# Patient Record
Sex: MALE | Employment: OTHER | URBAN - METROPOLITAN AREA
[De-identification: names, ages, dates, MRNs, and addresses within clinical notes are randomized per-mention and may not be internally consistent; named-entity substitution may affect disease eponyms.]

---

## 2019-12-04 ENCOUNTER — TRANSFERRED RECORDS (OUTPATIENT)
Dept: HEALTH INFORMATION MANAGEMENT | Facility: CLINIC | Age: 64
End: 2019-12-04

## 2019-12-18 ENCOUNTER — TRANSFERRED RECORDS (OUTPATIENT)
Dept: HEALTH INFORMATION MANAGEMENT | Facility: CLINIC | Age: 64
End: 2019-12-18

## 2020-02-11 ENCOUNTER — TRANSFERRED RECORDS (OUTPATIENT)
Dept: HEALTH INFORMATION MANAGEMENT | Facility: CLINIC | Age: 65
End: 2020-02-11

## 2020-04-20 ENCOUNTER — TELEPHONE (OUTPATIENT)
Dept: FAMILY MEDICINE | Facility: CLINIC | Age: 65
End: 2020-04-20

## 2020-04-20 DIAGNOSIS — Z01.84 ANTIBODY RESPONSE EXAMINATION: Primary | ICD-10-CM

## 2020-04-20 NOTE — TELEPHONE ENCOUNTER
COVID-19 Antibody Testing     Screening Questions  The patient must meet the following criteria to qualify for testing at this time:  • Testing will be performed at least ten days after initial symptom (cough, fever, shortness of breath) onset or at least 10 days after initial known exposure.     Information for Patient  • The test will be sent to [facility] and we will receive the results in 3-5 days.  • Results will be released to [employer] or mailed to you if you do not have a [employer] account.  • Because research is still ongoing, we will not be able to provide interpretation of your results.   • The cost of the test is $100 and we will bill this to your insurance company. If they do not pay for this study you will be responsible for the cost. You will be asked to sign a waiver at the time of specimen collection.  • Please bring your insurance card and photo identification with you to your lab visit.

## 2020-04-21 ENCOUNTER — APPOINTMENT (OUTPATIENT)
Dept: LAB | Facility: CLINIC | Age: 65
End: 2020-04-21
Payer: MEDICARE

## 2020-04-21 DIAGNOSIS — Z01.84 ANTIBODY RESPONSE EXAMINATION: ICD-10-CM

## 2020-04-21 PROCEDURE — 86769 SARS-COV-2 COVID-19 ANTIBODY: CPT

## 2020-04-21 PROCEDURE — 36415 COLL VENOUS BLD VENIPUNCTURE: CPT

## 2020-04-22 LAB
SARS-COV-2 IGG SERPL IA-ACNC: <1.01
SARS-COV-2 IGG SERPL QL IA: NEGATIVE

## 2020-05-28 ENCOUNTER — TRANSFERRED RECORDS (OUTPATIENT)
Dept: HEALTH INFORMATION MANAGEMENT | Facility: CLINIC | Age: 65
End: 2020-05-28

## 2020-06-01 ENCOUNTER — TRANSFERRED RECORDS (OUTPATIENT)
Dept: HEALTH INFORMATION MANAGEMENT | Facility: CLINIC | Age: 65
End: 2020-06-01

## 2020-06-12 ENCOUNTER — TRANSFERRED RECORDS (OUTPATIENT)
Dept: HEALTH INFORMATION MANAGEMENT | Facility: CLINIC | Age: 65
End: 2020-06-12

## 2020-06-12 ENCOUNTER — ANCILLARY PROCEDURE (OUTPATIENT)
Dept: NUCLEAR MEDICINE | Facility: CLINIC | Age: 65
End: 2020-06-12
Payer: MEDICARE

## 2020-06-12 DIAGNOSIS — K29.40 ATROPHIC GASTRITIS WITHOUT HEMORRHAGE: ICD-10-CM

## 2020-06-12 DIAGNOSIS — R63.4 WEIGHT LOSS: ICD-10-CM

## 2020-06-12 DIAGNOSIS — R68.81 EARLY SATIETY: ICD-10-CM

## 2020-06-12 DIAGNOSIS — R19.7 DIARRHEA, UNSPECIFIED TYPE: ICD-10-CM

## 2020-06-12 PROCEDURE — A9541 TC99M SULFUR COLLOID: HCPCS | Performed by: RADIOLOGY

## 2020-06-12 PROCEDURE — 78264 GASTRIC EMPTYING IMG STUDY: CPT | Mod: 26,MC | Performed by: RADIOLOGY

## 2020-06-12 PROCEDURE — 78264 GASTRIC EMPTYING IMG STUDY: CPT | Mod: MC

## 2020-07-01 ENCOUNTER — TRANSFERRED RECORDS (OUTPATIENT)
Dept: HEALTH INFORMATION MANAGEMENT | Facility: CLINIC | Age: 65
End: 2020-07-01

## 2020-07-20 NOTE — TELEPHONE ENCOUNTER
RECORDS RECEIVED FROM: [facility]BC Primary bilary cholangitis   Appt Date: 07.22.2020   NOTES STATUS DETAILS   OFFICE NOTE from referring provider Received  08.11.2020 DR [facility]   OFFICE NOTES from other specialists N/A    DISCHARGE SUMMARY from hospital N/A    MEDICATION LIST Received 08.11.2020 [facility]   LIVER BIOSPY (IF APPLICABLE)      PATHOLOGY REPORTS  N/A    IMAGING     ENDOSCOPY (IF AVAILABLE) N/A    COLONOSCOPY (IF AVAILABLE) N/A    ULTRASOUND LIVER Received 07.01.2020 [facility]   CT OF ABDOMEN N/A    MRI OF LIVER N/A    FIBROSCAN, US ELASTOGRAPHY, FIBROSIS SCAN, MR ELASTOGRAPHY N/A    LABS     HEPATIC PANEL (LIVER PANEL) Received 08.07.2020   BASIC METABOLIC PANEL N/A    COMPLETE METABOLIC PANEL Received 08.11.2020   COMPLETE BLOOD COUNT (CBC) Received 08.11.2020   INTERNATIONAL NORMALIZED RATIO (INR) Received 08.07.2020   HEPATITIS C ANTIBODY N/A    HEPATITIS C VIRAL LOAD/PCR N/A    HEPATITIS C GENOTYPE N/A    HEPATITIS B SURFACE ANTIGEN N/A    HEPATITIS B SURFACE ANTIBODY N/A    HEPATITIS B DNA QUANT LEVEL N/A    HEPATITIS B CORE ANTIBODY N/A      Action 07.202.2020   Action Taken Called Gastroenterologist in [place], [place] at [phone] lv, there fax number is [phone].    09.18.2020 Records received.

## 2020-07-21 ENCOUNTER — DOCUMENTATION ONLY (OUTPATIENT)
Dept: CARE COORDINATION | Facility: CLINIC | Age: 65
End: 2020-07-21

## 2020-07-22 ENCOUNTER — PRE VISIT (OUTPATIENT)
Dept: GASTROENTEROLOGY | Facility: CLINIC | Age: 65
End: 2020-07-22

## 2020-08-07 ENCOUNTER — TRANSFERRED RECORDS (OUTPATIENT)
Dept: HEALTH INFORMATION MANAGEMENT | Facility: CLINIC | Age: 65
End: 2020-08-07

## 2020-08-11 ENCOUNTER — TRANSFERRED RECORDS (OUTPATIENT)
Dept: HEALTH INFORMATION MANAGEMENT | Facility: CLINIC | Age: 65
End: 2020-08-11

## 2020-09-22 ENCOUNTER — VIRTUAL VISIT (OUTPATIENT)
Dept: GASTROENTEROLOGY | Facility: CLINIC | Age: 65
End: 2020-09-22
Attending: INTERNAL MEDICINE
Payer: MEDICARE

## 2020-09-22 DIAGNOSIS — K74.3 PRIMARY BILIARY CHOLANGITIS (H): Primary | ICD-10-CM

## 2020-09-22 RX ORDER — URSODIOL 500 MG/1
500 TABLET, FILM COATED ORAL 2 TIMES DAILY
COMMUNITY

## 2020-09-22 RX ORDER — FLUOXETINE 40 MG/1
40 CAPSULE ORAL DAILY
COMMUNITY

## 2020-09-22 RX ORDER — ASPIRIN 81 MG/1
81 TABLET ORAL
COMMUNITY

## 2020-09-22 RX ORDER — LOSARTAN POTASSIUM 50 MG/1
50 TABLET ORAL DAILY
COMMUNITY

## 2020-09-22 RX ORDER — ROSUVASTATIN CALCIUM 10 MG/1
10 TABLET, COATED ORAL DAILY
COMMUNITY

## 2020-09-22 SDOH — HEALTH STABILITY: MENTAL HEALTH: HOW OFTEN DO YOU HAVE A DRINK CONTAINING ALCOHOL?: 4 OR MORE TIMES A WEEK

## 2020-09-22 SDOH — HEALTH STABILITY: MENTAL HEALTH: HOW MANY STANDARD DRINKS CONTAINING ALCOHOL DO YOU HAVE ON A TYPICAL DAY?: 1 OR 2

## 2020-09-22 ASSESSMENT — PAIN SCALES - GENERAL: PAINLEVEL: MILD PAIN (3)

## 2020-09-22 NOTE — LETTER
"    9/22/2020         RE: Darin No  [address] [place] [address]        Dear Colleague,    Thank you for referring your patient, Darin No, to the [facility] HEPATOLOGY. Please see a copy of my visit note below.    Darin No is a 65 year old male who is being evaluated via a billable video visit.      The patient has been notified of following:     \"This video visit will be conducted via a call between you and your physician/provider. We have found that certain health care needs can be provided without the need for an in-person physical exam.  This service lets us provide the care you need with a video conversation.  If a prescription is necessary we can send it directly to your pharmacy.  If lab work is needed we can place an order for that and you can then stop by our lab to have the test done at a later time.    Video visits are billed at different rates depending on your insurance coverage.  Please reach out to your insurance provider with any questions.    If during the course of the call the physician/provider feels a video visit is not appropriate, you will not be charged for this service.\"    Patient has given verbal consent for Video visit? Yes  How would you like to obtain your AVS? MyChart  If you are dropped from the video visit, the video invite should be resent to: Text to cell phone: [phone]  Will anyone else be joining your video visit? No        Video-Visit Details    I pleasure seeing Roberto No for consultation via video visit at the [facility] liver clinic on September 22, 2020.  Mr. No presents for consultation regarding a diagnosis of primary biliary cholangitis.    He was recently found to have abnormal liver tests consisting of both transaminase elevation and an alkaline phosphatase elevation.  He is for the most part asymptomatic for this.  He denies any abdominal pain.  He did have some itching but that has resolved.  " He has mild fatigue.  He denies any increased abdominal girth or lower extremity edema.    He denies any fevers or chills, cough or shortness of breath.  He denies any nausea or vomiting, diarrhea or constipation.  His appetite is good but he has lost weight intentionally.  He does complain of some dry eyes and dry mouth.    Past medical history: He has had 5 previous knee surgeries and also had cataract removed.  He has been diagnosed in the past with eosinophilic esophagitis.  He also has hyperlipidemia and hypertension.  He has had colonic polyps and carries a diagnosis of gout.    Social history: He is a retired pediatric nurse practitioner.  He lives in [place] with his wife who has a gist tumor and gets her care here at the [facility].  He drinks about 8 drinks per week.    Family history: Negative for liver disease or other autoimmune disease.    A complete review of systems was negative other than that noted above.      Current Outpatient Medications   Medication     aspirin 81 MG EC tablet     FLUoxetine (PROZAC) 40 MG capsule     losartan (COZAAR) 50 MG tablet     rosuvastatin (CRESTOR) 10 MG tablet     ursodiol (ACTIGALL) 500 MG tablet     No current facility-administered medications for this visit.      On physical examination, he looks well.  HEENT exam shows no scleral icterus or temporal muscle wasting.  Neurologic exam is nonfocal.    His most recent laboratory tests were from August 7 and showed his white count was 5.2, hemoglobin was 16 and platelets were 252,000.  His AST was 16, ALT was 8 and alkaline phosphatase 100.  His albumin was 4.0 with a total protein of 7.6 and total bilirubin 0.8.  I did review his old records from [place] which showed he did have a positive AMA at a quite low titer.  The other testing was unremarkable.    My impression is that Mr. No does have primary biliary cholangitis.  The positive antimitochondrial antibody, which is highly specific  for PBC, the pattern of his liver tests and his excellent response to ursodiol are with leads me to make this diagnosis.  One certainly could do a liver biopsy to definitively confirm the diagnosis but I do not think it is necessary here.  Patient's have completely normalized her liver tests on ursodiol have an excellent prognosis and I do not see any reason to add any additional medication at this point in time.    He does have some dry eyes and dry mouth which is quite common in patients with primary biliary cholangitis.  He has been tested for hypothyroidism.  I have recommended he decrease his alcohol intake to no more than 2 drinks no more than twice a week.  I have not invited him to return for follow-up in 1 year or whenever time his wife comes back for follow-up here.    Thank you very much for allowing me to participate in the care of this patient.  If you have any questions regarding my recommendations, please do not hesitate to contact me.         Daniel Hill MD      Professor of Medicine  [employer]      Executive Medical Director, Solid Organ Transplant Program  [facility]    Type of service:  Video Visit    Video Start Time: 8:55 AM  Video End Time: 9:20 AM    Originating Location (pt. Location): Home    Distant Location (provider location):  [facility]     Platform used for Video Visit: [employer]          Again, thank you for allowing me to participate in the care of your patient.        Sincerely,        Daniel Hill MD

## 2020-09-22 NOTE — LETTER
Date:October 5, 2020      Patient was self referred, no letter generated. Do not send.        [facility] Health Information

## 2020-09-22 NOTE — PROGRESS NOTES
"Darin No is a 65 year old male who is being evaluated via a billable video visit.      The patient has been notified of following:     \"This video visit will be conducted via a call between you and your physician/provider. We have found that certain health care needs can be provided without the need for an in-person physical exam.  This service lets us provide the care you need with a video conversation.  If a prescription is necessary we can send it directly to your pharmacy.  If lab work is needed we can place an order for that and you can then stop by our lab to have the test done at a later time.    Video visits are billed at different rates depending on your insurance coverage.  Please reach out to your insurance provider with any questions.    If during the course of the call the physician/provider feels a video visit is not appropriate, you will not be charged for this service.\"    Patient has given verbal consent for Video visit? Yes  How would you like to obtain your AVS? MyChart  If you are dropped from the video visit, the video invite should be resent to: Text to cell phone: [phone]  Will anyone else be joining your video visit? No        Video-Visit Details    I pleasure seeing Roberto No for consultation via video visit at the [facility] liver clinic on September 22, 2020.  Mr. No presents for consultation regarding a diagnosis of primary biliary cholangitis.    He was recently found to have abnormal liver tests consisting of both transaminase elevation and an alkaline phosphatase elevation.  He is for the most part asymptomatic for this.  He denies any abdominal pain.  He did have some itching but that has resolved.  He has mild fatigue.  He denies any increased abdominal girth or lower extremity edema.    He denies any fevers or chills, cough or shortness of breath.  He denies any nausea or vomiting, diarrhea or constipation.  His appetite is good " but he has lost weight intentionally.  He does complain of some dry eyes and dry mouth.    Past medical history: He has had 5 previous knee surgeries and also had cataract removed.  He has been diagnosed in the past with eosinophilic esophagitis.  He also has hyperlipidemia and hypertension.  He has had colonic polyps and carries a diagnosis of gout.    Social history: He is a retired pediatric nurse practitioner.  He lives in [place] with his wife who has a gist tumor and gets her care here at the [facility].  He drinks about 8 drinks per week.    Family history: Negative for liver disease or other autoimmune disease.    A complete review of systems was negative other than that noted above.      Current Outpatient Medications   Medication     aspirin 81 MG EC tablet     FLUoxetine (PROZAC) 40 MG capsule     losartan (COZAAR) 50 MG tablet     rosuvastatin (CRESTOR) 10 MG tablet     ursodiol (ACTIGALL) 500 MG tablet     No current facility-administered medications for this visit.      On physical examination, he looks well.  HEENT exam shows no scleral icterus or temporal muscle wasting.  Neurologic exam is nonfocal.    His most recent laboratory tests were from August 7 and showed his white count was 5.2, hemoglobin was 16 and platelets were 252,000.  His AST was 16, ALT was 8 and alkaline phosphatase 100.  His albumin was 4.0 with a total protein of 7.6 and total bilirubin 0.8.  I did review his old records from [place] which showed he did have a positive AMA at a quite low titer.  The other testing was unremarkable.    My impression is that Mr. No does have primary biliary cholangitis.  The positive antimitochondrial antibody, which is highly specific for PBC, the pattern of his liver tests and his excellent response to ursodiol are with leads me to make this diagnosis.  One certainly could do a liver biopsy to definitively confirm the diagnosis but I do not think it is necessary here.   Patient's have completely normalized her liver tests on ursodiol have an excellent prognosis and I do not see any reason to add any additional medication at this point in time.    He does have some dry eyes and dry mouth which is quite common in patients with primary biliary cholangitis.  He has been tested for hypothyroidism.  I have recommended he decrease his alcohol intake to no more than 2 drinks no more than twice a week.  I have not invited him to return for follow-up in 1 year or whenever time his wife comes back for follow-up here.    Thank you very much for allowing me to participate in the care of this patient.  If you have any questions regarding my recommendations, please do not hesitate to contact me.         Daniel Hill MD      Professor of Medicine  [employer]      Executive Medical Director, Solid Organ Transplant Program  [facility]    Type of service:  Video Visit    Video Start Time: 8:55 AM  Video End Time: 9:20 AM    Originating Location (pt. Location): Home    Distant Location (provider location):   [facility]     Platform used for Video Visit: [employer]

## 2020-09-30 DIAGNOSIS — C43.62: Primary | ICD-10-CM

## 2020-09-30 NOTE — PROGRESS NOTES
Patient was a referral from [facility] for melanoma.  Patient to be scheduled for next opening with Dr. Rip Vásquez for excision of the melanoma.  Patient called and scheduled on Thursday, October 15.

## 2020-10-06 NOTE — PROGRESS NOTES
Juan Diego Rondon is a 65 y.o.  male who presents today for a wide local excision of the biopsy proven malignant melanoma left forearm.   This was biopsied by Seun Vásquez DO at [facility] on 9/18/2020.    He also questions a rash on his upper eyebrows reports it has been longstanding he has used T-Gel in the past without great improvement.    Review of Systems   Constitutional: Negative for fatigue and fever.   Skin: Negative for rash.   Allergic/Immunologic: Negative for environmental allergies and immunocompromised state.   Hematological: Does not bruise/bleed easily.   All other systems reviewed and are negative.      Past Medical History:   Diagnosis Date   • Melanoma (CMS/HCC) (HCC)        Current Outpatient Medications on File Prior to Visit   Medication Sig Dispense Refill   • rosuvastatin (CRESTOR) 10 mg tablet  30 6   • FLUoxetine (PROzac) 20 mg capsule  180 3   • losartan (COZAAR) 50 mg tablet  30 6   • ursodioL (AMANDEEP) 250 mg tablet Take 250 mg by mouth 4 (four) times a day     • aspirin 81 mg EC tablet Take 81 mg by mouth     • cyanocobalamin 1,000 mcg/mL injection Inject 1,000 mcg into the shoulder, thigh, or buttocks every 30 (thirty) days     • ARIPiprazole (ABILIFY) 5 mg tablet  45 1   • allopurinol (ZYLOPRIM) 100 mg tablet  90 3   • hydrocortisone 2.5 % lotion  59 1   • hydrocortisone 2.5 % cream  58 5   • omeprazole (PriLOSEC) 40 mg capsule  90 6   • oseltamivir (TAMIFLU) 75 mg capsule Take 1 capsule every day by oral route for 10 days. 10 0   • HYDROcodone-acetaminophen (NORCO) 5-325 mg per tablet Take 1 tablet every 4 hours by oral route as needed. 40 0   • olmesartan (BENICAR) 40 mg tablet Take 1 tablet every day by oral route.     • FLUoxetine (PROzac) 10 mg capsule Take 3 capsules every day by oral route.     • sildenafil (VIAGRA) 50 mg tablet Take 1 tablet every day by oral route as needed. 10      No current facility-administered medications on file prior to visit.        No  "Known Allergies     height is 1.905 m (6' 3\") and weight is 81.6 kg (180 lb). His temporal temperature is 36.1 °C (97 °F). His blood pressure is 104/67 and his pulse is 62. His respiration is 18 and oxygen saturation is 95%.           Areas examined include excised area below as well as face he has diffuse erythematous scale in a seborrheic distribution focused along the eyebrows.        Derm Wide Local Excision  Diagnosis: melanoma  Morphology: mm  Precise:   Site: Left forearm [id number]/0988  Pre-Op Size: 1 cm scarred plaque      Informed Consent: Discussed risks (permanent scarring, light or dark discoloration, infection, pain, bleeding, bruising, damage to nearby structures, damage to nerves causing numbness or weakness, and recurrence of the lesion) and benefits of the procedure, as well as the alternatives.  Informed consent was obtained.    Preparation: The area was prepared and draped in a standard fashion.    Anesthesia: Lidocaine 1% with epinephrine (1:022640) 7 cc's administered by Keira Meng CNP    Procedure Details: The lesion was excised down to subcutaneous tissue with a 1 cm margin of normal appearing skin.    Skin excision    Date/Time: 10/15/2020 8:21 AM  Performed by: Gracia Manriquez MD  Authorized by: Gracia Manriquez MD   Consent  Verbal consent was obtained from the patient. Written consent was obtained from the patient. Risks include permanent scarring, light or dark discoloration, infection, pain, bleeding, bruising, redness, blister formation and recurrence of the lesion. Consent given by patient. The patient states understanding of the procedure being performed. Patient ID confirmed verbally with the patient.     Number of Lesions:  1  Lesion 1:     Excision type:  Full-thickness wide local excision     Body area:  Upper extremity    Site:  Left lower arm    Margin size (mm):  10    Total excision size (mm): 30    Malignancy: malignant lesion    Closure Details:  Defect repaired by " linear closure.  Complexity of closure was intermediate. Epidermis were approximated and closed using 4-0 Ethilon. A total of sutures were placed in a continuous running fashion. Dermis were approximated and closed using 4-0 Vicryl. A total of sutures were placed in a simple interrupted fashion. The size of the repair was 8 cm.      Closure comments: AnSkin excision    Date/Time: 10/15/2020 8:21 AM  Performed by: Gracia Manriquez MD  Authorized by: Gracia Manriquez MD   Consent  Verbal consent was obtained from the patient. Written consent was obtained from the patient. Risks include permanent scarring, light or dark discoloration, infection, pain, bleeding, bruising, redness, blister formation and recurrence of the lesion. Consent given by patient. The patient states understanding of the procedure being performed. Patient ID confirmed verbally with the patient.     Number of Lesions:  1  Lesion 1:     Excision type:  Full-thickness wide local excision     Body area:  Upper extremity    Site:  Left lower arm    Margin size (mm):  1 cm    Malignancy: malignant lesion    Closure Details:  Defect repaired by linear closure.  Complexity of closure was intermediate. Epidermis were approximated and closed using 4-0 Ethilon. A total of sutures were placed in a continuous running fashion. Dermis were approximated and closed using 4-0 Vicryl. A total of sutures were placed in a simple interrupted fashion.      Closure comments: Anesthesia 7 cc's 1% lidocaine with epinepherine administered by Keira Meng. After hemostasis was noted, vaseline was liberally applied to wound.  A pressure dressing consisting of telfa, gauze and hypafix tape was then applied.                      Excised diameter: 8.3 cm.    Closure:    Intermediate Closure note:  Using a clean marking pen, an elliptical excision was designed around the defect.  The area around the defect was infiltrated with lidocaine 1%, 1:100,000 epinephrine. The  involved area was washed with Betadine x 6 draped in sterile fashion. Undermining was performed by both blunt and sharp dissection to minimize wound tension and prevent distortion of surrounding anatomic structures. Hemostasis was obtained by pressure and vicryl sutures The wound was closed in 2 layers with 4-0 vicryl interrupted sutures for the deep layers and 4-0 nylon running locked sutures for the superficial layer. The final length of the surgical closure was 8.3 cm.    Vaseline and a sterile dressing applied. The specimen was sent for pathologic examination. The patient tolerated the procedure well.    Plan: The patient was instructed on post-op care. Recommend OTC analgesia as needed for pain. Return for suture removal in 2 weeks.    Juan Diego was seen today for melanoma.    Diagnoses and all orders for this visit:    Melanoma of forearm, left (CMS/HCC) ([facility])  -     Specimen to pathology; Future  -     Skin excision    Seborrheic dermatitis  -     ketoconazole (Nizoral) 2 % shampoo; Apply 1 application topically 2 (two) times a week Apply to damp skin, lather, leave on 5 minutes, and rinse    Ketoconazole shampoo given for mild seborrheic dermatitis leave on for 5 minutes prior to rinsing zinc shampoo also provided.    Return in about 3 months (around 1/15/2021) for skin check.

## 2020-10-06 NOTE — PATIENT INSTRUCTIONS
WOUND CARE AFTER A SURGERY      **IF YOU HAD A SKIN GRAFT DONE, DO NOT REMOVE THE BANDAGE, KEEP IT ON, DRY AND DO NOT CHANGE  IT. THE FOLLOWING ORDERS WILL PERTAIN ONLY TO YOUR DONOR SITE (THE AREA THE SKIN WAS TAKEN FROM FOR THE GRAFT)    IF YOUR STITCHES ARE ON YOUR FACE, ABSOLUTELY NO SHAVING!!! WHATSOEVER UNLESS THE WOUND IS COVERED WITH A BANDAGE FIRST.  ELECTRIC RAZORS WILL TAKE OUT SUTURES JUST AS THEY WILL WHISKERS!!    After your surgical procedure, a pressure dressing will be placed on your surgery site(s).  Unless otherwise instructed by your physician, you will leave this tighter pressure bandage on for 2 days (48 hours) after your procedure, OR, until you return to see us.  This is to minimize any bleeding that can possibly occur after surgery.  After the specified time, you may remove the bandage and clean the area and sutures with:      • Dove soap, warm water and hands, no wash cloth once daily. Unless otherwise instructed by your physician. DO  NOT use alcohol or hydrogen peroxide.    • Apply a thin layer or POLYSPORIN ANTIBIOTIC OINTMENT OR VASELINE with a q-tip 2-3 times daily. Massage ointment into sutures. Use only enough ointment to make wound slightly greasy and wet looking.      • For the next 48 hours, avoid ANY activity that could increase your heartrate. This will increase your risk of bleeding. Avoid housework, yardwork, exercise, climbing stairs multiple times or carrying anything heavier than a book. If surgery was done on your face, avoid bending over so your head does not go below your heart. You may sit, sleep, watch TV, read or be on a computer.     • Cover wound with a bandaid or not stick bandage such as Telfa. The wound needs to be covered at bedtime, if you leave your home, if clothing or glasses rest or rub over your surgical site. Your wound may be left uncovered during the day if you are inside your home as long as clothing or glasses do not rub or rest on your surgical  site.    • Take Tylenol for any discomfort. If Tylenol doesn’t relieve the pain, please let us know. Your physician may suggest other medications for pain control also.    ** IF REDNESS, UNEXPECTED SWELLING, DRAINAGE OR INCREASED PAIN DEVELOPS, PLEASE CALL OUR OFFICE AS SOON AS POSSIBLE. IF IT IS EMERGENT SUCH AS BLEEDING, OUR PHYSICIANS CAN BE REACHED THROUGH OUR ANSWERING SERVICE.  [facility] DERMATOLOGY [phone]          IF YOU HAD SURGERY TO YOUR LEG, ARM, HAND OR FOOT    · It is normal to have some post-surgical swelling. If you notice increased swelling that turns the skin color white, purple or extremely cold,  causes numbness or loss of sensation in the fingers or toes, contact our office immediately, your wrap may need to be loosened and a physician will direct you.    · Elevate surgical site above your heart as much as possible for 48 hours after surgery to reduce swelling. You may want to use a sling to elevate an arm or hand or pillows to elevate legs and feet depending on where your surgery was.

## 2020-10-15 ENCOUNTER — PROCEDURE VISIT (OUTPATIENT)
Dept: DERMATOLOGY | Facility: CLINIC | Age: 65
End: 2020-10-15
Payer: MEDICARE

## 2020-10-15 VITALS
RESPIRATION RATE: 18 BRPM | DIASTOLIC BLOOD PRESSURE: 67 MMHG | HEIGHT: 75 IN | WEIGHT: 180 LBS | HEART RATE: 62 BPM | OXYGEN SATURATION: 95 % | SYSTOLIC BLOOD PRESSURE: 104 MMHG | TEMPERATURE: 97 F | BODY MASS INDEX: 22.38 KG/M2

## 2020-10-15 DIAGNOSIS — L21.9 SEBORRHEIC DERMATITIS: ICD-10-CM

## 2020-10-15 DIAGNOSIS — C43.62: Primary | ICD-10-CM

## 2020-10-15 PROCEDURE — 12034 INTMD RPR S/TR/EXT 7.6-12.5: CPT | Performed by: DERMATOLOGY

## 2020-10-15 PROCEDURE — 11603 EXC TR-EXT MAL+MARG 2.1-3 CM: CPT | Performed by: DERMATOLOGY

## 2020-10-15 PROCEDURE — 88305 TISSUE EXAM BY PATHOLOGIST: CPT | Performed by: DERMATOLOGY

## 2020-10-15 RX ORDER — ASPIRIN 81 MG/1
81 TABLET ORAL
COMMUNITY
End: 2021-04-09 | Stop reason: ALTCHOICE

## 2020-10-15 RX ORDER — CYANOCOBALAMIN 1000 UG/ML
1000 INJECTION, SOLUTION INTRAMUSCULAR; SUBCUTANEOUS
COMMUNITY
Start: 2020-07-14 | End: 2024-06-17 | Stop reason: SDUPTHER

## 2020-10-15 RX ORDER — KETOCONAZOLE 20 MG/ML
1 SHAMPOO, SUSPENSION TOPICAL 2 TIMES WEEKLY
Qty: 120 ML | Refills: 0 | Status: SHIPPED | OUTPATIENT
Start: 2020-10-15 | End: 2021-06-01

## 2020-10-15 RX ORDER — URSODIOL 250 MG/1
250 TABLET, FILM COATED ORAL 4 TIMES DAILY
COMMUNITY
Start: 2020-10-06 | End: 2021-04-09 | Stop reason: DRUGHIGH

## 2020-10-15 ASSESSMENT — ENCOUNTER SYMPTOMS
FEVER: 0
FATIGUE: 0
BRUISES/BLEEDS EASILY: 0

## 2020-10-15 ASSESSMENT — PAIN SCALES - GENERAL: PAINLEVEL: 0-NO PAIN

## 2020-10-19 ENCOUNTER — TELEPHONE (OUTPATIENT)
Dept: DERMATOLOGY | Facility: CLINIC | Age: 65
End: 2020-10-19

## 2020-10-20 ENCOUNTER — TELEPHONE (OUTPATIENT)
Dept: DERMATOLOGY | Facility: CLINIC | Age: 65
End: 2020-10-20

## 2020-10-20 NOTE — TELEPHONE ENCOUNTER
Deidre King LPN left message for patient to call back for pathology result 10/19/20, 11:39.    I also LMTCB for results today. No residual Malignant melanoma identified. Patient has January skin check scheduled.    See result note   Dr. Marcial Marcelo has been Perfect Served, Patient hasn't received Lasix since yesterday night and is ordered for tomorrow. She gave approval for it to be given today.

## 2020-11-05 ENCOUNTER — APPOINTMENT (OUTPATIENT)
Dept: LAB | Facility: CLINIC | Age: 65
End: 2020-11-05
Payer: MEDICARE

## 2020-11-05 DIAGNOSIS — R74.8 ACID PHOSPHATASE ELEVATED: ICD-10-CM

## 2020-11-05 DIAGNOSIS — E53.8 B12 DEFICIENCY: Primary | ICD-10-CM

## 2020-11-05 DIAGNOSIS — R53.83 FATIGUE, UNSPECIFIED TYPE: ICD-10-CM

## 2020-11-05 LAB
25(OH)D3 SERPL-MCNC: 29 NG/ML (ref 30–100)
TESTOST SERPL-MCNC: 259 NG/DL (ref 240–950)
VIT B12 SERPL-MCNC: 648 PG/ML (ref 180–914)

## 2020-11-05 PROCEDURE — 82306 VITAMIN D 25 HYDROXY: CPT

## 2020-11-05 PROCEDURE — 36415 COLL VENOUS BLD VENIPUNCTURE: CPT

## 2020-11-05 PROCEDURE — 82607 VITAMIN B-12: CPT

## 2020-11-05 PROCEDURE — 84403 ASSAY OF TOTAL TESTOSTERONE: CPT

## 2021-01-04 ENCOUNTER — HEALTH MAINTENANCE LETTER (OUTPATIENT)
Age: 66
End: 2021-01-04

## 2021-01-05 NOTE — PROGRESS NOTES
Subjective   Return Dermatology Skin examination  Juan Diego Rondon is a 65 y.o. male with a history of melanoma, who presents for a full body skin exam . Lesions of concern include: none.      Malignant Melanoma left forearm, .6mm Breslow, no mitotic figures, no ulceration or regression. Re-excised 10/15/2020.      Review of Systems   Constitutional: Negative for fatigue and fever.   Skin: Negative for rash.   Allergic/Immunologic: Positive for environmental allergies. Negative for immunocompromised state.   Hematological: Does not bruise/bleed easily.   All other systems reviewed and are negative.           Past Medical History:   Diagnosis Date   • Melanoma (CMS/HCC) (HCC)        Current Outpatient Medications on File Prior to Visit   Medication Sig Dispense Refill   • FLUoxetine (PROzac) 20 mg capsule Take 40 mg by mouth daily     • ursodioL (AMANDEEP) 250 mg tablet Take 250 mg by mouth 4 (four) times a day     • aspirin 81 mg EC tablet Take 81 mg by mouth     • cyanocobalamin 1,000 mcg/mL injection Inject 1,000 mcg into the shoulder, thigh, or buttocks every 30 (thirty) days     • rosuvastatin (CRESTOR) 10 mg tablet  30 6   • hydrocortisone 2.5 % cream  58 5   • losartan (COZAAR) 50 mg tablet  30 6   • ARIPiprazole (ABILIFY) 5 mg tablet  45 1   • allopurinol (ZYLOPRIM) 100 mg tablet  90 3   • FLUoxetine (PROzac) 20 mg capsule  180 3   • hydrocortisone 2.5 % lotion  59 1   • omeprazole (PriLOSEC) 40 mg capsule  90 6   • oseltamivir (TAMIFLU) 75 mg capsule Take 1 capsule every day by oral route for 10 days. 10 0   • HYDROcodone-acetaminophen (NORCO) 5-325 mg per tablet Take 1 tablet every 4 hours by oral route as needed. 40 0   • olmesartan (BENICAR) 40 mg tablet Take 1 tablet every day by oral route.     • FLUoxetine (PROzac) 10 mg capsule Take 3 capsules every day by oral route.     • sildenafil (VIAGRA) 50 mg tablet Take 1 tablet every day by oral route as needed. 10      No current facility-administered  "medications on file prior to visit.        Allergies  Patient has no known allergies.    The following have been reviewed and updated as appropriate in this visit  Tobacco  Allergies  Meds  Problems  Med Hx  Surg Hx  Fam Hx         Physical Examination    Vital Signs  height is 1.905 m (6' 3\") and weight is 81.6 kg (180 lb). His oral temperature is 37.3 °C (99.1 °F). His blood pressure is 118/76 and his pulse is 71. His respiration is 16 and oxygen saturation is 95%.         Physical Exam Findings:   Crump skin type:II    Constitutional: General Appearance: healthy-appearing, well-nourished, and well-developed. Level of Distress: NAD. Ambulation: ambulating normally.  Psychiatric: Insight: good judgement. Mental Status: normal mood and affect and active and alert. Orientation: to time, place, and person. Memory: recent memory normal and remote memory normal.  Head: normocephalic and atraumatic.  Eyes: Lids and Conjunctivae: no discharge or pallor and non-injected. Lens: clear. Sclerae: non-icteric.  Neurologic: Gait and Station: normal gait and station. Cranial Nerves: grossly intact. Coordination and Cerebellum: no tremor.    Lymph nodes exam:  Negative post auricular, clavicular, axillary, inguinal, popliteal adenopathy     A full body skin examination was performed including scalp, face, lips, ears, neck, both arms, chest, back, abdomen, buttocks, both legs, both feet                                        Trunk (including chest, abdomen, back): Stuck on tan brown papules, well demarcated tan brown macules, bright red cherry papules.    Arms: Stuck on tan brown papules, well demarcated tan brown macules, bright red cherry papules.  Well healed surgical scar left forearm no evidence of local recurrence of melanoma      buttock: No suspicious lesions noted.    legs: well demarcated tan brown macules.    Right outer thigh, 5 mm scaly papule, shave biopsy today See procedure note for treated " lesions    feet: See nevi list.    scalp: No suspicious lesions noted.   Face: See nevi list, well demarcated tan brown macules.  Gritty erythematous papule.  See procedure note for treated lesions.      Lymph nodes exam:  Negative post auricular, clavicular, axillary, inguinal, popliteal adenopathy     Lesions to follow:  Left heel, 3 mm brown macule  Left outer cheek, 3 mm brown macule  Right jawline, 3 mm excoriation  To be observed, patient to call if lesion should change prior to next appointment     Procedure Note:  Lesion Biopsy    Date/Time: 1/19/2021 9:29 AM  Performed by: Gracia Manriquez MD      Consent  Verbal consent was obtained from the patient. Written consent was not obtained from the patient. Risks, benefits, and alternatives were discussed. Consent given by patient. The patient states understanding of the procedure being performed. Patient ID confirmed verbally with patient.         Biopsy 1    Location Details  Body area: lower extremity  Lower extremity site: right outer thigh [id number].  Lesion size: 0.5 cm.     Preparation Details  Adequate anesthesia is achieved using lidocaine 1% with epinephrine (administered by Dr. Manriquez) in a local infiltration method. Area cleaned and prepped with Alcohol.     Procedure Details  Shave biopsy completed with dermablade. Biopsy performed to the depth of other (dermis). Hemostasis obtained with aluminium chloride and pressure.                     Post-Procedure  Specimen was sent to Pathology. Gauze and adhesive bandage applied for dressing. Patient tolerated the procedure well with no complications.       Destruction of Premalignant Lesion    Date/Time: 1/19/2021 9:30 AM  Performed by: Gracia Manriquez MD      Consent  Verbal consent was obtained from the patient. Written consent was not obtained from the patient. Risks include permanent scarring, light or dark discoloration, infection, pain, bleeding, bruising, redness, blister formation  and recurrence of the lesion. Consent given by patient. The patient states understanding of the procedure being performed. Patient ID confirmed verbally with patient.     Location:  1 total lesions removed   Head/neck location(s): left eyebrow (left inner brow)  Number of head/neck lesions removed: 1        Procedure Details   Lesion(s) are pre-malignant. Local anesthesia was not used. Lesion(s) destroyed with: liquid nitrogen. Number of freeze/thaw cycles was 1. Lesion(s) were frozen until an ice ball extended beyond the lesion.     Post-Procedure  Patient tolerated procedure well with no complications.                Juan Diego was seen today for follow-up.    Diagnoses and all orders for this visit:    Neoplasm of uncertain behavior of skin  -     Lesion Biopsy  -     Dermatology Clinic Pathology Date of Pathology Collection: 1/19/2021; Pathology Report ID: [id number] right outer thigh; Specimen Description/Clinical History: atypical lesion SKL to read    History of melanoma    Scar of skin    Seborrheic keratoses    Lentigines    Hemangioma of skin    Keratosis, actinic  -     Destruction of Premalignant Lesion    Excoriation        -- Scars of previous Malignant Melanoma well healed without evidence of recurrence, will continue to monitor.  Discussed with patient necessity for continued monitoring of skin, sunscreen use with a broad spectrum sunscreen  and regular skin examinations to ensure no new or recurrent skin cancers.     -  No local evidence of recurrence of melanoma, continue skin checks every six months    - biopsy and cryotherapy as above, will follow up results  -Discussed excoriation with patient patient understands to call should he not note healing over the next 2 to 3 weeks.    I emphasized use of broad spectrum daily sunscreen use with an SPF of 30-50, broad spectrum and water resistant. I recommended vanicream sport and neutrogena ultrasheer. I directed reapplication every two hours.  I  recommended wide brimmed hats.  Finally, we discussed danger signs of changing skin lesions including sores not healing and moles changing in size, shape or color.  Should any of these lesions occur before next appointment, I instructed patent to call sooner for evaluation.    Patient expresses understanding and agreement with the plan of care, and had no further questions at the end of the exam today.     No follow-ups on file.      Portions of this note may have been dictated using Dragon DMO voice recognition software.  Though the note has been proofread, small discrepencies may exist.  If a significant discrepancy is identified please alert me to further review.

## 2021-01-05 NOTE — PATIENT INSTRUCTIONS
You were treated with liquid nitrogen today. You should expect these areas to burn and once the burning subsides, the area(s)may itch. You should expect some reaction anywhere from welting of the skin to larger liquid filled blisters depending on how aggressively your lesion(s) needed to be treated. If you had treatment on your forehead, eyebrow areas or cheekbones, you could have some swelling under your eyes. This is normal and nothing to worry about. Blisters should be left intact until they pop and drain on their own. You will most likely have a clear drainage much like a blister when it pops.     The areas treated should be cared for by gentle daily cleansing with Dove soap and water and your hands. You should apply Polysporin ointment or Vaseline  WITH A Q-TIP to the areas daily as they are healing and continue this until they are completely healed. It may take anywhere from 7-14 days for areas to completely heal. If any other area than your face was treated, it may take longer for those areas to heal.    If warts were treated, they may turn to blood blisters and may appear purple, red or black. Leave the blister intact and let it pop on its own. Keep these areas dry and clean and do not use any ointment or Vaseline to warts.    IF YOU NOTICE INCREASED SURROUNDING REDNESS, TENDERNESS OR A PUS-LIKE DRAINAGE, PLEASE CONTACT OUR OFFICE IMMEDIATELY.  WOUND CARE FOLLOWING BIOPSY    After your biopsy a dressing will be placed on the site.  You may remove bandage at end of day.  This is to minimize any bleeding that can possibly occur after surgery.  To clean the area, use Dove soap, warm water and hands, no wash cloth, once daily    Next, apply a layer of POLYSPORIN ANTIBIOTIC OINTMENT or VASELINE, daily.  Do this until healed.  We will contact you with your results when available.  Please call with questions or concerns    If you have not received results in 2 weeks, please call our office and reference your biopsy  "number [id number]      Avoid sunlight between the hours of 10 am and 2 pm, wear a broad spectrum, water resistant sunscreen with SPF of 30-50 year round. Reapply sunscreen every 2 hours and after exercising or swimming. Wearing a 6\" broad brimmed hat, a lip sunblock of SPF of 30-50, and sun protective clothing is also suggested year round.   Recommended sunscreens include Neutrogena Ultra Sheer Dry Touch SPF 50 or higher, Neutrogena Sheer Zinc Dry Touch SPF 50, and Vanicream Sport.  -----------------------------------------------------------------------------------------------  Monthly self-examination of your skin is important. Should any lesions, including moles, change in size, shape, color, itch, bleed or burn, contact our office for sooner evaluation.  ---------------------------------------------------------------------------------------------  **[employer] is our survey company. You will be receiving a survey about your care. Your satisfaction is important to us and we are always looking for opportunities to improve your experience. If you come across questions in the survey that are not applicable to your visit, please leave these questions blank. Your identity is kept confidential so please be honest! Thank you!**    "

## 2021-01-19 ENCOUNTER — OFFICE VISIT (OUTPATIENT)
Dept: DERMATOLOGY | Facility: CLINIC | Age: 66
End: 2021-01-19
Payer: MEDICARE

## 2021-01-19 VITALS
HEIGHT: 75 IN | DIASTOLIC BLOOD PRESSURE: 76 MMHG | BODY MASS INDEX: 22.38 KG/M2 | SYSTOLIC BLOOD PRESSURE: 118 MMHG | HEART RATE: 71 BPM | WEIGHT: 180 LBS | RESPIRATION RATE: 16 BRPM | TEMPERATURE: 99.1 F | OXYGEN SATURATION: 95 %

## 2021-01-19 DIAGNOSIS — D48.5 NEOPLASM OF UNCERTAIN BEHAVIOR OF SKIN: Primary | ICD-10-CM

## 2021-01-19 DIAGNOSIS — D18.01 HEMANGIOMA OF SKIN: ICD-10-CM

## 2021-01-19 DIAGNOSIS — L90.5 SCAR OF SKIN: ICD-10-CM

## 2021-01-19 DIAGNOSIS — L57.0 KERATOSIS, ACTINIC: ICD-10-CM

## 2021-01-19 DIAGNOSIS — L81.4 LENTIGINES: ICD-10-CM

## 2021-01-19 DIAGNOSIS — L82.1 SEBORRHEIC KERATOSES: ICD-10-CM

## 2021-01-19 DIAGNOSIS — Z85.820 HISTORY OF MELANOMA: ICD-10-CM

## 2021-01-19 DIAGNOSIS — T14.8XXA EXCORIATION: ICD-10-CM

## 2021-01-19 LAB — DERM CLINIC PATH RESULTS: NORMAL

## 2021-01-19 PROCEDURE — 88305 TISSUE EXAM BY PATHOLOGIST: CPT | Performed by: DERMATOLOGY

## 2021-01-19 PROCEDURE — 99213 OFFICE O/P EST LOW 20 MIN: CPT | Mod: 25 | Performed by: DERMATOLOGY

## 2021-01-19 PROCEDURE — G0463 HOSPITAL OUTPT CLINIC VISIT: HCPCS | Performed by: DERMATOLOGY

## 2021-01-19 PROCEDURE — 88305 TISSUE EXAM BY PATHOLOGIST: CPT | Mod: 26 | Performed by: DERMATOLOGY

## 2021-01-19 PROCEDURE — 11102 TANGNTL BX SKIN SINGLE LES: CPT | Performed by: DERMATOLOGY

## 2021-01-19 PROCEDURE — 17000 DESTRUCT PREMALG LESION: CPT | Mod: 59 | Performed by: DERMATOLOGY

## 2021-01-19 RX ORDER — FLUOXETINE HYDROCHLORIDE 20 MG/1
40 CAPSULE ORAL EVERY EVENING
COMMUNITY
End: 2021-04-09 | Stop reason: DRUGHIGH

## 2021-01-19 ASSESSMENT — ENCOUNTER SYMPTOMS
FEVER: 0
BRUISES/BLEEDS EASILY: 0
FATIGUE: 0

## 2021-01-19 ASSESSMENT — PAIN SCALES - GENERAL: PAINLEVEL: 0-NO PAIN

## 2021-01-29 ENCOUNTER — HOSPITAL ENCOUNTER (OUTPATIENT)
Dept: NUCLEAR MEDICINE | Facility: HOSPITAL | Age: 66
Discharge: HOME | End: 2021-01-29
Payer: MEDICARE

## 2021-01-29 DIAGNOSIS — M54.50 LOW BACK PAIN, UNSPECIFIED BACK PAIN LATERALITY, UNSPECIFIED CHRONICITY, UNSPECIFIED WHETHER SCIATICA PRESENT: ICD-10-CM

## 2021-01-29 DIAGNOSIS — R63.4 WEIGHT LOSS: ICD-10-CM

## 2021-01-29 DIAGNOSIS — R74.8 ELEVATED ALKALINE PHOSPHATASE IN NEWBORN: ICD-10-CM

## 2021-01-29 DIAGNOSIS — M79.659 PAIN OF THIGH, UNSPECIFIED LATERALITY: ICD-10-CM

## 2021-01-29 DIAGNOSIS — M89.8X9 BONE PAIN: ICD-10-CM

## 2021-01-29 DIAGNOSIS — K74.3 PRIMARY BILIARY CHOLANGITIS (CMS/HCC): ICD-10-CM

## 2021-01-29 PROCEDURE — A9503 TC99M MEDRONATE: HCPCS | Performed by: RADIOLOGY

## 2021-01-29 PROCEDURE — A9503 TC99M MEDRONATE: HCPCS

## 2021-01-29 PROCEDURE — 78306 BONE IMAGING WHOLE BODY: CPT | Mod: MG

## 2021-02-01 ENCOUNTER — APPOINTMENT (OUTPATIENT)
Dept: LAB | Facility: CLINIC | Age: 66
End: 2021-02-01
Payer: MEDICARE

## 2021-02-01 DIAGNOSIS — D64.9 ANEMIA, UNSPECIFIED TYPE: ICD-10-CM

## 2021-02-01 DIAGNOSIS — R79.89 ELEVATED FERRITIN: Primary | ICD-10-CM

## 2021-02-01 DIAGNOSIS — E53.8 FOLATE DEFICIENCY: ICD-10-CM

## 2021-02-01 DIAGNOSIS — K74.3 PRIMARY BILIARY CHOLANGITIS (CMS/HCC): ICD-10-CM

## 2021-02-01 LAB
ALBUMIN SERPL-MCNC: 3.8 G/DL (ref 3.5–5.3)
ALP SERPL-CCNC: 93 U/L (ref 45–115)
ALT SERPL-CCNC: 7 U/L (ref 7–52)
AST SERPL-CCNC: 14 U/L
BASOPHILS # BLD AUTO: 0 10*3/UL
BASOPHILS NFR BLD AUTO: 0 % (ref 0–2)
BILIRUB DIRECT SERPL-MCNC: 0.13 MG/DL
BILIRUB SERPL-MCNC: 0.56 MG/DL (ref 0.2–1.4)
EOSINOPHIL # BLD AUTO: 0.1 10*3/UL
EOSINOPHIL NFR BLD AUTO: 2 % (ref 0–3)
ERYTHROCYTE [DISTWIDTH] IN BLOOD BY AUTOMATED COUNT: 13.9 % (ref 11.5–15)
FERRITIN SERPL-MCNC: 374.2 NG/ML (ref 24–336)
FOLATE SERPL-MCNC: 2.2 NG/ML
HCT VFR BLD AUTO: 40.6 % (ref 38–50)
HGB BLD-MCNC: 14.1 G/DL (ref 13.2–17.2)
LYMPHOCYTES # BLD AUTO: 2 10*3/UL
LYMPHOCYTES NFR BLD AUTO: 36 % (ref 15–47)
MACROCYTOSIS PRESENCE IN BLOOD, ANALYZER: ABNORMAL
MCH RBC QN AUTO: 35.1 PG (ref 29–34)
MCHC RBC AUTO-ENTMCNC: 34.7 G/DL (ref 32–36)
MCV RBC AUTO: 101.1 FL (ref 82–97)
MONOCYTES # BLD AUTO: 0.5 10*3/UL
MONOCYTES NFR BLD AUTO: 9 % (ref 5–13)
NEUTROPHILS # BLD AUTO: 2.9 10*3/UL
NEUTROPHILS NFR BLD AUTO: 53 % (ref 46–70)
PLATELET # BLD AUTO: 268 10*3/UL (ref 130–350)
PMV BLD AUTO: 7.1 FL (ref 6.9–10.8)
PROT SERPL-MCNC: 8 G/DL (ref 6–8.3)
RBC # BLD AUTO: 4.01 10*6/ΜL (ref 4.1–5.8)
WBC # BLD AUTO: 5.5 10*3/UL (ref 3.7–9.6)

## 2021-02-01 PROCEDURE — 82728 ASSAY OF FERRITIN: CPT

## 2021-02-01 PROCEDURE — 80076 HEPATIC FUNCTION PANEL: CPT

## 2021-02-01 PROCEDURE — 82746 ASSAY OF FOLIC ACID SERUM: CPT

## 2021-02-01 PROCEDURE — 81256 HFE GENE: CPT

## 2021-02-01 PROCEDURE — 85025 COMPLETE CBC W/AUTO DIFF WBC: CPT

## 2021-02-01 PROCEDURE — 36415 COLL VENOUS BLD VENIPUNCTURE: CPT

## 2021-02-05 LAB
GENETICIST REVIEW: NORMAL
HFE GENE MUT ANL BLD/T: NORMAL
MOL DX INTERP BLD/T QL: NEGATIVE
PROVIDER SIGNING NAME: NORMAL
REF LAB TEST METHOD: NORMAL
SPECIMEN SOURCE: NORMAL

## 2021-02-09 ENCOUNTER — TELEPHONE (OUTPATIENT)
Dept: DERMATOLOGY | Facility: CLINIC | Age: 66
End: 2021-02-09

## 2021-02-09 ENCOUNTER — ANCILLARY PROCEDURE (OUTPATIENT)
Dept: ULTRASOUND IMAGING | Facility: CLINIC | Age: 66
End: 2021-02-09
Payer: MEDICARE

## 2021-02-09 DIAGNOSIS — L57.0 ACTINIC KERATOSIS: Primary | ICD-10-CM

## 2021-02-09 DIAGNOSIS — K74.3 PRIMARY BILIARY CIRRHOSIS (CMS/HCC): ICD-10-CM

## 2021-02-09 PROCEDURE — 76700 US EXAM ABDOM COMPLETE: CPT | Mod: 26 | Performed by: RADIOLOGY

## 2021-02-09 PROCEDURE — 76700 US EXAM ABDOM COMPLETE: CPT

## 2021-02-09 NOTE — TELEPHONE ENCOUNTER
Caller would like to discuss the biopsy results    Patient: Juan Diego Rondon    Caller Name Patient    Name of Facility:     Callback Number: [phone]    Best Availability: any    Fax Number:    Additional Info:     Is it okay that the nurse communicates your response through [employer]? No    I advised caller of a callback by the end of the business day or if call received after 4:00pm, the next business day.

## 2021-02-10 NOTE — TELEPHONE ENCOUNTER
Notified patient that biopsy results shows actinic keratosis to right outer thigh.  Notified patient that this will require liquid nitrogen cryotherapy.  Patient will schedule a follow up to have this treated.  All patient questions answered.

## 2021-02-15 ENCOUNTER — APPOINTMENT (OUTPATIENT)
Dept: EMPLOYEE HEALTH | Facility: CLINIC | Age: 66
End: 2021-02-15
Payer: MEDICARE

## 2021-02-15 DIAGNOSIS — Z02.1 PRE-EMPLOYMENT HEALTH SCREENING EXAMINATION: Primary | ICD-10-CM

## 2021-02-15 LAB
HBV SURFACE AB SER QL: 13.2 MIU/ML
MEV IGG FLD-ACNC: NORMAL
MUMPS AB INDEX: 2.8 AI
MUV IGG FLD QL IA: NORMAL
RUBELLA IGG INTERP: NORMAL
RUBEOLA/MEASELS AB INDEX: 4.5 AI
RUBV IGG SER-ACNC: 1.4 AI
VZV AB INDEX: 2.4 AI
VZV IGG SER-ACNC: NORMAL

## 2021-02-15 PROCEDURE — 86765 RUBEOLA ANTIBODY: CPT | Mod: EHNH

## 2021-02-15 PROCEDURE — 86762 RUBELLA ANTIBODY: CPT | Mod: EHNH

## 2021-02-15 PROCEDURE — 86735 MUMPS ANTIBODY: CPT | Mod: EHNH

## 2021-02-15 PROCEDURE — 86787 VARICELLA-ZOSTER ANTIBODY: CPT | Mod: EHNH

## 2021-02-15 PROCEDURE — 86706 HEP B SURFACE ANTIBODY: CPT | Mod: EHNH

## 2021-02-15 PROCEDURE — 86481 TB AG RESPONSE T-CELL SUSP: CPT | Mod: EHNH

## 2021-02-16 ENCOUNTER — CLINICAL SUPPORT (OUTPATIENT)
Dept: FAMILY MEDICINE | Facility: CLINIC | Age: 66
End: 2021-02-16

## 2021-02-16 DIAGNOSIS — Z23 ENCOUNTER FOR VACCINATION: Primary | ICD-10-CM

## 2021-02-17 ENCOUNTER — HOSPITAL ENCOUNTER (OUTPATIENT)
Dept: CT IMAGING | Facility: HOSPITAL | Age: 66
Discharge: HOME | End: 2021-02-17
Payer: MEDICARE

## 2021-02-17 DIAGNOSIS — N28.9 RENAL LESION: ICD-10-CM

## 2021-02-17 LAB
CREAT BLD-MCNC: 0.8 MG/DL (ref 0.7–1.3)
M TB TUBERC IFN-G BLD QL: NEGATIVE
POCT EGFR, VENOUS (CALCULATED): >60 ML/M/1.73M*2
TBGOLD AG-NIL: 0.03 IU/ML

## 2021-02-17 PROCEDURE — 82565 ASSAY OF CREATININE: CPT

## 2021-02-17 PROCEDURE — 74170 CT ABD WO CNTRST FLWD CNTRST: CPT

## 2021-02-17 PROCEDURE — 2550000100 HC RX 255: Performed by: INTERNAL MEDICINE

## 2021-02-17 RX ORDER — IOPAMIDOL 755 MG/ML
100 INJECTION, SOLUTION INTRAVASCULAR ONCE
Status: COMPLETED | OUTPATIENT
Start: 2021-02-17 | End: 2021-02-17

## 2021-02-17 RX ADMIN — IOPAMIDOL 100 ML: 755 INJECTION, SOLUTION INTRAVENOUS at 13:00

## 2021-02-18 PROCEDURE — 74170 CT ABD WO CNTRST FLWD CNTRST: CPT | Mod: 26 | Performed by: RADIOLOGY

## 2021-03-16 ENCOUNTER — CLINICAL SUPPORT (OUTPATIENT)
Dept: FAMILY MEDICINE | Facility: CLINIC | Age: 66
End: 2021-03-16

## 2021-03-16 DIAGNOSIS — Z23 ENCOUNTER FOR VACCINATION: Primary | ICD-10-CM

## 2021-03-17 ENCOUNTER — OFFICE VISIT (OUTPATIENT)
Dept: ORTHOPEDIC SURGERY | Facility: CLINIC | Age: 66
End: 2021-03-17
Payer: MEDICARE

## 2021-03-17 ENCOUNTER — ANCILLARY PROCEDURE (OUTPATIENT)
Dept: RADIOLOGY | Facility: CLINIC | Age: 66
End: 2021-03-17
Payer: MEDICARE

## 2021-03-17 VITALS — DIASTOLIC BLOOD PRESSURE: 70 MMHG | SYSTOLIC BLOOD PRESSURE: 128 MMHG

## 2021-03-17 DIAGNOSIS — M25.552 LEFT HIP PAIN: Primary | ICD-10-CM

## 2021-03-17 DIAGNOSIS — M25.859 FEMORAL ACETABULAR IMPINGEMENT: ICD-10-CM

## 2021-03-17 DIAGNOSIS — M87.052 AVASCULAR NECROSIS OF BONE OF LEFT HIP (CMS/HCC): ICD-10-CM

## 2021-03-17 PROCEDURE — 99203 OFFICE O/P NEW LOW 30 MIN: CPT | Performed by: PHYSICIAN ASSISTANT

## 2021-03-17 PROCEDURE — G0463 HOSPITAL OUTPT CLINIC VISIT: HCPCS | Mod: PO | Performed by: PHYSICIAN ASSISTANT

## 2021-03-17 PROCEDURE — 73502 X-RAY EXAM HIP UNI 2-3 VIEWS: CPT | Mod: LT,PO,FY

## 2021-03-17 RX ORDER — HYDROCODONE BITARTRATE AND ACETAMINOPHEN 5; 325 MG/1; MG/1
1 TABLET ORAL EVERY 4 HOURS PRN
Qty: 30 TABLET | Refills: 0 | Status: SHIPPED | OUTPATIENT
Start: 2021-03-17 | End: 2021-03-17

## 2021-03-17 RX ORDER — LOSARTAN POTASSIUM 50 MG/1
50 TABLET ORAL EVERY EVENING
COMMUNITY
End: 2022-07-13 | Stop reason: ALTCHOICE

## 2021-03-17 RX ORDER — HYDROCODONE BITARTRATE AND ACETAMINOPHEN 5; 325 MG/1; MG/1
1 TABLET ORAL EVERY 4 HOURS PRN
Qty: 30 TABLET | Refills: 0 | Status: SHIPPED | OUTPATIENT
Start: 2021-03-17 | End: 2021-03-23 | Stop reason: SDUPTHER

## 2021-03-17 ASSESSMENT — ENCOUNTER SYMPTOMS
NERVOUS/ANXIOUS: 0
MYALGIAS: 1
JOINT SWELLING: 1
DECREASED CONCENTRATION: 0
VOMITING: 0
NECK PAIN: 0
NUMBNESS: 0
TREMORS: 0
PALPITATIONS: 0
FATIGUE: 0
LIGHT-HEADEDNESS: 0
SHORTNESS OF BREATH: 0
NAUSEA: 0
COUGH: 0
FEVER: 0
ARTHRALGIAS: 1
DIARRHEA: 0
ABDOMINAL PAIN: 0
NECK STIFFNESS: 0
CHEST TIGHTNESS: 0
SLEEP DISTURBANCE: 1
HEADACHES: 0
APPETITE CHANGE: 0
WOUND: 0
DYSPHORIC MOOD: 0
HYPERACTIVE: 0
ACTIVITY CHANGE: 1
DIAPHORESIS: 0
DIZZINESS: 0
CONFUSION: 0
BACK PAIN: 0
CONSTIPATION: 0
SEIZURES: 0
COLOR CHANGE: 0
WHEEZING: 0
AGITATION: 0
CHILLS: 0

## 2021-03-17 ASSESSMENT — PAIN - FUNCTIONAL ASSESSMENT: PAIN_FUNCTIONAL_ASSESSMENT: 0-10

## 2021-03-17 NOTE — PROGRESS NOTES
"Subjective   Name: Juan Diego Rondon \"Sherley"  : 1955  AGE: 65 y.o.    MRN: [id number]    PCP: Seun Vásquez DO at [facility] in [place], [place].    Chief Complaint:  Chief Complaint   Patient presents with   • Left Hip - Pain     Pt presents with left hip pain.   • Pain      Pt states pain 10/10, taking tylenol. Pt had an injection ~ 2 months ago, did not help. Pt states he has had difficulty sleeping. Pt reports pain in lateral hip, groin, and radiates down his femur. Pt states pain is constant. Pt states pain began 2020. Pt worked as an ortho PA for a number of years and believes his pain may have been due to getting up and off stools for many years while working.       HPI  Juan Diego Rondon \"Sherley" is a 65 y.o. man who comes in today complaining of chronic and progressive left hip/groin pain.    Emmett is a new patient here at [facility].    Hand dominance: Right  Work status: Retired, pediatric CNP 2019    Patient had underwent x-rays of the AP pelvis, left and right hips, lumbar spine, and bone scan prior to her visit today.  These were ordered by Dr. Javier.  We will review all scans.    Currently, Emmett is rating his pain today at a 10/10 in his left hip and groin.  Patient is taking over-the-counter medications for pain relief.  He has noted a progressive and chronic pain in his left groin and lateral hip over the past 3 months.  Patient was seen and evaluated by Silvano Green PA-C at [facility] in [place] in 2020.  Patient was provided a trochanteric bursa injection which gave him minimal relief of his current left hip pain.  Patient denies the use of an assisted or ambulatory device for his left hip or groin pain.  He denies any numbness/tingling or paresthesias of the left or right lower extremities.  He denies having any bowel/bladder changes, saddle anesthesia, or increased lower back pain.  His " pain is constant but is worse with standing or walking.  He is having trouble sleeping at night due to pain.  He denies any prior MRI scan, physical therapy, injections, or surgeries of the left hip.  He is voicing no other complaints at this time.    Patient has a history of melanoma over the left forearm that was removed on 10/15/2020 by Dr. Gracia Manriquez.  He has routine follow-up appointments with his dermatologist in [place].    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   • KNEE SURGERY Bilateral     3 surgeries on left and 2 on right knees - prior open medial meniscectomies bilateral knees in the 1970s   • SKIN BIOPSY     • SKIN CANCER EXCISION Left 10/15/2020    Melanoma removed from left forearm   • TONSILLECTOMY         Current Outpatient Medications:   •  losartan (COZAAR) 50 mg tablet, Take 50 mg by mouth daily, Disp: , Rfl:   •  HYDROcodone-acetaminophen (NORCO) 5-325 mg per tablet, Take 1 tablet by mouth every 4 (four) hours as needed for pain scale 8-10/10 for up to 10 days Max Daily Amount: 6 tablets, Disp: 30 tablet, Rfl: 0  •  FLUoxetine (PROzac) 20 mg capsule, Take 40 mg by mouth daily, Disp: , Rfl:   •  ursodioL (AMANDEEP) 250 mg tablet, Take 250 mg by mouth 4 (four) times a day, Disp: , Rfl:   •  aspirin 81 mg EC tablet, Take 81 mg by mouth, Disp: , Rfl:   •  cyanocobalamin 1,000 mcg/mL injection, Inject 1,000 mcg into the shoulder, thigh, or buttocks every 30 (thirty) days, Disp: , Rfl:   •  allopurinol (ZYLOPRIM) 100 mg tablet, , Disp: 90, Rfl: 3  •  rosuvastatin (CRESTOR) 10 mg tablet, , Disp: 30, Rfl: 6  •  hydrocortisone 2.5 % cream, , Disp: 58, Rfl:  "5  •  losartan (COZAAR) 50 mg tablet, , Disp: 30, Rfl: 6    No Known Allergies    Social History     Occupational History   •  Retired certified nurse practitioner in pediatrics in 2019   Tobacco Use   • Smoking status: Never Smoker   • Smokeless tobacco: Never Used   Substance and Sexual Activity   • Alcohol use:  6 drinks a week   • Drug use:  None   • Sexual activity:  Unknown      Review of Systems   Constitutional: Positive for activity change. Negative for appetite change, chills, diaphoresis, fatigue, fever and unexpected weight change.   HENT: Positive for sneezing.    Eyes: Negative for photophobia, itching and visual disturbance.   Respiratory: Negative for cough, chest tightness, shortness of breath and wheezing.    Cardiovascular: Negative for chest pain, palpitations and leg swelling.   Gastrointestinal: Negative for abdominal pain, constipation, diarrhea, nausea and vomiting.   Genitourinary: Negative for difficulty urinating.   Musculoskeletal: Positive for arthralgias, gait problem, joint swelling and myalgias. Negative for back pain, neck pain and neck stiffness.   Skin: Negative for color change, pallor, rash and wound.        Intermittent pruritus     Allergic/Immunologic: Positive for environmental allergies.   Neurological: Positive for weakness. Negative for dizziness, tremors, seizures, syncope, light-headedness, numbness and headaches.   Hematological: Negative for adenopathy. Does not bruise/bleed easily.   Psychiatric/Behavioral: Positive for sleep disturbance. Negative for agitation, behavioral problems, confusion, decreased concentration, dysphoric mood, hallucinations, self-injury and suicidal ideas. The patient is not nervous/anxious and is not hyperactive.              Objective    Vitals:    03/17/21 1327   BP: 128/70   BP Location: Right arm   Patient Position: Sitting   Cuff Size: Regular Adult      Juan Diego Rondon \"Emmett\" is a 65 y.o. male who looks his stated age.     General: " Patient is alert and cooperative and in moderate distress secondary to pain. Patient is well nourished and has a thin body habitus. Patient is clean and appropriately dressed.  Affect is mood congruent and pleasant.  Patient is alert and oriented x3 but clearly distressed secondary to pain and left hip.    Gait is antalgic without the use of an assisted or ambulatory device.    Skin is free of rash in the left and right lower extremities.  Right Hip Exam   Right hip exam is normal.     Tenderness   The patient is experiencing no tenderness.     Range of Motion   The patient has normal right hip ROM.  Abduction: normal   Adduction: normal   Extension: normal   Flexion: normal   External rotation:  60 normal   Internal rotation: normal     Muscle Strength   The patient has normal right hip strength.  Abduction: 5/5   Adduction: 5/5   Flexion: 5/5     Tests   ADRIA: negative  Monica: negative    Other   Erythema: absent  Scars: absent  Sensation: normal  Pulse: present    Comments:  Patient has a negative FADDIR maneuver of the right hip for right hip impingement.    Patient has a negative femoral thrust test for right hip pain.      Left Hip Exam     Tenderness   The patient is experiencing tenderness in the anterior and greater trochanter.    Range of Motion   Abduction: normal   Adduction: normal   Extension: normal   Flexion: normal   External rotation:  40 abnormal   Internal rotation: normal     Muscle Strength   Abduction: 5/5   Adduction: 5/5   Flexion: 3/5     Tests   ADRIA: positive  Monica: negative    Other   Erythema: absent  Scars: absent  Sensation: normal  Pulse: present    Comments:  Patient has a positive FADDIR maneuver of the left hip for left hip impingement.    Patient has a positive femoral thrust test of the left hip pain.      Back Exam     Tenderness   The patient is experiencing no tenderness.     Range of Motion   The patient has normal back ROM.  Extension: normal   Flexion: normal   Lateral  bend right: normal   Lateral bend left: normal   Rotation right: normal   Rotation left: normal     Muscle Strength   The patient has normal back strength.  Right Quadriceps:  5/5   Left Quadriceps:  5/5   Right Hamstrings:  5/5   Left Hamstrings:  5/5     Tests   Straight leg raise right: negative  Straight leg raise left: negative    Reflexes   Patellar:  2/4 normal  Achilles:  2/4 normal  Babinski's sign: normal     Other   Toe walk: normal  Heel walk: normal  Sensation: normal  Gait: normal   Erythema: no back redness  Scars: absent    Comments:  Patient has 5/5 strength with testing of his extensor hallucis longus, dorsiflexors, and plantar flexors bilaterally in the lower extremities.    Patient has normal dermatomal sensation throughout the lower extremities.                    Assessment   Diagnoses and all orders for this visit:    Left hip pain  Comments:  Rule out AVN or stress fracture of left hip  Orders:  -     X-ray hip 2 or 3 views left  -     MR hip left without contrast; Future  -     HYDROcodone-acetaminophen (NORCO) 5-325 mg per tablet; Take 1 tablet by mouth every 4 (four) hours as needed for pain scale 8-10/10 for up to 10 days Max Daily Amount: 6 tablets    Femoral acetabular impingement  -     X-ray hip 2 or 3 views left  -     MR hip left without contrast; Future    Avascular necrosis of bone of left hip (CMS/HCC) ([facility])              Plan    X-rays were reviewed of the AP pelvis and left and right hips from 12/21/2020 showing mild/moderate degenerative changes of both hip joints with a pincer and cam lesion noted on the left hip.  There is a small cam lesion on the right hip.  No acute fractures.  Patient has moderate degenerative changes over the SI joints bilaterally.    X-rays were reviewed of the lumbar spine from 12/21/2020 showing moderate degenerative disc disease at L5-S1.  There is no instability noted.    Full body bone scan was reviewed from 1/29/2021 was read as normal.  There  appears to be some increased uptake and signal in the femoral head in the anterior and posterior views of the left hip.    Physical examination today shows exquisite pain and tenderness with any type of internal and external rotation of the left hip.  Patient has exquisite pain and tenderness over the trochanteric bursa of the left hip.  Patient has a positive FADDIR maneuver for left hip impingement.  Patient has 3/5 strength with hip flexion due to pain.  Patient is neurovascularly intact in left and right lower extremity with a negative Homans' sign bilaterally.    I am concerned that Emmett may have avascular necrosis of the left femoral head based on his progressive and chronic left hip pain without significant degenerative changes of the left hip joint.  Patient does have a history of recent melanoma of the left forearm.  We will obtain a MRI with gadolinium of the left hip to rule out AVN and/or stress fracture.  Patient will have this accomplished soon as possible at [facility].  He is in agreement with the above treatment plan.    Emmett may use an assistive ambulatory device such as a cane, crutches, or walker as needed for pain relief.    Patient was provided a prescription for Norco 5/325 mg tablets 1 tablet every 4 hours as needed for severe pain in the left hip number 30 tablets were provided and sent to [facility] pharmacy here in [place].    Patient will return here to the office for review of his MRI scan to discuss further treatment options if necessary.  All questions were answered today.    Total time spent patient today was 40 minutes discussing his current left groin pain, review of past medical history, past surgical history, medications, allergies, social history, review of x-rays, physical exam, review of bone scan, and discussion of further conservative treatment options.    3/23/2021 Addendum:  Patient had his MRI scan of the left hip on 3/23/2021 showing the  following:      MR hip left without contrast  Order: [id number]  Status:  Final result   Visible to patient:  Yes (MyChart)   Next appt:  03/29/2021 at 01:20 PM in Orthopaedic Surgery (STEPHANIE HEREDIA)   Dx:  Left hip pain; Femoral acetabular imp...   0 Result Notes  Details    Reading Physician Reading Date Result Priority   Jairo Arciniega MD  [phone] 3/23/2021       Narrative & Impression     Exam:  MRI of the MR HIP LEFT WO CONTRAST without contrast from 03/23/2021     Clinical history:  Left hip pain; Femoral acetabular impingement; hip pain  hx of melanoma     Comparison(s):  3/17/2021     Technique:   Axial and coronal T1 and STIR sequences were obtained through the pelvis and small field of view axial and coronal fat saturated proton density and axial T1 sequences were obtained through the hip.     Findings:   Large area of avascular necrosis involving the left femoral head. There is a smaller area in the right femoral head. Large area of edema involving the left acetabulum. The left femoral head contour appears to be largely preserved. Small joint effusion. Tearing anterior superior labrum. Small osteophytosis. The cartilage demonstrates standing of cartilage. Mild proximal hamstring tendinosis. Large fluid surrounding the iliopsoas tendon which is otherwise intact. Otherwise visualized tendons are unremarkable. Visualized course of sciatic nerve unremarkable. Intrapelvic contents are not well assessed. There is iliopsoas muscular edema on the left side. Right-sided rib varicocele. Probable smaller left-sided varicocele.        IMPRESSION:  Left:   1. Large area of avascular necrosis of the femoral head although the bony contour appears relatively preserved. There is also prominent area of edema most pronounced in the posterior aspect of the acetabulum. This may be reactive to the femoral head. Alternatively they may be associated cartilage loss of the acetabulum not well-visualized on MRI     2. Prominent  edema left iliopsoas muscle partially visualized, with focal fluid likely from tenosynovium extension of fluid     Right: Limited large field-of-view images demonstrates small focal area of avascular necrosis femoral head               Last Resulted: 03/23/21 11:12             I spoke with Emmett about his results today.  He would like to proceed with an elective left total hip arthroplasty with Dr. Sj Reyes.    Patient will be scheduled for a left total hip arthroplasty with Dr. Reyes in mid April 2021.  We will set up a case request today.    All questions were answered today.

## 2021-03-17 NOTE — LETTER
[facility] ORTHOPEDIC SURGERY  [address]  [place] [address]  [phone]  Dept: [phone]  03/21/21      Seun Vásquez DO  [address] [place] [address]        To: Seun Vásquez DO      Thank you for referring your patient, Juan Diego Rondon, to receive care in my office. I have enclosed a summary of the care provided to Juan Diego on 03/21/21.    Please contact me with any questions you may have regarding the visit.    Sincerely,         STEPHANIE HEREDIA  [address] [place] [address]  [phone]    CC: No Recipients

## 2021-03-21 ASSESSMENT — ENCOUNTER SYMPTOMS
DIFFICULTY URINATING: 0
EYE ITCHING: 0
HALLUCINATIONS: 0
WEAKNESS: 1
ADENOPATHY: 0
BRUISES/BLEEDS EASILY: 0
UNEXPECTED WEIGHT CHANGE: 0
PHOTOPHOBIA: 0

## 2021-03-21 NOTE — PATIENT INSTRUCTIONS
-You will be scheduled for a MRI scan of the left hip.  You will be contacted with your results and discuss further conservative treatment options.  -You have been provided a prescription for oral Norco 5/325 mg 1 tablet to be taken every 4 hours as needed for severe pain in the left hip.  -Use a cane, crutches, or walker if needed for pain relief in the left hip.

## 2021-03-23 ENCOUNTER — TELEPHONE (OUTPATIENT)
Dept: ORTHOPEDIC SURGERY | Facility: CLINIC | Age: 66
End: 2021-03-23

## 2021-03-23 ENCOUNTER — HOSPITAL ENCOUNTER (OUTPATIENT)
Dept: MRI IMAGING | Facility: HOSPITAL | Age: 66
Discharge: HOME | End: 2021-03-23
Payer: MEDICARE

## 2021-03-23 DIAGNOSIS — M25.552 LEFT HIP PAIN: ICD-10-CM

## 2021-03-23 DIAGNOSIS — M25.859 FEMORAL ACETABULAR IMPINGEMENT: ICD-10-CM

## 2021-03-23 PROBLEM — M87.052 AVASCULAR NECROSIS OF BONE OF LEFT HIP (CMS/HCC): Status: ACTIVE | Noted: 2021-03-23

## 2021-03-23 PROCEDURE — G1004 CDSM NDSC: HCPCS

## 2021-03-23 RX ORDER — HYDROCODONE BITARTRATE AND ACETAMINOPHEN 5; 325 MG/1; MG/1
1 TABLET ORAL EVERY 4 HOURS PRN
Qty: 60 TABLET | Refills: 0 | Status: SHIPPED | OUTPATIENT
Start: 2021-03-23 | End: 2021-03-31 | Stop reason: SDUPTHER

## 2021-03-23 NOTE — TELEPHONE ENCOUNTER
Emmett called and stated that he just received his MRI results, and would like to discuss this with Henry? Please call Emmett at [phone].

## 2021-03-23 NOTE — PROGRESS NOTES
Emmett is a pleasant 65-year-old man who has had progressive and chronic left hip pain.    MRI scan of the left hip from 3/23/2021 at [facility] shows the following:    MR hip left without contrast  Order: [id number]  Status:  Final result   Visible to patient:  Yes (Sen)   Next appt:  03/29/2021 at 01:20 PM in Orthopaedic Surgery (STEPHANIE HEREDIA)   Dx:  Left hip pain; Femoral acetabular imp...   0 Result Notes  Details    Reading Physician Reading Date Result Priority   Jairo Arciniega MD  [phone] 3/23/2021       Narrative & Impression     Exam:  MRI of the MR HIP LEFT WO CONTRAST without contrast from 03/23/2021     Clinical history:  Left hip pain; Femoral acetabular impingement; hip pain  hx of melanoma     Comparison(s):  3/17/2021     Technique:   Axial and coronal T1 and STIR sequences were obtained through the pelvis and small field of view axial and coronal fat saturated proton density and axial T1 sequences were obtained through the hip.     Findings:   Large area of avascular necrosis involving the left femoral head. There is a smaller area in the right femoral head. Large area of edema involving the left acetabulum. The left femoral head contour appears to be largely preserved. Small joint effusion. Tearing anterior superior labrum. Small osteophytosis. The cartilage demonstrates standing of cartilage. Mild proximal hamstring tendinosis. Large fluid surrounding the iliopsoas tendon which is otherwise intact. Otherwise visualized tendons are unremarkable. Visualized course of sciatic nerve unremarkable. Intrapelvic contents are not well assessed. There is iliopsoas muscular edema on the left side. Right-sided rib varicocele. Probable smaller left-sided varicocele.        IMPRESSION:  Left:   1. Large area of avascular necrosis of the femoral head although the bony contour appears relatively preserved. There is also prominent area of edema most pronounced in the posterior  aspect of the acetabulum. This may be reactive to the femoral head. Alternatively they may be associated cartilage loss of the acetabulum not well-visualized on MRI     2. Prominent edema left iliopsoas muscle partially visualized, with focal fluid likely from tenosynovium extension of fluid     Right: Limited large field-of-view images demonstrates small focal area of avascular necrosis femoral head               Last Resulted: 03/23/21 11:12           I spoke with Emmett about his results.  He would like to proceed with an elective left total hip arthroplasty with Dr. Sj Reyes for AVN of the femoral head of the left hip.    Patient will be contacted by Dr. Reyes via phone.  We did put in a case request today for an elective left total hip arthroplasty to be accomplished in mid April 2021.    Patient will need a preoperative physical exam with his primary care provider Dr. Seun Vásquez at [facility] in [place] prior to surgery.    Patient will need a refill of his oral hydrocodone/APAP 5/325 mg 1 tablets to be taken every 4 hours as needed for severe left hip pain, number 60 tablets was sent to [facility] pharmacy today.    Patient will need a preoperative visit with Dr. Reyes prior to surgery.  All questions were answered today.

## 2021-03-23 NOTE — TELEPHONE ENCOUNTER
Patient was contacted today about his recent MRI results of the left hip via phone.    He would like to proceed with an elective left total hip arthroplasty with Dr. Sj Reyes when he returns in mid April 2021.    I have placed a case request in description for an elective left total hip arthroplasty.    Patient was given a refill of his Norco 5/325 mg 1 tablet to be taken every 4 hours as needed for severe pain in the left hip.  Number 60 tablets was sent to [facility] pharmacy today.    Patient will need a preoperative physical exam with his primary care provider, Dr. Seun Vásquez at [place], [place] prior to surgery.    Patient will need a preoperative physical exam with Dr. Reyes prior to surgery.

## 2021-03-24 ENCOUNTER — TELEPHONE (OUTPATIENT)
Dept: SOCIAL WORK | Facility: HOSPITAL | Age: 66
End: 2021-03-24

## 2021-03-24 NOTE — TELEPHONE ENCOUNTER
Discussed upcoming joint surgery, informed of education changes due to COVID 19 (no joint class), also discussed home situation and plans for discharge. Guidebook and pre-op information packet sent.  Patient requests outpatient PT at [facility]. He is aware surgery will call regarding his arrival time, recommended he ask about the current visitor policy at that time as it frequently changes. No other questions at this time.

## 2021-03-29 PROCEDURE — 73502 X-RAY EXAM HIP UNI 2-3 VIEWS: CPT | Mod: 26,LT | Performed by: ORTHOPAEDIC SURGERY

## 2021-03-30 DIAGNOSIS — M25.552 LEFT HIP PAIN: ICD-10-CM

## 2021-03-31 DIAGNOSIS — M25.552 LEFT HIP PAIN: ICD-10-CM

## 2021-03-31 RX ORDER — HYDROCODONE BITARTRATE AND ACETAMINOPHEN 5; 325 MG/1; MG/1
1 TABLET ORAL EVERY 6 HOURS PRN
Qty: 40 TABLET | Refills: 0 | Status: SHIPPED | OUTPATIENT
Start: 2021-03-31 | End: 2021-03-31 | Stop reason: SDUPTHER

## 2021-03-31 RX ORDER — HYDROCODONE BITARTRATE AND ACETAMINOPHEN 5; 325 MG/1; MG/1
1 TABLET ORAL EVERY 6 HOURS PRN
Qty: 40 TABLET | Refills: 0 | Status: SHIPPED | OUTPATIENT
Start: 2021-03-31 | End: 2021-04-10

## 2021-03-31 RX ORDER — HYDROCODONE BITARTRATE AND ACETAMINOPHEN 5; 325 MG/1; MG/1
1 TABLET ORAL EVERY 4 HOURS PRN
OUTPATIENT
Start: 2021-03-31 | End: 2021-04-10

## 2021-03-31 NOTE — TELEPHONE ENCOUNTER
Received an automated refill request from the patient's pharmacy for Hydrocodone.     Patient was last seen by STEPHANIE Delgado on 3/23/21 for his left hip. At this visit Henry prescribed the patient Norco for his left hip pain.     Patient is scheduled for a left total hip on 4/21/21 with Dr. Reyes.     Called and spoke to patient. He stated that his pharmacy only dispensed 40 of the 60 tablets Henry prescribed due to insurance purposes. He stated that he has been taking about 2-3 tabs a day.     I let patient know that we would speak to Henry for his recommendations and give him a call back. Patient verbalized understanding.

## 2021-03-31 NOTE — PROGRESS NOTES
Emmett is a pleasant 65-year-old man who has AVN of his left hip.    Patient is planning on elective left total hip arthroplasty with Dr. Sj Reyes in April 2021.    I have called in a prescription for Norco 5/325 mg 1 tablet every 6 hours as needed for pain relief number 40 tablets was sent to [facility] pharmacy here in [place] today.    Patient will need to have refills of his pain medication until he has his elective left NAZIA.

## 2021-04-08 ENCOUNTER — TELEPHONE (OUTPATIENT)
Dept: ORTHOPEDIC SURGERY | Facility: CLINIC | Age: 66
End: 2021-04-08

## 2021-04-08 DIAGNOSIS — Z01.818 PRE-OPERATIVE CLEARANCE: Primary | ICD-10-CM

## 2021-04-08 NOTE — TELEPHONE ENCOUNTER
Patient told PCP that the MRSA would be done by us. They will call the patient in to have this done.

## 2021-04-08 NOTE — TELEPHONE ENCOUNTER
"Sarah from pre cell called to relay that patient's PCP deferred to order the MRSA screen.  He stated in his note \"Pre-- operative MRSA screening being completed by orthopedic surgeons office\"      "

## 2021-04-09 ENCOUNTER — APPOINTMENT (OUTPATIENT)
Dept: LAB | Facility: HOSPITAL | Age: 66
End: 2021-04-09
Payer: MEDICARE

## 2021-04-09 LAB — MRSA DNA SPEC QL NAA+PROBE: NEGATIVE

## 2021-04-09 PROCEDURE — 87641 MR-STAPH DNA AMP PROBE: CPT | Performed by: FAMILY MEDICINE

## 2021-04-09 RX ORDER — URSODIOL 250 MG/1
750 TABLET, FILM COATED ORAL
COMMUNITY

## 2021-04-09 RX ORDER — URSODIOL 500 MG/1
500 TABLET, FILM COATED ORAL 2 TIMES DAILY
COMMUNITY

## 2021-04-09 RX ORDER — FLUOXETINE HYDROCHLORIDE 40 MG/1
40 CAPSULE ORAL DAILY
COMMUNITY
End: 2023-06-19 | Stop reason: SDUPTHER

## 2021-04-09 RX ORDER — HYDROXYZINE HYDROCHLORIDE 25 MG/1
25-50 TABLET, FILM COATED ORAL NIGHTLY PRN
COMMUNITY
Start: 2021-03-07 | End: 2023-09-24

## 2021-04-09 RX ORDER — FAMOTIDINE 20 MG/1
20 TABLET, FILM COATED ORAL 2 TIMES DAILY
COMMUNITY
Start: 2021-02-02 | End: 2022-07-13 | Stop reason: ALTCHOICE

## 2021-04-09 ASSESSMENT — ACTIVITIES OF DAILY LIVING (ADL): ADEQUATE_TO_COMPLETE_ADL: YES

## 2021-04-09 NOTE — PRE-PROCEDURE INSTRUCTIONS
Pre-Surgery Instructions:   Medication Instructions   • ursodioL (AMANDEEP) 250 mg tablet Take morning of surgery/procedure   • FLUoxetine (PROzac) 40 mg capsule Take at bedtime   • hydrOXYzine (ATARAX) 25 mg tablet Do not take morning of surgery/procedure   • famotidine (PEPCID) 20 mg tablet PRN morning of surgery/procedure   • ursodioL (ACTIGALL) 500 mg tablet Take morning of surgery/procedure   • HYDROcodone-acetaminophen (NORCO) 5-325 mg per tablet Do not take morning of surgery/procedure   • losartan (COZAAR) 50 mg tablet Do not take morning of surgery/procedure   • cyanocobalamin 1,000 mcg/mL injection Stop taking 1 week prior to surgery/procedure   • allopurinol (ZYLOPRIM) 100 mg tablet Do not take morning of surgery/procedure   • rosuvastatin (CRESTOR) 10 mg tablet Take at bedtime   ISHMAEL Vásquez DO, cleared pt.  Pre-op done.  Instructed on meds, hydration, Covid-19 instructions, wear mask dos.  Surgery dept will call 1-2 days prior to dos with arrival time and npo instructions.  Wife will be with dos.  Verbalizes understanding of instructions.    Diet instructions for surgery:      Preop questionnaire:    Will bring walker dos.  Preop instructions:  Pre-op Phone Call  Surgery Location Verified: Yes  Instructed to shower within 12 hours: Yes  Instructed to remove/not apply makeup, petroleum products, lotion, powder, scents, or deodorant on the morning of surgery: Yes  Recommend eye glasses for day of surgery, contact lenses must be removed prior to surgery:: Yes  Instructed to remove all jewelry, including piercings, and leave at home.: Yes  Instructed to leave all valuables at home: Yes  Instructed to bring a valid photo ID and insurance card:: Yes  Instruct patient to ensure transportation is available following surgery/procedure:: Yes  Instruct patient that a caregiver must stay with the patient 24 hours following anesthesia:: Yes

## 2021-04-12 ENCOUNTER — TELEPHONE (OUTPATIENT)
Dept: ORTHOPEDIC SURGERY | Facility: CLINIC | Age: 66
End: 2021-04-12

## 2021-04-12 DIAGNOSIS — M87.052 AVASCULAR NECROSIS OF BONE OF LEFT HIP (CMS/HCC): Primary | ICD-10-CM

## 2021-04-12 RX ORDER — HYDROCODONE BITARTRATE AND ACETAMINOPHEN 5; 325 MG/1; MG/1
1 TABLET ORAL EVERY 6 HOURS PRN
Qty: 40 TABLET | Refills: 0 | Status: SHIPPED | OUTPATIENT
Start: 2021-04-12 | End: 2021-04-22 | Stop reason: HOSPADM

## 2021-04-12 NOTE — TELEPHONE ENCOUNTER
Returned call to patient.  He states he has none left and took his last tablet last night.  He states he is taking number 3-3.5/day.  He states his pain is miserable and rates it 8/10.  He states he will see Dr. Reyes on Thursday and is very excited to have his total hip replaced on 4/21/2021.  I informed Emmett I would discuss with Henry Melendez PA-C and get back to him.  Patient verbalized understanding had no further questions at this time.

## 2021-04-15 ENCOUNTER — OFFICE VISIT (OUTPATIENT)
Dept: ORTHOPEDIC SURGERY | Facility: CLINIC | Age: 66
End: 2021-04-15
Payer: MEDICARE

## 2021-04-15 VITALS — SYSTOLIC BLOOD PRESSURE: 128 MMHG | WEIGHT: 180 LBS | DIASTOLIC BLOOD PRESSURE: 80 MMHG | BODY MASS INDEX: 22.5 KG/M2

## 2021-04-15 DIAGNOSIS — M16.12 PRIMARY OSTEOARTHRITIS OF LEFT HIP: Primary | ICD-10-CM

## 2021-04-15 DIAGNOSIS — Z96.642 STATUS POST TOTAL HIP REPLACEMENT, LEFT: ICD-10-CM

## 2021-04-15 PROCEDURE — G0463 HOSPITAL OUTPT CLINIC VISIT: HCPCS | Mod: PO | Performed by: ORTHOPAEDIC SURGERY

## 2021-04-15 PROCEDURE — 99213 OFFICE O/P EST LOW 20 MIN: CPT | Performed by: ORTHOPAEDIC SURGERY

## 2021-04-15 ASSESSMENT — ENCOUNTER SYMPTOMS
COUGH: 0
SHORTNESS OF BREATH: 0
VOMITING: 0
ARTHRALGIAS: 1
CHILLS: 0
FATIGUE: 0
NAUSEA: 0
WOUND: 0

## 2021-04-15 ASSESSMENT — PAIN - FUNCTIONAL ASSESSMENT: PAIN_FUNCTIONAL_ASSESSMENT: 0-10

## 2021-04-15 NOTE — PROGRESS NOTES
Subjective      Juan Diego Rondon is a 65 y.o. male who presents for f/u left hip.    Chief Complaint   Patient presents with   • Left Hip - Pre-op Visit     Pre op left total hip scheduled for 4/21/21.   • Pain     He is rating his pain today 8/10. Taking norco for pain, 1 tab 3x daily.        Mr. Rondon is a 65-year-old male who presents today to follow up on his left hip and is currently scheduled for total hip arthroplasty on April 21, 2021. He has started using a cane in the last 10 days. He has had preoperative clearance with Seun Vásquez DO for preoperative clearance, and his MRSA is negative.     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   • EYE SURGERY Bilateral     cataract   • KNEE SURGERY Bilateral     3 surgeries on left and 2 on right knees - prior open medial meniscectomies bilateral knees in the 1970s   • SKIN BIOPSY     • SKIN CANCER EXCISION Left 10/15/2020    Melanoma removed from left forearm   • TONSILLECTOMY       Family History   Problem Relation Age of Onset   • Hypertension Mother    • Lung cancer Father    • Hypertension Father      Social History     Occupational History   • Not on file   Tobacco Use   • Smoking status: Never Smoker   • Smokeless tobacco: Never Used   Substance and Sexual Activity   • Alcohol use: Yes     Alcohol/week: 1.0 standard drinks     Types: 1 Glasses of wine per week     Comment: occ   • Drug use: Never   • Sexual activity: Not on  file   Social History Narrative   • Not on file       Review of Systems   Constitutional: Negative for chills and fatigue.   Respiratory: Negative for cough and shortness of breath.    Gastrointestinal: Negative for nausea and vomiting.   Musculoskeletal: Positive for arthralgias and gait problem.   Skin: Negative for rash and wound.   All other systems reviewed and are negative.      Objective   /80 (BP Location: Right arm, Patient Position: Sitting, Cuff Size: Long Adult)   Wt 81.6 kg (180 lb)   BMI 22.50 kg/m²     Physical Exam  Constitutional:       Appearance: He is well-developed.   HENT:      Head: Normocephalic and atraumatic.   Eyes:      Pupils: Pupils are equal, round, and reactive to light.   Cardiovascular:      Rate and Rhythm: Normal rate and regular rhythm.      Heart sounds: Normal heart sounds. No murmur.   Pulmonary:      Effort: Pulmonary effort is normal.      Breath sounds: Normal breath sounds. No wheezing.   Musculoskeletal:      Comments: Leg length looks to be equal. Range of motion 0 to 120 degrees, internal rotation to 10 and external rotation 40. Stinchfield test is positive. Some tenderness to palpation superior aspect of the greater trochanter.    Skin:     General: Skin is warm and dry.   Neurological:      Mental Status: He is alert and oriented to person, place, and time.   Psychiatric:         Behavior: Behavior normal.         Thought Content: Thought content normal.         Judgment: Judgment normal.       ASSESSMENT AND PLAN     Assessment/Plan   Diagnoses and all orders for this visit:    Primary osteoarthritis of left hip      Mr. Rondon is a 65-year-old male who presents today to follow up on his left hip and is scheduled for left total hip arthroplasty on April 21, 2021 at [facility]. The procedure and recovery were explained to him in detail. Risks, benefits and alternatives were discussed and all his questions were answered. He voiced  understanding, and we will proceed with surgery.     Portions of this note were documented by Tere Wright as I performed the exam and collected the information from Juan Diego Rondon. I attest that I have reviewed the information as documented, verified the accuracy of the documentation and added additional information as needed.

## 2021-04-16 ENCOUNTER — ANESTHESIA EVENT (OUTPATIENT)
Dept: GASTROENTEROLOGY | Facility: HOSPITAL | Age: 66
End: 2021-04-16
Payer: MEDICARE

## 2021-04-19 NOTE — ANESTHESIA PREPROCEDURE EVALUATION
"Pre-Procedure Assessment    Patient: Juan Diego Rondon, male, 65 y.o.    Ht Readings from Last 1 Encounters:   01/19/21 1.905 m (6' 3\")     Wt Readings from Last 1 Encounters:   04/15/21 81.6 kg (180 lb)       Last Vitals  /98 (04/21/21 1242)    Temp 36.8 °C (98.3 °F) (04/21/21 1242)    Pulse 73 (04/21/21 1242)   Resp 18 (04/21/21 1242)    SpO2 96 % (04/21/21 1242)    Pain Score 10 - Worst possible pain (04/21/21 1242)       Problem list reviewed and Medical history reviewed    No history of anesthetic complications:    No family history of anesthetic complications:      Airway   Mallampati: III  TM distance: >3 FB  Neck ROM: full      Dental          Pulmonary - negative ROS and normal exam    breath sounds clear to auscultation  Cardiovascular - normal exam  Exercise tolerance: good (4-7 METS)  (+) hypertension well controlled,     ECG reviewed  Rhythm: regular  Rate: normal  ROS comment: EKG 4.7.21:  Sinus Rhythm, HR 69    Mental Status/Neuro/Psych    Pt is alert.      (+) psychiatric history (anxiety), arthritis,     GI/Hepatic/Renal    (+) liver disease (primary biliary cholangitis),     Comments: IBS    Endo/Other    (+) history of cancer (melanoma),     Comments: Pernicious anemia    Preoperative clearance with Seun Vásquez DO 4.8.21  Abdominal           Social History     Tobacco Use   • Smoking status: Never Smoker   • Smokeless tobacco: Never Used   Substance Use Topics   • Alcohol use: Yes     Alcohol/week: 1.0 standard drinks     Types: 1 Glasses of wine per week     Comment: occ      Hematology   WBC   Date Value Ref Range Status   02/01/2021 5.5 3.7 - 9.6 10*3/uL Final     RBC   Date Value Ref Range Status   02/01/2021 4.01 (L) 4.10 - 5.80 10*6/µL Final     MCV   Date Value Ref Range Status   02/01/2021 101.1 (H) 82.0 - 97.0 fL Final     Hemoglobin   Date Value Ref Range Status   02/01/2021 14.1 13.2 - 17.2 g/dL Final     Hematocrit   Date Value Ref Range Status   02/01/2021 40.6 38.0 - 50.0 % " Final     Platelets   Date Value Ref Range Status   02/01/2021 268 130 - 350 10*3/uL Final      Coagulation No results found for: PT, APTT, INR   General Chemistry No results found for: POCGLU, CALCIUM, BUN, CREATININE, GLUCOSE, NA, K, MG, CO2, CL, DIGOXIN  Anesthesia Plan    ASA 2   NPO status reviewed: > 2 hours    MAC and spinal         Induction: intravenous       Additional Comments: PREcovery at 1000          Anesthetic plan and risks discussed with patient.      Plan discussed with CRNA.

## 2021-04-20 RX ORDER — BUPIVACAINE 13.3 MG/ML
20 INJECTION, SUSPENSION, LIPOSOMAL INFILTRATION ONCE
Status: CANCELLED | OUTPATIENT
Start: 2021-04-21

## 2021-04-21 ENCOUNTER — APPOINTMENT (OUTPATIENT)
Dept: RADIOLOGY | Facility: HOSPITAL | Age: 66
End: 2021-04-21
Payer: MEDICARE

## 2021-04-21 ENCOUNTER — HOSPITAL ENCOUNTER (OUTPATIENT)
Facility: HOSPITAL | Age: 66
Discharge: HOME | End: 2021-04-22
Attending: ORTHOPAEDIC SURGERY | Admitting: ORTHOPAEDIC SURGERY
Payer: MEDICARE

## 2021-04-21 ENCOUNTER — ANESTHESIA (OUTPATIENT)
Dept: GASTROENTEROLOGY | Facility: HOSPITAL | Age: 66
End: 2021-04-21
Payer: MEDICARE

## 2021-04-21 DIAGNOSIS — Z96.642 STATUS POST TOTAL REPLACEMENT OF LEFT HIP: Primary | ICD-10-CM

## 2021-04-21 DIAGNOSIS — M87.052 AVASCULAR NECROSIS OF BONE OF LEFT HIP (CMS/HCC): ICD-10-CM

## 2021-04-21 PROBLEM — Z96.649 S/P TOTAL HIP ARTHROPLASTY: Status: ACTIVE | Noted: 2021-04-21

## 2021-04-21 PROCEDURE — 01214 ANES OPEN PX TOT HIP ARTHRP: CPT | Performed by: NURSE ANESTHETIST, CERTIFIED REGISTERED

## 2021-04-21 PROCEDURE — (BLANK) HC OR LEVEL 4 PROC EACH ADDITIONAL MIN: Performed by: ORTHOPAEDIC SURGERY

## 2021-04-21 PROCEDURE — 6360000200 HC RX 636 W HCPCS (ALT 250 FOR IP): Performed by: NURSE ANESTHETIST, CERTIFIED REGISTERED

## 2021-04-21 PROCEDURE — 6370000100 HC RX 637 (ALT 250 FOR IP): Performed by: PHYSICIAN ASSISTANT

## 2021-04-21 PROCEDURE — 2500000200 HC RX 250 WO HCPCS: Performed by: PHYSICIAN ASSISTANT

## 2021-04-21 PROCEDURE — 6360000200 HC RX 636 W HCPCS (ALT 250 FOR IP): Performed by: PHYSICIAN ASSISTANT

## 2021-04-21 PROCEDURE — 2580000300 HC RX 258: Performed by: PHYSICIAN ASSISTANT

## 2021-04-21 PROCEDURE — 97110 THERAPEUTIC EXERCISES: CPT | Mod: GP | Performed by: PHYSICAL THERAPIST

## 2021-04-21 PROCEDURE — 27130 TOTAL HIP ARTHROPLASTY: CPT | Mod: AS,LT | Performed by: PHYSICIAN ASSISTANT

## 2021-04-21 PROCEDURE — 97162 PT EVAL MOD COMPLEX 30 MIN: CPT | Mod: GP | Performed by: PHYSICAL THERAPIST

## 2021-04-21 PROCEDURE — 7100002500 HC EXTENDED RECOVERY PER HOUR

## 2021-04-21 PROCEDURE — 2500000200 HC RX 250 WO HCPCS: Mod: NCMED | Performed by: ORTHOPAEDIC SURGERY

## 2021-04-21 PROCEDURE — 2580000300 HC RX 258: Performed by: ORTHOPAEDIC SURGERY

## 2021-04-21 PROCEDURE — 2500000200 HC RX 250 WO HCPCS: Performed by: NURSE ANESTHETIST, CERTIFIED REGISTERED

## 2021-04-21 PROCEDURE — 88304 TISSUE EXAM BY PATHOLOGIST: CPT

## 2021-04-21 PROCEDURE — (BLANK) HC MAC ANESTHESIA FACILITY CHARGE EACH ADDITIONAL MIN: Performed by: ORTHOPAEDIC SURGERY

## 2021-04-21 PROCEDURE — 27130 TOTAL HIP ARTHROPLASTY: CPT | Mod: LT | Performed by: ORTHOPAEDIC SURGERY

## 2021-04-21 PROCEDURE — C9290 INJ, BUPIVACAINE LIPOSOME: HCPCS | Performed by: ORTHOPAEDIC SURGERY

## 2021-04-21 PROCEDURE — (BLANK) HC OR LEVEL 4 PROC 1ST 15MIN: Performed by: ORTHOPAEDIC SURGERY

## 2021-04-21 PROCEDURE — 72170 X-RAY EXAM OF PELVIS: CPT

## 2021-04-21 PROCEDURE — (BLANK) HC RECOVERY PHASE-1 1ST  HOUR ACUITY LEVEL 3: Performed by: ORTHOPAEDIC SURGERY

## 2021-04-21 PROCEDURE — 88311 DECALCIFY TISSUE: CPT

## 2021-04-21 PROCEDURE — (BLANK) HC MAC ANESTHESIA FACILITY CHARGE 1ST 15 MIN: Performed by: ORTHOPAEDIC SURGERY

## 2021-04-21 PROCEDURE — 73502 X-RAY EXAM HIP UNI 2-3 VIEWS: CPT | Mod: 26,LT | Performed by: RADIOLOGY

## 2021-04-21 PROCEDURE — 6360000200 HC RX 636 W HCPCS (ALT 250 FOR IP): Performed by: ORTHOPAEDIC SURGERY

## 2021-04-21 PROCEDURE — 6360000200 HC RX 636 W HCPCS (ALT 250 FOR IP)

## 2021-04-21 PROCEDURE — 72170 X-RAY EXAM OF PELVIS: CPT | Mod: 26,NCNR | Performed by: RADIOLOGY

## 2021-04-21 PROCEDURE — C1776 JOINT DEVICE (IMPLANTABLE): HCPCS | Performed by: ORTHOPAEDIC SURGERY

## 2021-04-21 PROCEDURE — 73502 X-RAY EXAM HIP UNI 2-3 VIEWS: CPT | Mod: LT

## 2021-04-21 DEVICE — STEM FEM POR LAT NC 15X155: Type: IMPLANTABLE DEVICE | Site: HIP | Status: FUNCTIONAL

## 2021-04-21 DEVICE — SHELL ACETABULAR G7 3H 52MM POROUS PLASMA CEMENTLESS: Type: IMPLANTABLE DEVICE | Site: HIP | Status: FUNCTIONAL

## 2021-04-21 DEVICE — LINER G7 ACETABULAR SZ E 36MM ANTIOXIDANT INFUSED: Type: IMPLANTABLE DEVICE | Site: HIP | Status: FUNCTIONAL

## 2021-04-21 DEVICE — HEAD CER BIOLOXD OPTION 36MM: Type: IMPLANTABLE DEVICE | Site: HIP | Status: FUNCTIONAL

## 2021-04-21 DEVICE — SLEEVE CER OPTION TYPE 1 TPR 6: Type: IMPLANTABLE DEVICE | Site: HIP | Status: FUNCTIONAL

## 2021-04-21 RX ORDER — ACETAMINOPHEN 500 MG
1000 TABLET ORAL EVERY 8 HOURS SCHEDULED
Status: DISCONTINUED | OUTPATIENT
Start: 2021-04-21 | End: 2021-04-22 | Stop reason: HOSPADM

## 2021-04-21 RX ORDER — SODIUM CHLORIDE, SODIUM LACTATE, POTASSIUM CHLORIDE, CALCIUM CHLORIDE 600; 310; 30; 20 MG/100ML; MG/100ML; MG/100ML; MG/100ML
100 INJECTION, SOLUTION INTRAVENOUS CONTINUOUS
Status: DISCONTINUED | OUTPATIENT
Start: 2021-04-21 | End: 2021-04-22 | Stop reason: HOSPADM

## 2021-04-21 RX ORDER — CELECOXIB 200 MG/1
200 CAPSULE ORAL ONCE
Status: COMPLETED | OUTPATIENT
Start: 2021-04-21 | End: 2021-04-21

## 2021-04-21 RX ORDER — OXYCODONE HYDROCHLORIDE 5 MG/1
2.5 TABLET ORAL EVERY 4 HOURS PRN
Status: DISCONTINUED | OUTPATIENT
Start: 2021-04-21 | End: 2021-04-22 | Stop reason: HOSPADM

## 2021-04-21 RX ORDER — TRANEXAMIC ACID 100 MG/ML
1000 INJECTION, SOLUTION INTRAVENOUS ONCE
Status: COMPLETED | OUTPATIENT
Start: 2021-04-21 | End: 2021-04-21

## 2021-04-21 RX ORDER — OXYCODONE HYDROCHLORIDE 5 MG/1
10 TABLET ORAL EVERY 4 HOURS PRN
Status: DISCONTINUED | OUTPATIENT
Start: 2021-04-21 | End: 2021-04-22 | Stop reason: HOSPADM

## 2021-04-21 RX ORDER — POLYETHYLENE GLYCOL (3350) 17 G/17G
17 POWDER, FOR SOLUTION ORAL DAILY
Status: DISCONTINUED | OUTPATIENT
Start: 2021-04-22 | End: 2021-04-22 | Stop reason: HOSPADM

## 2021-04-21 RX ORDER — ONDANSETRON 4 MG/1
4 TABLET, FILM COATED ORAL EVERY 6 HOURS PRN
Status: DISCONTINUED | OUTPATIENT
Start: 2021-04-21 | End: 2021-04-22 | Stop reason: HOSPADM

## 2021-04-21 RX ORDER — NORETHINDRONE AND ETHINYL ESTRADIOL 0.5-0.035
KIT ORAL AS NEEDED
Status: DISCONTINUED | OUTPATIENT
Start: 2021-04-21 | End: 2021-04-21 | Stop reason: SURG

## 2021-04-21 RX ORDER — BUPIVACAINE HYDROCHLORIDE 7.5 MG/ML
INJECTION, SOLUTION INTRASPINAL
Status: COMPLETED | OUTPATIENT
Start: 2021-04-21 | End: 2021-04-21

## 2021-04-21 RX ORDER — CELECOXIB 200 MG/1
200 CAPSULE ORAL DAILY
Status: DISCONTINUED | OUTPATIENT
Start: 2021-04-22 | End: 2021-04-22 | Stop reason: HOSPADM

## 2021-04-21 RX ORDER — URSODIOL 250 MG/1
250 TABLET, FILM COATED ORAL
Status: DISCONTINUED | OUTPATIENT
Start: 2021-04-22 | End: 2021-04-22 | Stop reason: HOSPADM

## 2021-04-21 RX ORDER — METOPROLOL TARTRATE 1 MG/ML
1 INJECTION, SOLUTION INTRAVENOUS EVERY 5 MIN PRN
Status: DISCONTINUED | OUTPATIENT
Start: 2021-04-21 | End: 2021-04-21 | Stop reason: HOSPADM

## 2021-04-21 RX ORDER — FENTANYL CITRATE/PF 50 MCG/ML
PLASTIC BAG, INJECTION (ML) INTRAVENOUS
Status: COMPLETED | OUTPATIENT
Start: 2021-04-21 | End: 2021-04-21

## 2021-04-21 RX ORDER — URSODIOL 250 MG/1
500 TABLET, FILM COATED ORAL 2 TIMES DAILY
Status: DISCONTINUED | OUTPATIENT
Start: 2021-04-21 | End: 2021-04-21

## 2021-04-21 RX ORDER — BUPIVACAINE 13.3 MG/ML
INJECTION, SUSPENSION, LIPOSOMAL INFILTRATION AS NEEDED
Status: DISCONTINUED | OUTPATIENT
Start: 2021-04-21 | End: 2021-04-21 | Stop reason: HOSPADM

## 2021-04-21 RX ORDER — DIPHENHYDRAMINE HCL 25 MG
25-50 CAPSULE ORAL EVERY 6 HOURS PRN
Status: DISCONTINUED | OUTPATIENT
Start: 2021-04-21 | End: 2021-04-22 | Stop reason: HOSPADM

## 2021-04-21 RX ORDER — ONDANSETRON HYDROCHLORIDE 2 MG/ML
4 INJECTION, SOLUTION INTRAVENOUS EVERY 6 HOURS PRN
Status: DISCONTINUED | OUTPATIENT
Start: 2021-04-21 | End: 2021-04-22 | Stop reason: HOSPADM

## 2021-04-21 RX ORDER — CEFAZOLIN SODIUM 10 G/1
2000 INJECTION, POWDER, FOR SOLUTION INTRAVENOUS EVERY 8 HOURS
Status: COMPLETED | OUTPATIENT
Start: 2021-04-21 | End: 2021-04-22

## 2021-04-21 RX ORDER — FAMOTIDINE 20 MG/1
20 TABLET, FILM COATED ORAL 2 TIMES DAILY
Status: DISCONTINUED | OUTPATIENT
Start: 2021-04-21 | End: 2021-04-22 | Stop reason: HOSPADM

## 2021-04-21 RX ORDER — DIPHENHYDRAMINE HYDROCHLORIDE 50 MG/ML
25 INJECTION INTRAMUSCULAR; INTRAVENOUS ONCE AS NEEDED
Status: DISCONTINUED | OUTPATIENT
Start: 2021-04-21 | End: 2021-04-21 | Stop reason: HOSPADM

## 2021-04-21 RX ORDER — ACETAMINOPHEN 500 MG
1000 TABLET ORAL ONCE
Status: COMPLETED | OUTPATIENT
Start: 2021-04-21 | End: 2021-04-21

## 2021-04-21 RX ORDER — SODIUM CHLORIDE, SODIUM LACTATE, POTASSIUM CHLORIDE, AND CALCIUM CHLORIDE .6; .31; .03; .02 G/100ML; G/100ML; G/100ML; G/100ML
500 INJECTION, SOLUTION INTRAVENOUS ONCE AS NEEDED
Status: DISCONTINUED | OUTPATIENT
Start: 2021-04-21 | End: 2021-04-21 | Stop reason: HOSPADM

## 2021-04-21 RX ORDER — FENTANYL CITRATE/PF 50 MCG/ML
50 PLASTIC BAG, INJECTION (ML) INTRAVENOUS EVERY 5 MIN PRN
Status: DISCONTINUED | OUTPATIENT
Start: 2021-04-21 | End: 2021-04-21 | Stop reason: HOSPADM

## 2021-04-21 RX ORDER — FLUOXETINE HYDROCHLORIDE 20 MG/1
40 CAPSULE ORAL DAILY
Status: DISCONTINUED | OUTPATIENT
Start: 2021-04-21 | End: 2021-04-22 | Stop reason: HOSPADM

## 2021-04-21 RX ORDER — ONDANSETRON HYDROCHLORIDE 2 MG/ML
4 INJECTION, SOLUTION INTRAVENOUS ONCE AS NEEDED
Status: DISCONTINUED | OUTPATIENT
Start: 2021-04-21 | End: 2021-04-21 | Stop reason: HOSPADM

## 2021-04-21 RX ORDER — LOSARTAN POTASSIUM 50 MG/1
50 TABLET ORAL EVERY EVENING
Status: DISCONTINUED | OUTPATIENT
Start: 2021-04-21 | End: 2021-04-22 | Stop reason: HOSPADM

## 2021-04-21 RX ORDER — ROSUVASTATIN CALCIUM 10 MG/1
10 TABLET, COATED ORAL EVERY EVENING
Status: DISCONTINUED | OUTPATIENT
Start: 2021-04-21 | End: 2021-04-22 | Stop reason: HOSPADM

## 2021-04-21 RX ORDER — PROPOFOL 10 MG/ML
INJECTION, EMULSION INTRAVENOUS CONTINUOUS PRN
Status: DISCONTINUED | OUTPATIENT
Start: 2021-04-21 | End: 2021-04-21 | Stop reason: SURG

## 2021-04-21 RX ORDER — ASPIRIN 325 MG/1
325 TABLET, FILM COATED ORAL DAILY
Status: DISCONTINUED | OUTPATIENT
Start: 2021-04-21 | End: 2021-04-22 | Stop reason: HOSPADM

## 2021-04-21 RX ORDER — URSODIOL 250 MG/1
500 TABLET, FILM COATED ORAL 2 TIMES DAILY
Status: DISCONTINUED | OUTPATIENT
Start: 2021-04-21 | End: 2021-04-22 | Stop reason: HOSPADM

## 2021-04-21 RX ORDER — CEFAZOLIN SODIUM 10 G/1
2000 INJECTION, POWDER, FOR SOLUTION INTRAVENOUS ONCE
Status: COMPLETED | OUTPATIENT
Start: 2021-04-21 | End: 2021-04-21

## 2021-04-21 RX ORDER — MIDAZOLAM HYDROCHLORIDE 1 MG/ML
INJECTION INTRAMUSCULAR; INTRAVENOUS AS NEEDED
Status: DISCONTINUED | OUTPATIENT
Start: 2021-04-21 | End: 2021-04-21 | Stop reason: SURG

## 2021-04-21 RX ORDER — DEXAMETHASONE SODIUM PHOSPHATE 4 MG/ML
4 INJECTION, SOLUTION INTRA-ARTICULAR; INTRALESIONAL; INTRAMUSCULAR; INTRAVENOUS; SOFT TISSUE ONCE AS NEEDED
Status: DISCONTINUED | OUTPATIENT
Start: 2021-04-21 | End: 2021-04-21 | Stop reason: HOSPADM

## 2021-04-21 RX ORDER — DIPHENHYDRAMINE HYDROCHLORIDE 50 MG/ML
25-50 INJECTION INTRAMUSCULAR; INTRAVENOUS EVERY 6 HOURS PRN
Status: DISCONTINUED | OUTPATIENT
Start: 2021-04-21 | End: 2021-04-22 | Stop reason: HOSPADM

## 2021-04-21 RX ORDER — ADHESIVE BANDAGE
30 BANDAGE TOPICAL DAILY PRN
Status: DISCONTINUED | OUTPATIENT
Start: 2021-04-21 | End: 2021-04-22 | Stop reason: HOSPADM

## 2021-04-21 RX ORDER — OXYCODONE HYDROCHLORIDE 5 MG/1
5 TABLET ORAL EVERY 4 HOURS PRN
Status: DISCONTINUED | OUTPATIENT
Start: 2021-04-21 | End: 2021-04-22 | Stop reason: HOSPADM

## 2021-04-21 RX ORDER — MORPHINE SULFATE 4 MG/ML
3-4 INJECTION, SOLUTION INTRAMUSCULAR; INTRAVENOUS
Status: DISCONTINUED | OUTPATIENT
Start: 2021-04-21 | End: 2021-04-22 | Stop reason: HOSPADM

## 2021-04-21 RX ORDER — SODIUM CHLORIDE, SODIUM LACTATE, POTASSIUM CHLORIDE, AND CALCIUM CHLORIDE .6; .31; .03; .02 G/100ML; G/100ML; G/100ML; G/100ML
250 INJECTION, SOLUTION INTRAVENOUS ONCE AS NEEDED
Status: DISCONTINUED | OUTPATIENT
Start: 2021-04-21 | End: 2021-04-21 | Stop reason: HOSPADM

## 2021-04-21 RX ORDER — MORPHINE SULFATE 2 MG/ML
1-2 INJECTION, SOLUTION INTRAMUSCULAR; INTRAVENOUS
Status: DISCONTINUED | OUTPATIENT
Start: 2021-04-21 | End: 2021-04-22 | Stop reason: HOSPADM

## 2021-04-21 RX ORDER — BISACODYL 10 MG/1
10 SUPPOSITORY RECTAL DAILY PRN
Status: DISCONTINUED | OUTPATIENT
Start: 2021-04-21 | End: 2021-04-22 | Stop reason: HOSPADM

## 2021-04-21 RX ADMIN — CEFAZOLIN 2000 MG: 10 INJECTION, POWDER, FOR SOLUTION INTRAVENOUS at 22:16

## 2021-04-21 RX ADMIN — MIDAZOLAM HYDROCHLORIDE 1 MG: 1 INJECTION, SOLUTION INTRAMUSCULAR; INTRAVENOUS at 15:13

## 2021-04-21 RX ADMIN — MIDAZOLAM HYDROCHLORIDE 1 MG: 1 INJECTION, SOLUTION INTRAMUSCULAR; INTRAVENOUS at 14:41

## 2021-04-21 RX ADMIN — Medication 150 MCG: at 14:14

## 2021-04-21 RX ADMIN — FLUOXETINE 40 MG: 20 CAPSULE ORAL at 17:52

## 2021-04-21 RX ADMIN — BUPIVACAINE HYDROCHLORIDE 1.8 ML: 7.5 INJECTION, SOLUTION INTRASPINAL at 14:00

## 2021-04-21 RX ADMIN — EPHEDRINE SULFATE 5 MG: 50 INJECTION, SOLUTION INTRAVENOUS at 15:17

## 2021-04-21 RX ADMIN — TRANEXAMIC ACID 1000 MG: 100 INJECTION, SOLUTION INTRAVENOUS at 16:25

## 2021-04-21 RX ADMIN — Medication 200 MCG: at 14:18

## 2021-04-21 RX ADMIN — FAMOTIDINE 20 MG: 20 TABLET ORAL at 21:01

## 2021-04-21 RX ADMIN — EPHEDRINE SULFATE 5 MG: 50 INJECTION, SOLUTION INTRAVENOUS at 15:07

## 2021-04-21 RX ADMIN — Medication 100 MCG: at 14:22

## 2021-04-21 RX ADMIN — ASPIRIN 325 MG: 325 TABLET, FILM COATED ORAL at 17:54

## 2021-04-21 RX ADMIN — CELECOXIB 200 MG: 200 CAPSULE ORAL at 12:34

## 2021-04-21 RX ADMIN — SODIUM CHLORIDE, POTASSIUM CHLORIDE, SODIUM LACTATE AND CALCIUM CHLORIDE 100 ML/HR: 600; 310; 30; 20 INJECTION, SOLUTION INTRAVENOUS at 12:34

## 2021-04-21 RX ADMIN — SODIUM CHLORIDE, POTASSIUM CHLORIDE, SODIUM LACTATE AND CALCIUM CHLORIDE 100 ML/HR: 600; 310; 30; 20 INJECTION, SOLUTION INTRAVENOUS at 17:36

## 2021-04-21 RX ADMIN — FENTANYL CITRATE 25 MCG: 50 INJECTION, SOLUTION INTRAMUSCULAR; INTRAVENOUS at 14:00

## 2021-04-21 RX ADMIN — FENTANYL CITRATE 50 MCG: 50 INJECTION INTRAMUSCULAR; INTRAVENOUS at 16:15

## 2021-04-21 RX ADMIN — LOSARTAN POTASSIUM 50 MG: 50 TABLET, FILM COATED ORAL at 21:01

## 2021-04-21 RX ADMIN — ROSUVASTATIN 10 MG: 10 TABLET, FILM COATED ORAL at 21:01

## 2021-04-21 RX ADMIN — EPHEDRINE SULFATE 5 MG: 50 INJECTION, SOLUTION INTRAVENOUS at 15:02

## 2021-04-21 RX ADMIN — PROPOFOL 75 MCG/KG/MIN: 10 INJECTION, EMULSION INTRAVENOUS at 14:00

## 2021-04-21 RX ADMIN — EPHEDRINE SULFATE 5 MG: 50 INJECTION, SOLUTION INTRAVENOUS at 14:28

## 2021-04-21 RX ADMIN — Medication 100 MCG: at 14:29

## 2021-04-21 RX ADMIN — CEFAZOLIN 2000 MG: 10 INJECTION, POWDER, FOR SOLUTION INTRAVENOUS at 16:25

## 2021-04-21 RX ADMIN — OXYCODONE HYDROCHLORIDE 10 MG: 5 TABLET ORAL at 19:38

## 2021-04-21 RX ADMIN — EPHEDRINE SULFATE 10 MG: 50 INJECTION, SOLUTION INTRAVENOUS at 14:35

## 2021-04-21 RX ADMIN — Medication 1000 MG: at 17:54

## 2021-04-21 RX ADMIN — SODIUM CHLORIDE, POTASSIUM CHLORIDE, SODIUM LACTATE AND CALCIUM CHLORIDE: 600; 310; 30; 20 INJECTION, SOLUTION INTRAVENOUS at 15:46

## 2021-04-21 RX ADMIN — Medication 50 MCG: at 14:09

## 2021-04-21 RX ADMIN — OXYCODONE HYDROCHLORIDE 10 MG: 5 TABLET ORAL at 23:19

## 2021-04-21 RX ADMIN — TRANEXAMIC ACID 1000 MG: 100 INJECTION, SOLUTION INTRAVENOUS at 14:13

## 2021-04-21 RX ADMIN — URSODIOL 500 MG: 250 TABLET, FILM COATED ORAL at 21:01

## 2021-04-21 RX ADMIN — Medication 1000 MG: at 22:15

## 2021-04-21 RX ADMIN — Medication 1000 MG: at 12:34

## 2021-04-21 RX ADMIN — CEFAZOLIN 2000 MG: 10 INJECTION, POWDER, FOR SOLUTION INTRAVENOUS at 14:09

## 2021-04-21 RX ADMIN — Medication 100 MCG: at 14:52

## 2021-04-21 ASSESSMENT — ACTIVITIES OF DAILY LIVING (ADL): ADEQUATE_TO_COMPLETE_ADL: YES

## 2021-04-21 ASSESSMENT — ENCOUNTER SYMPTOMS: EXERCISE TOLERANCE: GOOD (4-7 METS)

## 2021-04-21 NOTE — DISCHARGE INSTR - APPOINTMENTS
Outpatient Physical Therapy at [facility] -[phone]  Tuesday April 27th at 11am please arrive at 10:40am for paperwork.    [facility] with Dr Vásquez [phone]  Friday April 30th at 1pm.

## 2021-04-21 NOTE — ANESTHESIA POSTPROCEDURE EVALUATION
Patient: Juan Diego Rondon    Procedure Summary     Date: 04/21/21 Room / Location: [facility] OR 01 / [facility] OR    Anesthesia Start: 1353 Anesthesia Stop: 1612    Procedure: LEFT TOTAL HIP ARTHROPLASTY (Left Hip) Diagnosis:       Avascular necrosis of bone of left hip (CMS/HCC) (HCC)      (Avascular necrosis of bone of left hip (CMS/HCC) (HCC) [M87.052])    Surgeons: Sj Reyes MD Responsible Provider: Rosa Isela Rollins CRNA    Anesthesia Type: MAC, spinal ASA Status: 2          Anesthesia Type: MAC, spinal    Last vitals  Vitals Value Taken Time   /66 04/21/21 1615   Temp 37    Pulse 83 04/21/21 1616   Resp 8 04/21/21 1616   SpO2 100 % 04/21/21 1616   Pain Score 8 04/21/21 1615   Vitals shown include unvalidated device data.    Anesthesia Post Evaluation    Patient location during evaluation: PACU  Patient participation: complete - patient participated  Level of consciousness: awake and alert  Pain management: adequate  Airway patency: patent  Anesthetic complications: no  Cardiovascular status: acceptable  Respiratory status: acceptable  Hydration status: acceptable  May dismiss recovered patient based on consultation with the appropriate physicians and/or meeting appropriate discharge criteria      Cosmetic?  This procedure is not cosmetic.

## 2021-04-21 NOTE — INTERDISCIPLINARY/THERAPY
Discharge Plan      Met with Patient to discuss home situation and plans for discharge, Patient lives with his wife Patsy in their home in [place], he indicates his son Seun and JIGAR Gabriela live locally they are supportive and will assist as needed. Plan is for discharge home, Patsy will transport him. He will continue therapy at [facility] PT, appointment has been made. Patient has a cane but will need a walker for home use, PT notified. He denies need for further assistance at this time.

## 2021-04-21 NOTE — OP NOTE
Juan Diego Newlin  04/21/21    PREOPERATIVE DIAGNOSIS:  Pre-op Diagnosis     * Avascular necrosis of bone of left hip (CMS/HCC) ([facility]) [M87.052]    POSTOPERATIVE DIAGNOSIS:  Post-op Diagnosis     * Avascular necrosis of bone of left hip (CMS/HCC) ([facility]) [M87.052]    PROCEDURES:    Procedure:    LEFT TOTAL HIP ARTHROPLASTY  CPT(R) Code:  59407 - SC TOTAL HIP ARTHROPLASTY        SURGEON: Surgeon(s):  MD Kwesi Ashton PA      ASSISTANT:        ANESTHETIST:  CRNA: Rosa Isela Rollins CRNA; Dominic Paris CRNA  Student Nurse Anesthetist: JOHN Samuel       ANESTHESIA TYPE:  General       ESTIMATED BLOOD LOSS:  No blood loss documented.    IV FLUIDS:  Please see anesthesia notes.    IMPLANTS:    Implant Name Type Inv. Item Serial No.  Lot No. LRB No. Used Action   SHELL ACETABULAR G7 3H 52MM POROUS PLASMA CEMENTLESS - [id number] Implant Shell Acetabular G7 3H 52mm Porous Plasma Cementless  [employer] [id number] Left 1 Implanted   LINER G7 ACETABULAR SZ E 36MM ANTIOXIDANT INFUSED - [id number] Implant Liner G7 Acetabular Sz E 36mm Antioxidant Infused  [employer] [id number] Left 1 Implanted   HEAD CER BIOLOXD OPTION 36MM - [id number] Joint Head Cer Bioloxd Option 36mm  [employer] [id number] Left 1 Implanted   SLEEVE CER OPTION TYPE 1 TPR 6 - [id number] Joint Sleeve Cer Option Type 1 Tpr 6  [employer] [id number] Left 1 Implanted   STEM FEM POR LAT NC 15X155 - [id number] Implant Stem Fem Por Lat Nc [id number]  [employer] [id number] Left 1 Implanted         COMPLICATIONS:  None      DESCRIPTION OF PROCEDURE: Yoni Paz PA-C was present from the very beginning to the end of the case. assisted with retraction, dislocation / relocation of the hip, injection of local anesthetics, closure of wounds and application of dressings.    The patient was brought to the operating room and placed underneath Generalanesthesia.  The patient was placed within the hip positioner and the left hip was  prepped and draped in usual sterile fashion.  A 3-1/2-4 inch long incision was placed about the lateral hip.  This was carried down to the IT band and gluteus kendall.  Division between the gluteus kendall and IT band was made along with its fibers.  From here we identified the short external rotators were tagged and subsequently released.  Hip joint capsulotomy was also made and was also tagged for later repair.  From here the femoral head was dislocated from the hip socket.  Standard femoral neck cut was made.    The femoral canal was prepped next.  We used a  in the proximal femur followed by tapered reamers up to a size 15.  Then we used broaches up to the same size.  We then used a calcar planer.  Trial components were then removed.    Next the acetabulum was prepared by removing the soft tissue.  This includes the labral tissue, transverse acetabular ligament and pulvinar tissue.  After adequate visualization the acetabulum was started with a 44 mm reamer and worked up circumferentially to 52 millimeter cup.  We then placed a trial component within the acetabulum.  It was found to be satisfactorily tight within the bone.  The trial liner was inserted.  Osteophytes anteriorly and posterior removed.    From here the trial stem was replaced and the x-ray was obtained.  Once the x-ray was reviewed we put in an E poly-socket.  This was done after the x-ray was reviewed and showed satisfactory position of the acetabulum.  Once I was satisfied with the review the x-ray the real stem was inserted and this was a size 15 echo Bi-Metric stem.  I then went ahead and tried several different lengths on the 36 mm head.  The best fit was found to be a +6 millimeter length 36 mm head.  A real 36 mm by +6 delta ceramic head was impacted.  The joint was then anesthetized using Exparel cocktail mixture.  Hip joint capsule was closed interrupted #1 Vicryl sutures.  Short external rotators repaired #2 reinforced #1 Vicryl  sutures.  The IT band was closed #1 Vicryl sutures.  The deep repair was closed with 0 Vicryl followed by 2-0 subcuticular stitches followed by 3-0 Monocryl in the skin.  Steri-Strips were applied.  A light steriledressing was applied and the patient was awakened.  The patient was brought to recovery room and tolerated procedure well.    After I took the x-ray looks like the leg length was really quite good. overall I thought the hip was a little bit looser than it should.  With a standard size is head length there was a little bit anterior instability. In addtion I did pound a stem will bit further down more than what the x-ray was indicating so overall it should be a pretty good for length and also better for stability.                DISPOSITION:  MAIN OR/NONE    Sj Reyes MD  Phone Number: [phone]    DATE: 04/21/21      TIME: 3:51 PM

## 2021-04-21 NOTE — DISCHARGE SUMMARY
Today's date  04/21/21  Admit date  4/21/2021  Discharge date  4/22/2021    Procedure  Left total hip arthroplasty done by Dr. Reyes on 4/21/2021    Brief History  Patient is been having years of progressively worsening left hip pain and dysfunction due to avascular necrosis and osteoarthritis in this joint.  They have gotten to the point where there are ADLs, exercise, recreational activities, walking, sleep and work activities have been significantly diminished due to their symptoms.  They have tried and failed various conservative measures including over-the-counter and prescription medications, injections.  For this reason they underwent the total joint arthroplasty  Discharge Disposition  Home  No Order        Consults: None    DIET: Normal    Hospital Summary  Patient was admitted to the surgical floor after surgery for postanesthesia care, monitoring vital signs, pain control, physical and Occupational Therapy, wound care.  During patient's stay there vital signs remained stable, physical and occupational therapy goals were met, good pain control was achieved with oral pain medication.  Surgical incision was without any signs of infection, no drainage no erythema.    Outpatient Follow-Up  Future Appointments   Date Time Provider Department Center   5/6/2021 10:40 AM Sj Reyes MD [facility]    6/29/2021 10:30 AM Gracia Manriquez MD [id number]       Labs  Lab Results   Component Value Date    WBC 5.5 02/01/2021    HGB 14.1 02/01/2021    HCT 40.6 02/01/2021    .1 (H) 02/01/2021     02/01/2021     No results found for: GLUCOSE, CALCIUM, NA, K, CO2, CL, BUN, CREATININE, ANIONGAP    Ortho Exam    General: Patient well-nourished 65-year-old male no acute distress.  ANO 3 pleasant cooperative  Left hip and lower extremity: Incision/bandage  area clean and dry with no erythema, negative Homans.  Neurovascularly intact throughout

## 2021-04-21 NOTE — ANESTHESIA PROCEDURE NOTES
Spinal Block    Patient location during procedure: OR  Start time: 4/21/2021 2:00 PM  Reason for block: primary anesthetic    Staffing  CRNA: Rosa Isela Rollins CRNA  Other anesthesia staff: JOHN Smauel  Performed: CRNA and other anesthesia staff   Preanesthetic Checklist  Completed: patient identified, IV checked, site marked, risks and benefits discussed, surgical consent, monitors and equipment checked, pre-op evaluation and timeout performed  Spinal Block  Patient position: sitting  Prep: ChloraPrep  Patient monitoring: blood pressure monitoring, EKG and continuous pulse oximetry  Approach: midline  Location: L3-4  Injection technique: single-shot  Procedure: negative aspiration for blood,  no paresthesia and positive aspiration for clear CSF  Local infiltration: lidocaine 1%  Needle  Needle type: Sammi   Needle gauge: 25 G  Needle length: 3.5 in  Needle introducer used: Yes    Medications Administered  FentaNYL (PF) (SUBLIMAZE) injection 50 mcg/mL - Intrathecal, 25 mcg  BUPivacaine 0.75%-dextrose 8.25% (SENSORCAINE)(PF) injection - Intrathecal, 1.8 mL  Assessment  Sensory level: T10  Events: well tolerated

## 2021-04-22 VITALS
OXYGEN SATURATION: 95 % | TEMPERATURE: 98.8 F | RESPIRATION RATE: 20 BRPM | BODY MASS INDEX: 22.38 KG/M2 | WEIGHT: 180 LBS | HEIGHT: 75 IN | HEART RATE: 89 BPM | SYSTOLIC BLOOD PRESSURE: 131 MMHG | DIASTOLIC BLOOD PRESSURE: 84 MMHG

## 2021-04-22 LAB
ANION GAP SERPL CALC-SCNC: 11 MMOL/L (ref 3–11)
ANISOCYTOSIS PRESENCE IN BLOOD, ANALYZER: ABNORMAL
BASOPHILS # BLD AUTO: 0.1 10*3/UL
BASOPHILS NFR BLD AUTO: 1 % (ref 0–2)
BUN SERPL-MCNC: 7 MG/DL (ref 7–25)
CALCIUM SERPL-MCNC: 8.5 MG/DL (ref 8.6–10.3)
CHLORIDE SERPL-SCNC: 102 MMOL/L (ref 98–107)
CO2 SERPL-SCNC: 21 MMOL/L (ref 21–32)
CREAT SERPL-MCNC: 0.78 MG/DL (ref 0.7–1.3)
EOSINOPHIL # BLD AUTO: 0 10*3/UL
EOSINOPHIL NFR BLD AUTO: 0 % (ref 0–3)
ERYTHROCYTE [DISTWIDTH] IN BLOOD BY AUTOMATED COUNT: 17.5 % (ref 11.5–15)
GFR SERPL CREATININE-BSD FRML MDRD: 94 ML/MIN/1.73M*2
GLUCOSE SERPL-MCNC: 118 MG/DL (ref 70–105)
HCT VFR BLD AUTO: 31.3 % (ref 38–50)
HGB BLD-MCNC: 10.6 G/DL (ref 13.2–17.2)
LYMPHOCYTES # BLD AUTO: 0.9 10*3/UL
LYMPHOCYTES NFR BLD AUTO: 10 % (ref 15–47)
MACROCYTOSIS PRESENCE IN BLOOD, ANALYZER: ABNORMAL
MCH RBC QN AUTO: 36.6 PG (ref 29–34)
MCHC RBC AUTO-ENTMCNC: 33.9 G/DL (ref 32–36)
MCV RBC AUTO: 107.9 FL (ref 82–97)
MONOCYTES # BLD AUTO: 0.6 10*3/UL
MONOCYTES NFR BLD AUTO: 7 % (ref 5–13)
NEUTROPHILS # BLD AUTO: 7.3 10*3/UL
NEUTROPHILS NFR BLD AUTO: 82 % (ref 46–70)
PLATELET # BLD AUTO: 146 10*3/UL (ref 130–350)
PMV BLD AUTO: 7.3 FL (ref 6.9–10.8)
POTASSIUM SERPL-SCNC: 3.1 MMOL/L (ref 3.5–5.1)
RBC # BLD AUTO: 2.9 10*6/ΜL (ref 4.1–5.8)
SODIUM SERPL-SCNC: 134 MMOL/L (ref 135–145)
WBC # BLD AUTO: 8.9 10*3/UL (ref 3.7–9.6)

## 2021-04-22 PROCEDURE — 7100002500 HC EXTENDED RECOVERY PER HOUR

## 2021-04-22 PROCEDURE — 97161 PT EVAL LOW COMPLEX 20 MIN: CPT | Mod: GP | Performed by: PHYSICAL THERAPIST

## 2021-04-22 PROCEDURE — 6370000100 HC RX 637 (ALT 250 FOR IP): Performed by: ORTHOPAEDIC SURGERY

## 2021-04-22 PROCEDURE — 80048 BASIC METABOLIC PNL TOTAL CA: CPT | Performed by: PHYSICIAN ASSISTANT

## 2021-04-22 PROCEDURE — 6370000100 HC RX 637 (ALT 250 FOR IP): Performed by: PHYSICIAN ASSISTANT

## 2021-04-22 PROCEDURE — 2590000100 HC RX 259: Performed by: PHYSICIAN ASSISTANT

## 2021-04-22 PROCEDURE — 97165 OT EVAL LOW COMPLEX 30 MIN: CPT | Mod: GO | Performed by: OCCUPATIONAL THERAPIST

## 2021-04-22 PROCEDURE — 6360000200 HC RX 636 W HCPCS (ALT 250 FOR IP): Performed by: PHYSICIAN ASSISTANT

## 2021-04-22 PROCEDURE — 36415 COLL VENOUS BLD VENIPUNCTURE: CPT | Performed by: PHYSICIAN ASSISTANT

## 2021-04-22 PROCEDURE — 97110 THERAPEUTIC EXERCISES: CPT | Mod: GP | Performed by: PHYSICAL THERAPIST

## 2021-04-22 PROCEDURE — 97535 SELF CARE MNGMENT TRAINING: CPT | Mod: GO | Performed by: OCCUPATIONAL THERAPIST

## 2021-04-22 PROCEDURE — 97116 GAIT TRAINING THERAPY: CPT | Mod: GP | Performed by: PHYSICAL THERAPIST

## 2021-04-22 PROCEDURE — 85025 COMPLETE CBC W/AUTO DIFF WBC: CPT | Performed by: PHYSICIAN ASSISTANT

## 2021-04-22 RX ORDER — ASPIRIN 325 MG/1
325 TABLET, FILM COATED ORAL DAILY
Qty: 30 TABLET | Refills: 0 | Status: SHIPPED | OUTPATIENT
Start: 2021-04-23 | End: 2021-05-24 | Stop reason: ALTCHOICE

## 2021-04-22 RX ORDER — HYDROCODONE BITARTRATE AND ACETAMINOPHEN 5; 325 MG/1; MG/1
1-2 TABLET ORAL EVERY 4 HOURS PRN
Qty: 60 TABLET | Refills: 0 | Status: SHIPPED | OUTPATIENT
Start: 2021-04-22 | End: 2021-05-02

## 2021-04-22 RX ADMIN — POLYETHYLENE GLYCOL 3350 17 G: 17 POWDER, FOR SOLUTION ORAL at 08:44

## 2021-04-22 RX ADMIN — CEFAZOLIN 2000 MG: 10 INJECTION, POWDER, FOR SOLUTION INTRAVENOUS at 06:27

## 2021-04-22 RX ADMIN — FAMOTIDINE 20 MG: 20 TABLET ORAL at 08:44

## 2021-04-22 RX ADMIN — Medication 1000 MG: at 06:26

## 2021-04-22 RX ADMIN — OXYCODONE HYDROCHLORIDE 10 MG: 5 TABLET ORAL at 04:05

## 2021-04-22 RX ADMIN — FLUOXETINE 40 MG: 20 CAPSULE ORAL at 08:44

## 2021-04-22 RX ADMIN — URSODIOL 500 MG: 250 TABLET, FILM COATED ORAL at 08:45

## 2021-04-22 RX ADMIN — URSODIOL 250 MG: 250 TABLET, FILM COATED ORAL at 09:00

## 2021-04-22 RX ADMIN — ASPIRIN 325 MG: 325 TABLET, FILM COATED ORAL at 08:44

## 2021-04-22 RX ADMIN — CELECOXIB 200 MG: 200 CAPSULE ORAL at 08:44

## 2021-04-22 NOTE — INTERDISCIPLINARY/THERAPY
04/22/21 0805   Time Calculation   Start Time 0805   Stop Time 0835   Time Calculation (min) 30 min   OT Last Visit   OT Received On 04/22/21   Visit Number 1   General   Chart Reviewed Yes   Is this a Co-Treatment? No   Family/Caregiver Present No   Home Living   Type of Home House   Home Layout One level   Home Access Level entry   Bathroom Shower/Tub Walk-in shower   Bathroom Equipment Grab bars in shower;Shower chair with back;Raised toilet seat with rails   Home Adaptive Equipment Front wheeled walker   Prior Function   Level of Dixie Independent with ADLs and functional transfers;Independent with homemaking with ambulation   Subjective Comments   RN Stated patient is medically cleared for therapy Yes   Subjective Comments Patient is seated in bedside chair upon OT arrival, agreeable to OT evaluation and ADL retraining this morning.  At end of session patient is seated in bedside chair with all needs met and call light within reach.   Sachin Fall Risk   History of Falling 0   Secondary Diagnosis 15   Ambulatory Aids 15   Intravenous Therapy/Heparin/Saline Lock 0   Gait/Transferring 10   Mental Status 0   Stephenson Fall Risk Score 40   Pain Assessment Scale   Pain Score 4   Pain Type Surgical pain   Pain Location Hip   Pain Orientation Left   Pain Interventions Repositioned;Cold applied   Cognition   Arousal/Alertness WFL   Orientation Level Oriented to time;Oriented to situation;Oriented to person;Oriented to place   Transfers   Transfer   (Mod I sit to stand from bedside chair)   Grooming   Grooming Level of Assistance Independent   UE Dressing   UE Dressing Level of Assistance Independent   UE Dressing Where Assessed Bedside chair   LE Dressing   LE Dressing Yes   Pants Level of Assistance Modified independent   Sock Level of Assistance Modified independent   Shoe Level of Assistance Modified independent   Compression Hose Level of Assistance Modified independent   LE Dressing Where Assessed Bedside  chair   Additional Activities   Additional Activities Comments OTR provides education this morning regarding modified positioning for sleep, pain management, progression of activity occupational participation, and use of modalities including ice for promotion of max functional recovery and occupational participation and performance.  Good understanding is verbalized at this time.  Patient has no additional questions verbalized this morning, demonstrates good understanding of on functional status and progression of following discharge.   Assessment   Rehab Potential Excellent   Progress Discontinue OT   Recommendations for Therapy Outpatient Therapy   Therapeutic Interventions (Time Spent in Minutes)   ADL Selfcare Home Managment 20   OT Untimed Charges - Quick List (Time Spent in Minutes)   OT Eval Low Complexity 15   Plan   Plan Comment Goals met, DC OT   Treatment Interventions ADL retraining;Patient/family training;Equipment evaluation/education;Compensatory technique education   OT Frequency One-time visit

## 2021-04-22 NOTE — PLAN OF CARE
Problem: Safety Adult - Fall  Goal: Free from fall injury  Description: INTERVENTIONS:    Inpatient - Please reference Cares/Safety Flowsheet under Stephenson Fall Risk for interventions.  Pediatrics - Please reference Peds Daily Cares/Safety Flowsheet under Wild Pediatric Fall Assessment Fall Bundle for interventions  LD/OB - Please reference OB Shift Screening Flowsheet under OB Fall Risk for interventions.  Outcome: Progressing     Problem: Pain - Adult  Goal: Verbalizes/displays adequate comfort level or baseline comfort level  Description: INTERVENTIONS:  1. Encourage patient to monitor pain and request interventions  2. Assess pain using the appropriate pain scale  3. Administer analgesics based on type and severity of pain and evaluate response  4. Educate/Implement non-pharmacological measures as appropriate and evaluate response  5. Consider cultural, developmental and social influences on pain and pain management  6. Notify Provider if interventions unsuccessful or patient reports new pain  Outcome: Progressing     Problem: Infection - Adult  Goal: Absence of infection during hospitalization  Description: INTERVENTIONS:  1. Assess and monitor for signs and symptoms of infection  2. Monitor lab/diagnostic results  3. Monitor all insertion sites/wounds/incisions  4. Monitor secretions for changes in amount and color  5. Administer medications as ordered  6. Educate and encourage patient and family to use good hand hygiene technique  7. Identify and educate in appropriate isolation precautions for identified infection/condition  Outcome: Progressing     Problem: Safety Adult  Goal: Patient will remain safe during hospitalization  Description: INTERVENTIONS    1. Assess patient for fall risk and implement interventions if needed  2. Use safe transport techniques  3. Assess patient using the appropriate Rome skin assessment scale  4. Assess patient for risk of aspiration  5. Assess patient for risk of  elopement  6. Assess patient for risk of suicide  Outcome: Progressing     Problem: Daily Care  Goal: Daily care needs are met  Description: INTERVENTIONS:   1. Assess and monitor skin integrity  2. Identify patients at risk for skin breakdown on admission and per policy  3. Assess and monitor ability to perform self care and identify potential discharge needs  4. Assess skin integrity/risk for skin breakdown  5. Assist patient with activities of daily living as needed  6. Encourage independent activity per ability   7. Provide mouth care   8. Include patient/family/caregiver in decisions related to daily care   Outcome: Progressing     Problem: Knowledge Deficit  Goal: Patient/family/caregiver demonstrates understanding of disease process, treatment plan, medications, and discharge instructions  Description: INTERVENTIONS:   1. Complete learning assessment and assess knowledge base  2. Provide teaching at level of understanding   3. Provide teaching via preferred learning methods  Outcome: Progressing     Problem: Discharge Barriers  Goal: Patient's discharge needs are met  Description: INTERVENTIONS:  1. Assess patient for self-management skills  2. Encourage participation in management  3. Identify potential discharge barriers on admission and throughout hospital stay  4. Involve patient/family/caregiver in discharge planning process  5. Collaborate with case management/ for discharge needs  Outcome: Progressing

## 2021-04-22 NOTE — DISCHARGE INSTRUCTIONS
You have been prescribed hydrocodone/acetaminophen wean off this medication as able.  Continue with your previously directed Celebrex, Tylenol do not take more than 3000 mg of acetaminophen in a 24-hour.  Take aspirin 325 mg once a day for 4 weeks to help prevent blood clots.  You will need to use MARIAN hose for 4 weeks to help prevent blood clots.  Leave current bandage on for 1 week then change it. You may shower however no tub soaking.  You will have an orthopedic follow-up visit for staple removal/suture removal in 2 weeks  If you have any drainage, bleeding or any concerns call the orthopedic clinic at [phone]    Total Hip Replacement, care after  These instructions give you information about caring for yourself after your surgery. Your doctor may also give you more specific instructions.  CONTACT YOUR SURGEON at [phone] if:  • You experience a temperature greater than 100.5 degrees F.  • You experience increase pain not relieved by the narcotic pain medication.  • YOU EXPERIENCE REDNESS, SWELLING, DRAINAGE, BLISTERING OR ODOR FROM INCISION.  • You experience uncontrolled bleeding.  • You have increased respiratory distress.  • You are having problems with constipation.  • You have any other questions regarding your surgery.  FOR SAFETY:  DO keep your incision clean and dry to help prevent infection.  • CHANGE THE DRESSING 7 DAYS AFTER SURGERY. Instructions for removing the old dressing if needed can be found on the instruction sheet you have been given along with the replacement dressing. This new dressing will stay in place until seen in the orthopedic clinic it is waterproof so you may shower. You will have staples or the Dermabond mesh for skin closure. If it is Dermabond mesh DO NOT remove it.              DO NOT apply any creams, lotions, ointments or salves to the incision site. NO tub bathes until incision is completely healed. You may shower with the dressing in place.     DO Wear the elastic MARIAN  hose to prevent blood clots.    • Stockings are to be worn on both legs during the day and at night. They may be removed to shower and to wash the stockings.    DO walk every hour to help prevent blood clots.    Do NOT use alcohol when taking pain medications.    Do NOT drive until you are no longer taking narcotic pain medications and/or using your walker.    Do NOT schedule dentist appointments within the next 3 months. To help prevent infections you will need to obtain an antibiotic prescription from your surgeon prior to any dental work/cleaning.      FOR HEALING:    DO your hip exercises twice a day and as instructed by your therapist - refer to the hip guidebook.    DO eat a well- balanced diet. Good nutrition helps your body heal faster.    Do NOT smoke - It slows healing.    FOR COMFORT:    DO use the ice machine for swelling and comfort.    • There will be MORE swelling and bruising on days 2-3 than there is on the day after surgery. This is normal. The swelling will decrease with the anti-inflammatory medication, the ice machine, and by keeping your leg elevated when not walking. The swelling will make it more difficult to move your hip. As the swelling goes down, the movement will become easier.  • Use the ice machine as much as you can for the first week following your surgery, including at night, then try to use it 4-6 times per day for 20-30 minutes. You should use it after you have had physical therapy or have done your exercises. Use it more frequently if you are having continued pain and swelling.  • Always put something between the cold therapy pad and your skin.  • Inspect your skin under the pad every 1-2 hours and notify Orthopedics if you experience any adverse reactions such as burning, itching, blisters, increased redness discoloration, welts or other changes in skin appearance.  • Be sure the strap is not too tight; it should be as loose as possible to keep the pad in place.  • Be sure to  have an adequate supply of ice as you will need to refill your cooler approximately every 4 hours. Remember to ensure the correct amount of water is in the cooler to circulate. When restarting the machine allow plenty of time to ensure water has completely circulated through the pad before placing over your incision and securing the straps.   • It is very important to read the instruction booklet that comes with the ice machine.    DO take Pain medication prior to activity and exercise. You will be given a prescription at discharge.    DO walk often.    If you were given hip precautions by the Physical Therapist to prevent a hip dislocation these should be followed for the first 3 months after surgery, unless your surgeon gives you additional instructions/recommendations.     DO actively prevent constipation. Remember that narcotics cause constipation.    • Drink 8-8oz glasses of fluid especially water daily and /or include more moist foods like soups and fresh fruits.  • Add Fiber by eating at least 5-9 servings of fruit and vegetables and 3-4 servings of whole grains per day.  • Eat 1-2 servings of yogurt with live and active cultures daily.  • Get regular exercise (walking).  • Use stool softeners and over the counter laxatives as needed.  • Contact your provider if you have not had a bowl movement 5 days after surgery.      REMEMBER:    • Recuperation takes 6-12 weeks and you may feel weak during this time.  • It is normal to have some numbness around your incision.  • Expect soreness, swelling bruising. It will improve over 4-6 weeks.  • You may experience a low grade fever (below 100 degrees F).  • Do gradually wean yourself from prescription medications to non-prescription pain relievers such as Tylenol.  • Do Transition from the walker to a cane or normal walking when able. Your Physical Therapist will help you determine when that is.

## 2021-04-30 NOTE — INTERDISCIPLINARY/THERAPY
04/22/21 0931   Time Calculation   Start Time 0931   Stop Time 1009   Time Calculation (min) 38 min   PT Last Visit   Visit Number 1   Pain Assessment Scale   Pain Assessment 0-10   Pain Score 4   General   Chart Reviewed Yes   Therapy Treatment Diagnosis Status post left total hip arthroplasty   Is this a Co-Treatment? No   Precautions   Total Hip Replacement Posterior Approach   Reinforced Precautions Yes   Weight Bearing Precautions   (Weightbearing as tolerated left lower extremity)   Home Living   Type of Home House   Home Layout One level   Home Access Level entry   Home Adaptive Equipment None  (Has a borrowed walker that is the wrong size for him)   Prior Function   Level of [place] Independent with ADLs and functional transfers;Independent with homemaking with ambulation   Subjective Comments   RN Stated patient is medically cleared for therapy Yes   Cognition   Arousal/Alertness WFL   Orientation Level Oriented to time;Oriented to situation;Oriented to person;Oriented to place   Recommendation   Recommendations for Therapy Continue skilled therapy;Outpatient Therapy   Equipment Recommended Front wheeled walker   DME Justification (Front Wheel Walker) Patient has a mobility limitation that considerably impairs the ability to participate in [facility] in the home. The patient is able to safely use and the mobility deficit is resolved by the use of a front wheeled walker.;Patient does not demonstrate safe mobility with cane, cruthces or standard walker.   Equipment Issued Front wheeled walker   Therapeutic Interventions (Time Spent in Minutes)   Gait/Mobility 10   Therapeutic Exercise 15       BJECTIVE:  Pt states they feels safe and ready to D/C home today.      OBJECTIVE:  Transfers:  Bed mobility: Patient requires independent with bed mobility supine to sitting edge of bed  Sit to/from stand:  Patient able to progress to independence level with sit to stand transfers using a Front wheel walker after  verbal cueing education instruction of safe hand placement and walker positioning.     Ambulation:  Patient ambulated with front wheel walker    Level of assistance: Modified independent level using front wheel walker              During ambulation, patient demonstrated step to gait pattern               Comments:Gait training 1 x250.    Stair training:  Patient completed ambulation up and down curb at modified independent level using a front wheel walker after training and education appropriate sequencing and technique x2 repetitions.          Balance: Static and dynamic sitting balance at independent level.  Static and dynamic standing balance at contact-guard assist using front wheel walker.      Range of motion: Active range of motion of left hip  tamiko  90 degrees of flexion in sitting .  leftt hip strength at 2+ out of 5.  Dorsiflexion strength at 3 out of 5 bilaterally.  Right lower extremity active range of motion strength within functional limits.    Exercises: Patient instructed in postoperative therapeutic exercises for  total joint including: quad sets, heel slide, GS, ankle pump, standing calf raise, supine hip add/ABd and supine heel slide with gait belt assist.  Each exercise x10 reps.  Pt indep with post op HEP.    ASSESSMENT:  Rehabilitation Potential: excellent  Problem list:  Decreased strength, Decreased ROM, Decreased endurance, Impaired balance, Decreased functional mobility, and Pain    All below treatment goals met as of this day patient safe and ready for discharge home using front wheel walker with family assistance utilizing provided education and instruction.      Goals:   1.  Patient will demonstrate sit to stand transfers with Independent / no assistance and crutces while using safe hand placement and assistive device placement.    2.   Patient will demonstrate Independent / no assistancewith ambulation on level surfaces 250 feet using front wheel walker safely without loss of  balance.    3.  Patient will demonstrate ascending/descending 8 inch curb step using front wheel walker at modified independent level.    4.  Patient will demonstrate postoperative range of motion exercises per protocol.         Recommendations: Patient should use Front Wheel Walker at all times for safe in-home mobility.        PLAN: Continue transfer and gait training, exercise education, exercise progression and stair mobility training.  Patient discharged home today after therapy evaluation and treatment.

## 2021-05-06 ENCOUNTER — OFFICE VISIT (OUTPATIENT)
Dept: ORTHOPEDIC SURGERY | Facility: CLINIC | Age: 66
End: 2021-05-06
Payer: MEDICARE

## 2021-05-06 VITALS — TEMPERATURE: 97.9 F | SYSTOLIC BLOOD PRESSURE: 126 MMHG | DIASTOLIC BLOOD PRESSURE: 84 MMHG

## 2021-05-06 DIAGNOSIS — Z96.642 STATUS POST TOTAL HIP REPLACEMENT, LEFT: Primary | ICD-10-CM

## 2021-05-06 PROCEDURE — 99024 POSTOP FOLLOW-UP VISIT: CPT | Performed by: ORTHOPAEDIC SURGERY

## 2021-05-06 RX ORDER — HYDROCODONE BITARTRATE AND ACETAMINOPHEN 5; 325 MG/1; MG/1
1 TABLET ORAL EVERY 6 HOURS PRN
COMMUNITY
End: 2021-05-06 | Stop reason: SDUPTHER

## 2021-05-06 RX ORDER — ACETAMINOPHEN 500 MG
500 TABLET ORAL EVERY 6 HOURS PRN
COMMUNITY

## 2021-05-06 RX ORDER — HYDROCODONE BITARTRATE AND ACETAMINOPHEN 5; 325 MG/1; MG/1
TABLET ORAL
Qty: 30 TABLET | Refills: 0 | Status: SHIPPED | OUTPATIENT
Start: 2021-05-06 | End: 2022-01-31 | Stop reason: ALTCHOICE

## 2021-05-06 RX ORDER — HYDROCODONE BITARTRATE AND ACETAMINOPHEN 5; 325 MG/1; MG/1
TABLET ORAL
Qty: 30 TABLET | Refills: 0 | Status: SHIPPED | OUTPATIENT
Start: 2021-05-06 | End: 2021-05-06

## 2021-05-06 ASSESSMENT — PAIN - FUNCTIONAL ASSESSMENT: PAIN_FUNCTIONAL_ASSESSMENT: 0-10

## 2021-05-06 NOTE — PROGRESS NOTES
Post-op of the Left Hip (15 days S/P left total hip arthroplasty on 4/21/21 with Dr. Reyes. Presents with cane. Attending PT 2X per day. Pain level is 7/10 - states he just finished with PT. He is taking Norco at bedtime and prior to PT. ) and Pain (He is taking tylenol extra strength for day time pain and using the ice machine. )      HPI  Mr. Rondon is a 65-year-old male who presents today to follow up status post left total hip arthroplasty. He discontinued the walker after a couple of days. He has been doing some short walks during the day and is going to physical therapy. He is currently using a cane.     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 Authorizing Provider   HYDROcodone-acetaminophen (NORCO) 5-325 mg per tablet Take 1 tablet by mouth every 6 (six) hours as needed   Yes Historical Provider, MD   acetaminophen (Tylenol Extra Strength) 500 mg tablet Take 500 mg by mouth every 6 (six) hours as needed for pain  scale 1-3/10   Yes Historical Provider, MD   buffered aspirin (BUFFERIN) 325 mg tablet Take 1 tablet (325 mg total) by mouth daily 4/23/21  Yes STEPHANIE Zamora   FLUoxetine (PROzac) 40 mg capsule Take 40 mg by mouth daily   Yes Historical Provider, MD   hydrOXYzine (ATARAX) 25 mg tablet Take 25-50 mg by mouth nightly as needed 3/7/21  Yes Historical Provider, MD   famotidine (PEPCID) 20 mg tablet Take 20 mg by mouth 2 (two) times a day 2/2/21  Yes Historical Provider, MD   ursodioL (ACTIGALL) 500 mg tablet Take 500 mg by mouth 2 times daily Every other day   Yes Historical Provider, MD   ursodioL (AMANDEEP) 250 mg tablet Take 750 mg by mouth In am and 500 mg in pm every other day-alternate with the 500 mg bid   Yes Historical Provider, MD   celecoxib (CeleBREX) 200 mg capsule Take 1 capsule by mouth in the morning the day before surgery, 1 capsule the night before surgery, then 1 daily for 14 days after surgery 4/20/21  Yes Sj Reyes MD   losartan (COZAAR) 50 mg tablet Take 50 mg by mouth every evening     Yes Historical Provider, MD   cyanocobalamin 1,000 mcg/mL injection Inject 1,000 mcg into the shoulder, thigh, or buttocks every 30 (thirty) days 7/14/20  Yes Historical Provider, MD   allopurinol (ZYLOPRIM) 100 mg tablet Take 100 mg by mouth daily as needed   4/22/14  Yes Conversion Provider   rosuvastatin (CRESTOR) 10 mg tablet Take 10 mg by mouth every evening   6/23/14  Yes Conversion Provider   ondansetron (Zofran) 4 mg tablet Take 1 tablet by mouth every 6 hours as needed for nausea following surgery.  Patient not taking: Reported on 5/6/2021 4/21/21   Sj Reyes MD     No Known Allergies  Social History     Socioeconomic History   • Marital status:      Spouse name: Not on file   • Number of children: Not on file   • Years of education: Not on file   • Highest education level: Not on file   Occupational History   • Not on file   Tobacco Use   • Smoking status: Never Smoker   • Smokeless  tobacco: Never Used   Substance and Sexual Activity   • Alcohol use: Yes     Alcohol/week: 1.0 standard drinks     Types: 1 Glasses of wine per week     Comment: occ   • Drug use: Never   • Sexual activity: Not on file   Other Topics Concern   • Not on file   Social History Narrative   • Not on file     Social Determinants of Health     Financial Resource Strain:    • Difficulty of Paying Living Expenses:    Food Insecurity:    • Worried About Running Out of Food in the Last Year:    • Ran Out of Food in the Last Year:    Transportation Needs:    • Lack of Transportation (Medical):    • Lack of Transportation (Non-Medical):    Physical Activity:    • Days of Exercise per Week:    • Minutes of Exercise per Session:    Stress:    • Feeling of Stress :    Social Connections:    • Frequency of Communication with Friends and Family:    • Frequency of Social Gatherings with Friends and Family:    • Attends [employer] Services:    • Active Member of Clubs or Organizations:    • Attends Club or Organization Meetings:    • Marital Status:    Intimate Partner Violence:    • Fear of Current or Ex-Partner:    • Emotionally Abused:    • Physically Abused:    • Sexually Abused:      Family History   Problem Relation Age of Onset   • Hypertension Mother    • Lung cancer Father    • Hypertension Father        /84 (BP Location: Left arm, Patient Position: Sitting, Cuff Size: Regular Adult)   Temp 36.6 °C (97.9 °F)     Ortho Exam  Left leg looks long by 1/8-1/4 inch. Incision is healing up nicely.     Assessment/Plan   Mr. Rondon returns today to follow up on his left total hip arthroplasty. Overall he is doing well and does not have any complaints. He feels that he is improving and is pleased with his progress. We will see him back in 2 weeks for his one month followup.     Portions of this note were documented by Tere Wright as I performed the exam and collected the information from Juan Diego Rondon. I attest that I  have reviewed the information as documented, verified the accuracy of the documentation and added additional information as needed.

## 2021-05-06 NOTE — PROGRESS NOTES
Patient presents for staple removal. The wound is well healed without signs of infection. Benzoin and steri-strips applied. The staples were removed.

## 2021-05-24 ENCOUNTER — OFFICE VISIT (OUTPATIENT)
Dept: ORTHOPEDIC SURGERY | Facility: CLINIC | Age: 66
End: 2021-05-24
Payer: MEDICARE

## 2021-05-24 ENCOUNTER — ANCILLARY PROCEDURE (OUTPATIENT)
Dept: RADIOLOGY | Facility: CLINIC | Age: 66
End: 2021-05-24
Payer: MEDICARE

## 2021-05-24 VITALS — DIASTOLIC BLOOD PRESSURE: 72 MMHG | SYSTOLIC BLOOD PRESSURE: 124 MMHG

## 2021-05-24 DIAGNOSIS — Z96.642 STATUS POST TOTAL HIP REPLACEMENT, LEFT: Primary | ICD-10-CM

## 2021-05-24 PROCEDURE — 73502 X-RAY EXAM HIP UNI 2-3 VIEWS: CPT | Mod: LT,PO,FY

## 2021-05-24 PROCEDURE — 99024 POSTOP FOLLOW-UP VISIT: CPT | Performed by: ORTHOPAEDIC SURGERY

## 2021-05-24 ASSESSMENT — PAIN - FUNCTIONAL ASSESSMENT: PAIN_FUNCTIONAL_ASSESSMENT: 0-10

## 2021-05-24 NOTE — PROGRESS NOTES
Post-op of the Left Hip (4 weeks 5 days S/P left total hip arthoplasty on 4/21/21 with Dr. Reyes. Pt is attending PT 2X per week. Presents with no assistive walking device. Pain level is 6/10. Pt does take a half of a hydrocodone for severe pain. ) and Pain (Pt is taking extra strength tylenol and icing. )      HPI  Mr. Rondon is a 66-year-old male who presents today to follow up status post left total hip arthroplasty on April 21, 2021. He is currently going to physical therapy and is walking 3-4 miles a day. He is still having some pain but he feels he is doing well. He does not notice any leg length discrepancy.     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 Authorizing Provider   acetaminophen (Tylenol Extra Strength) 500 mg tablet Take 500 mg by mouth every 6 (six) hours as needed for pain scale 1-3/10   Yes Historical Provider, MD   HYDROcodone-acetaminophen (NORCO) 5-325 mg per tablet Take 1 tablet by mouth every 6  hours as needed for pain. 5/6/21  Yes Sj Reyes MD   FLUoxetine (PROzac) 40 mg capsule Take 40 mg by mouth daily   Yes Historical Provider, MD   hydrOXYzine (ATARAX) 25 mg tablet Take 25-50 mg by mouth nightly as needed 3/7/21  Yes Historical Provider, MD   famotidine (PEPCID) 20 mg tablet Take 20 mg by mouth 2 (two) times a day 2/2/21  Yes Historical Provider, MD   ursodioL (ACTIGALL) 500 mg tablet Take 500 mg by mouth 2 times daily Every other day   Yes Historical Provider, MD   ursodioL (AMANDEEP) 250 mg tablet Take 750 mg by mouth In am and 500 mg in pm every other day-alternate with the 500 mg bid   Yes Historical Provider, MD   losartan (COZAAR) 50 mg tablet Take 50 mg by mouth every evening     Yes Historical Provider, MD   cyanocobalamin 1,000 mcg/mL injection Inject 1,000 mcg into the shoulder, thigh, or buttocks every 30 (thirty) days 7/14/20  Yes Historical Provider, MD   rosuvastatin (CRESTOR) 10 mg tablet Take 10 mg by mouth every evening   6/23/14  Yes Conversion Provider     No Known Allergies  Social History     Socioeconomic History   • Marital status:      Spouse name: Not on file   • Number of children: Not on file   • Years of education: Not on file   • Highest education level: Not on file   Occupational History   • Not on file   Tobacco Use   • Smoking status: Never Smoker   • Smokeless tobacco: Never Used   Substance and Sexual Activity   • Alcohol use: Yes     Alcohol/week: 1.0 standard drinks     Types: 1 Glasses of wine per week     Comment: occ   • Drug use: Never   • Sexual activity: Not on file   Other Topics Concern   • Not on file   Social History Narrative   • Not on file     Social Determinants of Health     Financial Resource Strain:    • Difficulty of Paying Living Expenses:    Food Insecurity:    • Worried About Running Out of Food in the Last Year:    • Ran Out of Food in the Last Year:    Transportation Needs:    • Lack of Transportation (Medical):    • Lack of  Transportation (Non-Medical):    Physical Activity:    • Days of Exercise per Week:    • Minutes of Exercise per Session:    Stress:    • Feeling of Stress :    Social Connections:    • Frequency of Communication with Friends and Family:    • Frequency of Social Gatherings with Friends and Family:    • Attends [employer] Services:    • Active Member of Clubs or Organizations:    • Attends Club or Organization Meetings:    • Marital Status:    Intimate Partner Violence:    • Fear of Current or Ex-Partner:    • Emotionally Abused:    • Physically Abused:    • Sexually Abused:      Family History   Problem Relation Age of Onset   • Hypertension Mother    • Lung cancer Father    • Hypertension Father        /72 (BP Location: Left arm, Patient Position: Sitting, Cuff Size: Regular Adult)     Ortho Exam  Incision is well healed. Left leg is long by maybe 1/8 inch. Range of motion 0 to 125 degrees, internal rotation to 10 and external rotation to 30. Stinchfield test causes a little bit of groin pain. Tenderness to palpation more over the superior aspect of the incision.     Assessment/Plan   Mr. Rondon returns today to follow up on his left total hip arthroplasty. I reviewed the x-rays and they demonstrate the total hip arthroplasty in good position and alignment. He states that he is doing well and does not have any complaints. He will continue with his physical therapy, and we will see him back in 2 months.     Portions of this note were documented by Tere Wright as I performed the exam and collected the information from Juan Diego Rondon. I attest that I have reviewed the information as documented, verified the accuracy of the documentation and added additional information as needed.

## 2021-05-27 PROCEDURE — 73502 X-RAY EXAM HIP UNI 2-3 VIEWS: CPT | Mod: 26,LT | Performed by: ORTHOPAEDIC SURGERY

## 2021-05-29 DIAGNOSIS — L21.9 SEBORRHEIC DERMATITIS: ICD-10-CM

## 2021-06-01 RX ORDER — KETOCONAZOLE 20 MG/ML
1 SHAMPOO, SUSPENSION TOPICAL 2 TIMES WEEKLY
Qty: 120 ML | Refills: 0 | Status: SHIPPED | OUTPATIENT
Start: 2021-06-03 | End: 2021-07-03

## 2021-06-15 NOTE — PATIENT INSTRUCTIONS
You were treated with liquid nitrogen today. You should expect these areas to burn and once the burning subsides, the area(s)may itch. You should expect some reaction anywhere from welting of the skin to larger liquid filled blisters depending on how aggressively your lesion(s) needed to be treated. If you had treatment on your forehead, eyebrow areas or cheekbones, you could have some swelling under your eyes. This is normal and nothing to worry about. Blisters should be left intact until they pop and drain on their own. You will most likely have a clear drainage much like a blister when it pops.     The areas treated should be cared for by gentle daily cleansing with Dove soap and water and your hands. You should apply Polysporin ointment or Vaseline  WITH A Q-TIP to the areas daily as they are healing and continue this until they are completely healed. It may take anywhere from 7-14 days for areas to completely heal. If any other area than your face was treated, it may take longer for those areas to heal.    If warts were treated, they may turn to blood blisters and may appear purple, red or black. Leave the blister intact and let it pop on its own. Keep these areas dry and clean and do not use any ointment or Vaseline to warts.    IF YOU NOTICE INCREASED SURROUNDING REDNESS, TENDERNESS OR A PUS-LIKE DRAINAGE, PLEASE CONTACT OUR OFFICE IMMEDIATELY.    WOUND CARE FOLLOWING BIOPSY    After your biopsy a dressing will be placed on the site.  You may remove bandage at end of day.  This is to minimize any bleeding that can possibly occur after surgery.  To clean the area, use Dove soap, warm water and hands, no wash cloth, once daily    Next, apply a layer of POLYSPORIN ANTIBIOTIC OINTMENT or VASELINE, daily.  Do this until healed.  We will contact you with your results when available.  Please call with questions or concerns    If you have MyChart, your results will be released to [employer] 3 days after being signed by  "the provider.  Your provider will develop a plan of care for the diagnosis related to your biopsy.  Staff from our clinic will call you with the interpretation of the results and your plan of care developed by your provider.  We work hard to get these phone calls made in a timely manner, but if you have not received the results in 2 weeks, please call our office.        Avoid sunlight between the hours of 10 am and 2 pm, wear a broad spectrum, water resistant sunscreen with SPF of 30-50 year round. Reapply sunscreen every 2 hours and after exercising or swimming. Wearing a 6\" broad brimmed hat, a lip sunblock of SPF of 30-50, and sun protective clothing is also suggested year round.   Recommended sunscreens include Neutrogena Ultra Sheer Dry Touch SPF 50 or higher, Neutrogena Sheer Zinc Dry Touch SPF 50, and Vanicream Sport.  ---------------------------------------------------------------------  Monthly self-examination of your skin is important. Should any lesions, including moles, change in size, shape, color, itch, bleed or burn, contact our office for sooner evaluation.  ----------------------------------------------------------------------  **[employer] is our survey company. You will be receiving a survey about your care. Your satisfaction is important to us and we are always looking for opportunities to improve your experience. If you come across questions in the survey that are not applicable to your visit, please leave these questions blank. Your identity is kept confidential so please be honest! Thank you!**  --------------------------------------  You were treated with liquid nitrogen today. You should expect these areas to burn and once the burning subsides, the area(s)may itch. You should expect some reaction anywhere from welting of the skin to larger liquid filled blisters depending on how aggressively your lesion(s) needed to be treated. If you had treatment on your forehead, eyebrow areas or " cheekbones, you could have some swelling under your eyes. This is normal and nothing to worry about. Blisters should be left intact until they pop and drain on their own. You will most likely have a clear drainage much like a blister when it pops.     The areas treated should be cared for by gentle daily cleansing with Dove soap and water and your hands. You should apply Polysporin ointment or Vaseline  WITH A Q-TIP to the areas daily as they are healing and continue this until they are completely healed. It may take anywhere from 7-14 days for areas to completely heal. If any other area than your face was treated, it may take longer for those areas to heal.  IF YOU NOTICE INCREASED SURROUNDING REDNESS, TENDERNESS OR A PUS-LIKE DRAINAGE, PLEASE CONTACT OUR OFFICE IMMEDIATELY.

## 2021-06-15 NOTE — PROGRESS NOTES
Subjective   Return Dermatology Skin examination  Juan Diego Rondon is a 66 y.o. male with a history of Melanoma and Actinic Keratoses who presents for a full body skin exam . Lesions of concern include: left forehead spot that comes and goes. He has noticed it about 2 months. He has a history of seborrhea but this spot is persistent. It is asypmptomatic..      Malignant Melanoma left forearm, .6mm Breslow, no mitotic figures, no ulceration or regression. Re-excised 10/15/2020.    Patient also presents for cryotherapy of the biopsy proven actinic keratosis on the right outer thigh.           Review of Systems   Constitutional: Negative for fatigue and fever.   Skin: Negative for rash.   Allergic/Immunologic: Negative for environmental allergies and immunocompromised state.   Hematological: Does not bruise/bleed easily.   All other systems reviewed and are negative.        Past Medical History:   Diagnosis Date   • Allergic eosinophilic esophagitis    • Arthritis    • Blood disorder     pernicious anemia   • Gastrointestinal disease     IBS   • Gout    • Hyperlipidemia    • Hypertension    • Melanoma (CMS/HCC) (HCC)    • Neurologic disorder    • Primary biliary cholangitis (CMS/HCC) (HCC)        Current Outpatient Medications on File Prior to Visit   Medication Sig Dispense Refill   • ketoconazole (NIZORAL) 2 % shampoo Apply 1 application topically 2 (two) times a week Apply to damp skin, lather, leave on 5 minutes, and rinse 120 mL 0   • acetaminophen (Tylenol Extra Strength) 500 mg tablet Take 500 mg by mouth every 6 (six) hours as needed for pain scale 1-3/10     • FLUoxetine (PROzac) 40 mg capsule Take 40 mg by mouth daily     • hydrOXYzine (ATARAX) 25 mg tablet Take 25-50 mg by mouth nightly as needed     • famotidine (PEPCID) 20 mg tablet Take 20 mg by mouth 2 (two) times a day     • ursodioL (ACTIGALL) 500 mg tablet Take 500 mg by mouth 2 times daily Every other day     • ursodioL (AMANDEEP) 250 mg tablet Take 750  "mg by mouth In am and 500 mg in pm every other day-alternate with the 500 mg bid     • losartan (COZAAR) 50 mg tablet Take 50 mg by mouth every evening       • cyanocobalamin 1,000 mcg/mL injection Inject 1,000 mcg into the shoulder, thigh, or buttocks every 30 (thirty) days     • rosuvastatin (CRESTOR) 10 mg tablet Take 10 mg by mouth every evening   30 6   • HYDROcodone-acetaminophen (NORCO) 5-325 mg per tablet Take 1 tablet by mouth every 6 hours as needed for pain. 30 tablet 0     No current facility-administered medications on file prior to visit.       Allergies  Patient has no known allergies.    The following have been reviewed and updated as appropriate in this visit         Physical Examination    Vital Signs  height is 1.905 m (6' 3\") and weight is 77.1 kg (170 lb). His skin temperature is 36.8 °C (98.2 °F). His blood pressure is 108/65 and his pulse is 79. His respiration is 18 and oxygen saturation is 95%.         Physical Exam Findings:   Crump skin type:I    Constitutional: General Appearance: healthy-appearing, well-nourished, and well-developed. Level of Distress: NAD. Ambulation: ambulating normally.  Psychiatric: Insight: good judgement. Mental Status: normal mood and affect and active and alert. Orientation: to time, place, and person. Memory: recent memory normal and remote memory normal.  Head: normocephalic and atraumatic.  Eyes: Lids and Conjunctivae: no discharge or pallor and non-injected. Lens: clear. Sclerae: non-icteric.  Neurologic: Gait and Station: normal gait and station. Cranial Nerves: grossly intact. Coordination and Cerebellum: no tremor.    A full body skin examination was performed including scalp, face, lips, ears, neck, both arms, chest, back, abdomen, buttocks, both legs, both feet                                         Trunk (including chest, abdomen, back): Stuck on tan brown papules, well demarcated tan brown macules, bright red cherry papules    Arms: Stuck on tan " brown papules, well demarcated tan brown macules, bright red cherry papules, well healed surgical site left forearm, melanoma site without evidence of local recurrence, Lymph nodes exam:  Negative post auricular, clavicular, axillary, inguinal, popliteal adenopathy      buttock: No suspicious lesions noted    legs: well demarcated tan brown macules    feet: See nevi list    scalp: No suspicious lesions noted    face: See nevi list, gritty erythematous papules, see procedure note for treated lesions.    Lesion in question Left inner brow 5 mm scaly papule precisely 3 cm superior to left Inner canthus  Shave biopsy today See procedure note for treated lesions            Lesions to follow:  Left heel, 3 mm brown macule with fibrillar pattern  Left outer cheek, 3 mm brown macule  Right jawline, 3 mm excoriation--resolved  -All lesions unchanged from previous exam  To be observed, patient to call if lesion should change prior to next appointment      Procedure Note:  Lesion Biopsy    Date/Time: 6/29/2021 10:57 AM  Performed by: Gracia Manriquez MD      Consent  Verbal consent was obtained from the patient. Written consent was not obtained from the patient. Risks, benefits, and alternatives were discussed. Consent given by patient. The patient states understanding of the procedure being performed. Patient ID confirmed verbally with patient.         Biopsy 1    Location Details  Body area: head/neck  Site: L eyebrow ([id number] left inner brow)  Lesion size: 0.5 cm.     Preparation Details  Adequate anesthesia is achieved using 0.4 mL of lidocaine 1% with epinephrine (administered by ) in a local infiltration method. Area cleaned and prepped with Alcohol.     Procedure Details  Shave biopsy completed with dermablade. Biopsy performed to the depth of other (dermis). Hemostasis obtained with aluminium chloride and pressure.                     Post-Procedure  Specimen was sent to Pathology. Gauze and adhesive bandage  applied for dressing. Patient tolerated the procedure well with no complications.       Destruction of Premalignant Lesion    Date/Time: 6/29/2021 10:58 AM  Performed by: Gracia Manriquez MD      Consent  Verbal consent was obtained from the patient. Written consent was not obtained from the patient. Consent given by patient. The patient states understanding of the procedure being performed. Patient ID confirmed verbally with patient.     Location:  3 total lesions removed   Head/neck location(s):  Number of head/neck lesions removed: 2Head/neck location: right outer cheek and right sidewall nose.      Lower Extremity location(s): right upper leg (bx proven right outer thigh)   Number of Lower Extremity lesions removed: 1        Procedure Details   Lesion(s) are pre-malignant. Local anesthesia was not used. Lesion(s) destroyed with: liquid nitrogen. Number of freeze/thaw cycles was 1. Lesion(s) were frozen until an ice ball extended beyond the lesion.     Post-Procedure  Patient tolerated procedure well with no complications.                 Juan Diego was seen today for follow-up.    Diagnoses and all orders for this visit:    Actinic keratoses  -     Destruction of Premalignant Lesion    Neoplasm of uncertain behavior of skin  -     Lesion Biopsy  -     Dermatology Clinic Pathology Date of Pathology Collection: 6/29/2021; Pathology Report ID: [id number] Left inner brow; Specimen Description/Clinical History: atypical lesion SKL to read    Melanocytic nevi of lower limb, including hip, left    Actinic skin damage    Nevus of face    Seborrheic keratoses    Hemangioma of skin and subcutaneous tissue    History of actinic keratoses    Scar of skin    History of melanoma    Lentigo    - biopsy and cryotherapy as above, will follow up results    -- Patient offered reassurance of benign nature of lesions; continue to observe the list of followed lesions, patient to call if lesion should change prior to next appointment    -- Explained to patient that actinic damage is a chronic life long condition.  Discussed the nature of actinic damage with patient.  Informed patient to prevent further damage and to lower the risk of skin cancer a broad spectrum sunscreen should be used SPF 30-50 reapplying every two hours while outdoors.    -- Scars of previous Malignant Melanoma well healed without evidence of recurrence, will continue to monitor as a chronic condition due to risk of recurrence.  Discussed with patient necessity for continued monitoring of skin, sunscreen use with a broad spectrum sunscreen  and regular skin examinations to ensure no new or recurrent skin cancers.   - biopsy and cryotherapy as above, will follow up results       I emphasized use of broad spectrum daily sunscreen use with an SPF of 30-50, broad spectrum and water resistant. I recommended vanicream sport and neutrogena ultrasheer. I directed reapplication every two hours.  I recommended wide brimmed hats.  Finally, we discussed danger signs of changing skin lesions including sores not healing and moles changing in size, shape or color.  Should any of these lesions occur before next appointment, I instructed patent to call sooner for evaluation.    Patient expresses understanding and agreement with the plan of care, and had no further questions at the end of the exam today.     Return in about 6 months (around 12/29/2021) for recheck with SKL.      Portions of this note may have been dictated using Dragon DMO voice recognition software.  Though the note has been proofread, small discrepencies may exist.  If a significant discrepancy is identified please alert me to further review.

## 2021-06-29 ENCOUNTER — OFFICE VISIT (OUTPATIENT)
Dept: DERMATOLOGY | Facility: CLINIC | Age: 66
End: 2021-06-29
Payer: MEDICARE

## 2021-06-29 VITALS
OXYGEN SATURATION: 95 % | HEART RATE: 79 BPM | TEMPERATURE: 98.2 F | RESPIRATION RATE: 18 BRPM | HEIGHT: 75 IN | SYSTOLIC BLOOD PRESSURE: 108 MMHG | WEIGHT: 170 LBS | BODY MASS INDEX: 21.14 KG/M2 | DIASTOLIC BLOOD PRESSURE: 65 MMHG

## 2021-06-29 DIAGNOSIS — L82.1 SEBORRHEIC KERATOSES: ICD-10-CM

## 2021-06-29 DIAGNOSIS — D48.5 NEOPLASM OF UNCERTAIN BEHAVIOR OF SKIN: ICD-10-CM

## 2021-06-29 DIAGNOSIS — L81.4 LENTIGO: ICD-10-CM

## 2021-06-29 DIAGNOSIS — D22.30 NEVUS OF FACE: ICD-10-CM

## 2021-06-29 DIAGNOSIS — L90.5 SCAR OF SKIN: ICD-10-CM

## 2021-06-29 DIAGNOSIS — L57.8 ACTINIC SKIN DAMAGE: ICD-10-CM

## 2021-06-29 DIAGNOSIS — Z85.820 HISTORY OF MELANOMA: ICD-10-CM

## 2021-06-29 DIAGNOSIS — D22.72 MELANOCYTIC NEVI OF LOWER LIMB, INCLUDING HIP, LEFT: ICD-10-CM

## 2021-06-29 DIAGNOSIS — L57.0 ACTINIC KERATOSES: Primary | ICD-10-CM

## 2021-06-29 DIAGNOSIS — D18.01 HEMANGIOMA OF SKIN AND SUBCUTANEOUS TISSUE: ICD-10-CM

## 2021-06-29 DIAGNOSIS — Z87.2 HISTORY OF ACTINIC KERATOSES: ICD-10-CM

## 2021-06-29 LAB — DERM CLINIC PATH RESULTS: NORMAL

## 2021-06-29 PROCEDURE — 11102 TANGNTL BX SKIN SINGLE LES: CPT | Performed by: DERMATOLOGY

## 2021-06-29 PROCEDURE — G0463 HOSPITAL OUTPT CLINIC VISIT: HCPCS | Performed by: DERMATOLOGY

## 2021-06-29 PROCEDURE — 88305 TISSUE EXAM BY PATHOLOGIST: CPT | Performed by: DERMATOLOGY

## 2021-06-29 PROCEDURE — 99213 OFFICE O/P EST LOW 20 MIN: CPT | Mod: 25 | Performed by: DERMATOLOGY

## 2021-06-29 PROCEDURE — 17003 DESTRUCT PREMALG LES 2-14: CPT | Performed by: DERMATOLOGY

## 2021-06-29 PROCEDURE — 17000 DESTRUCT PREMALG LESION: CPT | Mod: 59 | Performed by: DERMATOLOGY

## 2021-06-29 PROCEDURE — 88305 TISSUE EXAM BY PATHOLOGIST: CPT | Mod: 26 | Performed by: DERMATOLOGY

## 2021-06-29 ASSESSMENT — PAIN SCALES - GENERAL: PAINLEVEL: 4

## 2021-06-29 ASSESSMENT — ENCOUNTER SYMPTOMS
FATIGUE: 0
FEVER: 0
BRUISES/BLEEDS EASILY: 0

## 2021-07-15 ENCOUNTER — TELEPHONE (OUTPATIENT)
Dept: DERMATOLOGY | Facility: CLINIC | Age: 66
End: 2021-07-15

## 2021-07-15 DIAGNOSIS — L57.0 ACTINIC KERATOSES: Primary | ICD-10-CM

## 2021-07-15 NOTE — TELEPHONE ENCOUNTER
Notified patient that biopsy results shows actinic keratosis to left inner brow [id number]/[id number].  Notified patient that this will require liquid nitrogen cryotherapy.  Patient scheduled for this in January.  All patient questions answered.

## 2021-07-26 ENCOUNTER — OFFICE VISIT (OUTPATIENT)
Dept: ORTHOPEDIC SURGERY | Facility: CLINIC | Age: 66
End: 2021-07-26
Payer: MEDICARE

## 2021-07-26 VITALS — SYSTOLIC BLOOD PRESSURE: 105 MMHG | DIASTOLIC BLOOD PRESSURE: 65 MMHG

## 2021-07-26 DIAGNOSIS — Z96.642 STATUS POST TOTAL REPLACEMENT OF LEFT HIP: Primary | ICD-10-CM

## 2021-07-26 PROCEDURE — 99213 OFFICE O/P EST LOW 20 MIN: CPT | Performed by: ORTHOPAEDIC SURGERY

## 2021-07-26 PROCEDURE — G0463 HOSPITAL OUTPT CLINIC VISIT: HCPCS | Mod: PO | Performed by: ORTHOPAEDIC SURGERY

## 2021-07-26 ASSESSMENT — PAIN - FUNCTIONAL ASSESSMENT: PAIN_FUNCTIONAL_ASSESSMENT: 0-10

## 2021-07-26 NOTE — PROGRESS NOTES
Post-op of the Left Hip (S/P 3m 5d Left Total Hip Arthroplasty W/ Dr. Reyes on 4/21/21. ) and Pain (Pt reports similar pain to troch bursitis which he has had in the past. Pt states pain 5/10, taking tylenol. Pt reports good RoM. Pt reports difficulty going upstairs.)      HPI  Mr. Rondon is a 66-year-old male who presents today to follow up status post left total hip arthroplasty on April 21, 2021. He is complaining of some bursitis pain on the left but otherwise he is not having pain with the hip. He is taking Tylenol and ibuprofen as needed.     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 Authorizing Provider   acetaminophen (Tylenol Extra Strength) 500 mg tablet Take 500 mg by mouth every 6 (six) hours as needed for pain scale 1-3/10    Historical Provider, MD   HYDROcodone-acetaminophen (NORCO) 5-325 mg per tablet Take 1 tablet by mouth every 6 hours as needed for pain. 5/6/21   Sj Reyes MD   FLUoxetine (PROzac)  40 mg capsule Take 40 mg by mouth daily    Historical Provider, MD   hydrOXYzine (ATARAX) 25 mg tablet Take 25-50 mg by mouth nightly as needed 3/7/21   Historical Provider, MD   famotidine (PEPCID) 20 mg tablet Take 20 mg by mouth 2 (two) times a day 2/2/21   Historical Provider, MD   ursodioL (ACTIGALL) 500 mg tablet Take 500 mg by mouth 2 times daily Every other day    Historical Provider, MD   ursodioL (AMANDEEP) 250 mg tablet Take 750 mg by mouth In am and 500 mg in pm every other day-alternate with the 500 mg bid    Historical Provider, MD   losartan (COZAAR) 50 mg tablet Take 50 mg by mouth every evening      Historical Provider, MD   cyanocobalamin 1,000 mcg/mL injection Inject 1,000 mcg into the shoulder, thigh, or buttocks every 30 (thirty) days 7/14/20   Historical Provider, MD   rosuvastatin (CRESTOR) 10 mg tablet Take 10 mg by mouth every evening   6/23/14   Conversion Provider     No Known Allergies  Social History     Socioeconomic History   • Marital status:      Spouse name: Not on file   • Number of children: Not on file   • Years of education: Not on file   • Highest education level: Not on file   Occupational History   • Not on file   Tobacco Use   • Smoking status: Never Smoker   • Smokeless tobacco: Never Used   Substance and Sexual Activity   • Alcohol use: Yes     Alcohol/week: 1.0 standard drinks     Types: 1 Glasses of wine per week     Comment: occ   • Drug use: Never   • Sexual activity: Not on file   Other Topics Concern   • Not on file   Social History Narrative   • Not on file     Social Determinants of Health     Financial Resource Strain:    • Difficulty of Paying Living Expenses:    Food Insecurity:    • Worried About Running Out of Food in the Last Year:    • Ran Out of Food in the Last Year:    Transportation Needs:    • Lack of Transportation (Medical):    • Lack of Transportation (Non-Medical):    Physical Activity:    • Days of Exercise per Week:    • Minutes of Exercise per  Session:    Stress:    • Feeling of Stress :    Social Connections:    • Frequency of Communication with Friends and Family:    • Frequency of Social Gatherings with Friends and Family:    • Attends [employer] Services:    • Active Member of Clubs or Organizations:    • Attends Club or Organization Meetings:    • Marital Status:    Intimate Partner Violence:    • Fear of Current or Ex-Partner:    • Emotionally Abused:    • Physically Abused:    • Sexually Abused:      Family History   Problem Relation Age of Onset   • Hypertension Mother    • Lung cancer Father    • Hypertension Father        /65 (BP Location: Left arm, Patient Position: Sitting, Cuff Size: Regular Adult)     Ortho Exam  Leg length looks to be equal. Range of motion 0 to 130 degrees, internal rotation to 25 and external rotation to 45. No groin pain with Stinchfield test. Tenderness to palpation posterior aspect of the greater trochanter.     Assessment/Plan   Mr. Rondon returns today to follow up on his left total hip arthroplasty. At this point, he is doing well and does not have any complaints with the hip. He is having some trochanteric bursitis, and I did show him some stretching exercises to do that will help with that and also recommended some anti-inflammatories. We will see him back just as needed.     Portions of this note were documented by Tere Wright as I performed the exam and collected the information from Juan Diego Rondon. I attest that I have reviewed the information as documented, verified the accuracy of the documentation and added additional information as needed.

## 2021-07-30 ENCOUNTER — APPOINTMENT (OUTPATIENT)
Dept: LAB | Facility: CLINIC | Age: 66
End: 2021-07-30
Payer: MEDICARE

## 2021-07-30 DIAGNOSIS — D64.9 ANEMIA, UNSPECIFIED TYPE: ICD-10-CM

## 2021-07-30 DIAGNOSIS — E55.9 VITAMIN D DEFICIENCY: ICD-10-CM

## 2021-07-30 DIAGNOSIS — K74.3 PRIMARY BILIARY CHOLANGITIS (CMS/HCC): ICD-10-CM

## 2021-07-30 LAB
25(OH)D3 SERPL-MCNC: 65 NG/ML (ref 30–100)
ALBUMIN SERPL-MCNC: 3.7 G/DL (ref 3.5–5.3)
ALP SERPL-CCNC: 118 U/L (ref 45–115)
ALT SERPL-CCNC: 15 U/L (ref 7–52)
ANION GAP SERPL CALC-SCNC: 9 MMOL/L (ref 3–11)
AST SERPL-CCNC: 34 U/L
BASOPHILS # BLD AUTO: 0 10*3/UL
BASOPHILS NFR BLD AUTO: 1 % (ref 0–2)
BILIRUB DIRECT SERPL-MCNC: 0.18 MG/DL
BILIRUB SERPL-MCNC: 0.68 MG/DL (ref 0.2–1.4)
BUN SERPL-MCNC: 16 MG/DL (ref 7–25)
CALCIUM ALBUM COR SERPL-MCNC: 9.5 MG/DL (ref 8.6–10.3)
CALCIUM SERPL-MCNC: 9.3 MG/DL (ref 8.6–10.3)
CHLORIDE SERPL-SCNC: 102 MMOL/L (ref 98–107)
CHOLEST SERPL-MCNC: 161 MG/DL (ref 0–199)
CO2 SERPL-SCNC: 27 MMOL/L (ref 21–32)
CREAT SERPL-MCNC: 1.03 MG/DL (ref 0.7–1.3)
EOSINOPHIL # BLD AUTO: 0.1 10*3/UL
EOSINOPHIL NFR BLD AUTO: 2 % (ref 0–3)
ERYTHROCYTE [DISTWIDTH] IN BLOOD BY AUTOMATED COUNT: 14.5 % (ref 11.5–15)
FASTING STATUS PATIENT QL REPORTED: YES
GFR SERPL CREATININE-BSD FRML MDRD: 75 ML/MIN/1.73M*2
GLUCOSE SERPL-MCNC: 85 MG/DL (ref 70–105)
HCT VFR BLD AUTO: 36.6 % (ref 38–50)
HDLC SERPL-MCNC: 105 MG/DL
HGB BLD-MCNC: 12.8 G/DL (ref 13.2–17.2)
LDLC SERPL CALC-MCNC: <20 MG/DL (ref 20–99)
LYMPHOCYTES # BLD AUTO: 1.2 10*3/UL
LYMPHOCYTES NFR BLD AUTO: 34 % (ref 15–47)
MACROCYTOSIS PRESENCE IN BLOOD, ANALYZER: ABNORMAL
MCH RBC QN AUTO: 36.8 PG (ref 29–34)
MCHC RBC AUTO-ENTMCNC: 35 G/DL (ref 32–36)
MCV RBC AUTO: 105 FL (ref 82–97)
MONOCYTES # BLD AUTO: 0.4 10*3/UL
MONOCYTES NFR BLD AUTO: 11 % (ref 5–13)
NEUTROPHILS # BLD AUTO: 1.9 10*3/UL
NEUTROPHILS NFR BLD AUTO: 53 % (ref 46–70)
PLATELET # BLD AUTO: 193 10*3/UL (ref 130–350)
PMV BLD AUTO: 7.2 FL (ref 6.9–10.8)
POTASSIUM SERPL-SCNC: 4.1 MMOL/L (ref 3.5–5.1)
PROT SERPL-MCNC: 7.1 G/DL (ref 6–8.3)
RBC # BLD AUTO: 3.48 10*6/ΜL (ref 4.1–5.8)
SODIUM SERPL-SCNC: 138 MMOL/L (ref 135–145)
TRIGL SERPL-MCNC: 259 MG/DL
WBC # BLD AUTO: 3.7 10*3/UL (ref 3.7–9.6)

## 2021-07-30 PROCEDURE — 82248 BILIRUBIN DIRECT: CPT

## 2021-07-30 PROCEDURE — 36415 COLL VENOUS BLD VENIPUNCTURE: CPT

## 2021-07-30 PROCEDURE — 82306 VITAMIN D 25 HYDROXY: CPT

## 2021-07-30 PROCEDURE — 80053 COMPREHEN METABOLIC PANEL: CPT

## 2021-07-30 PROCEDURE — 80061 LIPID PANEL: CPT

## 2021-07-30 PROCEDURE — 85025 COMPLETE CBC W/AUTO DIFF WBC: CPT

## 2021-08-04 ENCOUNTER — APPOINTMENT (OUTPATIENT)
Dept: LAB | Facility: HOSPITAL | Age: 66
End: 2021-08-04
Payer: MEDICARE

## 2021-08-04 DIAGNOSIS — K74.3 PRIMARY BILIARY CIRRHOSIS (CMS/HCC): Primary | ICD-10-CM

## 2021-08-04 PROCEDURE — 82656 EL-1 FECAL QUAL/SEMIQ: CPT

## 2021-08-05 LAB — ELASTASE PANC STL-MCNT: >500 MCG/G

## 2021-08-06 ENCOUNTER — APPOINTMENT (OUTPATIENT)
Dept: LAB | Facility: HOSPITAL | Age: 66
End: 2021-08-06
Payer: MEDICARE

## 2021-08-06 ENCOUNTER — HOSPITAL ENCOUNTER (OUTPATIENT)
Dept: ULTRASOUND IMAGING | Facility: HOSPITAL | Age: 66
Discharge: HOME | End: 2021-08-06
Payer: MEDICARE

## 2021-08-06 DIAGNOSIS — K74.60 CIRRHOSIS (CMS/HCC): ICD-10-CM

## 2021-08-06 DIAGNOSIS — R63.4 WEIGHT LOSS: ICD-10-CM

## 2021-08-06 DIAGNOSIS — K74.3 PRIMARY BILIARY CIRRHOSIS (CMS/HCC): ICD-10-CM

## 2021-08-06 DIAGNOSIS — R68.81 EARLY SATIETY: Primary | ICD-10-CM

## 2021-08-06 PROCEDURE — 82710 FATS/LIPIDS FECES QUANT: CPT

## 2021-08-06 PROCEDURE — 76700 US EXAM ABDOM COMPLETE: CPT

## 2021-08-08 LAB
COLLECT DURATION TIME STL: 48 H
FAT 24H STL-MRATE: 2 G/24 H (ref 2–7)
SPECIMEN WT STL QN: 268 G

## 2021-08-09 PROCEDURE — 76700 US EXAM ABDOM COMPLETE: CPT | Mod: 26 | Performed by: RADIOLOGY

## 2021-08-25 ENCOUNTER — CLINICAL SUPPORT (OUTPATIENT)
Dept: FAMILY MEDICINE | Facility: CLINIC | Age: 66
End: 2021-08-25
Payer: MEDICARE

## 2021-08-25 DIAGNOSIS — Z23 ENCOUNTER FOR VACCINATION: Primary | ICD-10-CM

## 2021-08-25 PROCEDURE — 91301 MODERNA SARS-COV-2 VACCINE: CPT

## 2021-09-07 ENCOUNTER — TELEPHONE (OUTPATIENT)
Dept: ORTHOPEDIC SURGERY | Facility: CLINIC | Age: 66
End: 2021-09-07

## 2021-09-07 DIAGNOSIS — M87.051 AVASCULAR NECROSIS OF HIP, RIGHT (CMS/HCC): Primary | ICD-10-CM

## 2021-09-07 NOTE — TELEPHONE ENCOUNTER
Emmett called to say that the R hip is not hurting the same as his L did.  Dr Reyes told him to call when it started to hurt.  Does he need an appt to see Dr Reyes or can he just be scheduled for sx?    Please call Emmett

## 2021-09-07 NOTE — TELEPHONE ENCOUNTER
I returned a phone call to the patient he states that he has had significant increase in right hip pain and feels that he is ready to move forward with right total hip arthroplasty.  His last x-rays of his right hip were in January 2021.  I informed the patient that I would visit with Dr. Reyes to see if he needed an appointment or if we could just move forward with scheduling for the surgery.  Patient verbalized understanding and I will get back in touch with him next week.

## 2021-09-13 NOTE — TELEPHONE ENCOUNTER
I visited with Dr. Reyes in regards to the patient and the possibility of scheduling right total hip arthroplasty.  Dr. Reyes agrees to this.  I returned a phone call to the patient and told him that we would schedule this, he is wanting to do this in November.  Order has been placed.

## 2021-09-30 ENCOUNTER — APPOINTMENT (OUTPATIENT)
Dept: EMPLOYEE HEALTH | Facility: CLINIC | Age: 66
End: 2021-09-30
Payer: MEDICARE

## 2021-09-30 DIAGNOSIS — Z02.1 PRE-EMPLOYMENT HEALTH SCREENING EXAMINATION: Primary | ICD-10-CM

## 2021-09-30 PROCEDURE — 86481 TB AG RESPONSE T-CELL SUSP: CPT | Mod: EHNH

## 2021-10-04 LAB
M TB TUBERC IFN-G BLD QL: NEGATIVE
TBGOLD AG-NIL: 0 IU/ML

## 2021-10-06 ENCOUNTER — APPOINTMENT (OUTPATIENT)
Dept: LAB | Facility: CLINIC | Age: 66
End: 2021-10-06
Payer: MEDICARE

## 2021-10-06 DIAGNOSIS — Z11.52 ENCOUNTER FOR SCREENING LABORATORY TESTING FOR COVID-19 VIRUS: ICD-10-CM

## 2021-10-06 LAB — SARS-COV-2 RNA RESP QL NAA+PROBE: NEGATIVE

## 2021-10-06 PROCEDURE — 87635 SARS-COV-2 COVID-19 AMP PRB: CPT

## 2021-10-06 PROCEDURE — C9803 HOPD COVID-19 SPEC COLLECT: HCPCS | Mod: CS

## 2021-10-11 ENCOUNTER — HEALTH MAINTENANCE LETTER (OUTPATIENT)
Age: 66
End: 2021-10-11

## 2021-11-08 ENCOUNTER — APPOINTMENT (OUTPATIENT)
Dept: LAB | Facility: CLINIC | Age: 66
End: 2021-11-08
Payer: MEDICARE

## 2021-11-08 DIAGNOSIS — E53.8 FOLATE DEFICIENCY: ICD-10-CM

## 2021-11-08 DIAGNOSIS — R79.89 ELEVATED FERRITIN: ICD-10-CM

## 2021-11-08 LAB
FERRITIN SERPL-MCNC: 1412.8 NG/ML (ref 24–336)
FOLATE SERPL-MCNC: 4.6 NG/ML

## 2021-11-08 PROCEDURE — 36415 COLL VENOUS BLD VENIPUNCTURE: CPT

## 2021-11-08 PROCEDURE — 82728 ASSAY OF FERRITIN: CPT

## 2021-11-08 PROCEDURE — 82746 ASSAY OF FOLIC ACID SERUM: CPT

## 2021-11-09 ENCOUNTER — APPOINTMENT (OUTPATIENT)
Dept: LAB | Facility: CLINIC | Age: 66
End: 2021-11-09
Payer: MEDICARE

## 2021-11-09 DIAGNOSIS — K74.3 PRIMARY BILIARY CIRRHOSIS (CMS/HCC): ICD-10-CM

## 2021-11-09 DIAGNOSIS — R79.89 ELEVATED FERRITIN: Primary | ICD-10-CM

## 2021-11-09 LAB
ALBUMIN SERPL-MCNC: 3.8 G/DL (ref 3.5–5.3)
ALP SERPL-CCNC: 106 U/L (ref 45–115)
ALT SERPL-CCNC: 54 U/L (ref 7–52)
ANION GAP SERPL CALC-SCNC: 12 MMOL/L (ref 3–11)
AST SERPL-CCNC: 99 U/L
BASOPHILS # BLD AUTO: 0 10*3/UL
BASOPHILS NFR BLD AUTO: 1 % (ref 0–2)
BILIRUB SERPL-MCNC: 1.52 MG/DL (ref 0.2–1.4)
BUN SERPL-MCNC: 11 MG/DL (ref 7–25)
CALCIUM ALBUM COR SERPL-MCNC: 10.1 MG/DL (ref 8.6–10.3)
CALCIUM SERPL-MCNC: 9.9 MG/DL (ref 8.6–10.3)
CHLORIDE SERPL-SCNC: 107 MMOL/L (ref 98–107)
CO2 SERPL-SCNC: 20 MMOL/L (ref 21–32)
CREAT SERPL-MCNC: 0.93 MG/DL (ref 0.7–1.3)
CRP SERPL-MCNC: <1 MG/L
EOSINOPHIL # BLD AUTO: 0 10*3/UL
EOSINOPHIL NFR BLD AUTO: 1 % (ref 0–3)
ERYTHROCYTE [DISTWIDTH] IN BLOOD BY AUTOMATED COUNT: 13.3 % (ref 11.5–15)
GFR SERPL CREATININE-BSD FRML MDRD: 85 ML/MIN/1.73M*2
GLUCOSE SERPL-MCNC: 110 MG/DL (ref 70–105)
HCT VFR BLD AUTO: 39.1 % (ref 38–50)
HGB BLD-MCNC: 13.5 G/DL (ref 13.2–17.2)
IRON SATN MFR SERPL: ABNORMAL %
IRON SERPL-MCNC: 279 UG/DL (ref 50–175)
LYMPHOCYTES # BLD AUTO: 1.4 10*3/UL
LYMPHOCYTES NFR BLD AUTO: 28 % (ref 15–47)
MACROCYTOSIS PRESENCE IN BLOOD, ANALYZER: ABNORMAL
MCH RBC QN AUTO: 35.2 PG (ref 29–34)
MCHC RBC AUTO-ENTMCNC: 34.5 G/DL (ref 32–36)
MCV RBC AUTO: 102 FL (ref 82–97)
MONOCYTES # BLD AUTO: 0.5 10*3/UL
MONOCYTES NFR BLD AUTO: 9 % (ref 5–13)
NEUTROPHILS # BLD AUTO: 3.1 10*3/UL
NEUTROPHILS NFR BLD AUTO: 61 % (ref 46–70)
PLATELET # BLD AUTO: 201 10*3/UL (ref 130–350)
PMV BLD AUTO: 7.3 FL (ref 6.9–10.8)
POTASSIUM SERPL-SCNC: 3.6 MMOL/L (ref 3.5–5.1)
PROT SERPL-MCNC: 7.4 G/DL (ref 6–8.3)
RBC # BLD AUTO: 3.83 10*6/ΜL (ref 4.1–5.8)
SODIUM SERPL-SCNC: 139 MMOL/L (ref 135–145)
TIBC SERPL-MCNC: ABNORMAL UG/DL
UIBC SERPL-MCNC: <55 UG/DL (ref 155–355)
WBC # BLD AUTO: 5.1 10*3/UL (ref 3.7–9.6)

## 2021-11-09 PROCEDURE — 36415 COLL VENOUS BLD VENIPUNCTURE: CPT

## 2021-11-09 PROCEDURE — 86140 C-REACTIVE PROTEIN: CPT

## 2021-11-09 PROCEDURE — 80053 COMPREHEN METABOLIC PANEL: CPT

## 2021-11-09 PROCEDURE — 85025 COMPLETE CBC W/AUTO DIFF WBC: CPT

## 2021-11-09 PROCEDURE — 83540 ASSAY OF IRON: CPT

## 2021-11-30 ENCOUNTER — HOSPITAL ENCOUNTER (OUTPATIENT)
Dept: ULTRASOUND IMAGING | Facility: HOSPITAL | Age: 66
Discharge: HOME | End: 2021-11-30
Payer: MEDICARE

## 2021-11-30 DIAGNOSIS — K75.4 AUTOIMMUNE HEPATITIS (CMS/HCC): ICD-10-CM

## 2021-11-30 DIAGNOSIS — R79.89 ELEVATED LFTS: ICD-10-CM

## 2021-11-30 PROCEDURE — 93975 VASCULAR STUDY: CPT

## 2021-12-02 ENCOUNTER — HOSPITAL ENCOUNTER (OUTPATIENT)
Dept: CT IMAGING | Facility: HOSPITAL | Age: 66
Discharge: HOME | End: 2021-12-02
Payer: MEDICARE

## 2021-12-02 VITALS
DIASTOLIC BLOOD PRESSURE: 75 MMHG | SYSTOLIC BLOOD PRESSURE: 123 MMHG | HEART RATE: 64 BPM | RESPIRATION RATE: 16 BRPM | OXYGEN SATURATION: 96 %

## 2021-12-02 DIAGNOSIS — K74.3 PRIMARY BILIARY CHOLANGITIS (CMS/HCC): ICD-10-CM

## 2021-12-02 DIAGNOSIS — E83.119 HEMOCHROMATOSIS, UNSPECIFIED HEMOCHROMATOSIS TYPE: ICD-10-CM

## 2021-12-02 DIAGNOSIS — K75.4 AUTOIMMUNE HEPATITIS (CMS/HCC): ICD-10-CM

## 2021-12-02 DIAGNOSIS — R79.89 ELEVATED LFTS: ICD-10-CM

## 2021-12-02 DIAGNOSIS — R79.89 ELEVATED FERRITIN: ICD-10-CM

## 2021-12-02 PROCEDURE — 83540 ASSAY OF IRON: CPT | Performed by: RADIOLOGY

## 2021-12-02 PROCEDURE — 6370000100 HC RX 637 (ALT 250 FOR IP): Performed by: RADIOLOGY

## 2021-12-02 PROCEDURE — 88307 TISSUE EXAM BY PATHOLOGIST: CPT

## 2021-12-02 PROCEDURE — 84999 UNLISTED CHEMISTRY PROCEDURE: CPT | Performed by: RADIOLOGY

## 2021-12-02 PROCEDURE — 47000 NEEDLE BIOPSY OF LIVER PERQ: CPT

## 2021-12-02 PROCEDURE — (BLANK) HC RECOVERY PHASE-2 1ST 1/2 HOUR ACUITY LEVEL 1

## 2021-12-02 PROCEDURE — (BLANK) HC RECOVERY PHASE-2 EACH ADDITIONAL 1/2 HOUR ACUITY LEVEL 1

## 2021-12-02 PROCEDURE — 88313 SPECIAL STAINS GROUP 2: CPT

## 2021-12-02 RX ORDER — HYDROCODONE BITARTRATE AND ACETAMINOPHEN 7.5; 325 MG/1; MG/1
1 TABLET ORAL EVERY 6 HOURS PRN
Status: DISCONTINUED | OUTPATIENT
Start: 2021-12-02 | End: 2021-12-03 | Stop reason: HOSPADM

## 2021-12-02 RX ORDER — HYDROCODONE BITARTRATE AND ACETAMINOPHEN 5; 325 MG/1; MG/1
1 TABLET ORAL EVERY 6 HOURS PRN
Status: DISCONTINUED | OUTPATIENT
Start: 2021-12-02 | End: 2021-12-03 | Stop reason: HOSPADM

## 2021-12-02 RX ADMIN — HYDROCODONE BITARTRATE AND ACETAMINOPHEN 1 TABLET: 7.5; 325 TABLET ORAL at 11:42

## 2021-12-02 NOTE — POST-PROCEDURE NOTE
Discharge dressing clean dry intact.  Denies discharge questions, discharge instructions given to pt and spouce.

## 2021-12-02 NOTE — NURSING NOTE
Patient positioned supine with arms elevated above head and positioned on pillows, lower legs supported with positioning device in position of comfort.

## 2021-12-02 NOTE — DISCHARGE INSTRUCTIONS
INVASIVE PROCEDURES INSTRUCTIONS FOR HOME CARE:      Activity when going home:  · No driving or operating heavy machinery for 24 hours.   · Day of discharge: relax at home.   · Limit lifting to no more than 10 pounds for 2 days.   · Have someone stay with you for the first 12 hours.     Self Care:  · You may shower the next day.   · No tub bath, hot tubs or swimming for three days.     Medications:  · Resume other medications unless directed otherwise by your doctor.     Diet:   · Resume previous diet unless your doctor has changed it.    Site Care:  · The site may be tender or slightly bruised.  This is normal and will heal.   · Over-the-counter pain relievers may be helpful.    · Replace the Band-Aid if it becomes soiled.   · Cleanse the site gently if it becomes soiled.   · Remove Band-Aid tomorrow.     Call your doctor if you have:   · Increased bruising or swelling at the site of your procedure.   · Bleeding from the site that is new or more in amount.    · Increasing pain at the biopsy site.   · Dizziness, lightheadedness, weakness, nausea, or vomiting.    Call your doctor if you see signs of infection at the site:   · Redness or swelling.   · Warm to touch.   · Thick cloudy drainage.   · Fever about 100.5 degrees F.   · Increased pain at site.   ·   · Dr. Mccauley [phone]  · SUMANTH Mix,  Alicia. RN & Sharron- procedure  · SUMANTH Hirsch- recovery

## 2021-12-02 NOTE — POST-PROCEDURE NOTE
Post Procedure Note    Patient Name: Juan Diego Rondon     : 1955    Radiologist: ARUN ANTOINE MD    Pre-operative Diagnosis:  Elevated LFTs    Operation : Liver biopsy    Anesthesia Type: Lidocaine          Estimated Blood Loss: <5 ml    Specimens: Histology     Complications:  None; patient tolerated the procedure well.

## 2021-12-02 NOTE — NURSING NOTE
Labeled procedural biopsies for histology and iron studies transported to lab per DENNISE Mccord RN and given to .

## 2021-12-02 NOTE — NURSING NOTE
Transported to post procedure area per RICARDO Jimenez RN.  Report given to ISHMAEL Tapia RN--aware pain medication will be available in [facility] pharmacy in 15 minutes.

## 2021-12-02 NOTE — NURSING NOTE
"Procedure complete. Dressing placed at procedural site rt mid-abdomen procedural site. Procedural site dry/intact without hematoma at site.  Patient c/o discomfort right shoulder and rt abdomen---Dr. Mccauley aware.  Refer to orders received for pain medication.  Patient refuses oxycodone and states, \"It makes me goofy\".    "

## 2021-12-02 NOTE — NURSING NOTE
Prep of procedural site with chloraprep, sterile towels placed followed by lidocaine 1% at procedural site by Dr. Mccauley.  Patient rests easily.

## 2021-12-08 LAB — MISC MAYO RESULT(REF): NORMAL

## 2021-12-10 ENCOUNTER — APPOINTMENT (OUTPATIENT)
Dept: LAB | Facility: CLINIC | Age: 66
End: 2021-12-10
Payer: MEDICARE

## 2021-12-10 DIAGNOSIS — N28.9 RENAL LESION: ICD-10-CM

## 2021-12-10 DIAGNOSIS — D51.0 PERNICIOUS ANEMIA: ICD-10-CM

## 2021-12-10 DIAGNOSIS — K74.60 HEPATIC CIRRHOSIS, UNSPECIFIED HEPATIC CIRRHOSIS TYPE, UNSPECIFIED WHETHER ASCITES PRESENT (CMS/HCC): ICD-10-CM

## 2021-12-10 LAB
ALBUMIN SERPL-MCNC: 3.6 G/DL (ref 3.5–5.3)
ALP SERPL-CCNC: 97 U/L (ref 45–115)
ALT SERPL-CCNC: 14 U/L (ref 7–52)
ANION GAP SERPL CALC-SCNC: 9 MMOL/L (ref 3–11)
AST SERPL-CCNC: 22 U/L
BILIRUB SERPL-MCNC: 0.58 MG/DL (ref 0.2–1.4)
BUN SERPL-MCNC: 16 MG/DL (ref 7–25)
CALCIUM ALBUM COR SERPL-MCNC: 10 MG/DL (ref 8.6–10.3)
CALCIUM SERPL-MCNC: 9.7 MG/DL (ref 8.6–10.3)
CHLORIDE SERPL-SCNC: 108 MMOL/L (ref 98–107)
CO2 SERPL-SCNC: 24 MMOL/L (ref 21–32)
CREAT SERPL-MCNC: 0.89 MG/DL (ref 0.7–1.3)
GFR SERPL CREATININE-BSD FRML MDRD: 88 ML/MIN/1.73M*2
GLUCOSE SERPL-MCNC: 112 MG/DL (ref 70–105)
POTASSIUM SERPL-SCNC: 3.9 MMOL/L (ref 3.5–5.1)
PROT SERPL-MCNC: 6.9 G/DL (ref 6–8.3)
SODIUM SERPL-SCNC: 141 MMOL/L (ref 135–145)
VIT B12 SERPL-MCNC: 357 PG/ML (ref 180–914)

## 2021-12-10 PROCEDURE — 82607 VITAMIN B-12: CPT

## 2021-12-10 PROCEDURE — 36415 COLL VENOUS BLD VENIPUNCTURE: CPT

## 2021-12-10 PROCEDURE — 80053 COMPREHEN METABOLIC PANEL: CPT

## 2021-12-20 ENCOUNTER — APPOINTMENT (OUTPATIENT)
Dept: LAB | Facility: CLINIC | Age: 66
End: 2021-12-20
Payer: MEDICARE

## 2021-12-20 DIAGNOSIS — K74.3 PRIMARY BILIARY CHOLANGITIS (CMS/HCC): ICD-10-CM

## 2021-12-20 DIAGNOSIS — K75.4 AUTOIMMUNE HEPATITIS (CMS/HCC): ICD-10-CM

## 2021-12-20 DIAGNOSIS — K74.3 PRIMARY BILIARY CIRRHOSIS (CMS/HCC): ICD-10-CM

## 2021-12-20 LAB
ALBUMIN SERPL-MCNC: 3.6 G/DL (ref 3.5–5.3)
ALP SERPL-CCNC: 71 U/L (ref 45–115)
ALT SERPL-CCNC: 23 U/L (ref 7–52)
AST SERPL-CCNC: 28 U/L
BILIRUB DIRECT SERPL-MCNC: 0.17 MG/DL
BILIRUB SERPL-MCNC: 0.74 MG/DL (ref 0.2–1.4)
PROT SERPL-MCNC: 6.7 G/DL (ref 6–8.3)

## 2021-12-20 PROCEDURE — 0034U TPMT NUDT15 GENES: CPT

## 2021-12-20 PROCEDURE — 36415 COLL VENOUS BLD VENIPUNCTURE: CPT

## 2021-12-20 PROCEDURE — 80076 HEPATIC FUNCTION PANEL: CPT

## 2021-12-22 LAB
GENETICIST REVIEW: NORMAL
LAB TEST METHOD: NORMAL
NUDT15 GENOTYPE(REF): NORMAL
NUDT15 PHENOTYPE(REF): NORMAL
PROVIDER SIGNING NAME: NORMAL
TEST PERFORMANCE INFO SPEC: NORMAL
TPMT GENE MUT ANL BLD/T: NORMAL
TPMT GENE PROD MET ACT IMP BLD/T-IMP: NORMAL

## 2022-01-05 ENCOUNTER — APPOINTMENT (OUTPATIENT)
Dept: LAB | Facility: CLINIC | Age: 67
End: 2022-01-05
Payer: MEDICARE

## 2022-01-05 DIAGNOSIS — K74.3 PRIMARY BILIARY CHOLANGITIS (CMS/HCC): ICD-10-CM

## 2022-01-05 DIAGNOSIS — K74.3 PRIMARY BILIARY CIRRHOSIS (CMS/HCC): ICD-10-CM

## 2022-01-05 DIAGNOSIS — K75.4 AUTOIMMUNE HEPATITIS (CMS/HCC): ICD-10-CM

## 2022-01-05 LAB
ALBUMIN SERPL-MCNC: 3.7 G/DL (ref 3.5–5.3)
ALP SERPL-CCNC: 69 U/L (ref 45–115)
ALT SERPL-CCNC: 25 U/L (ref 7–52)
AST SERPL-CCNC: 23 U/L
BILIRUB DIRECT SERPL-MCNC: 0.18 MG/DL
BILIRUB SERPL-MCNC: 0.57 MG/DL (ref 0.2–1.4)
PROT SERPL-MCNC: 7 G/DL (ref 6–8.3)

## 2022-01-05 PROCEDURE — 80076 HEPATIC FUNCTION PANEL: CPT

## 2022-01-05 PROCEDURE — 36415 COLL VENOUS BLD VENIPUNCTURE: CPT

## 2022-01-06 ENCOUNTER — APPOINTMENT (OUTPATIENT)
Dept: LAB | Facility: CLINIC | Age: 67
End: 2022-01-06
Payer: MEDICARE

## 2022-01-06 DIAGNOSIS — Z11.52 ENCOUNTER FOR SCREENING FOR COVID-19: ICD-10-CM

## 2022-01-06 LAB — SARS-COV-2 RDRP RESP QL NAA+PROBE: NEGATIVE

## 2022-01-06 PROCEDURE — C9803 HOPD COVID-19 SPEC COLLECT: HCPCS | Mod: CS

## 2022-01-06 PROCEDURE — 87635 SARS-COV-2 COVID-19 AMP PRB: CPT

## 2022-01-12 ENCOUNTER — OFFICE VISIT (OUTPATIENT)
Dept: URGENT CARE | Facility: CLINIC | Age: 67
End: 2022-01-12
Payer: MEDICARE

## 2022-01-12 VITALS
RESPIRATION RATE: 21 BRPM | TEMPERATURE: 98 F | HEIGHT: 75 IN | SYSTOLIC BLOOD PRESSURE: 136 MMHG | DIASTOLIC BLOOD PRESSURE: 78 MMHG | BODY MASS INDEX: 23.43 KG/M2 | WEIGHT: 188.4 LBS | OXYGEN SATURATION: 93 % | HEART RATE: 82 BPM

## 2022-01-12 DIAGNOSIS — R05.9 COUGH: Primary | ICD-10-CM

## 2022-01-12 DIAGNOSIS — M94.0 COSTOCHONDRITIS: ICD-10-CM

## 2022-01-12 DIAGNOSIS — J06.9 URI, ACUTE: ICD-10-CM

## 2022-01-12 LAB
INFLUENZA A (AMB): NEGATIVE
INFLUENZA B (AMB): NEGATIVE
INTERNAL QC PASS?: YES
SARS-COV-2 RDRP RESP QL NAA+PROBE: NORMAL

## 2022-01-12 PROCEDURE — 87804 INFLUENZA ASSAY W/OPTIC: CPT | Mod: QW | Performed by: PHYSICIAN ASSISTANT

## 2022-01-12 PROCEDURE — G0463 HOSPITAL OUTPT CLINIC VISIT: HCPCS | Performed by: PHYSICIAN ASSISTANT

## 2022-01-12 PROCEDURE — C9803 HOPD COVID-19 SPEC COLLECT: HCPCS | Mod: CS | Performed by: PHYSICIAN ASSISTANT

## 2022-01-12 PROCEDURE — 99203 OFFICE O/P NEW LOW 30 MIN: CPT | Performed by: PHYSICIAN ASSISTANT

## 2022-01-12 RX ORDER — BENZONATATE 200 MG/1
200 CAPSULE ORAL 3 TIMES DAILY PRN
Qty: 30 CAPSULE | Refills: 0 | Status: SHIPPED | OUTPATIENT
Start: 2022-01-12 | End: 2022-07-13 | Stop reason: ALTCHOICE

## 2022-01-12 ASSESSMENT — ENCOUNTER SYMPTOMS
HEADACHES: 1
FEVER: 0
WHEEZING: 0
COUGH: 1
FATIGUE: 0
CARDIOVASCULAR NEGATIVE: 1
SORE THROAT: 1
STRIDOR: 0
SHORTNESS OF BREATH: 1

## 2022-01-12 ASSESSMENT — PAIN SCALES - GENERAL: PAINLEVEL: 2

## 2022-01-12 NOTE — PATIENT INSTRUCTIONS
"Covid and Influenza are negative.     Patient advised this is probably viral in nature and to treat it symptomatically.    Discussed probable Costochondritis.      Will start him on Tessalon perles. Discussed use and side effects of medication including rashes, breathing problems and GI upset.     You can use Tylenol or Ibuprofen as needed for pain/fever. Use Salt water gargles, Sudafed, saline nasal spray/rinses, OTC anithistamine such as Zyrtec or Claritin, OTC cough/cold formula product such as Robitussin DM as needed for symptoms.    Recommend rest and pushing fluids.     If no improvement in 5-7 days follow up with Primary Care Provider.  All questions were answered.  He is in agreement with plan of care.            Patient Education     Upper Respiratory Infection, Adult  Most upper respiratory infections (URIs) are caused by a virus. A URI affects the nose, throat, and upper air passages. The most common type of URI is often called \"the common cold.\"  Follow these instructions at home:  · Take medicines only as told by your doctor.  · Gargle warm saltwater or take cough drops to comfort your throat as told by your doctor.  · Use a warm mist humidifier or inhale steam from a shower to increase air moisture. This may make it easier to breathe.  · Drink enough fluid to keep your pee (urine) clear or pale yellow.  · Eat soups and other clear broths.  · Have a healthy diet.  · Rest as needed.  · Go back to work when your fever is gone or your doctor says it is okay.  ? You may need to stay home longer to avoid giving your URI to others.  ? You can also wear a face mask and wash your hands often to prevent spread of the virus.  · Use your inhaler more if you have asthma.  · Do not use any tobacco products, including cigarettes, chewing tobacco, or electronic cigarettes. If you need help quitting, ask your doctor.  Contact a doctor if:  · You are getting worse, not better.  · Your symptoms are not helped by " medicine.  · You have chills.  · You are getting more short of breath.  · You have brown or red mucus.  · You have yellow or brown discharge from your nose.  · You have pain in your face, especially when you bend forward.  · You have a fever.  · You have puffy (swollen) neck glands.  · You have pain while swallowing.  · You have white areas in the back of your throat.  Get help right away if:  · You have very bad or constant:  ? Headache.  ? Ear pain.  ? Pain in your forehead, behind your eyes, and over your cheekbones (sinus pain).  ? Chest pain.  · You have long-lasting (chronic) lung disease and any of the following:  ? Wheezing.  ? Long-lasting cough.  ? Coughing up blood.  ? A change in your usual mucus.  · You have a stiff neck.  · You have changes in your:  ? Vision.  ? Hearing.  ? Thinking.  ? Mood.  This information is not intended to replace advice given to you by your health care provider. Make sure you discuss any questions you have with your health care provider.  Document Released: 06/05/2009 Document Revised: 08/20/2017 Document Reviewed: 03/25/2015  [employer] Interactive Patient Education © 2018 [employer]

## 2022-01-12 NOTE — PROGRESS NOTES
"Subjective      Juan Diego Rondon is a 66 y.o. male who presents for cough.    Patient in with complaint of a cough that is causing some chest pain, headache, SOB and sore throat.  Symptoms present x 2 days. No known contact with a positive or suspected Covid patient.  He believes he has a costochondritis.  He is a retired Mid-level provider.  He is taking tylenol and doesn't/can't take NSAIDs.                      The following have been reviewed and updated as appropriate in this visit:          Review of Systems   Constitutional: Negative for fatigue and fever.   HENT: Positive for sore throat.    Respiratory: Positive for cough and shortness of breath. Negative for wheezing and stridor.    Cardiovascular: Negative.    Neurological: Positive for headaches.       Objective   /78 (BP Location: Left arm, Patient Position: Sitting, Cuff Size: Regular Adult)   Pulse 82   Temp 36.7 °C (98 °F) (Temporal)   Resp 21   Ht 1.905 m (6' 3\")   Wt 85.5 kg (188 lb 6.4 oz)   SpO2 93%   BMI 23.55 kg/m²     Physical Exam  Vitals and nursing note reviewed.   Constitutional:       General: He is not in acute distress.     Appearance: He is well-developed. He is not ill-appearing.   HENT:      Head: Normocephalic and atraumatic.      Right Ear: Tympanic membrane, ear canal and external ear normal. There is no impacted cerumen. Tympanic membrane is not erythematous, retracted or bulging.      Left Ear: Tympanic membrane, ear canal and external ear normal. There is no impacted cerumen. Tympanic membrane is not erythematous, retracted or bulging.      Nose: Nose normal. No congestion or rhinorrhea.      Right Sinus: No maxillary sinus tenderness or frontal sinus tenderness.      Left Sinus: No maxillary sinus tenderness or frontal sinus tenderness.      Mouth/Throat:      Mouth: Mucous membranes are moist.      Pharynx: Oropharynx is clear. No oropharyngeal exudate or posterior oropharyngeal erythema.      Tonsils: No " tonsillar exudate.   Eyes:      Conjunctiva/sclera: Conjunctivae normal.      Pupils: Pupils are equal, round, and reactive to light.   Cardiovascular:      Rate and Rhythm: Normal rate and regular rhythm.      Heart sounds: Normal heart sounds. No murmur heard.    No gallop.   Pulmonary:      Effort: Pulmonary effort is normal. No respiratory distress.      Breath sounds: Normal breath sounds. No stridor. No wheezing, rhonchi or rales.      Comments: Lungs are CTA.    Chest:       Musculoskeletal:      Cervical back: Normal range of motion and neck supple.   Lymphadenopathy:      Cervical: No cervical adenopathy.   Skin:     General: Skin is warm and dry.   Neurological:      Mental Status: He is alert and oriented to person, place, and time.   Psychiatric:         Mood and Affect: Mood normal.         Behavior: Behavior normal.         Thought Content: Thought content normal.         Judgment: Judgment normal.         Assessment/Plan   Diagnoses and all orders for this visit:    Cough  -     POCT COVID-19, IDNOW PCR  -     POCT Influenza A/B  -     benzonatate (TESSALON) 200 mg capsule; Take 1 capsule (200 mg total) by mouth 3 (three) times a day as needed for cough    URI, acute    Costochondritis    Covid and Influenza are negative.     Patient advised this is probably viral in nature and to treat it symptomatically.    Discussed probable Costochondritis.      Will start him on Tessalon perles. Discussed use and side effects of medication including rashes, breathing problems and GI upset.     You can use Tylenol or Ibuprofen as needed for pain/fever. Use Salt water gargles, Sudafed, saline nasal spray/rinses, OTC anithistamine such as Zyrtec or Claritin, OTC cough/cold formula product such as Robitussin DM as needed for symptoms.    Recommend rest and pushing fluids.     If no improvement in 5-7 days follow up with Primary Care Provider.  All questions were answered.  He is in agreement with plan of care.     He  declined imaging and additional labs.       STEPHANIE HILL

## 2022-01-18 ENCOUNTER — TELEPHONE (OUTPATIENT)
Dept: ORTHOPEDIC SURGERY | Facility: CLINIC | Age: 67
End: 2022-01-18
Payer: MEDICARE

## 2022-01-18 DIAGNOSIS — G89.29 CHRONIC PAIN OF RIGHT KNEE: ICD-10-CM

## 2022-01-18 DIAGNOSIS — M25.561 CHRONIC PAIN OF RIGHT KNEE: ICD-10-CM

## 2022-01-18 DIAGNOSIS — M17.11 OSTEOARTHRITIS OF RIGHT KNEE, UNSPECIFIED OSTEOARTHRITIS TYPE: Primary | ICD-10-CM

## 2022-01-18 NOTE — TELEPHONE ENCOUNTER
Pt called and wanted to set up R hip sx with Dr. Reyes. He stated that was told just call to schedule. Advised would send message back to find out if needs clinic appt or if orders were being sent in as don't have those orders yet and would call him with update. He was understanding of this plan. Please advise.

## 2022-01-18 NOTE — TELEPHONE ENCOUNTER
9/13/21 pt was ok to schedule R NAZIA. Case request was ordered but cancelled. Unable to find sx sheet or reason of cancellation. Will make new sx sheet and place case request. Pt will need preop with Dr. Reyes with new xrays. Called pt to let him know.     Pt cancelled last sx because he needed to be out of town. Sx sheet in accordion.

## 2022-01-29 PROBLEM — Z96.642 HISTORY OF TOTAL HIP ARTHROPLASTY, LEFT: Status: ACTIVE | Noted: 2022-01-29

## 2022-01-29 ASSESSMENT — ENCOUNTER SYMPTOMS
PALPITATIONS: 0
HALLUCINATIONS: 0
NAUSEA: 0
CONSTIPATION: 0
BACK PAIN: 0
SLEEP DISTURBANCE: 1
HYPERACTIVE: 0
JOINT SWELLING: 1
TREMORS: 0
DIAPHORESIS: 0
WOUND: 0
DIZZINESS: 0
CHEST TIGHTNESS: 0
EYE ITCHING: 0
FEVER: 0
DIFFICULTY URINATING: 0
WHEEZING: 0
PHOTOPHOBIA: 0
SEIZURES: 0
CHILLS: 0
NECK STIFFNESS: 0
ACTIVITY CHANGE: 1
AGITATION: 0
APPETITE CHANGE: 0
FATIGUE: 0
NUMBNESS: 0
LIGHT-HEADEDNESS: 0
DIARRHEA: 0
MYALGIAS: 1
SHORTNESS OF BREATH: 0
DYSPHORIC MOOD: 0
CONFUSION: 0
NERVOUS/ANXIOUS: 0
BRUISES/BLEEDS EASILY: 0
UNEXPECTED WEIGHT CHANGE: 0
ARTHRALGIAS: 1
ABDOMINAL PAIN: 0
NECK PAIN: 0
VOMITING: 0
ADENOPATHY: 0
HEADACHES: 0
COUGH: 0
DECREASED CONCENTRATION: 0
COLOR CHANGE: 0
WEAKNESS: 1

## 2022-01-29 NOTE — PROGRESS NOTES
"Subjective   Name: Juan Diego Rondon \"Sherley"  : 1955  AGE: 66 y.o.    MRN: [id number]    PCP: Seun Vásquez DO at [place] in [place], [place].    Chief Complaint:  Chief Complaint   Patient presents with   • Right Hip - Pain     Pt took high doses of prednisone for autoimmune diseases and hip began hurting terrible. Pain all the time and stairs very painful. Hip will give out   • Pain     6/10 pain and tylenol PRN takes the edge off       HPI  Juan Diego Rondon \"Sherley" is a 66 y.o. man who comes in today complaining of acute and progressive right lateral hip pain.    Date of onset: Early 2022  Mechanism of injury: None, insidious onset  Aggravating factors: Walking/standing, laying on the right hip  Alleviating factors: Rest, Icing  Therapy: None  Injections: None  Hand dominance: Right  Nicotine use: None    Work status: Retired pediatric CNP back in 2019    I last saw Emmett here in the office for an initial evaluation for chronic and progressive left hip pain on 3/17/2021.  Patient had a MRI scan with gadolinium which found avascular necrosis of the left hip.  He underwent a left total hip arthroplasty with Dr. Sj Reyes on 2021.  Patient has done very well from his surgery.    Currently, Emmett is rating his pain today at a 5/10 in his right lateral hip and groin.  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 Patient is taking OTC Tylenol for pain relief.  He is unable to take oral NSAIDs.  He has noted a progressive and chronic pain in his right  lateral hip over the past month. Patient denies the use of an assisted or ambulatory device for his right hip or groin pain.  His right leg is giving way due to pain in the right hip. He denies any numbness/tingling or paresthesias of the left or right lower extremities.  He denies having any bowel/bladder changes, saddle anesthesia, or increased lower back pain.  His pain is " constant but is worse with standing or walking.  He is having trouble sleeping at night due to pain.  He denies any prior MRI scan, physical therapy, injections, or surgeries of the right hip.  He is voicing no other complaints at this time.    Patient has a history of melanoma over the left forearm that was removed on 10/15/2020 by Dr. Gracia Manriquez.  He has routine follow-up appointments with his dermatologist in [place].    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 MIS CT SCAN   • EYE SURGERY Bilateral     cataract   • KNEE SURGERY Bilateral     3 surgeries on left and 2 on right knees - prior open medial meniscectomies bilateral knees in the 1970s   • SKIN BIOPSY     • SKIN CANCER EXCISION Left 10/15/2020    Melanoma removed from left forearm   • TONSILLECTOMY     • TOTAL HIP ARTHROPLASTY Left 4/21/2021    Procedure: LEFT TOTAL HIP ARTHROPLASTY;  Surgeon: Sj Reyes MD;  Location: [facility];  Service: Orthopedics;  Laterality: Left;       Current Outpatient Medications:   •  predniSONE 10 mg tablet, SMARTSIG:By Mouth, Disp: , Rfl:   •  Golytely 236-22.74-6.74 -5.86 gram solution, SMARTSIG:By Mouth, Disp: , Rfl:   •  valsartan (DIOVAN) 80 mg tablet, Take 1 tablet by mouth daily, Disp: , Rfl:   •  fenofibrate (TRICOR) 48 mg tablet, Take 48 mg by mouth daily, Disp: , Rfl:   •  HYDROcodone-acetaminophen (NORCO) 5-325 mg per tablet, Take 1 tablet by mouth every 4 (four)  hours as needed for pain scale 8-10/10 for up to 10 days Max Daily Amount: 6 tablets, Disp: 30 tablet, Rfl: 0  •  benzonatate (TESSALON) 200 mg capsule, Take 1 capsule (200 mg total) by mouth 3 (three) times a day as needed for cough, Disp: 30 capsule, Rfl: 0  •  acetaminophen (Tylenol Extra Strength) 500 mg tablet, Take 500 mg by mouth every 6 (six) hours as needed for pain scale 1-3/10, Disp: , Rfl:   •  FLUoxetine (PROzac) 40 mg capsule, Take 40 mg by mouth daily, Disp: , Rfl:   •  hydrOXYzine (ATARAX) 25 mg tablet, Take 25-50 mg by mouth nightly as needed, Disp: , Rfl:   •  famotidine (PEPCID) 20 mg tablet, Take 20 mg by mouth 2 (two) times a day, Disp: , Rfl:   •  ursodioL (ACTIGALL) 500 mg tablet, Take 500 mg by mouth 2 times daily Every other day, Disp: , Rfl:   •  ursodioL (AMANDEEP) 250 mg tablet, Take 750 mg by mouth In am and 500 mg in pm every other day-alternate with the 500 mg bid, Disp: , Rfl:   •  losartan (COZAAR) 50 mg tablet, Take 50 mg by mouth every evening  , Disp: , Rfl:   •  cyanocobalamin 1,000 mcg/mL injection, Inject 1,000 mcg into the shoulder, thigh, or buttocks every 30 (thirty) days, Disp: , Rfl:   •  rosuvastatin (CRESTOR) 10 mg tablet, Take 10 mg by mouth every evening  , Disp: 30, Rfl: 6    No Known Allergies    Social History     Occupational History   •  Retired certified nurse practitioner in pediatrics in 2019   Tobacco Use   • Smoking status: Never Smoker   • Smokeless tobacco: Never Used   Substance and Sexual Activity   • Alcohol use:  6 drinks a week   • Drug use:  None   • Sexual activity:  Unknown      Review of Systems   Constitutional: Positive for activity change. Negative for appetite change, chills, diaphoresis, fatigue, fever and unexpected weight change.   HENT: Positive for sneezing.    Eyes: Negative for photophobia, itching and visual disturbance.   Respiratory: Negative for cough, chest tightness, shortness of breath and wheezing.    Cardiovascular: Negative for  "chest pain, palpitations and leg swelling.   Gastrointestinal: Negative for abdominal pain, constipation, diarrhea, nausea and vomiting.   Genitourinary: Negative for difficulty urinating.   Musculoskeletal: Positive for arthralgias, gait problem, joint swelling and myalgias. Negative for back pain, neck pain and neck stiffness.   Skin: Negative for color change, pallor, rash and wound.        Intermittent pruritus     Allergic/Immunologic: Positive for environmental allergies.   Neurological: Positive for weakness. Negative for dizziness, tremors, seizures, syncope, light-headedness, numbness and headaches.   Hematological: Negative for adenopathy. Does not bruise/bleed easily.   Psychiatric/Behavioral: Positive for sleep disturbance. Negative for agitation, behavioral problems, confusion, decreased concentration, dysphoric mood, hallucinations, self-injury and suicidal ideas. The patient is not nervous/anxious and is not hyperactive.              Objective    Vitals:    01/31/22 1346   BP: 140/86      Juan Diego Rondon \"Sherley" is a 66 y.o. male who looks his stated age.     General: Patient is alert and cooperative and in no distress secondary to pain. Patient is well nourished and has a thin body habitus. Patient is clean and appropriately dressed.  Affect is mood congruent and pleasant.  Patient is alert and oriented x3.    Gait is antalgic without the use of an assisted or ambulatory device.    Skin is free of rash in the left and right lower extremities.    Right Hip Exam     Tenderness   The patient is experiencing tenderness in the lateral (pain in the right lateral hip).    Range of Motion   The patient has normal right hip ROM.  Abduction: normal   Adduction: normal   Extension: normal   Flexion: normal   External rotation:  60 normal   Internal rotation: normal     Muscle Strength   Right hip normal muscle strength: Pain with internal rotation in the seated position for right lateral hip pain.  Abduction: " 4/5   Adduction: 5/5   Flexion: 5/5     Tests   ADRIA: positive  Monica: negative    Other   Erythema: absent  Scars: absent  Sensation: normal  Pulse: present    Comments:  Patient has a negative FADDIR maneuver of the right hip for right hip impingement.    Patient has a negative femoral thrust test for right hip pain.    Patient has a negative logroll of the right hip in supine position.      Left Hip Exam   Left hip exam is normal.    Tenderness   The patient is experiencing no tenderness.     Range of Motion   Abduction: normal   Adduction: normal   Extension: normal   Flexion: normal   External rotation:  60 normal   Internal rotation: normal     Muscle Strength   The patient has normal left hip strength.   Abduction: 5/5   Adduction: 5/5   Flexion: 5/5     Tests   ADRIA: negative  Monica: negative    Other   Erythema: absent  Scars: present  Sensation: normal  Pulse: present    Comments:  Patient has a negative FADDIR maneuver of the left hip for left hip impingement.    Patient has a negative femoral thrust test of the left hip pain.    Patient has a negative logroll to left hip in a supine position.          Back Exam     Tenderness   The patient is experiencing no tenderness.     Range of Motion   The patient has normal back ROM.  Extension: normal   Flexion: normal   Lateral bend right: normal   Lateral bend left: normal   Rotation right: normal   Rotation left: normal     Muscle Strength   The patient has normal back strength.  Right Quadriceps:  5/5   Left Quadriceps:  5/5   Right Hamstrings:  5/5   Left Hamstrings:  5/5     Tests   Straight leg raise right: negative  Straight leg raise left: negative    Reflexes   Patellar:  2/4 normal  Achilles:  2/4 normal  Babinski's sign: normal     Other   Toe walk: normal  Heel walk: normal  Sensation: normal  Gait: normal   Erythema: no back redness  Scars: absent    Comments:  Patient has normal dermatomal sensation throughout the left and right lower  extremities.    Patient has a negative straight leg raise in a seated and supine position bilaterally.    Patient has a negative slump test bilaterally.    Patient has 5/5 strength with testing of the extensor hallucis longus, dorsiflexors, and plantar flexors bilaterally.    Patient has a negative Trendelenburg for gluteus medius/minimus weakness bilaterally.    Patient has a negative Valsalva maneuver.                Imaging:  X-rays were accomplished of the AP pelvis and right lateral hip which shows moderate DJD of the right hip. There is a left NAZIA in good position. There is no leg-length deformity noted.     Assessment   Diagnoses and all orders for this visit:    Right hip pain  -     X-ray hip 2 or 3 views right  -     HYDROcodone-acetaminophen (NORCO) 5-325 mg per tablet; Take 1 tablet by mouth every 4 (four) hours as needed for pain scale 8-10/10 for up to 10 days Max Daily Amount: 6 tablets  -     MR hip right without contrast; Future    Current chronic use of systemic steroids  Comments:  Prednisone 10 mg 1 tablet daily  Orders:  -     MR hip right without contrast; Future    Trochanteric bursitis, right hip    History of total hip arthroplasty, left  Comments:  Left total hip arthroplasty with Dr. Sj Reyes on 4/21/2021              Plan    I reviewed the x-rays of the right hip with Emmett today.    Physical examination today shows exquisite pain and tenderness with palpation over the right lateral trochanter over the trochanteric bursa and the insertion of the gluteus medius tendon.    Patient has a positive FADDIR maneuver for left hip impingement. Patient is neurovascularly intact in left and right lower extremity with a negative Homans' sign bilaterally.    I have discussed conservative treatment options for his right hip pain such as a steroid injection for trochanteric bursitis or a MRI scan of the right hip to assess for AVN.  Patient would like to proceed with an MRI scan of the right hip.   Patient will get this scheduled soon as possible and I will contact him with his results.    Emmett may use an assistive ambulatory device such as a cane, crutches, or walker as needed for pain relief.    Patient was provided a prescription for Norco 5/325 mg tablets 1 tablet every 4 hours as needed for severe pain in the left hip number 30 tablets were provided and sent to [facility] pharmacy here in [place].    Patient will be contacted with his results of his MRI scan and we will discuss further treatment options at that time.  Patient is in agreement with the above treatment plan.  All questions were answered today.

## 2022-01-30 ENCOUNTER — HEALTH MAINTENANCE LETTER (OUTPATIENT)
Age: 67
End: 2022-01-30

## 2022-01-31 ENCOUNTER — ANCILLARY PROCEDURE (OUTPATIENT)
Dept: RADIOLOGY | Facility: CLINIC | Age: 67
End: 2022-01-31
Payer: MEDICARE

## 2022-01-31 ENCOUNTER — OFFICE VISIT (OUTPATIENT)
Dept: ORTHOPEDIC SURGERY | Facility: CLINIC | Age: 67
End: 2022-01-31
Payer: MEDICARE

## 2022-01-31 VITALS — DIASTOLIC BLOOD PRESSURE: 86 MMHG | SYSTOLIC BLOOD PRESSURE: 140 MMHG

## 2022-01-31 DIAGNOSIS — Z96.642 HISTORY OF TOTAL HIP ARTHROPLASTY, LEFT: ICD-10-CM

## 2022-01-31 DIAGNOSIS — Z79.52 CURRENT CHRONIC USE OF SYSTEMIC STEROIDS: ICD-10-CM

## 2022-01-31 DIAGNOSIS — M70.61 TROCHANTERIC BURSITIS, RIGHT HIP: ICD-10-CM

## 2022-01-31 DIAGNOSIS — M25.551 RIGHT HIP PAIN: Primary | ICD-10-CM

## 2022-01-31 PROCEDURE — G0463 HOSPITAL OUTPT CLINIC VISIT: HCPCS | Mod: PO | Performed by: PHYSICIAN ASSISTANT

## 2022-01-31 PROCEDURE — 73502 X-RAY EXAM HIP UNI 2-3 VIEWS: CPT | Mod: RT,PO,FY

## 2022-01-31 PROCEDURE — 99213 OFFICE O/P EST LOW 20 MIN: CPT | Performed by: PHYSICIAN ASSISTANT

## 2022-01-31 RX ORDER — PREDNISONE 10 MG/1
5 TABLET ORAL DAILY
COMMUNITY
Start: 2022-01-10 | End: 2024-02-06 | Stop reason: ALTCHOICE

## 2022-01-31 RX ORDER — VALSARTAN 80 MG/1
TABLET ORAL
COMMUNITY
Start: 2022-01-11 | End: 2023-06-19 | Stop reason: SDUPTHER

## 2022-01-31 RX ORDER — HYDROCODONE BITARTRATE AND ACETAMINOPHEN 5; 325 MG/1; MG/1
1 TABLET ORAL EVERY 4 HOURS PRN
Qty: 30 TABLET | Refills: 0 | Status: SHIPPED | OUTPATIENT
Start: 2022-01-31 | End: 2022-02-10

## 2022-01-31 RX ORDER — POLYETHYLENE GLYCOL 3350, SODIUM SULFATE ANHYDROUS, SODIUM BICARBONATE, SODIUM CHLORIDE, POTASSIUM CHLORIDE 236; 22.74; 6.74; 5.86; 2.97 G/4L; G/4L; G/4L; G/4L; G/4L
POWDER, FOR SOLUTION ORAL
COMMUNITY
Start: 2022-01-14 | End: 2022-07-13 | Stop reason: ALTCHOICE

## 2022-01-31 RX ORDER — FENOFIBRATE 48 MG/1
48 TABLET, FILM COATED ORAL DAILY
COMMUNITY
Start: 2022-01-08 | End: 2023-01-06 | Stop reason: SDUPTHER

## 2022-01-31 NOTE — LETTER
[facility]  [address]  [place] [address]  [phone]  Dept: [phone]  01/31/22      Seun Vásquez DO  [address] [place] [address]        To: Seun Vásquez DO      Thank you for referring your patient, Juan Diego Rondon, to receive care in my office. I have enclosed a summary of the care provided to Juan Diego on 01/31/22.    Please contact me with any questions you may have regarding the visit.    Sincerely,         STEPHANIE HEREDIA  [address] [place] [address]  [phone]    CC: No Recipients

## 2022-01-31 NOTE — PATIENT INSTRUCTIONS
-You will be scheduled for a MRI scan of the right hip to rule out avascular necrosis and trochanteric bursitis and assess for hip DJD.  -You have been provided a prescription of hydrocodone/APAP 5/325 mg 1 tablet to be taken every 4 hours as needed for severe pain in the right hip.  Number 3 tablets was sent to [facility] pharmacy here in [place].  -You will be contacted with your results of your MRI scan of the right hip and to discuss further treatment options.

## 2022-02-04 ENCOUNTER — HOSPITAL ENCOUNTER (OUTPATIENT)
Dept: MRI IMAGING | Facility: HOSPITAL | Age: 67
Discharge: HOME | End: 2022-02-04
Payer: MEDICARE

## 2022-02-04 DIAGNOSIS — M25.551 RIGHT HIP PAIN: ICD-10-CM

## 2022-02-04 DIAGNOSIS — Z79.52 CURRENT CHRONIC USE OF SYSTEMIC STEROIDS: ICD-10-CM

## 2022-02-04 PROCEDURE — G1004 CDSM NDSC: HCPCS | Performed by: RADIOLOGY

## 2022-02-04 PROCEDURE — 73721 MRI JNT OF LWR EXTRE W/O DYE: CPT | Mod: RT,ME

## 2022-02-04 PROCEDURE — 73721 MRI JNT OF LWR EXTRE W/O DYE: CPT | Mod: 26,RT,ME | Performed by: RADIOLOGY

## 2022-02-09 ENCOUNTER — APPOINTMENT (OUTPATIENT)
Dept: LAB | Facility: CLINIC | Age: 67
End: 2022-02-09
Payer: MEDICARE

## 2022-02-09 DIAGNOSIS — K75.4 AUTOIMMUNE HEPATITIS (CMS/HCC): ICD-10-CM

## 2022-02-09 LAB
ALBUMIN SERPL-MCNC: 3.6 G/DL (ref 3.5–5.3)
ALP SERPL-CCNC: 111 U/L (ref 45–115)
ALT SERPL-CCNC: 37 U/L (ref 7–52)
AST SERPL-CCNC: 52 U/L
BILIRUB DIRECT SERPL-MCNC: 0.1 MG/DL
BILIRUB SERPL-MCNC: 0.48 MG/DL (ref 0.2–1.4)
PROT SERPL-MCNC: 7.1 G/DL (ref 6–8.3)

## 2022-02-09 PROCEDURE — 80076 HEPATIC FUNCTION PANEL: CPT

## 2022-02-09 PROCEDURE — 36415 COLL VENOUS BLD VENIPUNCTURE: CPT

## 2022-02-11 ENCOUNTER — HOSPITAL ENCOUNTER (OUTPATIENT)
Facility: HOSPITAL | Age: 67
Setting detail: OUTPATIENT SURGERY
End: 2022-02-11
Attending: INTERNAL MEDICINE | Admitting: INTERNAL MEDICINE
Payer: MEDICARE

## 2022-02-14 ENCOUNTER — TELEPHONE (OUTPATIENT)
Dept: ORTHOPEDIC SURGERY | Facility: CLINIC | Age: 67
End: 2022-02-14
Payer: MEDICARE

## 2022-02-14 DIAGNOSIS — Z11.59 SPECIAL SCREENING EXAMINATION FOR VIRAL DISEASE: Primary | ICD-10-CM

## 2022-02-14 NOTE — TELEPHONE ENCOUNTER
This patient called about hip pain and he is requesting to talk to a nurse about this. He states he is not taking his pain medication. He is asking about options outside of surgery to alleviate pain.

## 2022-02-15 DIAGNOSIS — M87.051 AVASCULAR NECROSIS OF BONE OF RIGHT HIP (CMS/HCC): Primary | ICD-10-CM

## 2022-02-15 RX ORDER — LIDOCAINE 50 MG/G
1 PATCH TOPICAL DAILY
Qty: 30 PATCH | Refills: 0 | Status: SHIPPED | OUTPATIENT
Start: 2022-02-15 | End: 2022-03-17

## 2022-02-15 RX ORDER — HYDROCODONE BITARTRATE AND ACETAMINOPHEN 5; 325 MG/1; MG/1
1 TABLET ORAL EVERY 6 HOURS PRN
Qty: 40 TABLET | Refills: 0 | Status: SHIPPED | OUTPATIENT
Start: 2022-02-15 | End: 2022-03-15 | Stop reason: ALTCHOICE

## 2022-02-15 NOTE — TELEPHONE ENCOUNTER
I returned a phone call to the patient, he states that he is using hydrocodone sparingly at at bedtime for pain but is wondering if he could maybe get lidocaine patch to use during the day. He states that he is using Tylenol but is trying to stay away from anti-inflammatories due to stomach upset. Informed the patient I would visit with Henry in regards to this. He is also requesting a refill of hydrocodone, he will be leaving for a trip and will not be back until March 6 and would like to get the prescription filled before he leaves.    We have scheduled the patient for his right total hip arthroplasty with Dr. Reyes on 3/22/2022.

## 2022-02-16 ENCOUNTER — TELEPHONE (OUTPATIENT)
Dept: SOCIAL WORK | Facility: HOSPITAL | Age: 67
End: 2022-02-16
Payer: MEDICARE

## 2022-02-16 ENCOUNTER — TELEPHONE (OUTPATIENT)
Dept: ORTHOPEDIC SURGERY | Facility: CLINIC | Age: 67
End: 2022-02-16
Payer: MEDICARE

## 2022-02-16 DIAGNOSIS — M87.051 AVASCULAR NECROSIS OF BONE OF RIGHT HIP (CMS/HCC): Primary | ICD-10-CM

## 2022-02-16 NOTE — TELEPHONE ENCOUNTER
Patient returned call. He verbalized understanding and requested that we prior authorize the lidocaine patch for him.     Prior auth for lidoacaine patches was submitted to patient's insurance.

## 2022-02-16 NOTE — TELEPHONE ENCOUNTER
Pt called to advise his Rx that was to go to [facility] has not been received. He would like to pick this up today or tomorrow from pharmacy.

## 2022-02-16 NOTE — TELEPHONE ENCOUNTER
Patient notified that prescriptions were sent into his pharmacy.    Patient notified of surgery date in March and appointment dates for preop and Covid test.

## 2022-02-16 NOTE — TELEPHONE ENCOUNTER
Discussed upcoming joint surgery, informed of education changes due to COVID 19, also discussed home situation and plans for discharge. Guidebook and pre-op information packet sent along with the information regarding the virtual joint class.  Patient requests outpatient PT at [facility]. He is aware surgery will call regarding his arrival time.  No other questions at this time.

## 2022-02-16 NOTE — TELEPHONE ENCOUNTER
Called and spoke to [facility] pharmacy. She stated that the hydrocodone is ready to be picked up. She stated however, that the Lidocaine is requiring prior auth and this information was sent to our office.    Attempted to call patient, left general voicemail.

## 2022-02-25 PROCEDURE — 73502 X-RAY EXAM HIP UNI 2-3 VIEWS: CPT | Mod: 26,RT | Performed by: ORTHOPAEDIC SURGERY

## 2022-03-11 DIAGNOSIS — Z48.89 AFTERCARE FOLLOWING SURGERY: Primary | ICD-10-CM

## 2022-03-14 ENCOUNTER — APPOINTMENT (OUTPATIENT)
Dept: LAB | Facility: CLINIC | Age: 67
End: 2022-03-14
Payer: MEDICARE

## 2022-03-14 DIAGNOSIS — E27.49 GLUCOCORTICOID DEFICIENCY (CMS/HCC): ICD-10-CM

## 2022-03-14 DIAGNOSIS — Z01.818 PRE-OP EXAM: ICD-10-CM

## 2022-03-14 DIAGNOSIS — K75.4 HEPATITIS, AUTOIMMUNE (CMS/HCC): ICD-10-CM

## 2022-03-14 DIAGNOSIS — K75.4 AUTOIMMUNE HEPATITIS (CMS/HCC): ICD-10-CM

## 2022-03-14 LAB
ALBUMIN SERPL-MCNC: 3.5 G/DL (ref 3.5–5.3)
ALP SERPL-CCNC: 72 U/L (ref 45–115)
ALT SERPL-CCNC: 17 U/L (ref 7–52)
AST SERPL-CCNC: 20 U/L
BILIRUB DIRECT SERPL-MCNC: 0.07 MG/DL
BILIRUB SERPL-MCNC: 0.48 MG/DL (ref 0.2–1.4)
CORTIS AM PEAK SERPL-MCNC: 11.31 UG/DL (ref 4.3–22.4)
INR BLD: 1
PROT SERPL-MCNC: 7 G/DL (ref 6–8.3)
PROTHROMBIN TIME: 11.5 SECONDS (ref 9.4–12.5)

## 2022-03-14 PROCEDURE — 82533 TOTAL CORTISOL: CPT

## 2022-03-14 PROCEDURE — 36415 COLL VENOUS BLD VENIPUNCTURE: CPT

## 2022-03-14 PROCEDURE — 80076 HEPATIC FUNCTION PANEL: CPT

## 2022-03-14 PROCEDURE — 85610 PROTHROMBIN TIME: CPT

## 2022-03-14 PROCEDURE — 87081 CULTURE SCREEN ONLY: CPT

## 2022-03-15 ENCOUNTER — APPOINTMENT (OUTPATIENT)
Dept: LAB | Facility: CLINIC | Age: 67
End: 2022-03-15
Payer: MEDICARE

## 2022-03-15 ENCOUNTER — TELEPHONE (OUTPATIENT)
Dept: ORTHOPEDIC SURGERY | Facility: CLINIC | Age: 67
End: 2022-03-15
Payer: MEDICARE

## 2022-03-15 DIAGNOSIS — Z11.59 SPECIAL SCREENING EXAMINATION FOR VIRAL DISEASE: ICD-10-CM

## 2022-03-15 DIAGNOSIS — M25.551 RIGHT HIP PAIN: Primary | ICD-10-CM

## 2022-03-15 DIAGNOSIS — Z11.52 ENCOUNTER FOR SCREENING FOR COVID-19: ICD-10-CM

## 2022-03-15 LAB — SARS-COV-2 RNA RESP QL NAA+PROBE: NEGATIVE

## 2022-03-15 PROCEDURE — U0005 INFEC AGEN DETEC AMPLI PROBE: HCPCS

## 2022-03-15 PROCEDURE — C9803 HOPD COVID-19 SPEC COLLECT: HCPCS | Mod: CS

## 2022-03-15 RX ORDER — HYDROCODONE BITARTRATE AND ACETAMINOPHEN 5; 325 MG/1; MG/1
1 TABLET ORAL EVERY 6 HOURS PRN
Qty: 30 TABLET | Refills: 0 | Status: ON HOLD | OUTPATIENT
Start: 2022-03-15 | End: 2022-03-23 | Stop reason: SDUPTHER

## 2022-03-15 NOTE — TELEPHONE ENCOUNTER
Patient is scheduled with Dr. Reyes for right total hip arthroplasty on 3/22/2022, he has a history of AVN.  Patient is taking hydrocodone very sparingly, last refilled by Henry Melendez on 2/15/2022 #40.

## 2022-03-15 NOTE — TELEPHONE ENCOUNTER
Patient had a refill sent for Norco 5/325 mg 1 tablet every 6 hours as needed for severe right hip pain number 30 tablets was sent to [facility] pharmacy here in [place].    Please let the patient know that this prescription has been sent.

## 2022-03-16 LAB — MRSA DNA SPEC QL NAA+PROBE: NORMAL

## 2022-03-17 ENCOUNTER — ANESTHESIA EVENT (OUTPATIENT)
Dept: GASTROENTEROLOGY | Facility: HOSPITAL | Age: 67
End: 2022-03-17
Payer: MEDICARE

## 2022-03-17 ASSESSMENT — ACTIVITIES OF DAILY LIVING (ADL): ADEQUATE_TO_COMPLETE_ADL: YES

## 2022-03-17 NOTE — PRE-PROCEDURE INSTRUCTIONS
Pre-Surgery Instructions:   Medication Instructions   • HYDROcodone-acetaminophen (NORCO) 5-325 mg per tablet Do not take morning of surgery/procedure   • lidocaine (LIDODERM) 5 % patch Do not take morning of surgery/procedure   • predniSONE 10 mg tablet Do not take morning of surgery/procedure   • valsartan (DIOVAN) 80 mg tablet Take morning of surgery/procedure   • fenofibrate (TRICOR) 48 mg tablet Do not take morning of surgery/procedure   • acetaminophen (TYLENOL) 500 mg tablet Do not take morning of surgery/procedure   • FLUoxetine (PROzac) 40 mg capsule Take morning of surgery/procedure   • hydrOXYzine (ATARAX) 25 mg tablet Do not take morning of surgery/procedure   • ursodioL (ACTIGALL) 500 mg tablet pt to talk to Dr Reyes about this med for DOS   • ursodioL (AMANDEEP) 250 mg tablet pt will talk to Dr Reyes about this med for DOS   • cyanocobalamin 1,000 mcg/mL injection Do not take morning of surgery/procedure   • rosuvastatin (CRESTOR) 10 mg tablet Take at bedtime   • Golytely 236-22.74-6.74 -5.86 gram solution not taking   • benzonatate (TESSALON) 200 mg capsule not taking   • famotidine (PEPCID) 20 mg tablet Take as needed morning of surgery/procedure   • losartan (COZAAR) 50 mg tablet not taking       Diet instructions for surgery:      Preop questionnaire:       Preop instructions:  Pre-op Phone Call  Surgery Time Verified: No  Arrival Time Verified: No  Surgery Location Verified: Yes  Instructed to shower within 12 hours: Yes  Patient has special physician orders: Yes (skin wipes and precovery drink)  Patient verbalized understanding of physician's preoperative order: Yes  Does Patient Require Bowel Prep for Procedure?: No  Instructed to remove/not apply makeup, petroleum products, lotion, powder, scents, or deodorant on the morning of surgery: Yes  Recommend eye glasses for day of surgery, contact lenses must be removed prior to surgery:: Yes  Instructed to bring CPAP mask: N/A  Instructed to remove all  jewelry, including piercings, and leave at home.: Yes  Instructed to leave all valuables at home: Yes  Instructed to leave all medications at home: Yes  Instructed to bring a valid photo ID and insurance card:: Yes  NPO Status Reinforced: No  Instruct patient to ensure transportation is available following surgery/procedure:: Yes  Instruct patient that a caregiver must stay with the patient 24 hours following anesthesia:: Yes   Preop assessment and teaching done with pt, informed pt of the visitor policy due to covid,wearing of a mask while in the facility, will get a call with HT/NPO status from the OR dept prior to DOS. Pt stated they understood all instructions given today, will have family with DOS. Pt will bring walker DOS

## 2022-03-18 NOTE — ANESTHESIA PREPROCEDURE EVALUATION
"Pre-Procedure Assessment    Patient: Juan Diego Rondon, male, 66 y.o.    Ht Readings from Last 1 Encounters:   01/12/22 1.905 m (6' 3\")     Wt Readings from Last 1 Encounters:   01/12/22 85.5 kg (188 lb 6.4 oz)       Last Vitals  BP      Temp      Pulse     Resp      SpO2      Pain Score         Problem list reviewed and Medical history reviewed           Airway   Mallampati: I  TM distance: >3 FB  Neck ROM: full      Dental - normal exam     Pulmonary - negative ROS and normal exam    breath sounds clear to auscultation  Cardiovascular - normal exam  (+) hypertension well controlled,     ECG reviewed  Rhythm: regular  Rate: normal  ROS comment: 3/10/22 EKG: NSR    Mental Status/Neuro/Psych    Pt is alert.      (+) psychiatric history (anxiety), arthritis,     GI/Hepatic/Renal    (+) liver disease (auto immune hepatitis ),     Comments: IBS    Endo/Other    (+) history of cancer (skin),     Comments: Cleared 3/17/22 by Seun Vásquez, DO  Abdominal  - normal exam          Social History     Tobacco Use   • Smoking status: Never Smoker   • Smokeless tobacco: Never Used   Substance Use Topics   • Alcohol use: Yes     Alcohol/week: 1.0 standard drink     Types: 1 Glasses of wine per week     Comment: occ      Hematology   WBC   Date Value Ref Range Status   11/09/2021 5.1 3.7 - 9.6 10*3/uL Final     RBC   Date Value Ref Range Status   11/09/2021 3.83 (L) 4.10 - 5.80 10*6/µL Final     MCV   Date Value Ref Range Status   11/09/2021 102.0 (H) 82.0 - 97.0 fL Final     Hemoglobin   Date Value Ref Range Status   11/09/2021 13.5 13.2 - 17.2 g/dL Final     Hematocrit   Date Value Ref Range Status   11/09/2021 39.1 38.0 - 50.0 % Final     Platelets   Date Value Ref Range Status   11/09/2021 201 130 - 350 10*3/uL Final      Coagulation   Protime   Date Value Ref Range Status   03/14/2022 11.5 9.4 - 12.5 seconds Final     INR   Date Value Ref Range Status   03/14/2022 1.0 <=1.1 Final      General Chemistry   Calcium   Date Value " Ref Range Status   12/10/2021 9.7 8.6 - 10.3 mg/dL Final     BUN   Date Value Ref Range Status   12/10/2021 16 7 - 25 mg/dL Final     Creatinine   Date Value Ref Range Status   12/10/2021 0.89 0.70 - 1.30 mg/dL Final     Glucose   Date Value Ref Range Status   12/10/2021 112 (H) 70 - 105 mg/dL Final     Sodium   Date Value Ref Range Status   12/10/2021 141 135 - 145 mmol/L Final     Potassium   Date Value Ref Range Status   12/10/2021 3.9 3.5 - 5.1 MMOL/L Final     CO2   Date Value Ref Range Status   12/10/2021 24 21 - 32 mmol/L Final     Chloride   Date Value Ref Range Status   12/10/2021 108 (H) 98 - 107 mmol/L Final     Anesthesia Plan    ASA 2   NPO status reviewed: > 8 hours    Spinal                Additional Comments: precovery @5951          Anesthetic plan and risks discussed with patient.  Use of blood products discussed with patient who consented to blood products.

## 2022-03-21 ENCOUNTER — OFFICE VISIT (OUTPATIENT)
Dept: ORTHOPEDIC SURGERY | Facility: CLINIC | Age: 67
End: 2022-03-21
Payer: MEDICARE

## 2022-03-21 VITALS — SYSTOLIC BLOOD PRESSURE: 90 MMHG | DIASTOLIC BLOOD PRESSURE: 64 MMHG

## 2022-03-21 DIAGNOSIS — M87.051 AVASCULAR NECROSIS OF HIP, RIGHT (CMS/HCC): Primary | ICD-10-CM

## 2022-03-21 PROCEDURE — 99214 OFFICE O/P EST MOD 30 MIN: CPT | Mod: 57 | Performed by: ORTHOPAEDIC SURGERY

## 2022-03-21 PROCEDURE — G0463 HOSPITAL OUTPT CLINIC VISIT: HCPCS | Mod: PO | Performed by: ORTHOPAEDIC SURGERY

## 2022-03-21 RX ORDER — BUPIVACAINE 13.3 MG/ML
20 INJECTION, SUSPENSION, LIPOSOMAL INFILTRATION ONCE
Status: CANCELLED | OUTPATIENT
Start: 2022-03-22

## 2022-03-21 ASSESSMENT — ENCOUNTER SYMPTOMS
ARTHRALGIAS: 1
VOMITING: 0
COUGH: 0
SHORTNESS OF BREATH: 0
FATIGUE: 0
CHILLS: 0
NAUSEA: 0
WOUND: 0

## 2022-03-21 NOTE — PROGRESS NOTES
Subjective      Juan Diego Rondon is a 66 y.o. male who presents for f/u right hip.    Chief Complaint   Patient presents with   • Right Hip - Pre-op Visit     NAZIA 3/22/22       Mr. Rondon is a 66-year-old male who presents today to follow up on his right hip with avascular necrosis and is scheduled for total hip arthroplasty on March 22, 2022. He does have a history of left total hip arthroplasty on April 21, 2021. He has been seen by Seun Vásquez DO, and his MRSA and COVID are negative.       The following have been reviewed and updated as appropriate in this visit:        Past Medical History:   Diagnosis Date   • Allergic eosinophilic esophagitis    • Arthritis    • Blood disorder     pernicious anemia   • Gastrointestinal disease     IBS   • Gout    • Hyperlipidemia    • Hypertension    • Melanoma (CMS/HCC) (HCC)    • Neurologic disorder    • Primary biliary cholangitis (CMS/HCC) (HCC)     autoimmune hepatitis   • Psychiatric illness     anxiety     Past Surgical History:   Procedure Laterality Date   • APPENDECTOMY     • COLONOSCOPY W/ BIOPSIES AND POLYPECTOMY  02/09/2009    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   • CT BIOPSY LIVER FUNCTION  12/2/2021    CT BIOPSY LIVER FUNCTION 12/2/2021 [facility] MIS CT SCAN   • EYE SURGERY Bilateral     cataract   • KNEE SURGERY Bilateral     3 surgeries on left and 2 on right knees - prior open medial meniscectomies bilateral knees in the 1970s   • SKIN BIOPSY     • SKIN CANCER EXCISION Left 10/15/2020    Melanoma removed from left forearm   • TONSILLECTOMY     • TOTAL HIP ARTHROPLASTY Left 4/21/2021    Procedure: LEFT TOTAL HIP ARTHROPLASTY;  Surgeon: Sj Reyes MD;  Location: [facility];  Service: Orthopedics;  Laterality: Left;     Family History   Problem Relation Age of Onset   • Hypertension Mother    • Lung cancer Father    • Hypertension Father      Social History     Socioeconomic History   • Marital status:    Tobacco Use   • Smoking status: Never  Smoker   • Smokeless tobacco: Never Used   Substance and Sexual Activity   • Alcohol use: Yes     Alcohol/week: 1.0 standard drink     Types: 1 Glasses of wine per week     Comment: occ   • Drug use: Never     Social Determinants of Health     Tobacco Use: Low Risk    • Smoking Tobacco Use: Never Smoker   • Smokeless Tobacco Use: Never Used       Review of Systems   Constitutional: Negative for chills and fatigue.   Respiratory: Negative for cough and shortness of breath.    Gastrointestinal: Negative for nausea and vomiting.   Musculoskeletal: Positive for arthralgias.   Skin: Negative for rash and wound.   All other systems reviewed and are negative.      Objective   BP 90/64     Physical Exam  Constitutional:       Appearance: He is well-developed.   HENT:      Head: Normocephalic and atraumatic.   Eyes:      Pupils: Pupils are equal, round, and reactive to light.   Cardiovascular:      Rate and Rhythm: Normal rate and regular rhythm.      Heart sounds: Normal heart sounds. No murmur heard.  Pulmonary:      Effort: Pulmonary effort is normal.      Breath sounds: Normal breath sounds. No wheezing.   Musculoskeletal:      Comments: Leg length is 1/8 long on the left compared to the right. Range of motion 0 to 105 degrees, internal rotation to 30 and external rotation to 40. Some tenderness to palpation right greater trochanter.    Skin:     General: Skin is warm and dry.   Neurological:      Mental Status: He is alert and oriented to person, place, and time.   Psychiatric:         Behavior: Behavior normal.         Thought Content: Thought content normal.         Judgment: Judgment normal.         ASSESSMENT AND PLAN     Assessment/Plan   Diagnoses and all orders for this visit:    Avascular necrosis of hip, right (CMS/HCC) ([facility])      Mr. Rondon is a 66-year-old male who presents today to follow up on his right hip and is scheduled for total hip arthroplasty om March 22, 2022 at [facility]. The procedure and recovery were reviewed with him. Risks, benefits and alternatives were discussed and all his questions were answered. He voiced understanding and we will proceed with surgery tomorrow.       Portions of this note were documented by Tere Wright as I performed the exam and collected the information from Juan Diego Rondon. I attest that I have reviewed the information as documented, verified the accuracy of the documentation and added additional information as needed.

## 2022-03-22 ENCOUNTER — HOSPITAL ENCOUNTER (OUTPATIENT)
Facility: HOSPITAL | Age: 67
Discharge: HOME | End: 2022-03-23
Attending: ORTHOPAEDIC SURGERY | Admitting: ORTHOPAEDIC SURGERY
Payer: MEDICARE

## 2022-03-22 ENCOUNTER — ANESTHESIA (OUTPATIENT)
Dept: GASTROENTEROLOGY | Facility: HOSPITAL | Age: 67
End: 2022-03-22
Payer: MEDICARE

## 2022-03-22 ENCOUNTER — APPOINTMENT (OUTPATIENT)
Dept: RADIOLOGY | Facility: HOSPITAL | Age: 67
End: 2022-03-22
Payer: MEDICARE

## 2022-03-22 DIAGNOSIS — M25.551 RIGHT HIP PAIN: ICD-10-CM

## 2022-03-22 PROBLEM — M87.052 AVASCULAR NECROSIS OF BONE OF LEFT HIP (CMS/HCC): Status: RESOLVED | Noted: 2021-03-23 | Resolved: 2022-03-22

## 2022-03-22 PROBLEM — M87.051 AVASCULAR NECROSIS OF BONE OF RIGHT HIP (CMS/HCC): Status: ACTIVE | Noted: 2022-03-22

## 2022-03-22 PROBLEM — Z96.641 S/P TOTAL RIGHT HIP ARTHROPLASTY: Status: ACTIVE | Noted: 2022-03-22

## 2022-03-22 PROBLEM — M16.11 OSTEOARTHRITIS OF RIGHT HIP: Status: ACTIVE | Noted: 2022-03-22

## 2022-03-22 PROCEDURE — 27130 TOTAL HIP ARTHROPLASTY: CPT | Mod: RT | Performed by: ORTHOPAEDIC SURGERY

## 2022-03-22 PROCEDURE — 6370000100 HC RX 637 (ALT 250 FOR IP): Performed by: PHYSICIAN ASSISTANT

## 2022-03-22 PROCEDURE — (BLANK) HC OR LEVEL 4 PROC 1ST 15MIN: Performed by: ORTHOPAEDIC SURGERY

## 2022-03-22 PROCEDURE — 2500000200 HC RX 250 WO HCPCS: Performed by: PHYSICIAN ASSISTANT

## 2022-03-22 PROCEDURE — 7100002500 HC EXTENDED RECOVERY PER HOUR

## 2022-03-22 PROCEDURE — (BLANK) HC REGIONAL ANESTHESIA FACILITY CHARGE EACH ADDITIONAL MIN: Performed by: ORTHOPAEDIC SURGERY

## 2022-03-22 PROCEDURE — 2580000300 HC RX 258: Performed by: ORTHOPAEDIC SURGERY

## 2022-03-22 PROCEDURE — (BLANK) HC RECOVERY PHASE-1 1ST  HOUR ACUITY LEVEL 3: Performed by: ORTHOPAEDIC SURGERY

## 2022-03-22 PROCEDURE — 2500000200 HC RX 250 WO HCPCS: Performed by: NURSE ANESTHETIST, CERTIFIED REGISTERED

## 2022-03-22 PROCEDURE — 88304 TISSUE EXAM BY PATHOLOGIST: CPT

## 2022-03-22 PROCEDURE — 6360000200 HC RX 636 W HCPCS (ALT 250 FOR IP): Performed by: ORTHOPAEDIC SURGERY

## 2022-03-22 PROCEDURE — 72170 X-RAY EXAM OF PELVIS: CPT | Mod: 26,NCP | Performed by: INTERNAL MEDICINE

## 2022-03-22 PROCEDURE — 6360000200 HC RX 636 W HCPCS (ALT 250 FOR IP): Performed by: PHYSICIAN ASSISTANT

## 2022-03-22 PROCEDURE — 6360000200 HC RX 636 W HCPCS (ALT 250 FOR IP): Performed by: NURSE ANESTHETIST, CERTIFIED REGISTERED

## 2022-03-22 PROCEDURE — 01214 ANES OPEN PX TOT HIP ARTHRP: CPT | Performed by: NURSE ANESTHETIST, CERTIFIED REGISTERED

## 2022-03-22 PROCEDURE — 72170 X-RAY EXAM OF PELVIS: CPT

## 2022-03-22 PROCEDURE — 2720000000 HC SUPP 272 WO HCPCS: Performed by: ORTHOPAEDIC SURGERY

## 2022-03-22 PROCEDURE — (BLANK) HC OR LEVEL 4 PROC EACH ADDITIONAL MIN: Performed by: ORTHOPAEDIC SURGERY

## 2022-03-22 PROCEDURE — 73502 X-RAY EXAM HIP UNI 2-3 VIEWS: CPT | Mod: 26,RT | Performed by: INTERNAL MEDICINE

## 2022-03-22 PROCEDURE — 73502 X-RAY EXAM HIP UNI 2-3 VIEWS: CPT | Mod: RT

## 2022-03-22 PROCEDURE — 2580000300 HC RX 258: Performed by: PHYSICIAN ASSISTANT

## 2022-03-22 PROCEDURE — C9290 INJ, BUPIVACAINE LIPOSOME: HCPCS | Performed by: ORTHOPAEDIC SURGERY

## 2022-03-22 PROCEDURE — (BLANK) HC REGIONAL ANESTHESIA FACILITY CHARGE 1ST 15 MIN: Performed by: ORTHOPAEDIC SURGERY

## 2022-03-22 PROCEDURE — 2500000200 HC RX 250 WO HCPCS: Performed by: ORTHOPAEDIC SURGERY

## 2022-03-22 PROCEDURE — 2500000200 HC RX 250 WO HCPCS: Mod: NCMED | Performed by: ORTHOPAEDIC SURGERY

## 2022-03-22 PROCEDURE — C1776 JOINT DEVICE (IMPLANTABLE): HCPCS | Performed by: ORTHOPAEDIC SURGERY

## 2022-03-22 PROCEDURE — 88311 DECALCIFY TISSUE: CPT

## 2022-03-22 PROCEDURE — 6370000100 HC RX 637 (ALT 250 FOR IP): Performed by: ORTHOPAEDIC SURGERY

## 2022-03-22 DEVICE — STEM FEM POR LAT NC 15X155: Type: IMPLANTABLE DEVICE | Site: HIP | Status: FUNCTIONAL

## 2022-03-22 DEVICE — LINER G7 ACETABULAR SZ E 36MM ZIMMER: Type: IMPLANTABLE DEVICE | Site: HIP | Status: FUNCTIONAL

## 2022-03-22 DEVICE — HEAD CER BIOLOXD OPTION 36MM: Type: IMPLANTABLE DEVICE | Site: HIP | Status: FUNCTIONAL

## 2022-03-22 DEVICE — SLEEVE CER OPTION TYPE 1 TPR 6: Type: IMPLANTABLE DEVICE | Site: HIP | Status: FUNCTIONAL

## 2022-03-22 DEVICE — SHELL ACETABULAR G7 3H 52MM POROUS PLASMA CEMENTLESS: Type: IMPLANTABLE DEVICE | Site: HIP | Status: FUNCTIONAL

## 2022-03-22 RX ORDER — ACETAMINOPHEN 500 MG
500 TABLET ORAL 3 TIMES DAILY
Status: DISCONTINUED | OUTPATIENT
Start: 2022-03-23 | End: 2022-03-22

## 2022-03-22 RX ORDER — TRANEXAMIC ACID 100 MG/ML
1000 INJECTION, SOLUTION INTRAVENOUS ONCE
Status: COMPLETED | OUTPATIENT
Start: 2022-03-22 | End: 2022-03-22

## 2022-03-22 RX ORDER — HYDROCODONE BITARTRATE AND ACETAMINOPHEN 5; 325 MG/1; MG/1
1-2 TABLET ORAL EVERY 4 HOURS PRN
Status: DISCONTINUED | OUTPATIENT
Start: 2022-03-22 | End: 2022-03-23 | Stop reason: HOSPADM

## 2022-03-22 RX ORDER — DIPHENHYDRAMINE HCL 25 MG
25-50 CAPSULE ORAL EVERY 6 HOURS PRN
Status: DISCONTINUED | OUTPATIENT
Start: 2022-03-22 | End: 2022-03-23 | Stop reason: HOSPADM

## 2022-03-22 RX ORDER — SODIUM CHLORIDE, SODIUM LACTATE, POTASSIUM CHLORIDE, AND CALCIUM CHLORIDE .6; .31; .03; .02 G/100ML; G/100ML; G/100ML; G/100ML
250 INJECTION, SOLUTION INTRAVENOUS ONCE AS NEEDED
Status: DISCONTINUED | OUTPATIENT
Start: 2022-03-22 | End: 2022-03-22 | Stop reason: HOSPADM

## 2022-03-22 RX ORDER — OXYCODONE HYDROCHLORIDE 5 MG/1
5 TABLET ORAL EVERY 4 HOURS PRN
Status: DISCONTINUED | OUTPATIENT
Start: 2022-03-22 | End: 2022-03-22

## 2022-03-22 RX ORDER — PROPOFOL 10 MG/ML
INJECTION, EMULSION INTRAVENOUS CONTINUOUS PRN
Status: DISCONTINUED | OUTPATIENT
Start: 2022-03-22 | End: 2022-03-22 | Stop reason: SURG

## 2022-03-22 RX ORDER — ADHESIVE BANDAGE
30 BANDAGE TOPICAL DAILY PRN
Status: DISCONTINUED | OUTPATIENT
Start: 2022-03-22 | End: 2022-03-23 | Stop reason: HOSPADM

## 2022-03-22 RX ORDER — POLYETHYLENE GLYCOL (3350) 17 G/17G
17 POWDER, FOR SOLUTION ORAL DAILY
Status: DISCONTINUED | OUTPATIENT
Start: 2022-03-23 | End: 2022-03-23 | Stop reason: HOSPADM

## 2022-03-22 RX ORDER — HYDROXYZINE HYDROCHLORIDE 25 MG/1
25 TABLET, FILM COATED ORAL NIGHTLY PRN
Status: DISCONTINUED | OUTPATIENT
Start: 2022-03-22 | End: 2022-03-23 | Stop reason: HOSPADM

## 2022-03-22 RX ORDER — ONDANSETRON HYDROCHLORIDE 2 MG/ML
4 INJECTION, SOLUTION INTRAVENOUS ONCE AS NEEDED
Status: DISCONTINUED | OUTPATIENT
Start: 2022-03-22 | End: 2022-03-22 | Stop reason: HOSPADM

## 2022-03-22 RX ORDER — CEFAZOLIN SODIUM 10 G/1
2000 INJECTION, POWDER, FOR SOLUTION INTRAVENOUS ONCE
Status: COMPLETED | OUTPATIENT
Start: 2022-03-22 | End: 2022-03-22

## 2022-03-22 RX ORDER — MIDAZOLAM HYDROCHLORIDE 1 MG/ML
INJECTION INTRAMUSCULAR; INTRAVENOUS AS NEEDED
Status: DISCONTINUED | OUTPATIENT
Start: 2022-03-22 | End: 2022-03-22 | Stop reason: SURG

## 2022-03-22 RX ORDER — PREDNISONE 10 MG/1
10 TABLET ORAL DAILY
Status: DISCONTINUED | OUTPATIENT
Start: 2022-03-22 | End: 2022-03-23 | Stop reason: HOSPADM

## 2022-03-22 RX ORDER — OXYCODONE HYDROCHLORIDE 10 MG/1
10 TABLET ORAL EVERY 4 HOURS PRN
Status: DISCONTINUED | OUTPATIENT
Start: 2022-03-22 | End: 2022-03-22

## 2022-03-22 RX ORDER — FENOFIBRATE 48 MG/1
48 TABLET, FILM COATED ORAL DAILY
Status: DISCONTINUED | OUTPATIENT
Start: 2022-03-22 | End: 2022-03-23 | Stop reason: HOSPADM

## 2022-03-22 RX ORDER — MORPHINE SULFATE 2 MG/ML
1-2 INJECTION, SOLUTION INTRAMUSCULAR; INTRAVENOUS
Status: DISCONTINUED | OUTPATIENT
Start: 2022-03-22 | End: 2022-03-23 | Stop reason: HOSPADM

## 2022-03-22 RX ORDER — DIPHENHYDRAMINE HYDROCHLORIDE 50 MG/ML
25 INJECTION INTRAMUSCULAR; INTRAVENOUS ONCE AS NEEDED
Status: DISCONTINUED | OUTPATIENT
Start: 2022-03-22 | End: 2022-03-22 | Stop reason: HOSPADM

## 2022-03-22 RX ORDER — ACETAMINOPHEN 500 MG
1000 TABLET ORAL EVERY 8 HOURS SCHEDULED
Status: DISCONTINUED | OUTPATIENT
Start: 2022-03-22 | End: 2022-03-22

## 2022-03-22 RX ORDER — URSODIOL 250 MG/1
250 TABLET, FILM COATED ORAL
Status: DISCONTINUED | OUTPATIENT
Start: 2022-03-24 | End: 2022-03-23 | Stop reason: HOSPADM

## 2022-03-22 RX ORDER — CELECOXIB 200 MG/1
200 CAPSULE ORAL ONCE
Status: COMPLETED | OUTPATIENT
Start: 2022-03-22 | End: 2022-03-22

## 2022-03-22 RX ORDER — ONDANSETRON HYDROCHLORIDE 2 MG/ML
4 INJECTION, SOLUTION INTRAVENOUS EVERY 6 HOURS PRN
Status: DISCONTINUED | OUTPATIENT
Start: 2022-03-22 | End: 2022-03-23 | Stop reason: HOSPADM

## 2022-03-22 RX ORDER — ONDANSETRON HYDROCHLORIDE 2 MG/ML
INJECTION, SOLUTION INTRAVENOUS AS NEEDED
Status: DISCONTINUED | OUTPATIENT
Start: 2022-03-22 | End: 2022-03-22 | Stop reason: SURG

## 2022-03-22 RX ORDER — ACETAMINOPHEN 500 MG
500 TABLET ORAL 3 TIMES DAILY
Status: DISCONTINUED | OUTPATIENT
Start: 2022-03-22 | End: 2022-03-23 | Stop reason: HOSPADM

## 2022-03-22 RX ORDER — DIPHENHYDRAMINE HYDROCHLORIDE 50 MG/ML
25-50 INJECTION INTRAMUSCULAR; INTRAVENOUS EVERY 6 HOURS PRN
Status: DISCONTINUED | OUTPATIENT
Start: 2022-03-22 | End: 2022-03-23 | Stop reason: HOSPADM

## 2022-03-22 RX ORDER — SODIUM CHLORIDE, SODIUM LACTATE, POTASSIUM CHLORIDE, AND CALCIUM CHLORIDE .6; .31; .03; .02 G/100ML; G/100ML; G/100ML; G/100ML
500 INJECTION, SOLUTION INTRAVENOUS ONCE AS NEEDED
Status: DISCONTINUED | OUTPATIENT
Start: 2022-03-22 | End: 2022-03-22 | Stop reason: HOSPADM

## 2022-03-22 RX ORDER — BUPIVACAINE HYDROCHLORIDE 5 MG/ML
INJECTION, SOLUTION EPIDURAL; INTRACAUDAL
Status: COMPLETED | OUTPATIENT
Start: 2022-03-22 | End: 2022-03-22

## 2022-03-22 RX ORDER — OXYCODONE HYDROCHLORIDE 5 MG/1
2.5 TABLET ORAL EVERY 4 HOURS PRN
Status: DISCONTINUED | OUTPATIENT
Start: 2022-03-22 | End: 2022-03-22

## 2022-03-22 RX ORDER — ROSUVASTATIN CALCIUM 10 MG/1
10 TABLET, COATED ORAL EVERY EVENING
Status: DISCONTINUED | OUTPATIENT
Start: 2022-03-22 | End: 2022-03-23 | Stop reason: HOSPADM

## 2022-03-22 RX ORDER — FLUOXETINE HYDROCHLORIDE 20 MG/1
40 CAPSULE ORAL DAILY
Status: DISCONTINUED | OUTPATIENT
Start: 2022-03-22 | End: 2022-03-23 | Stop reason: HOSPADM

## 2022-03-22 RX ORDER — ONDANSETRON 4 MG/1
4 TABLET, FILM COATED ORAL EVERY 6 HOURS PRN
Status: DISCONTINUED | OUTPATIENT
Start: 2022-03-22 | End: 2022-03-23 | Stop reason: HOSPADM

## 2022-03-22 RX ORDER — LIDOCAINE HYDROCHLORIDE 20 MG/ML
INJECTION, SOLUTION EPIDURAL; INFILTRATION; INTRACAUDAL; PERINEURAL AS NEEDED
Status: DISCONTINUED | OUTPATIENT
Start: 2022-03-22 | End: 2022-03-22 | Stop reason: SURG

## 2022-03-22 RX ORDER — ASPIRIN 325 MG/1
325 TABLET, FILM COATED ORAL DAILY
Status: DISCONTINUED | OUTPATIENT
Start: 2022-03-22 | End: 2022-03-23 | Stop reason: HOSPADM

## 2022-03-22 RX ORDER — ACETAMINOPHEN 500 MG
1000 TABLET ORAL ONCE
Status: COMPLETED | OUTPATIENT
Start: 2022-03-22 | End: 2022-03-22

## 2022-03-22 RX ORDER — URSODIOL 250 MG/1
500 TABLET, FILM COATED ORAL 2 TIMES DAILY
Status: DISCONTINUED | OUTPATIENT
Start: 2022-03-22 | End: 2022-03-23 | Stop reason: HOSPADM

## 2022-03-22 RX ORDER — FENTANYL CITRATE/PF 50 MCG/ML
50 PLASTIC BAG, INJECTION (ML) INTRAVENOUS EVERY 5 MIN PRN
Status: DISCONTINUED | OUTPATIENT
Start: 2022-03-22 | End: 2022-03-22 | Stop reason: HOSPADM

## 2022-03-22 RX ORDER — NALOXONE HYDROCHLORIDE 0.4 MG/ML
0.2 INJECTION, SOLUTION INTRAMUSCULAR; INTRAVENOUS; SUBCUTANEOUS AS NEEDED
Status: DISCONTINUED | OUTPATIENT
Start: 2022-03-22 | End: 2022-03-22 | Stop reason: HOSPADM

## 2022-03-22 RX ORDER — SODIUM CHLORIDE, SODIUM LACTATE, POTASSIUM CHLORIDE, CALCIUM CHLORIDE 600; 310; 30; 20 MG/100ML; MG/100ML; MG/100ML; MG/100ML
100 INJECTION, SOLUTION INTRAVENOUS CONTINUOUS
Status: DISCONTINUED | OUTPATIENT
Start: 2022-03-22 | End: 2022-03-23 | Stop reason: HOSPADM

## 2022-03-22 RX ORDER — CEFAZOLIN SODIUM 10 G/1
2000 INJECTION, POWDER, FOR SOLUTION INTRAVENOUS EVERY 8 HOURS
Status: COMPLETED | OUTPATIENT
Start: 2022-03-22 | End: 2022-03-23

## 2022-03-22 RX ORDER — NORETHINDRONE AND ETHINYL ESTRADIOL 0.5-0.035
KIT ORAL AS NEEDED
Status: DISCONTINUED | OUTPATIENT
Start: 2022-03-22 | End: 2022-03-22 | Stop reason: SURG

## 2022-03-22 RX ORDER — CELECOXIB 200 MG/1
200 CAPSULE ORAL DAILY
Status: DISCONTINUED | OUTPATIENT
Start: 2022-03-23 | End: 2022-03-23 | Stop reason: HOSPADM

## 2022-03-22 RX ORDER — BUPIVACAINE 13.3 MG/ML
INJECTION, SUSPENSION, LIPOSOMAL INFILTRATION AS NEEDED
Status: DISCONTINUED | OUTPATIENT
Start: 2022-03-22 | End: 2022-03-22 | Stop reason: HOSPADM

## 2022-03-22 RX ORDER — MIDAZOLAM HYDROCHLORIDE 1 MG/ML
1 INJECTION INTRAMUSCULAR; INTRAVENOUS EVERY 5 MIN PRN
Status: DISCONTINUED | OUTPATIENT
Start: 2022-03-22 | End: 2022-03-22 | Stop reason: HOSPADM

## 2022-03-22 RX ORDER — MORPHINE SULFATE 4 MG/ML
3-4 INJECTION, SOLUTION INTRAMUSCULAR; INTRAVENOUS
Status: DISCONTINUED | OUTPATIENT
Start: 2022-03-22 | End: 2022-03-23 | Stop reason: HOSPADM

## 2022-03-22 RX ORDER — VALSARTAN 80 MG/1
80 TABLET ORAL DAILY
Status: DISCONTINUED | OUTPATIENT
Start: 2022-03-22 | End: 2022-03-23 | Stop reason: HOSPADM

## 2022-03-22 RX ORDER — HYDROMORPHONE HYDROCHLORIDE 2 MG/ML
0.5 INJECTION, SOLUTION INTRAMUSCULAR; INTRAVENOUS; SUBCUTANEOUS EVERY 5 MIN PRN
Status: DISCONTINUED | OUTPATIENT
Start: 2022-03-22 | End: 2022-03-22 | Stop reason: HOSPADM

## 2022-03-22 RX ORDER — URSODIOL 250 MG/1
750 TABLET, FILM COATED ORAL DAILY
Status: DISCONTINUED | OUTPATIENT
Start: 2022-03-22 | End: 2022-03-22

## 2022-03-22 RX ORDER — SODIUM CHLORIDE, SODIUM LACTATE, POTASSIUM CHLORIDE, CALCIUM CHLORIDE 600; 310; 30; 20 MG/100ML; MG/100ML; MG/100ML; MG/100ML
100 INJECTION, SOLUTION INTRAVENOUS CONTINUOUS
Status: DISCONTINUED | OUTPATIENT
Start: 2022-03-22 | End: 2022-03-22

## 2022-03-22 RX ORDER — BISACODYL 10 MG/1
10 SUPPOSITORY RECTAL DAILY PRN
Status: DISCONTINUED | OUTPATIENT
Start: 2022-03-22 | End: 2022-03-23 | Stop reason: HOSPADM

## 2022-03-22 RX ORDER — DEXAMETHASONE SODIUM PHOSPHATE 4 MG/ML
4 INJECTION, SOLUTION INTRA-ARTICULAR; INTRALESIONAL; INTRAMUSCULAR; INTRAVENOUS; SOFT TISSUE ONCE AS NEEDED
Status: DISCONTINUED | OUTPATIENT
Start: 2022-03-22 | End: 2022-03-22 | Stop reason: HOSPADM

## 2022-03-22 RX ADMIN — Medication 1000 MG: at 16:30

## 2022-03-22 RX ADMIN — HYDROCODONE BITARTRATE AND ACETAMINOPHEN 1 TABLET: 5; 325 TABLET ORAL at 23:43

## 2022-03-22 RX ADMIN — Medication 500 MG: at 22:00

## 2022-03-22 RX ADMIN — EPHEDRINE SULFATE 10 MG: 50 INJECTION, SOLUTION INTRAVENOUS at 13:20

## 2022-03-22 RX ADMIN — Medication 100 MCG: at 14:39

## 2022-03-22 RX ADMIN — TRANEXAMIC ACID 1000 MG: 100 INJECTION, SOLUTION INTRAVENOUS at 13:43

## 2022-03-22 RX ADMIN — CEFAZOLIN 2000 MG: 10 INJECTION, POWDER, FOR SOLUTION INTRAVENOUS at 13:24

## 2022-03-22 RX ADMIN — Medication 50 MCG: at 13:41

## 2022-03-22 RX ADMIN — EPHEDRINE SULFATE 10 MG: 50 INJECTION, SOLUTION INTRAVENOUS at 14:47

## 2022-03-22 RX ADMIN — URSODIOL 500 MG: 250 TABLET ORAL at 21:00

## 2022-03-22 RX ADMIN — OXYCODONE HYDROCHLORIDE 5 MG: 5 TABLET ORAL at 19:15

## 2022-03-22 RX ADMIN — ASPIRIN 325 MG: 325 TABLET, FILM COATED ORAL at 16:30

## 2022-03-22 RX ADMIN — TRANEXAMIC ACID 1000 MG: 100 INJECTION, SOLUTION INTRAVENOUS at 15:44

## 2022-03-22 RX ADMIN — ONDANSETRON 4 MG: 2 INJECTION INTRAMUSCULAR; INTRAVENOUS at 13:09

## 2022-03-22 RX ADMIN — HYDROCODONE BITARTRATE AND ACETAMINOPHEN 1 TABLET: 5; 325 TABLET ORAL at 22:01

## 2022-03-22 RX ADMIN — CELECOXIB 200 MG: 200 CAPSULE ORAL at 10:55

## 2022-03-22 RX ADMIN — Medication 100 MCG: at 15:15

## 2022-03-22 RX ADMIN — Medication 100 MCG: at 13:30

## 2022-03-22 RX ADMIN — EPHEDRINE SULFATE 10 MG: 50 INJECTION, SOLUTION INTRAVENOUS at 14:32

## 2022-03-22 RX ADMIN — MIDAZOLAM 2 MG: 1 INJECTION INTRAMUSCULAR; INTRAVENOUS at 13:08

## 2022-03-22 RX ADMIN — SODIUM CHLORIDE, POTASSIUM CHLORIDE, SODIUM LACTATE AND CALCIUM CHLORIDE: 600; 310; 30; 20 INJECTION, SOLUTION INTRAVENOUS at 14:21

## 2022-03-22 RX ADMIN — MIDAZOLAM 2 MG: 1 INJECTION INTRAMUSCULAR; INTRAVENOUS at 14:39

## 2022-03-22 RX ADMIN — Medication 1000 MG: at 10:55

## 2022-03-22 RX ADMIN — ROSUVASTATIN CALCIUM 10 MG: 10 TABLET, FILM COATED ORAL at 21:00

## 2022-03-22 RX ADMIN — SODIUM CHLORIDE, POTASSIUM CHLORIDE, SODIUM LACTATE AND CALCIUM CHLORIDE 100 ML/HR: 600; 310; 30; 20 INJECTION, SOLUTION INTRAVENOUS at 10:58

## 2022-03-22 RX ADMIN — Medication 100 MCG: at 14:59

## 2022-03-22 RX ADMIN — CEFAZOLIN 2000 MG: 10 INJECTION, POWDER, FOR SOLUTION INTRAVENOUS at 23:44

## 2022-03-22 RX ADMIN — PROPOFOL 50 MCG/KG/MIN: 10 INJECTION, EMULSION INTRAVENOUS at 13:16

## 2022-03-22 RX ADMIN — HYDROXYZINE HYDROCHLORIDE 25 MG: 25 TABLET, FILM COATED ORAL at 23:44

## 2022-03-22 RX ADMIN — Medication 100 MCG: at 14:18

## 2022-03-22 RX ADMIN — CEFAZOLIN 2000 MG: 10 INJECTION, POWDER, FOR SOLUTION INTRAVENOUS at 15:43

## 2022-03-22 RX ADMIN — LIDOCAINE HYDROCHLORIDE 60 ML: 20 INJECTION, SOLUTION EPIDURAL; INFILTRATION; INTRACAUDAL; PERINEURAL at 13:08

## 2022-03-22 RX ADMIN — METHYLPREDNISOLONE SODIUM SUCCINATE 62.5 MG: 125 INJECTION, POWDER, FOR SOLUTION INTRAMUSCULAR; INTRAVENOUS at 13:10

## 2022-03-22 RX ADMIN — BUPIVACAINE HYDROCHLORIDE 3 ML: 5 INJECTION, SOLUTION EPIDURAL; INTRACAUDAL at 13:16

## 2022-03-22 RX ADMIN — SODIUM CHLORIDE, POTASSIUM CHLORIDE, SODIUM LACTATE AND CALCIUM CHLORIDE 100 ML/HR: 600; 310; 30; 20 INJECTION, SOLUTION INTRAVENOUS at 16:30

## 2022-03-22 RX ADMIN — Medication 50 MCG: at 14:08

## 2022-03-22 RX ADMIN — Medication 100 MCG: at 15:25

## 2022-03-22 ASSESSMENT — ACTIVITIES OF DAILY LIVING (ADL)
ADEQUATE_TO_COMPLETE_ADL: YES
PATIENT'S MEMORY ADEQUATE TO SAFELY COMPLETE DAILY ACTIVITIES?: YES
ASSISTIVE_DEVICE: WALKER;EYEGLASSES

## 2022-03-22 NOTE — ANESTHESIA POSTPROCEDURE EVALUATION
Patient: Juan Diego Rondon    Procedure Summary     Date: 03/22/22 Room / Location: [facility] OR 01 / [facility] OR    Anesthesia Start: 1308 Anesthesia Stop: 1539    Procedure: RIGHT TOTAL HIP ARTHROPLASTY POSTERIOR (Right Hip) Diagnosis:       Avascular necrosis of bone of right hip (CMS/HCC) (HCC)      (Avascular necrosis of bone of right hip (CMS/HCC) (HCC) [M87.051])    Surgeons: Sj Reyes MD Responsible Provider: Nivia Coleman CRNA    Anesthesia Type: spinal ASA Status: 2          Anesthesia Type: spinal    Last vitals  Vitals Value Taken Time   BP     Temp     Pulse 80 03/22/22 1538   Resp 18 03/22/22 1538   SpO2 96 % 03/22/22 1538   0-10 Pain Score     Vitals shown include unvalidated device data.    Anesthesia Post Evaluation    Patient location during evaluation: PACU  Patient participation: complete - patient participated  Level of consciousness: awake and alert  Pain management: adequate  Airway patency: patent  Anesthetic complications: no  Cardiovascular status: acceptable  Respiratory status: acceptable  Hydration status: acceptable  May dismiss recovered patient based on consultation with the appropriate physicians and/or meeting appropriate discharge criteria      Cosmetic?  This procedure is not cosmetic.

## 2022-03-22 NOTE — OP NOTE
Juan Diego SAL Rondon  03/22/22    PREOPERATIVE DIAGNOSIS:  Pre-op Diagnosis     * Avascular necrosis of bone of right hip (CMS/HCC) ([facility]) [M87.051]    POSTOPERATIVE DIAGNOSIS:  Post-op Diagnosis     * Avascular necrosis of bone of right hip (CMS/HCC) ([facility]) [M87.051]    PROCEDURES:    Procedure:    RIGHT TOTAL HIP ARTHROPLASTY POSTERIOR  CPT(R) Code:  81680 - VT TOTAL HIP ARTHROPLASTY        SURGEON: Surgeon(s):  Sj Reyes MD      ASSISTANT:        ANESTHETIST:  CRNA: Rylee Velasquez CRNA; Nivia Coleman CRNA       ANESTHESIA TYPE:  * No anesthesia type entered *       ESTIMATED BLOOD LOSS:  200 mL    IV FLUIDS:  Please see anesthesia notes.    IMPLANTS:    Implant Name Type Inv. Item Serial No.  Lot No. LRB No. Used Action   SHELL ACETABULAR G7 3H 52MM POROUS PLASMA CEMENTLESS - [id number] Implant Shell Acetabular G7 3H 52mm Porous Plasma Cementless  [employer] [id number] Right 1 Implanted   LINER G7 ACETABULAR SZ E 36MM NOEMI - [id number] Implant Liner G7 Acetabular Sz E 36mm Noemi  [employer] [id number] Right 1 Implanted   STEM FEM POR LAT NC 15X155 - [id number] Implant Stem Fem Por Lat Nc 15X155  [employer] [id number] Right 1 Implanted   HEAD CER BIOLOXD OPTION 36MM - [id number] Joint Head Cer Bioloxd Option 36mm  [employer] [id number] Right 1 Implanted   SLEEVE CER OPTION TYPE 1 TPR 6 - [id number] Joint Sleeve Cer Option Type 1 Tpr 6  [employer] [id number] Right 1 Implanted         COMPLICATIONS:  None      DESCRIPTION OF PROCEDURE: Sheridan Michelle CFA   was present from the very beginning to the end of the case. assisted with retraction, dislocation / relocation of the hip, injection of local anesthetics, closure of wounds and application of dressings.    The patient was brought to the operating room and placed underneath * No anesthesia type entered *anesthesia.  The patient was placed within the hip positioner and the right hip was prepped and draped in usual sterile fashion.   A 3-1/2-4 inch long incision was placed about the lateral hip.  This was carried down to the IT band and gluteus kendall.  Division between the gluteus kendall and IT band was made along with its fibers.  From here we identified the short external rotators were tagged and subsequently released.  Hip joint capsulotomy was also made and was also tagged for later repair.  From here the femoral head was dislocated from the hip socket.  Standard femoral neck cut was made.    The femoral canal was prepped next.  We used a  in the proximal femur followed by tapered reamers up to a size 15.  Then we used broaches up to the same size.  We then used a calcar planer.  Trial components were then removed.    Next the acetabulum was prepared by removing the soft tissue.  This includes the labral tissue, transverse acetabular ligament and pulvinar tissue.  After adequate visualization the acetabulum was started with a 44 mm reamer and worked up circumferentially to 52 millimeter cup.  We then placed a trial component within the acetabulum.  It was found to be satisfactorily tight within the bone.  The trial liner was inserted.  Osteophytes anteriorly and posterior removed.    From here the trial stem was replaced and the x-ray was obtained.  Once the x-ray was reviewed we put in an E poly-socket.  This was done after the x-ray was reviewed and showed satisfactory position of the acetabulum.  Once I was satisfied with the review the x-ray the real stem was inserted and this was a size 15 echo Bi-Metric stem.  I then went ahead and tried several different lengths on the 36 mm head.  The best fit was found to be a +6 millimeter length 36 mm head.  A real 36 mm by +6 delta ceramic head was impacted.  The joint was then anesthetized using Exparel cocktail mixture.  Hip joint capsule was closed interrupted #1 Vicryl sutures.  Short external rotators repaired #2 reinforced #1 Vicryl sutures.  The IT band was closed #1 Vicryl  sutures.  The deep repair was closed with 0 Vicryl followed by 2-0 subcuticular stitches followed by 3-0 Monocryl in the skin.  Steri-Strips were applied.  A light steriledressing was applied and the patient was awakened.  The patient was brought to recovery room and tolerated procedure well.                DISPOSITION:  MAIN OR/NONE    Sj Reyes MD  Phone Number: [phone]    DATE: 03/22/22      TIME: 3:06 PM

## 2022-03-22 NOTE — DISCHARGE INSTRUCTIONS
You have been prescribed oxycodone  wean off this medication as able.  Continue with your previously directed Celebrex/meloxicam, Tylenol, Neurontin.  Take aspirin 325 mg once a day for 4 weeks to help prevent blood clots.  You will need to use MARIAN hose for 4 weeks to help prevent blood clots.  Leave current bandage on for 1 week then change it. You may shower however no tub soaking.  You will have an orthopedic follow-up visit for staple removal/suture removal in 2 weeks  If you have any drainage, bleeding or any concerns call the orthopedic clinic at [phone]  Total Hip Replacement, care after  These instructions give you information about caring for yourself after your surgery. Your doctor may also give you more specific instructions.  CONTACT YOUR SURGEON at [phone] if:  • You experience a temperature greater than 100.5 degrees F.  • You experience increase pain not relieved by the narcotic pain medication.  • YOU EXPERIENCE REDNESS, SWELLING, DRAINAGE, BLISTERING OR ODOR FROM INCISION.  • You experience uncontrolled bleeding.  • You have increased respiratory distress.  • You are having problems with constipation.  • You have any other questions regarding your surgery.  FOR SAFETY:  DO keep your incision clean and dry to help prevent infection.  • CHANGE THE DRESSING 7 DAYS AFTER SURGERY. Instructions for removing the old dressing if needed can be found on the instruction sheet you have been given along with the replacement dressing. This new dressing will stay in place until seen in the orthopedic clinic it is waterproof so you may shower. You will have staples or the Dermabond mesh for skin closure. If it is Dermabond mesh DO NOT remove it.              DO NOT apply any creams, lotions, ointments or salves to the incision site. NO tub bathes until incision is completely healed. You may shower with the dressing in place.     DO Wear the elastic MARIAN hose to prevent blood clots.    • Stockings are to be  worn on both legs during the day and at night. They may be removed to shower and to wash the stockings.    DO walk every hour to help prevent blood clots.    Do NOT use alcohol when taking pain medications.    Do NOT drive until you are no longer taking narcotic pain medications and/or using your walker.    Do NOT schedule dentist appointments within the next 3 months. To help prevent infections you will need to obtain an antibiotic prescription from your surgeon prior to any dental work/cleaning.      FOR HEALING:    DO your hip exercises twice a day and as instructed by your therapist - refer to the hip guidebook.    DO eat a well- balanced diet. Good nutrition helps your body heal faster.    Do NOT smoke - It slows healing.    FOR COMFORT:    DO use the ice machine for swelling and comfort.    • There will be MORE swelling and bruising on days 2-3 than there is on the day after surgery. This is normal. The swelling will decrease with the anti-inflammatory medication, the ice machine, and by keeping your leg elevated when not walking. The swelling will make it more difficult to move your hip. As the swelling goes down, the movement will become easier.  • Use the ice machine as much as you can for the first week following your surgery, including at night, then try to use it 4-6 times per day for 20-30 minutes. You should use it after you have had physical therapy or have done your exercises. Use it more frequently if you are having continued pain and swelling.  • Always put something between the cold therapy pad and your skin.  • Inspect your skin under the pad every 1-2 hours and notify Orthopedics if you experience any adverse reactions such as burning, itching, blisters, increased redness discoloration, welts or other changes in skin appearance.  • Be sure the strap is not too tight; it should be as loose as possible to keep the pad in place.  • Be sure to have an adequate supply of ice as you will need to  refill your cooler approximately every 4 hours. Remember to ensure the correct amount of water is in the cooler to circulate. When restarting the machine allow plenty of time to ensure water has completely circulated through the pad before placing over your incision and securing the straps.   • It is very important to read the instruction booklet that comes with the ice machine.    DO take Pain medication prior to activity and exercise. You will be given a prescription at discharge.    DO walk often.    If you were given hip precautions by the Physical Therapist to prevent a hip dislocation these should be followed for the first 3 months after surgery, unless your surgeon gives you additional instructions/recommendations.     DO actively prevent constipation. Remember that narcotics cause constipation.    • Drink 8-8oz glasses of fluid especially water daily and /or include more moist foods like soups and fresh fruits.  • Add Fiber by eating at least 5-9 servings of fruit and vegetables and 3-4 servings of whole grains per day.  • Eat 1-2 servings of yogurt with live and active cultures daily.  • Get regular exercise (walking).  • Use stool softeners and over the counter laxatives as needed.  • Contact your provider if you have not had a bowl movement 5 days after surgery.      REMEMBER:    • Recuperation takes 6-12 weeks and you may feel weak during this time.  • It is normal to have some numbness around your incision.  • Expect soreness, swelling bruising. It will improve over 4-6 weeks.  • You may experience a low grade fever (below 100 degrees F).  • Do gradually wean yourself from prescription medications to non-prescription pain relievers such as Tylenol.  • Do Transition from the walker to a cane or normal walking when able. Your Physical Therapist will help you determine when that is.

## 2022-03-22 NOTE — ANESTHESIA PROCEDURE NOTES
Spinal Block    Patient location during procedure: OR  Start time: 3/22/2022 1:12 PM  End time: 3/22/2022 1:16 PM  Reason for block: primary anesthetic    Staffing  CRNA: Nivia Coleman CRNA  Performed: CRNA   Preanesthetic Checklist  Completed: patient identified, IV checked, site marked, risks and benefits discussed, surgical consent, monitors and equipment checked, pre-op evaluation and timeout performed  Spinal Block  Patient position: sitting  Prep: ChloraPrep  Patient monitoring: blood pressure monitoring and continuous pulse oximetry  Approach: midline  Location: L4-5  Injection technique: single-shot  Procedure: negative aspiration for blood, no paresthesia and positive aspiration for clear CSF  Local infiltration: lidocaine 1%  Needle  Needle type: Quincke   Needle gauge: 25 G  Needle length: 3.5 in  Needle introducer used: Yes  Epinephrine wash performed: No  Medications Administered  BUPivacaine (PF) (MARCAINE) injection 0.5% - Intrathecal - intrathecal   3 mL - 3/22/2022 1:16:00 PM

## 2022-03-22 NOTE — PLAN OF CARE
Problem: Safety Adult - Fall  Goal: Free from fall injury  Description: INTERVENTIONS:    Inpatient - Please reference Cares/Safety Flowsheet under Stephenson Fall Risk for interventions.  Pediatrics - Please reference Peds Daily Cares/Safety Flowsheet under Wild Pediatric Fall Assessment Fall Bundle for interventions  LD/OB - Please reference OB Shift Screening Flowsheet under OB Fall Risk for interventions.  Outcome: Progressing     Problem: Pain - Adult  Goal: Verbalizes/displays adequate comfort level or baseline comfort level  Description: INTERVENTIONS:  1. Encourage patient to monitor pain and request interventions  2. Assess pain using the appropriate pain scale  3. Administer analgesics based on type and severity of pain and evaluate response  4. Educate/Implement non-pharmacological measures as appropriate and evaluate response  5. Consider cultural, developmental and social influences on pain and pain management  6. Notify Provider if interventions unsuccessful or patient reports new pain  Outcome: Progressing     Problem: Infection - Adult  Goal: Absence of infection during hospitalization  Description: INTERVENTIONS:  1. Assess and monitor for signs and symptoms of infection  2. Monitor lab/diagnostic results  3. Monitor all insertion sites/wounds/incisions  4. Monitor secretions for changes in amount and color  5. Administer medications as ordered  6. Educate and encourage patient and family to use good hand hygiene technique  7. Identify and educate in appropriate isolation precautions for identified infection/condition  Outcome: Progressing     Problem: Musculoskeletal - Adult  Goal: Return mobility to safest level of function  Description: INTERVENTIONS:  1. Assess patient stability and activity tolerance for standing, transferring and ambulating w/ or w/o assistive devices  2. Assist with transfers and ambulation using safe practices  3. Ensure adequate protection for wounds/incisions during  mobilization  4. Obtain PT/OT and other consults as needed  5. Apply Continuous Passive Motion per order to increase flexion toward goal  6. Instruct patient/family in ordered activity level  Outcome: Progressing  Goal: Maintain proper alignment of affected body part  Description: INTERVENTIONS:  1. Support and protect limb and body alignment per provider order  2. Instruct and reinforce with patient and family use of appropriate assistive device and precautions (e.g. spinal or hip dislocation precautions)  Outcome: Progressing  Goal: Return ADL status to a safe level of function  Description: INTERVENTIONS:  1. Assess patient's ADL deficits and provide assistive devices as needed  2. Obtain PT/OT consults as needed  3. Assist and instruct patient to increase activity and self care  Outcome: Progressing

## 2022-03-22 NOTE — INTERDISCIPLINARY/THERAPY
Pre-Surgery Discharge Plan    Spoke with patient prior to surgery, (he is currently in surgery) Patient did not attend pre-op joint class due to COVID19 but he has the guidebook. We discussed home situation and discharge plans, Patient lives with his wife Patsy in [place], she is supportive and will assist as needed.  Plan was for discharge home, he indicated Patsy would transport. He planned on continuing outpatient PT at [facility] which has been arranged.  Patient indicated he had a walker and a cane for home.

## 2022-03-22 NOTE — DISCHARGE INSTR - APPOINTMENTS
Outpatient Physical Therapy at [facility] -[phone]  Friday March 25th at 9:40am please arrive at 9:25am for paperwork.

## 2022-03-23 VITALS
RESPIRATION RATE: 16 BRPM | WEIGHT: 188 LBS | SYSTOLIC BLOOD PRESSURE: 131 MMHG | OXYGEN SATURATION: 96 % | DIASTOLIC BLOOD PRESSURE: 78 MMHG | TEMPERATURE: 97.5 F | BODY MASS INDEX: 23.5 KG/M2 | HEART RATE: 69 BPM

## 2022-03-23 LAB
ANION GAP SERPL CALC-SCNC: 11 MMOL/L (ref 3–11)
BUN SERPL-MCNC: 13 MG/DL (ref 7–25)
CALCIUM SERPL-MCNC: 8.8 MG/DL (ref 8.6–10.3)
CHLORIDE SERPL-SCNC: 95 MMOL/L (ref 98–107)
CO2 SERPL-SCNC: 23 MMOL/L (ref 21–32)
CREAT SERPL-MCNC: 0.98 MG/DL (ref 0.7–1.3)
ERYTHROCYTE [DISTWIDTH] IN BLOOD BY AUTOMATED COUNT: 14.9 % (ref 11.5–15)
GFR SERPL CREATININE-BSD FRML MDRD: 85 ML/MIN/1.73M*2
GLUCOSE SERPL-MCNC: 143 MG/DL (ref 70–105)
HCT VFR BLD AUTO: 32.8 % (ref 38–50)
HGB BLD-MCNC: 11.5 G/DL (ref 13.2–17.2)
LYMPHOCYTES # BLD MANUAL: 0.3 10*3/UL
LYMPHOCYTES NFR BLD MANUAL: 2 % (ref 15–47)
MACROCYTES BLD QL SMEAR: ABNORMAL
MACROCYTOSIS PRESENCE IN BLOOD, ANALYZER: ABNORMAL
MCH RBC QN AUTO: 36.9 PG (ref 29–34)
MCHC RBC AUTO-ENTMCNC: 35 G/DL (ref 32–36)
MCV RBC AUTO: 105.6 FL (ref 82–97)
MONOCYTES # BLD MANUAL: 0.5 10*3/UL
MONOCYTES NFR BLD MANUAL: 4 % (ref 5–13)
NEUTROPHILS # BLD MANUAL: 12.6 10*3/UL
NEUTS SEG # BLD MANUAL: 12.6 10*3/UL
NEUTS SEG NFR BLD MANUAL: 94 % (ref 46–70)
PLATELET # BLD AUTO: 150 10*3/UL (ref 130–350)
PLATELET CLUMP BLD QL SMEAR: ABNORMAL
PLATELET MORPHOLOGY IN BLOOD: NORMAL
PMV BLD AUTO: 7.2 FL (ref 6.9–10.8)
POTASSIUM SERPL-SCNC: 4.4 MMOL/L (ref 3.5–5.1)
RBC # BLD AUTO: 3.11 10*6/ΜL (ref 4.1–5.8)
RBC MORPH BLD: ABNORMAL
SODIUM SERPL-SCNC: 129 MMOL/L (ref 135–145)
TOTAL CELLS COUNTED BLD: 100 CELLS
WBC # BLD AUTO: 13.4 10*3/UL (ref 3.7–9.6)
WBC NRBC COR # BLD: 13.4 10*3/UL

## 2022-03-23 PROCEDURE — 6370000100 HC RX 637 (ALT 250 FOR IP): Performed by: PHYSICIAN ASSISTANT

## 2022-03-23 PROCEDURE — 97161 PT EVAL LOW COMPLEX 20 MIN: CPT | Mod: GP | Performed by: PHYSICAL THERAPIST

## 2022-03-23 PROCEDURE — 6360000200 HC RX 636 W HCPCS (ALT 250 FOR IP): Performed by: PHYSICIAN ASSISTANT

## 2022-03-23 PROCEDURE — 99024 POSTOP FOLLOW-UP VISIT: CPT | Performed by: PHYSICIAN ASSISTANT

## 2022-03-23 PROCEDURE — 36415 COLL VENOUS BLD VENIPUNCTURE: CPT | Performed by: PHYSICIAN ASSISTANT

## 2022-03-23 PROCEDURE — 7100002500 HC EXTENDED RECOVERY PER HOUR

## 2022-03-23 PROCEDURE — 97110 THERAPEUTIC EXERCISES: CPT | Mod: GP | Performed by: PHYSICAL THERAPIST

## 2022-03-23 PROCEDURE — 80048 BASIC METABOLIC PNL TOTAL CA: CPT | Performed by: PHYSICIAN ASSISTANT

## 2022-03-23 PROCEDURE — 85025 COMPLETE CBC W/AUTO DIFF WBC: CPT | Performed by: PHYSICIAN ASSISTANT

## 2022-03-23 PROCEDURE — 97165 OT EVAL LOW COMPLEX 30 MIN: CPT | Mod: GO | Performed by: OCCUPATIONAL THERAPIST

## 2022-03-23 RX ORDER — HYDROCODONE BITARTRATE AND ACETAMINOPHEN 5; 325 MG/1; MG/1
1 TABLET ORAL EVERY 6 HOURS PRN
Qty: 40 TABLET | Refills: 0 | Status: SHIPPED | OUTPATIENT
Start: 2022-03-23 | End: 2022-03-23 | Stop reason: SDUPTHER

## 2022-03-23 RX ORDER — HYDROCODONE BITARTRATE AND ACETAMINOPHEN 5; 325 MG/1; MG/1
1 TABLET ORAL EVERY 6 HOURS PRN
Qty: 40 TABLET | Refills: 0 | Status: SHIPPED | OUTPATIENT
Start: 2022-03-23 | End: 2022-03-26

## 2022-03-23 RX ADMIN — CELECOXIB 200 MG: 200 CAPSULE ORAL at 08:14

## 2022-03-23 RX ADMIN — PREDNISONE 10 MG: 10 TABLET ORAL at 08:14

## 2022-03-23 RX ADMIN — VALSARTAN 80 MG: 80 TABLET, FILM COATED ORAL at 08:14

## 2022-03-23 RX ADMIN — CEFAZOLIN 2000 MG: 10 INJECTION, POWDER, FOR SOLUTION INTRAVENOUS at 08:13

## 2022-03-23 RX ADMIN — ASPIRIN 325 MG: 325 TABLET, FILM COATED ORAL at 08:14

## 2022-03-23 RX ADMIN — FENOFIBRATE 48 MG: 48 TABLET ORAL at 08:13

## 2022-03-23 RX ADMIN — HYDROCODONE BITARTRATE AND ACETAMINOPHEN 2 TABLET: 5; 325 TABLET ORAL at 03:28

## 2022-03-23 RX ADMIN — URSODIOL 500 MG: 250 TABLET ORAL at 08:13

## 2022-03-23 RX ADMIN — FLUOXETINE 40 MG: 20 CAPSULE ORAL at 08:13

## 2022-03-23 NOTE — INTERDISCIPLINARY/THERAPY
03/23/22 0843   Time Calculation   Start Time 0843   Stop Time 0906   Time Calculation (min) 23 min   PT Last Visit   Visit Number 1   Pain Assessment Scale   0-10 Pain Score 1   Pain Type Surgical pain   Pain Location Hip   Pain Orientation Right   General   Is this a Co-Treatment? No   Additional Pertinent History Mild global tremors   Family/Caregiver Present No   Precautions   Total Hip Replacement Posterior Approach   Reinforced Precautions Yes   Weight Bearing Precautions   (Weightbearing as tolerated right lower extremity)   Home Living   Type of Home House   Home Layout One level   Home Access Level entry   Home Adaptive Equipment Front wheeled walker   Prior Function   Level of [place] Independent with ADLs and functional transfers;Independent with homemaking with ambulation   Lived With Spouse   Receives Help From Family   Subjective Comments   RN Stated patient is medically cleared for therapy Yes   Subjective Comments Patient states he feels comfortable with his mobility ready for discharge home today.   Cognition   Arousal/Alertness WFL   Therapeutic Interventions (Time Spent in Minutes)   Therapeutic Exercise 11     OBJECTIVE:  Transfers:  Bed mobility: Patient requires independent with bed mobility supine to sitting edge of bed  Sit to/from stand:  Patient able to progress to independence level with sit to stand transfers using a Front wheel walker after verbal cueing education instruction of safe hand placement and walker positioning.  Patient instructed to keep walker close to him throughout turning and positioning to sitting surface.  To and from sit to stand training x2 reps.    Ambulation:  Patient ambulated with front wheel walker    Level of assistance: Modified independent level using front wheel walker              During ambulation, patient demonstrated step to gait pattern               Comments:Gait training 1 x200.    Stair training:  Patient completed ambulation up and down 8  inch curb step using reverse ambulation up and forward ambulation down.  Patient instructed in appropriate sequencing and positioning of walker and lower extremities to provide best balance and stability.  Patient will demonstrate this at modified independent level.          Balance: Static and dynamic sitting balance at independent level.  Static and dynamic standing balance at 5 independent using front wheel walker.      Range of motion: Active range of motion of Rt hip  tamiko  90 degrees of flexion in sitting .  Rt hip strength at 2+ out of 5.  Dorsiflexion strength at 3 out of 5 bilaterally.  Lt lower extremity active range of motion strength within functional limits.    Exercises: Patient instructed in postoperative therapeutic exercises for  total joint including: quad sets, heel slide, GS, ankle pump, standing calf raise, supine hip add/ABd and supine heel slide with gait belt assist.  Each exercise x10 reps.  Pt indep with post op HEP.    ASSESSMENT:  Rehabilitation Potential: excellent  Problem list:  Decreased strength, Decreased ROM, Decreased endurance, Impaired balance, Decreased functional mobility, and Pain    All below treatment goals met as of this day patient safe and ready for discharge home using front wheel walker with family assistance utilizing provided education and instruction.      Goals:    Patient will demonstrate sit to stand transfers with Independent / no assistance and front wheel walker while using safe hand placement and assistive device placement.    2.   Patient will demonstrate Independent / no assistancewith ambulation on level surfaces 200 feet using front wheel walker safely without loss of balance.    3.  Patient will demonstrate ascending/descending inch curb step using front wheel walker at modified independent level.    4.  Patient will demonstrate postoperative range of motion exercises per protocol.       Recommendations: Patient should use Front Wheel Walker at all times  for safe in-home mobility.      PLAN: Continue transfer and gait training, exercise education, exercise progression and stair mobility training.  Patient discharged home today after therapy evaluation and treatment.       A voice recognition software and device was used to aid in medical record documentation. Some words may be printed not exactly as they were spoken. Efforts were made to carefully edit and correct any inaccuracies; however, some errors may be present.

## 2022-03-23 NOTE — PLAN OF CARE
Problem: Safety Adult - Fall  Goal: Free from fall injury  Description: INTERVENTIONS:    Inpatient - Please reference Cares/Safety Flowsheet under Stephenson Fall Risk for interventions.  Pediatrics - Please reference Peds Daily Cares/Safety Flowsheet under Wild Pediatric Fall Assessment Fall Bundle for interventions  LD/OB - Please reference OB Shift Screening Flowsheet under OB Fall Risk for interventions.  3/22/2022 2105 by Kristan Adrian RN  Outcome: Progressing  3/22/2022 2104 by Kristan Adrian RN  Outcome: Progressing     Problem: Pain - Adult  Goal: Verbalizes/displays adequate comfort level or baseline comfort level  Description: INTERVENTIONS:  1. Encourage patient to monitor pain and request interventions  2. Assess pain using the appropriate pain scale  3. Administer analgesics based on type and severity of pain and evaluate response  4. Educate/Implement non-pharmacological measures as appropriate and evaluate response  5. Consider cultural, developmental and social influences on pain and pain management  6. Notify Provider if interventions unsuccessful or patient reports new pain  3/22/2022 2105 by Kristan Adrian RN  Outcome: Progressing  3/22/2022 2104 by Kristan Adrian RN  Outcome: Progressing     Problem: Infection - Adult  Goal: Absence of infection during hospitalization  Description: INTERVENTIONS:  1. Assess and monitor for signs and symptoms of infection  2. Monitor lab/diagnostic results  3. Monitor all insertion sites/wounds/incisions  4. Monitor secretions for changes in amount and color  5. Administer medications as ordered  6. Educate and encourage patient and family to use good hand hygiene technique  7. Identify and educate in appropriate isolation precautions for identified infection/condition  3/22/2022 2105 by Kristan Adrian RN  Outcome: Progressing  3/22/2022 2104 by Kristan Adrian RN  Outcome: Progressing     Problem: Musculoskeletal - Adult  Goal: Return mobility to safest level of  function  Description: INTERVENTIONS:  1. Assess patient stability and activity tolerance for standing, transferring and ambulating w/ or w/o assistive devices  2. Assist with transfers and ambulation using safe practices  3. Ensure adequate protection for wounds/incisions during mobilization  4. Obtain PT/OT and other consults as needed  5. Apply Continuous Passive Motion per order to increase flexion toward goal  6. Instruct patient/family in ordered activity level  3/22/2022 2105 by Kristan Adrian RN  Outcome: Progressing  3/22/2022 2104 by Kristan Adrian RN  Outcome: Progressing  Goal: Maintain proper alignment of affected body part  Description: INTERVENTIONS:  1. Support and protect limb and body alignment per provider order  2. Instruct and reinforce with patient and family use of appropriate assistive device and precautions (e.g. spinal or hip dislocation precautions)  3/22/2022 2105 by Kristan Adrian RN  Outcome: Progressing  3/22/2022 2104 by Kristan Adrian RN  Outcome: Progressing  Goal: Return ADL status to a safe level of function  Description: INTERVENTIONS:  1. Assess patient's ADL deficits and provide assistive devices as needed  2. Obtain PT/OT consults as needed  3. Assist and instruct patient to increase activity and self care  3/22/2022 2105 by Kristan Adrian RN  Outcome: Progressing  3/22/2022 2104 by Kristan Adrian RN  Outcome: Progressing

## 2022-03-23 NOTE — PLAN OF CARE
Problem: Safety Adult - Fall  Goal: Free from fall injury  Description: INTERVENTIONS:    Inpatient - Please reference Cares/Safety Flowsheet under Stephenson Fall Risk for interventions.  Pediatrics - Please reference Peds Daily Cares/Safety Flowsheet under Wild Pediatric Fall Assessment Fall Bundle for interventions  LD/OB - Please reference OB Shift Screening Flowsheet under OB Fall Risk for interventions.  Outcome: Adequate for Discharge     Problem: Pain - Adult  Goal: Verbalizes/displays adequate comfort level or baseline comfort level  Description: INTERVENTIONS:  1. Encourage patient to monitor pain and request interventions  2. Assess pain using the appropriate pain scale  3. Administer analgesics based on type and severity of pain and evaluate response  4. Educate/Implement non-pharmacological measures as appropriate and evaluate response  5. Consider cultural, developmental and social influences on pain and pain management  6. Notify Provider if interventions unsuccessful or patient reports new pain  Outcome: Adequate for Discharge     Problem: Infection - Adult  Goal: Absence of infection during hospitalization  Description: INTERVENTIONS:  1. Assess and monitor for signs and symptoms of infection  2. Monitor lab/diagnostic results  3. Monitor all insertion sites/wounds/incisions  4. Monitor secretions for changes in amount and color  5. Administer medications as ordered  6. Educate and encourage patient and family to use good hand hygiene technique  7. Identify and educate in appropriate isolation precautions for identified infection/condition  Outcome: Adequate for Discharge     Problem: Musculoskeletal - Adult  Goal: Return mobility to safest level of function  Description: INTERVENTIONS:  1. Assess patient stability and activity tolerance for standing, transferring and ambulating w/ or w/o assistive devices  2. Assist with transfers and ambulation using safe practices  3. Ensure adequate protection  for wounds/incisions during mobilization  4. Obtain PT/OT and other consults as needed  5. Apply Continuous Passive Motion per order to increase flexion toward goal  6. Instruct patient/family in ordered activity level  Outcome: Adequate for Discharge  Goal: Maintain proper alignment of affected body part  Description: INTERVENTIONS:  1. Support and protect limb and body alignment per provider order  2. Instruct and reinforce with patient and family use of appropriate assistive device and precautions (e.g. spinal or hip dislocation precautions)  Outcome: Adequate for Discharge  Goal: Return ADL status to a safe level of function  Description: INTERVENTIONS:  1. Assess patient's ADL deficits and provide assistive devices as needed  2. Obtain PT/OT consults as needed  3. Assist and instruct patient to increase activity and self care  Outcome: Adequate for Discharge

## 2022-03-23 NOTE — INTERDISCIPLINARY/THERAPY
03/23/22 0827   Time Calculation   Start Time 0827   Stop Time 0840   Time Calculation (min) 13 min   OT Last Visit   Visit Number 1   General   Chart Reviewed Yes   Is this a Co-Treatment? No   Family/Caregiver Present No   Precautions   Total Hip Replacement Posterior Approach   Reinforced Precautions Yes   Home Living   Type of Home House   Home Layout One level   Home Access Level entry   Bathroom Shower/Tub Walk-in shower   Bathroom Toilet Standard   Bathroom Equipment Shower chair with back   Prior Function   Level of [place] Independent with ADLs and functional transfers;Independent with homemaking with ambulation   Lived With Spouse   Receives Help From Family   Prior Function Comments Patient underwent L NAZIA last April and reports all went well following same, no questions or concerns.   Subjective Comments   RN Stated patient is medically cleared for therapy Yes   Subjective Comments Patient is seated in bedside chair upon OT arrival, fully dressed, reports he dressed himeself this AM. At end of session he is in bedside chair with needs met and call light in reach.   [place] Fall Risk   History of Falling 0   Secondary Diagnosis 0   Ambulatory Aids 15   Intravenous Therapy/Heparin/Saline Lock 20   Gait/Transferring 10   Mental Status 0   Stephenson Fall Risk Score 45   Required Fall Bundle  Yes   High Fall Risk Bundle  Yes   Pain Assessment Scale   0-10 Pain Score 0 - No pain   Cognition   Arousal/Alertness WFL   Orientation Level Oriented to place;Oriented to person;Oriented to situation;Oriented to time   Transfers   Transfer   (Mod I sit<>stand from bedside chair)   UE Dressing   UE Dressing Level of Assistance Independent   LE Dressing   LE Dressing   (Mod I to doff/don pants)   Additional Activities   Additional Activities Comments OTR reviews education regarding pain mgmt, modified positioning, modified ADL techniques, use of AD/DME, progression of ADL participation/activity tolerance for promotion  of max functional recovery and occupational performance.   Assessment   Rehab Potential Excellent   Progress Discontinue OT   Recommendations for Therapy Outpatient Therapy   OT Untimed Charges - Quick List (Time Spent in Minutes)   OT Eval Low Complexity 13   Plan   Plan Comment Goals met, d/c OT   Treatment Interventions ADL retraining;Patient/family training;Equipment evaluation/education;Compensatory technique education   OT Frequency One-time visit

## 2022-03-23 NOTE — DISCHARGE SUMMARY
Today's date  03/23/22  Admit date  3/22/2022  Discharge date  3/23/2022 11:18 AM    Procedure  Right hip total arthroplasty-Dr. Reyes    Brief History  Patient is been having years of progressively worsening right hip pain due to severe osteoarthritis in this joint.  They have gotten to the point where there are ADLs, exercise, recreational activities, walking, sleep and work activities have been significantly diminished due to their symptoms.  They have tried and failed various conservative measures including over-the-counter and prescription medications, injections.  For this reason they underwent the total joint arthroplasty  Discharge Disposition  01 - Home or Self-Care  Full Code         Dietary Orders   (From admission, onward)             Start     Ordered    03/22/22 1624  Diet Regular  Diet effective now        Question Answer Comment   Nutrition Therapy Protocol (Dietitian May Adjust Diet and Nourishments) Yes    Diet type Regular        03/22/22 1623    03/22/22 1623  Advance diet as tolerated  Until discontinued        Comments: Advance patient to the target diet when the following criteria are met:   Question:  Target Diet:  Answer:  normal diet    03/22/22 1622                Consults:    DIET: Normal    Hospital Summary  Patient was admitted to the surgical floor after surgery for postanesthesia care, monitoring vital signs, pain control, physical and Occupational Therapy, wound care.  During patient's stay there vital signs remained stable, physical and occupational therapy goals were met, good pain control was achieved with oral pain medication.  Surgical incision was without any signs of infection, no drainage no erythema.    Outpatient Follow-Up  Future Appointments   Date Time Provider Department Center   4/4/2022 11:00 AM [facility]   5/2/2022 11:00 AM [facility]       Labs  Lab Results   Component Value Date    WBC 13.4 (H) 03/23/2022     HGB 11.5 (L) 03/23/2022    HCT 32.8 (L) 03/23/2022    .6 (H) 03/23/2022     03/23/2022     Lab Results   Component Value Date    GLUCOSE 143 (H) 03/23/2022    CALCIUM 8.8 03/23/2022     (L) 03/23/2022    K 4.4 03/23/2022    CO2 23 03/23/2022    CL 95 (L) 03/23/2022    BUN 13 03/23/2022    CREATININE 0.98 03/23/2022    ANIONGAP 11 03/23/2022       Ortho Exam    General: Alert and oriented x3, no acute distress   right hip and lower extremity:  Dressing clean dry intact  Minimal swelling/ecchymosis  Normal active motion at the knee  Compartments are supple throughout  Dorsiflexion 5 out of 5 with grossly intact sensation to the foot

## 2022-04-04 ENCOUNTER — OFFICE VISIT (OUTPATIENT)
Dept: ORTHOPEDIC SURGERY | Facility: CLINIC | Age: 67
End: 2022-04-04
Payer: MEDICARE

## 2022-04-04 VITALS — SYSTOLIC BLOOD PRESSURE: 128 MMHG | DIASTOLIC BLOOD PRESSURE: 68 MMHG

## 2022-04-04 DIAGNOSIS — M87.051 AVASCULAR NECROSIS OF HIP, RIGHT (CMS/HCC): Primary | ICD-10-CM

## 2022-04-04 PROCEDURE — 99024 POSTOP FOLLOW-UP VISIT: CPT | Performed by: PHYSICIAN ASSISTANT

## 2022-04-04 RX ORDER — HYDROCODONE BITARTRATE AND ACETAMINOPHEN 10; 325 MG/1; MG/1
1 TABLET ORAL EVERY 6 HOURS PRN
Qty: 30 TABLET | Refills: 0 | Status: SHIPPED | OUTPATIENT
Start: 2022-04-04 | End: 2022-04-18 | Stop reason: ALTCHOICE

## 2022-04-04 RX ORDER — ASPIRIN 325 MG
325 TABLET ORAL DAILY
COMMUNITY
End: 2022-07-13 | Stop reason: ALTCHOICE

## 2022-04-04 RX ORDER — HYDROCODONE BITARTRATE AND ACETAMINOPHEN 10; 325 MG/1; MG/1
1 TABLET ORAL EVERY 6 HOURS PRN
COMMUNITY
End: 2022-04-04 | Stop reason: SDUPTHER

## 2022-04-04 NOTE — PROGRESS NOTES
.Dermabond Prineo Skin Closure removed and steri strips applied. The wound is well healed without signs of infection.   Wound care and activity instructions given. Return as directed.

## 2022-04-04 NOTE — PROGRESS NOTES
Post-op of the Right Hip (S/P 13d RIGHT TOTAL HIP ARTHROPLASTY POSTERIOR CPT(R) W/ Dr. Reyes on 3/22/22. Pt states pain 7/10, taking hydrocodone. Pt is ambulating with a cane. Pt aggravated the hip yesterday from overuse. )      HPI  Patient is here today for 2 week follow-up of their right total hip arthroplasty done on 3/22/2022 by Dr. Reyes.  Patient overall is doing well no fevers/chills/sweats no problems with the incision they are doing her physical therapy and home exercise program progressing nicely pain is improving. Taking hydrocodone for pain.    Past Medical History:   Diagnosis Date   • Allergic eosinophilic esophagitis    • Arthritis    • Blood disorder     pernicious anemia   • Gastrointestinal disease     IBS   • Gout    • Hyperlipidemia    • Hypertension    • Melanoma (CMS/HCC) (HCC)    • Neurologic disorder    • Primary biliary cholangitis (CMS/HCC) (HCC)     autoimmune hepatitis   • Psychiatric illness     anxiety     Past Surgical History:   Procedure Laterality Date   • APPENDECTOMY     • COLONOSCOPY W/ BIOPSIES AND POLYPECTOMY  02/09/2009    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   • CT BIOPSY LIVER FUNCTION  12/2/2021    CT BIOPSY LIVER FUNCTION 12/2/2021 [facility] MIS CT SCAN   • EYE SURGERY Bilateral     cataract   • KNEE SURGERY Bilateral     3 surgeries on left and 2 on right knees - prior open medial meniscectomies bilateral knees in the 1970s   • SKIN BIOPSY     • SKIN CANCER EXCISION Left 10/15/2020    Melanoma removed from left forearm   • TONSILLECTOMY     • TOTAL HIP ARTHROPLASTY Left 4/21/2021    Procedure: LEFT TOTAL HIP ARTHROPLASTY;  Surgeon: Sj Reyes MD;  Location: [facility] OR;  Service: Orthopedics;  Laterality: Left;   • TOTAL HIP ARTHROPLASTY Right 3/22/2022    Procedure: RIGHT TOTAL HIP ARTHROPLASTY POSTERIOR;  Surgeon: Sj Reyes MD;  Location: [facility] OR;  Service: Orthopedics;  Laterality: Right;     Prior to Admission medications    Medication Sig Start Date End  Date Taking? Authorizing Provider   aspirin 325 mg tablet Take 325 mg by mouth daily    Historical Provider, MD   HYDROcodone-acetaminophen (LORCET HD)  mg per tablet Take 1 tablet by mouth every 6 (six) hours as needed for pain scale 8-10/10 Max Daily Amount: 4 tablets 4/4/22   Zora Smith PA-C   predniSONE 10 mg tablet Take 10 mg by mouth daily 1/10/22   Historical Provider, MD [place] 236-22.74-6.74 -5.86 gram solution SMARTSIG:By Mouth 1/14/22   Historical Provider, MD   valsartan (DIOVAN) 80 mg tablet Take 1 tablet by mouth daily 1/11/22   Historical Provider, MD   fenofibrate (TRICOR) 48 mg tablet Take 48 mg by mouth daily 1/8/22   Historical Provider, MD   benzonatate (TESSALON) 200 mg capsule Take 1 capsule (200 mg total) by mouth 3 (three) times a day as needed for cough 1/12/22   STEPHANIE Garcia   acetaminophen (TYLENOL) 500 mg tablet Take 500 mg by mouth every 6 (six) hours as needed for pain scale 1-3/10    Historical Provider, MD   FLUoxetine (PROzac) 40 mg capsule Take 40 mg by mouth daily    Historical Provider, MD   hydrOXYzine (ATARAX) 25 mg tablet Take 25-50 mg by mouth nightly as needed 3/7/21   Historical Provider, MD   famotidine (PEPCID) 20 mg tablet Take 20 mg by mouth 2 (two) times a day 2/2/21   Historical Provider, MD   ursodioL (ACTIGALL) 500 mg tablet Take 500 mg by mouth 2 times daily Every other day    Historical Provider, MD   ursodioL (AMANDEEP) 250 mg tablet Take 750 mg by mouth In am and 500 mg in pm every other day-alternate with the 500 mg bid    Historical Provider, MD   losartan (COZAAR) 50 mg tablet Take 50 mg by mouth every evening      Historical Provider, MD   cyanocobalamin 1,000 mcg/mL injection Inject 1,000 mcg into the shoulder, thigh, or buttocks every 30 (thirty) days 7/14/20   Historical Provider, MD   rosuvastatin (CRESTOR) 10 mg tablet Take 10 mg by mouth every evening   6/23/14   Conversion Provider     No Known Allergies  Social History      Socioeconomic History   • Marital status:      Spouse name: Not on file   • Number of children: Not on file   • Years of education: Not on file   • Highest education level: Not on file   Occupational History   • Not on file   Tobacco Use   • Smoking status: Never Smoker   • Smokeless tobacco: Never Used   Substance and Sexual Activity   • Alcohol use: Yes     Alcohol/week: 1.0 standard drink     Types: 1 Glasses of wine per week     Comment: occ   • Drug use: Never   • Sexual activity: Not on file   Other Topics Concern   • Not on file   Social History Narrative   • Not on file     Social Determinants of Health     Financial Resource Strain: Not on file   Food Insecurity: Not on file   Transportation Needs: Not on file   Physical Activity: Not on file   Stress: Not on file   Social Connections: Not on file   Intimate Partner Violence: Not on file   Housing Stability: Not on file     Family History   Problem Relation Age of Onset   • Hypertension Mother    • Lung cancer Father    • Hypertension Father        /68 (BP Location: Right arm, Patient Position: Sitting, Cuff Size: Regular Adult)     Ortho Exam  General: 66-year-old male, well-appearing, no acute distress, alert and oriented x3  Right hip:  Incision is healing well, minimal swelling, no erythema, no ecchymosis  Mild tenderness deep to the surgical incision  Gentle passive motion at the hip is pain-free  He is able to initiate hip flexion and knee extension without difficulty  Compartments are supple throughout the right lower extremity  Homans test is negative  Dorsiflexion 5 out of 5 with grossly normal sensation to the foot  Gait: Using a cane, antalgic favoring the right  Assessment/Plan     Status post right total hip arthroplasty done on 3/22/2022 by Dr. Reyes patient is doing quite well increasing their activity level having decreased pain and improve mobility.  Incision looks good  Plan:  Patient will continue to increase activity as  tolerates, continue home exercise program and physical therapy.  There is a continue with their MARIAN hose and aspirin, work on decreasing her pain medication and will follow-up in 2 weeks with usual x-rays.  I will call in a refill of his hydrocodone and he will continue to wean from the medication.

## 2022-04-18 ENCOUNTER — TELEPHONE (OUTPATIENT)
Dept: ORTHOPEDIC SURGERY | Facility: CLINIC | Age: 67
End: 2022-04-18
Payer: MEDICARE

## 2022-04-18 DIAGNOSIS — G89.18 POST-OP PAIN: ICD-10-CM

## 2022-04-18 DIAGNOSIS — Z96.642 HISTORY OF TOTAL HIP ARTHROPLASTY, LEFT: Primary | ICD-10-CM

## 2022-04-18 RX ORDER — HYDROCODONE BITARTRATE AND ACETAMINOPHEN 5; 325 MG/1; MG/1
1 TABLET ORAL EVERY 6 HOURS PRN
Qty: 20 TABLET | Refills: 0 | Status: ON HOLD | OUTPATIENT
Start: 2022-04-18 | End: 2022-05-16 | Stop reason: ALTCHOICE

## 2022-04-18 NOTE — TELEPHONE ENCOUNTER
This patient called requesting a refill on his pain medication. He states he has six left. He can be reached at the number on file.

## 2022-04-18 NOTE — TELEPHONE ENCOUNTER
"Call patient talk to him about pain medication.  Patient states that over the weekend he feel like he \"just twisted funny\" and had some increased pain in his hip.  Patient states that currently he usually takes half a tablet if he has an increase in pain like after he walked 2 miles today, at nighttime or half tab before after PT.  He has been weaning down significantly.  Asked if he is taken tramadol and patient states that this \"makes him feel like Jell-O.\"  Patient would like his prescription called to [facility].    Discussed prescription with Zora.  She will send prescription to [facility].  "

## 2022-04-22 ENCOUNTER — CLINICAL SUPPORT (OUTPATIENT)
Dept: FAMILY MEDICINE | Facility: CLINIC | Age: 67
End: 2022-04-22
Payer: MEDICARE

## 2022-04-22 DIAGNOSIS — Z23 ENCOUNTER FOR VACCINATION: Primary | ICD-10-CM

## 2022-04-22 PROCEDURE — 91306 MODERNA BOOSTER SARS-COV-2 VACCINE: CPT | Performed by: FAMILY MEDICINE

## 2022-05-02 ENCOUNTER — OFFICE VISIT (OUTPATIENT)
Dept: ORTHOPEDIC SURGERY | Facility: CLINIC | Age: 67
End: 2022-05-02
Payer: MEDICARE

## 2022-05-02 ENCOUNTER — ANCILLARY PROCEDURE (OUTPATIENT)
Dept: RADIOLOGY | Facility: CLINIC | Age: 67
End: 2022-05-02
Payer: MEDICARE

## 2022-05-02 VITALS — SYSTOLIC BLOOD PRESSURE: 132 MMHG | DIASTOLIC BLOOD PRESSURE: 82 MMHG

## 2022-05-02 DIAGNOSIS — Z96.642 HISTORY OF TOTAL HIP ARTHROPLASTY, LEFT: Primary | ICD-10-CM

## 2022-05-02 PROCEDURE — 99024 POSTOP FOLLOW-UP VISIT: CPT | Performed by: PHYSICIAN ASSISTANT

## 2022-05-02 PROCEDURE — 73502 X-RAY EXAM HIP UNI 2-3 VIEWS: CPT | Mod: RT,PO,FY

## 2022-05-02 NOTE — PROGRESS NOTES
Post-op of the Right Hip (S/P 5w 6d RIGHT TOTAL HIP ARTHROPLASTY POSTERIOR W/ Dr. Reyes on 3/22/22. Pt has colitis and hereditary hepatitis, pt states that he is always in pain, though no additional pain from the Sx, taking tylenol. Pt reports improving RoM. Pt has been attending PT 2x weekly. Pt is not using any ambulatory assistance.)      HPI  Patient is here today for 6 week follow-up of their right total hip arthroplasty done on 3/22/2022 by Dr. Reyes.  Patient overall is doing well no fevers/chills/sweats no problems with the incision. Patient is gradually improving activity level and having minimal to no pain.    Patient does have a significant history of primary biliary cholangitis and hereditary hepatitis which seems to be flaring for him.  He is planning to seek out further follow-up from his primary care and possibly seek formal referral to rheumatology.    Past Medical History:   Diagnosis Date   • Allergic eosinophilic esophagitis    • Arthritis    • Blood disorder     pernicious anemia   • Gastrointestinal disease     IBS   • Gout    • Hyperlipidemia    • Hypertension    • Melanoma (CMS/HCC) (HCC)    • Neurologic disorder    • Primary biliary cholangitis (CMS/HCC) (HCC)     autoimmune hepatitis   • Psychiatric illness     anxiety     Past Surgical History:   Procedure Laterality Date   • APPENDECTOMY     • COLONOSCOPY W/ BIOPSIES AND POLYPECTOMY  02/09/2009    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   • CT BIOPSY LIVER FUNCTION  12/2/2021    CT BIOPSY LIVER FUNCTION 12/2/2021 [facility] MIS CT SCAN   • EYE SURGERY Bilateral     cataract   • KNEE SURGERY Bilateral     3 surgeries on left and 2 on right knees - prior open medial meniscectomies bilateral knees in the 1970s   • SKIN BIOPSY     • SKIN CANCER EXCISION Left 10/15/2020    Melanoma removed from left forearm   • TONSILLECTOMY     • TOTAL HIP ARTHROPLASTY Left 4/21/2021    Procedure: LEFT TOTAL HIP ARTHROPLASTY;  Surgeon: Sj Reyes MD;   Location: [facility] OR;  Service: Orthopedics;  Laterality: Left;   • TOTAL HIP ARTHROPLASTY Right 3/22/2022    Procedure: RIGHT TOTAL HIP ARTHROPLASTY POSTERIOR;  Surgeon: Sj Reyes MD;  Location: [facility] OR;  Service: Orthopedics;  Laterality: Right;     Prior to Admission medications    Medication Sig Start Date End Date Taking? Authorizing Provider   HYDROcodone-acetaminophen (NORCO) 5-325 mg per tablet Take 1 tablet by mouth every 6 (six) hours as needed for pain scale 8-10/10 Max Daily Amount: 4 tablets 4/18/22   Zora Smith PA-C   aspirin 325 mg tablet Take 325 mg by mouth daily    Historical Provider, MD   predniSONE 10 mg tablet Take 10 mg by mouth daily 1/10/22   Historical Provider, MD   [place] 236-22.74-6.74 -5.86 gram solution SMARTSIG:By Mouth 1/14/22   Historical Provider, MD   valsartan (DIOVAN) 80 mg tablet Take 1 tablet by mouth daily 1/11/22   Historical Provider, MD   fenofibrate (TRICOR) 48 mg tablet Take 48 mg by mouth daily 1/8/22   Historical Provider, MD   benzonatate (TESSALON) 200 mg capsule Take 1 capsule (200 mg total) by mouth 3 (three) times a day as needed for cough 1/12/22   STEPHANIE Garcia   acetaminophen (TYLENOL) 500 mg tablet Take 500 mg by mouth every 6 (six) hours as needed for pain scale 1-3/10    Historical Provider, MD   FLUoxetine (PROzac) 40 mg capsule Take 40 mg by mouth daily    Historical Provider, MD   hydrOXYzine (ATARAX) 25 mg tablet Take 25-50 mg by mouth nightly as needed 3/7/21   Historical Provider, MD   famotidine (PEPCID) 20 mg tablet Take 20 mg by mouth 2 (two) times a day 2/2/21   Historical Provider, MD   ursodioL (ACTIGALL) 500 mg tablet Take 500 mg by mouth 2 times daily Every other day    Historical Provider, MD   ursodioL (AMANDEEP) 250 mg tablet Take 750 mg by mouth In am and 500 mg in pm every other day-alternate with the 500 mg bid    Historical Provider, MD   losartan (COZAAR) 50 mg tablet Take 50 mg by mouth every evening      Historical  Provider, MD   cyanocobalamin 1,000 mcg/mL injection Inject 1,000 mcg into the shoulder, thigh, or buttocks every 30 (thirty) days 7/14/20   Historical Provider, MD   rosuvastatin (CRESTOR) 10 mg tablet Take 10 mg by mouth every evening   6/23/14   Conversion Provider     No Known Allergies  Social History     Socioeconomic History   • Marital status:      Spouse name: Not on file   • Number of children: Not on file   • Years of education: Not on file   • Highest education level: Not on file   Occupational History   • Not on file   Tobacco Use   • Smoking status: Never Smoker   • Smokeless tobacco: Never Used   Substance and Sexual Activity   • Alcohol use: Yes     Alcohol/week: 1.0 standard drink     Types: 1 Glasses of wine per week     Comment: occ   • Drug use: Never   • Sexual activity: Not on file   Other Topics Concern   • Not on file   Social History Narrative   • Not on file     Social Determinants of Health     Financial Resource Strain: Not on file   Food Insecurity: Not on file   Transportation Needs: Not on file   Physical Activity: Not on file   Stress: Not on file   Social Connections: Not on file   Intimate Partner Violence: Not on file   Housing Stability: Not on file     Family History   Problem Relation Age of Onset   • Hypertension Mother    • Lung cancer Father    • Hypertension Father        /82 (BP Location: Right arm, Patient Position: Sitting, Cuff Size: Regular Adult)     Ortho Exam  General: 66-year-old male, unaccompanied office today, no acute distress, alert and oriented x3  Gait: Using a cane, minimal gait pattern disturbance  Right hip:   Incision is well-healed without tenderness  Mild tenderness over the SI joint  Gentle active motion at the hip is within normal limits  Range of motion at the knee is within normal limits  Compartments are supple, Homans negative  Dorsiflexion 5 out of 5  Sensation grossly intact throughout  Assessment/Plan     Patient is status post  right hip arthroplasty done by Dr. Reyes on 3/22/2022.  They are doing quite well with improved mobility, decreased pain improved range of motion and strength.  They are using Tylenol for pain  X-ray was obtained today which shows the total hip arthroplasty prosthesis in place appropriately, well aligned with no acute findings    Plan:  Patient will continue with their physical therapy, home exercise program and increase activity as tolerated.  We will plan a follow-up visit at 3 months after surgery.    He will pursue further treatment for his autoimmune disorders through his primary care and possibly rheumatology.  From an orthopedic standpoint, I have no reservations about starting rheumatologic medications including steroids or biologic agents.

## 2022-05-16 ENCOUNTER — HOSPITAL ENCOUNTER (OUTPATIENT)
Facility: HOSPITAL | Age: 67
Setting detail: OUTPATIENT SURGERY
Discharge: HOME | End: 2022-05-16
Attending: INTERNAL MEDICINE | Admitting: INTERNAL MEDICINE
Payer: MEDICARE

## 2022-05-16 ENCOUNTER — ANESTHESIA (OUTPATIENT)
Dept: GASTROENTEROLOGY | Facility: HOSPITAL | Age: 67
End: 2022-05-16
Payer: MEDICARE

## 2022-05-16 ENCOUNTER — ANESTHESIA EVENT (OUTPATIENT)
Dept: GASTROENTEROLOGY | Facility: HOSPITAL | Age: 67
End: 2022-05-16
Payer: MEDICARE

## 2022-05-16 VITALS
BODY MASS INDEX: 23.35 KG/M2 | SYSTOLIC BLOOD PRESSURE: 106 MMHG | HEIGHT: 75 IN | RESPIRATION RATE: 12 BRPM | WEIGHT: 187.83 LBS | OXYGEN SATURATION: 95 % | DIASTOLIC BLOOD PRESSURE: 70 MMHG | TEMPERATURE: 97.3 F | HEART RATE: 85 BPM

## 2022-05-16 PROCEDURE — (BLANK) HC RECOVERY PHASE-2 1ST 1/2 HOUR ACUITY LEVEL 1: Performed by: INTERNAL MEDICINE

## 2022-05-16 PROCEDURE — 88305 TISSUE EXAM BY PATHOLOGIST: CPT

## 2022-05-16 PROCEDURE — 2580000300 HC RX 258

## 2022-05-16 PROCEDURE — 00731 ANES UPR GI NDSC PX NOS: CPT

## 2022-05-16 PROCEDURE — (BLANK): Performed by: INTERNAL MEDICINE

## 2022-05-16 PROCEDURE — 6360000200 HC RX 636 W HCPCS (ALT 250 FOR IP): Mod: JW

## 2022-05-16 PROCEDURE — 2500000200 HC RX 250 WO HCPCS

## 2022-05-16 PROCEDURE — (BLANK) HC MAC ANESTHESIA FACILITY CHARGE EACH ADDITIONAL MIN: Performed by: INTERNAL MEDICINE

## 2022-05-16 PROCEDURE — (BLANK) HC MAC ANESTHESIA FACILITY CHARGE 1ST 15 MIN: Performed by: INTERNAL MEDICINE

## 2022-05-16 RX ORDER — PROPOFOL 10 MG/ML
INJECTION, EMULSION INTRAVENOUS CONTINUOUS PRN
Status: DISCONTINUED | OUTPATIENT
Start: 2022-05-16 | End: 2022-05-16 | Stop reason: SURG

## 2022-05-16 RX ORDER — GLYCOPYRROLATE 0.2 MG/ML
INJECTION INTRAMUSCULAR; INTRAVENOUS AS NEEDED
Status: DISCONTINUED | OUTPATIENT
Start: 2022-05-16 | End: 2022-05-16 | Stop reason: SURG

## 2022-05-16 RX ORDER — TOPICAL ANESTHETIC 200 MG/ML
SPRAY DENTAL; PERIODONTAL AS NEEDED
Status: DISCONTINUED | OUTPATIENT
Start: 2022-05-16 | End: 2022-05-16 | Stop reason: SURG

## 2022-05-16 RX ORDER — SODIUM CHLORIDE, SODIUM LACTATE, POTASSIUM CHLORIDE, CALCIUM CHLORIDE 600; 310; 30; 20 MG/100ML; MG/100ML; MG/100ML; MG/100ML
INJECTION, SOLUTION INTRAVENOUS CONTINUOUS PRN
Status: DISCONTINUED | OUTPATIENT
Start: 2022-05-16 | End: 2022-05-16 | Stop reason: SURG

## 2022-05-16 RX ORDER — PROPOFOL 10 MG/ML
INJECTION, EMULSION INTRAVENOUS AS NEEDED
Status: DISCONTINUED | OUTPATIENT
Start: 2022-05-16 | End: 2022-05-16 | Stop reason: SURG

## 2022-05-16 RX ADMIN — TOPICAL ANESTHETIC 1 APPLICATION: 200 SPRAY DENTAL; PERIODONTAL at 10:21

## 2022-05-16 RX ADMIN — PROPOFOL 50 MG: 10 INJECTION, EMULSION INTRAVENOUS at 10:21

## 2022-05-16 RX ADMIN — GLYCOPYRROLATE 0.2 MG: 0.2 INJECTION, SOLUTION INTRAMUSCULAR; INTRAVENOUS at 10:21

## 2022-05-16 RX ADMIN — SODIUM CHLORIDE, POTASSIUM CHLORIDE, SODIUM LACTATE AND CALCIUM CHLORIDE: 600; 310; 30; 20 INJECTION, SOLUTION INTRAVENOUS at 10:20

## 2022-05-16 RX ADMIN — PROPOFOL 200 MCG/KG/MIN: 10 INJECTION, EMULSION INTRAVENOUS at 10:21

## 2022-05-16 ASSESSMENT — ENCOUNTER SYMPTOMS: EXERCISE TOLERANCE: GOOD (4-7 METS)

## 2022-05-16 NOTE — ANESTHESIA PREPROCEDURE EVALUATION
"Pre-Procedure Assessment    Patient: Juan Diego Rondon, male, 67 y.o.    Ht Readings from Last 1 Encounters:   05/16/22 1.905 m (6' 3\")     Wt Readings from Last 1 Encounters:   05/16/22 85.2 kg (187 lb 13.3 oz)       Last Vitals  /80 (05/16/22 0836)    Temp 36.6 °C (97.9 °F) (05/16/22 0836)    Pulse 60 (05/16/22 0836)   Resp      SpO2 95 % (05/16/22 0836)    Pain Score 0 - No pain (05/16/22 0836)       Problem list reviewed and Medical history reviewed           Airway   Mallampati: III  TM distance: >3 FB  Neck ROM: full      Dental - normal exam     Pulmonary - normal exam   Cardiovascular - negative ROS and normal exam  Exercise tolerance: good (4-7 METS)  (+) hypertension,     Rhythm: regular  Rate: normal    Mental Status/Neuro/Psych    Pt is alert.      (+) psychiatric history, arthritis,     GI/Hepatic/Renal    (+) liver disease,     Endo/Other    (+) history of cancer,   Abdominal  - normal exam    Abdomen: soft.  Bowel sounds: normal.          Social History     Tobacco Use   • Smoking status: Never Smoker   • Smokeless tobacco: Never Used   Substance Use Topics   • Alcohol use: Yes     Alcohol/week: 1.0 standard drink     Types: 1 Glasses of wine per week     Comment: occ      Hematology   WBC   Date Value Ref Range Status   03/23/2022 13.4 (H) 3.7 - 9.6 10*3/uL Final     RBC   Date Value Ref Range Status   03/23/2022 3.11 (L) 4.10 - 5.80 10*6/µL Final     MCV   Date Value Ref Range Status   03/23/2022 105.6 (H) 82.0 - 97.0 fL Final     Hemoglobin   Date Value Ref Range Status   03/23/2022 11.5 (L) 13.2 - 17.2 g/dL Final     Hematocrit   Date Value Ref Range Status   03/23/2022 32.8 (L) 38.0 - 50.0 % Final     Platelets   Date Value Ref Range Status   03/23/2022 150 130 - 350 10*3/uL Final      Coagulation   Protime   Date Value Ref Range Status   03/14/2022 11.5 9.4 - 12.5 seconds Final     INR   Date Value Ref Range Status   03/14/2022 1.0 <=1.1 Final      General Chemistry   Calcium   Date Value " Ref Range Status   03/23/2022 8.8 8.6 - 10.3 mg/dL Final     BUN   Date Value Ref Range Status   03/23/2022 13 7 - 25 mg/dL Final     Creatinine   Date Value Ref Range Status   03/23/2022 0.98 0.70 - 1.30 mg/dL Final     Glucose   Date Value Ref Range Status   03/23/2022 143 (H) 70 - 105 mg/dL Final     Sodium   Date Value Ref Range Status   03/23/2022 129 (L) 135 - 145 mmol/L Final     Potassium   Date Value Ref Range Status   03/23/2022 4.4 3.5 - 5.1 MMOL/L Final     CO2   Date Value Ref Range Status   03/23/2022 23 21 - 32 mmol/L Final     Chloride   Date Value Ref Range Status   03/23/2022 95 (L) 98 - 107 mmol/L Final     Anesthesia Plan    ASA 2   NPO status reviewed: > 8 hours    MAC         Induction: intravenous                 Anesthetic plan and risks discussed with patient.  Use of blood products discussed with patient who consented to blood products.     Plan discussed with attending.

## 2022-05-16 NOTE — H&P
HPI  HPI   Emmett presents with early satiety and weight loss (now recovered; up to 190 lbs).  He is here for EGD, possible endoscopic variceal ligation.    Patient Active Problem List   Diagnosis   • Osteoarthritis of right knee   • Chronic pain of right knee   • History of total hip arthroplasty, left   • Osteoarthritis of right hip   • Avascular necrosis of bone of right hip (CMS/HCC) (HCC)   • S/P total right hip arthroplasty     Past Medical History:   Diagnosis Date   • Allergic eosinophilic esophagitis    • Arthritis    • Blood disorder     pernicious anemia   • Gastrointestinal disease     IBS   • Gout    • Hyperlipidemia    • Hypertension    • Melanoma (CMS/HCC) (HCC)    • Neurologic disorder    • Primary biliary cholangitis (CMS/HCC) (HCC)     autoimmune hepatitis   • Psychiatric illness     anxiety     Prior to Admission medications    Medication Sig Start Date End Date Taking? Authorizing Provider   predniSONE 10 mg tablet Take 10 mg by mouth daily 1/10/22  Yes Historical Provider, MD   fenofibrate (TRICOR) 48 mg tablet Take 48 mg by mouth daily 1/8/22  Yes Historical Provider, MD   acetaminophen (TYLENOL) 500 mg tablet Take 500 mg by mouth every 6 (six) hours as needed for pain scale 1-3/10   Yes Historical Provider, MD   FLUoxetine (PROzac) 40 mg capsule Take 40 mg by mouth daily   Yes Historical Provider, MD   ursodioL (ACTIGALL) 500 mg tablet Take 500 mg by mouth 2 times daily Every other day   Yes Historical Provider, MD   ursodioL (AMANDEEP) 250 mg tablet Take 750 mg by mouth In am and 500 mg in pm every other day-alternate with the 500 mg bid   Yes Historical Provider, MD   rosuvastatin (CRESTOR) 10 mg tablet Take 10 mg by mouth every evening   6/23/14  Yes Conversion Provider   aspirin 325 mg tablet Take 325 mg by mouth daily    Historical Provider, MD [place] 236-22.74-6.74 -5.86 gram solution SMARTSIG:By Mouth 1/14/22   Historical Provider, MD   valsartan (DIOVAN) 80 mg tablet Take 1 tablet by  mouth daily 1/11/22   Historical Provider, MD   benzonatate (TESSALON) 200 mg capsule Take 1 capsule (200 mg total) by mouth 3 (three) times a day as needed for cough 1/12/22   STEPHANIE Garcia   hydrOXYzine (ATARAX) 25 mg tablet Take 25-50 mg by mouth nightly as needed 3/7/21   Historical Provider, MD   famotidine (PEPCID) 20 mg tablet Take 20 mg by mouth 2 (two) times a day 2/2/21   Historical Provider, MD   losartan (COZAAR) 50 mg tablet Take 50 mg by mouth every evening      Historical Provider, MD   cyanocobalamin 1,000 mcg/mL injection Inject 1,000 mcg into the shoulder, thigh, or buttocks every 30 (thirty) days 7/14/20   Historical Provider, MD     No Known Allergies  Social History     Tobacco Use   • Smoking status: Never Smoker   • Smokeless tobacco: Never Used   Substance Use Topics   • Alcohol use: Yes     Alcohol/week: 1.0 standard drink     Types: 1 Glasses of wine per week     Comment: occ     Family History   Problem Relation Age of Onset   • Hypertension Mother    • Lung cancer Father    • Hypertension Father      Physical Exam  Well-developed, well-nourished.  Comfortable at rest on room air.  Alert and oriented.  Anicteric.  Mallampati class II pharynx.  Chest is clear to auscultation bilaterally.  Cardiac examination reveals normal S1-S2.  No S3-S4.  No murmurs clicks or rubs audible.  Impression/Plan   67-year-old man who presents with early satiety and weight loss, now recovered.  Also history of PBC and portal vein thrombus, question varices.  Risks benefits indications and alternatives to esophagogastroduodenoscopy and possible endoscopic variceal ligation were reviewed in detail with the patient and informed consent was obtained.  Anesthesia Evaluation   The patient is an appropriate candidate for the planned procedure with monitored anesthesia care administered by CRNA.

## 2022-05-17 NOTE — ANESTHESIA POSTPROCEDURE EVALUATION
Patient: Juan Diego Rondon    Procedure Summary     Date: 05/16/22 Room / Location: [facility] ENDO 03 / [facility]    Anesthesia Start: 1020 Anesthesia Stop: 1049    Procedure: ESOPHAGOGASTRODUODENOSCOPY with Biopsies (N/A Esophagus) Diagnosis:       (Schatzki's Ring)      (Esophagitis)    Providers: Bradley Cook MD Responsible Provider: Bradley Cook MD    Anesthesia Type: MAC ASA Status: 2          Anesthesia Type: MAC    Last vitals  Vitals Value Taken Time   /70 05/16/22 1054   Temp 36.3 °C (97.3 °F) 05/16/22 1043   Pulse 85 05/16/22 1054   Resp 12 05/16/22 1043   SpO2 95 % 05/16/22 1043   0-10 Pain Score 0 - No pain 05/16/22 1043       Anesthesia Post Evaluation    Patient location during evaluation: PACU  Patient participation: complete - patient participated  Level of consciousness: awake and alert  Pain management: adequate  Airway patency: patent  Anesthetic complications: no  Cardiovascular status: acceptable  Respiratory status: acceptable  Hydration status: acceptable  May dismiss recovered patient based on consultation with the appropriate physicians and/or meeting appropriate discharge criteria      Cosmetic?  This procedure is not cosmetic.

## 2022-05-18 PROCEDURE — 73502 X-RAY EXAM HIP UNI 2-3 VIEWS: CPT | Mod: 26,RT | Performed by: ORTHOPAEDIC SURGERY

## 2022-05-20 ENCOUNTER — APPOINTMENT (OUTPATIENT)
Dept: LAB | Facility: CLINIC | Age: 67
End: 2022-05-20
Payer: MEDICARE

## 2022-05-20 DIAGNOSIS — K74.3 PRIMARY BILIARY CIRRHOSIS (CMS/HCC): ICD-10-CM

## 2022-05-20 LAB
ALBUMIN SERPL-MCNC: 3.7 G/DL (ref 3.5–5.3)
ALP SERPL-CCNC: 93 U/L (ref 45–115)
ALT SERPL-CCNC: 12 U/L (ref 7–52)
AST SERPL-CCNC: 17 U/L
BILIRUB DIRECT SERPL-MCNC: 0.28 MG/DL
BILIRUB SERPL-MCNC: 0.37 MG/DL (ref 0.2–1.4)
INR BLD: 0.9
PROT SERPL-MCNC: 6.9 G/DL (ref 6–8.3)
PROTHROMBIN TIME: 10.9 SECONDS (ref 9.4–12.5)

## 2022-05-20 PROCEDURE — 80076 HEPATIC FUNCTION PANEL: CPT

## 2022-05-20 PROCEDURE — 85610 PROTHROMBIN TIME: CPT

## 2022-05-20 PROCEDURE — 36415 COLL VENOUS BLD VENIPUNCTURE: CPT

## 2022-06-10 NOTE — PROGRESS NOTES
Post-op of the Right Hip (11w6d S/P Total Hip Arthroplasty Posterior with Amy 3/22/22. Constant pain worse at night. Pain with sanding/ walking extended periods and sleeping difficult. ) and Pain (7/10 and takes tylenol PRN)      HPI  Patient is here today for 3 month follow-up of their right total hip arthroplasty done on 3/22/2022 by Dr. Reyes.  Patient feels that the hip itself is doing well, but he has significant pain over the lateral aspect of the hip.  He is not requiring any assistive devices to aid in ambulation, but has a limp.  He continues with physical therapy.  He does note he has a history of trochanteric bursitis and feels that this may be flared.  It seems to be worse at night particularly when he is trying to lie on his right side.    Past Medical History:   Diagnosis Date   • Allergic eosinophilic esophagitis    • Arthritis    • Blood disorder     pernicious anemia   • Gastrointestinal disease     IBS   • Gout    • Hyperlipidemia    • Hypertension    • Melanoma (CMS/HCC) (HCC)    • Neurologic disorder    • Primary biliary cholangitis (CMS/HCC) (HCC)     autoimmune hepatitis   • Psychiatric illness     anxiety     Past Surgical History:   Procedure Laterality Date   • APPENDECTOMY     • COLONOSCOPY W/ BIOPSIES AND POLYPECTOMY  02/09/2009    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   • CT BIOPSY LIVER FUNCTION  12/2/2021    CT BIOPSY LIVER FUNCTION 12/2/2021 [facility] MIS CT SCAN   • ESOPHAGOGASTRODUODENOSCOPY N/A 5/16/2022    Procedure: ESOPHAGOGASTRODUODENOSCOPY with Biopsies;  Surgeon: Bradley Cook MD;  Location: [facility] Endoscopy;  Service: Endoscopy;  Laterality: N/A;   • EYE SURGERY Bilateral     cataract   • KNEE SURGERY Bilateral     3 surgeries on left and 2 on right knees - prior open medial meniscectomies bilateral knees in the 1970s   • SKIN BIOPSY     • SKIN CANCER EXCISION Left 10/15/2020    Melanoma removed from left forearm   • TONSILLECTOMY     • TOTAL HIP ARTHROPLASTY Left  4/21/2021    Procedure: LEFT TOTAL HIP ARTHROPLASTY;  Surgeon: Sj Reyes MD;  Location: [facility] OR;  Service: Orthopedics;  Laterality: Left;   • TOTAL HIP ARTHROPLASTY Right 3/22/2022    Procedure: RIGHT TOTAL HIP ARTHROPLASTY POSTERIOR;  Surgeon: Sj Reyes MD;  Location: [facility] OR;  Service: Orthopedics;  Laterality: Right;     Prior to Admission medications    Medication Sig Start Date End Date Taking? Authorizing Provider   aspirin 325 mg tablet Take 325 mg by mouth daily    Historical Provider, MD   predniSONE 10 mg tablet Take 10 mg by mouth daily 1/10/22   Historical Provider, MD [place] [id number] -5.86 gram solution SMARTSIG:By Mouth 1/14/22   Historical Provider, MD   valsartan (DIOVAN) 80 mg tablet Take 1 tablet by mouth daily 1/11/22   Historical Provider, MD   fenofibrate (TRICOR) 48 mg tablet Take 48 mg by mouth daily 1/8/22   Historical Provider, MD   benzonatate (TESSALON) 200 mg capsule Take 1 capsule (200 mg total) by mouth 3 (three) times a day as needed for cough 1/12/22   STEPHANIE Garcia   acetaminophen (TYLENOL) 500 mg tablet Take 500 mg by mouth every 6 (six) hours as needed for pain scale 1-3/10    Historical Provider, MD   FLUoxetine (PROzac) 40 mg capsule Take 40 mg by mouth daily    Historical Provider, MD   hydrOXYzine (ATARAX) 25 mg tablet Take 25-50 mg by mouth nightly as needed 3/7/21   Historical Provider, MD   famotidine (PEPCID) 20 mg tablet Take 20 mg by mouth 2 (two) times a day 2/2/21   Historical Provider, MD   ursodioL (ACTIGALL) 500 mg tablet Take 500 mg by mouth 2 times daily Every other day    Historical Provider, MD   ursodioL (AMANDEEP) 250 mg tablet Take 750 mg by mouth In am and 500 mg in pm every other day-alternate with the 500 mg bid    Historical Provider, MD   losartan (COZAAR) 50 mg tablet Take 50 mg by mouth every evening      Historical Provider, MD   cyanocobalamin 1,000 mcg/mL injection Inject 1,000 mcg into the shoulder, thigh, or buttocks every 30  (thirty) days 7/14/20   Historical Provider, MD   rosuvastatin (CRESTOR) 10 mg tablet Take 10 mg by mouth every evening   6/23/14   Conversion Provider     No Known Allergies  Social History     Socioeconomic History   • Marital status:      Spouse name: Not on file   • Number of children: Not on file   • Years of education: Not on file   • Highest education level: Not on file   Occupational History   • Not on file   Tobacco Use   • Smoking status: Never Smoker   • Smokeless tobacco: Never Used   Substance and Sexual Activity   • Alcohol use: Yes     Alcohol/week: 1.0 standard drink     Types: 1 Glasses of wine per week     Comment: occ   • Drug use: Never   • Sexual activity: Not on file   Other Topics Concern   • Not on file   Social History Narrative   • Not on file     Social Determinants of Health     Financial Resource Strain: Not on file   Food Insecurity: Not on file   Transportation Needs: Not on file   Physical Activity: Not on file   Stress: Not on file   Social Connections: Not on file   Intimate Partner Violence: Not on file   Housing Stability: Not on file     Family History   Problem Relation Age of Onset   • Hypertension Mother    • Lung cancer Father    • Hypertension Father        /68     Ortho Exam  General: Patient is a well-developed well-nourished 67-year-old.  In no acute distress, A and O x3  Gait: Mild antalgia, favoring the right  Right hip:  Incision is well-healed, no erythema, no warmth  Gentle passive motion at the hip is within normal limits and pain-free  Strength is 5 out of 5 for hip flexion, knee flexion, knee extension  There is significant tenderness over the greater trochanter    Assessment/Plan   Patient is status post right total hip arthroplasty done on 3/22/2022 with Dr. Reyes.  Patient is doing quite in terms of the hip itself, but I believe he has trochanteric bursitis.  We discussed management strategies and ultimately recommended steroid injection as well  as continuing with physical therapy. Patient did elect to proceed with injection after discussion of risks, expected benefits, and alternatives. Please see separate documentation for further details of the procedure.     Plan:  Patient will continue to increase activities as tolerates.  Should he fail to see improvement in his symptoms, I would recommend follow-up in 4-6 weeks.  If he is doing better following the injection and continued physical therapy, I recommend follow-up visit at 1 year status post surgery if there is any questions or concerns in the meantime they are to call the office.

## 2022-06-13 ENCOUNTER — OFFICE VISIT (OUTPATIENT)
Dept: ORTHOPEDIC SURGERY | Facility: CLINIC | Age: 67
End: 2022-06-13
Payer: MEDICARE

## 2022-06-13 VITALS — SYSTOLIC BLOOD PRESSURE: 130 MMHG | DIASTOLIC BLOOD PRESSURE: 68 MMHG

## 2022-06-13 DIAGNOSIS — M70.61 TROCHANTERIC BURSITIS, RIGHT HIP: Primary | ICD-10-CM

## 2022-06-13 PROCEDURE — 6360000200 HC RX 636 W HCPCS (ALT 250 FOR IP): Mod: PO | Performed by: PHYSICIAN ASSISTANT

## 2022-06-13 PROCEDURE — 99024 POSTOP FOLLOW-UP VISIT: CPT | Performed by: PHYSICIAN ASSISTANT

## 2022-06-13 PROCEDURE — 20610 DRAIN/INJ JOINT/BURSA W/O US: CPT | Mod: PO,78 | Performed by: PHYSICIAN ASSISTANT

## 2022-06-13 RX ORDER — BETAMETHASONE SODIUM PHOSPHATE AND BETAMETHASONE ACETATE 3; 3 MG/ML; MG/ML
12 INJECTION, SUSPENSION INTRA-ARTICULAR; INTRALESIONAL; INTRAMUSCULAR; SOFT TISSUE
Status: COMPLETED | OUTPATIENT
Start: 2022-06-13 | End: 2022-06-13

## 2022-06-13 RX ORDER — ROPIVACAINE HYDROCHLORIDE 5 MG/ML
INJECTION, SOLUTION EPIDURAL; INFILTRATION; PERINEURAL
Status: COMPLETED | OUTPATIENT
Start: 2022-06-13 | End: 2022-06-13

## 2022-06-13 RX ADMIN — ROPIVACAINE HYDROCHLORIDE: 5 INJECTION, SOLUTION EPIDURAL; INFILTRATION; PERINEURAL at 12:06

## 2022-06-13 RX ADMIN — BETAMETHASONE SODIUM PHOSPHATE AND BETAMETHASONE ACETATE 12 MG: 3; 3 INJECTION, SUSPENSION INTRA-ARTICULAR; INTRALESIONAL; INTRAMUSCULAR at 12:06

## 2022-06-13 NOTE — PROGRESS NOTES
Right Greater Trochanteric Bursa Injection  Date/Time: 6/13/2022 12:06 PM  Performed by: Zora Smith PA-C       Consent  Verbal consent was obtained from the patient. Written consent was not obtained from the patient. Risks, benefits, and alternatives were discussed. Consent given by patient. The patient states understanding of the procedure being performed. Patient ID confirmed verbally with the patient.     Location Details  An injection was given to Juan Diego in his hip. The injection site was the R greater trochanteric bursa.    Procedure Details   Patient was prepped and draped in the usual sterile fashion. Ethyl chloride spray local anesthesia was used.   A 22 G 3.5 inch gauge needle was inserted into the R greater trochanteric bursa using a lateral approach . Aspirate was not obtained. R greater trochanteric bursa was injected with 12 mg betamethasone acet,sod phos 6 mg/mL, ropivacaine 5 mg/mL (0.5 %),  . Needle removed without complication.     Post-Procedure  Patient tolerated the procedure well with no immediate complications. A standard bandage was applied. Advised patient to avoid strenous activity for 24-48 hours. Advised patient to use ice, NSAIDs or acetaminophen for pain as needed.     Procedure Comments  12 mg Celestone and 3 ml of 0.5% ropivacaine right greater trochanteric bursa

## 2022-06-13 NOTE — LETTER
06/13/22      [facility] ORTH[facility] SURGERY  [address] [place] [place] [address] [phone]  Dept: [phone]        To whom it may concern:    Juan Diego Rondon had a right total hip arthroplasty with Dr. Sj Reyes on 3/22/22. Our recommendation is for the patient to take amoxicillin 2 grams ONE HOUR by mouth prior to all dental cleanings and procedures. We also recommend that the patient wait at least 3 months after surgery before having elective dental work done. It is recommended to take these antibiotics for a lifetime prior to these appointments. If you have any questions, do not hesitate to contact our office at [phone].    Dr. Sj Reyes

## 2022-07-08 ENCOUNTER — APPOINTMENT (OUTPATIENT)
Dept: LAB | Facility: CLINIC | Age: 67
End: 2022-07-08
Payer: MEDICARE

## 2022-07-08 DIAGNOSIS — K74.3 PRIMARY BILIARY CHOLANGITIS (CMS/HCC): ICD-10-CM

## 2022-07-08 LAB
ALBUMIN SERPL-MCNC: 3.8 G/DL (ref 3.5–5.3)
ALP SERPL-CCNC: 77 U/L (ref 45–115)
ALT SERPL-CCNC: 13 U/L (ref 7–52)
ANION GAP SERPL CALC-SCNC: 12 MMOL/L (ref 3–11)
AST SERPL-CCNC: 18 U/L
BASOPHILS # BLD AUTO: 0 10*3/UL
BASOPHILS NFR BLD AUTO: 0.7 % (ref 0–2)
BILIRUB SERPL-MCNC: 0.68 MG/DL (ref 0.2–1.4)
BUN SERPL-MCNC: 12 MG/DL (ref 7–25)
CALCIUM ALBUM COR SERPL-MCNC: 9.8 MG/DL (ref 8.6–10.3)
CALCIUM SERPL-MCNC: 9.6 MG/DL (ref 8.6–10.3)
CHLORIDE SERPL-SCNC: 104 MMOL/L (ref 98–107)
CO2 SERPL-SCNC: 23 MMOL/L (ref 21–32)
CREAT SERPL-MCNC: 0.72 MG/DL (ref 0.7–1.3)
EOSINOPHIL # BLD AUTO: 0.1 10*3/UL
EOSINOPHIL NFR BLD AUTO: 1.3 % (ref 0–3)
ERYTHROCYTE [DISTWIDTH] IN BLOOD BY AUTOMATED COUNT: 14.9 % (ref 11.5–15)
GFR SERPL CREATININE-BSD FRML MDRD: 100 ML/MIN/1.73M*2
GLUCOSE SERPL-MCNC: 83 MG/DL (ref 70–105)
HCT VFR BLD AUTO: 38 % (ref 38–50)
HGB BLD-MCNC: 13.1 G/DL (ref 13.2–17.2)
LYMPHOCYTES # BLD AUTO: 1.1 10*3/UL
LYMPHOCYTES NFR BLD AUTO: 21.6 % (ref 15–47)
MACROCYTOSIS PRESENCE IN BLOOD, ANALYZER: ABNORMAL
MCH RBC QN AUTO: 34.4 PG (ref 29–34)
MCHC RBC AUTO-ENTMCNC: 34.3 G/DL (ref 32–36)
MCV RBC AUTO: 100.3 FL (ref 82–97)
MONOCYTES # BLD AUTO: 0.6 10*3/UL
MONOCYTES NFR BLD AUTO: 12.6 % (ref 5–13)
NEUTROPHILS # BLD AUTO: 3.2 10*3/UL
NEUTROPHILS NFR BLD AUTO: 63.8 % (ref 46–70)
PLATELET # BLD AUTO: 215 10*3/UL (ref 130–350)
PMV BLD AUTO: 6.8 FL (ref 6.9–10.8)
POTASSIUM SERPL-SCNC: 3.7 MMOL/L (ref 3.5–5.1)
PROT SERPL-MCNC: 7.4 G/DL (ref 6–8.3)
RBC # BLD AUTO: 3.79 10*6/ΜL (ref 4.1–5.8)
SODIUM SERPL-SCNC: 139 MMOL/L (ref 135–145)
WBC # BLD AUTO: 5.1 10*3/UL (ref 3.7–9.6)

## 2022-07-08 PROCEDURE — 85025 COMPLETE CBC W/AUTO DIFF WBC: CPT

## 2022-07-08 PROCEDURE — 80053 COMPREHEN METABOLIC PANEL: CPT

## 2022-07-08 PROCEDURE — 36415 COLL VENOUS BLD VENIPUNCTURE: CPT

## 2022-07-13 ENCOUNTER — TELEPHONE - BILLABLE (OUTPATIENT)
Dept: URGENT CARE | Facility: URGENT CARE | Age: 67
End: 2022-07-13
Payer: MEDICARE

## 2022-07-13 ENCOUNTER — TELEPHONE (OUTPATIENT)
Dept: FAMILY MEDICINE | Facility: CLINIC | Age: 67
End: 2022-07-13
Payer: MEDICARE

## 2022-07-13 DIAGNOSIS — U07.1 COVID-19: Primary | ICD-10-CM

## 2022-07-13 DIAGNOSIS — U07.1 COVID-19 VIRUS INFECTION: Primary | ICD-10-CM

## 2022-07-13 PROCEDURE — G0463 HOSPITAL OUTPT CLINIC VISIT: HCPCS | Mod: PO | Performed by: NURSE PRACTITIONER

## 2022-07-13 PROCEDURE — 99442 *INACTIVE DO NOT USE* PR PHYS/QHP TELEPHONE EVALUATION 11-20 MIN: CPT | Performed by: NURSE PRACTITIONER

## 2022-07-13 RX ORDER — SODIUM CHLORIDE 9 MG/ML
0-999 INJECTION, SOLUTION INTRAVENOUS CONTINUOUS PRN
Status: CANCELLED | OUTPATIENT
Start: 2022-07-16 | End: 2022-07-16

## 2022-07-13 RX ORDER — FAMOTIDINE 10 MG/ML
20 INJECTION INTRAVENOUS AS NEEDED
Status: CANCELLED | OUTPATIENT
Start: 2022-07-16

## 2022-07-13 RX ORDER — DIPHENHYDRAMINE HYDROCHLORIDE 50 MG/ML
25-50 INJECTION INTRAMUSCULAR; INTRAVENOUS AS NEEDED
Status: CANCELLED | OUTPATIENT
Start: 2022-07-16

## 2022-07-13 RX ORDER — ALBUTEROL SULFATE 90 UG/1
2-3 INHALANT RESPIRATORY (INHALATION) AS NEEDED
Status: CANCELLED | OUTPATIENT
Start: 2022-07-16

## 2022-07-13 RX ORDER — EPINEPHRINE 1 MG/ML
0.3 INJECTION INTRAMUSCULAR; INTRAVENOUS; SUBCUTANEOUS AS NEEDED
Status: CANCELLED | OUTPATIENT
Start: 2022-07-14

## 2022-07-13 RX ORDER — DIPHENHYDRAMINE HYDROCHLORIDE 50 MG/ML
25-50 INJECTION INTRAMUSCULAR; INTRAVENOUS AS NEEDED
Status: CANCELLED | OUTPATIENT
Start: 2022-07-14

## 2022-07-13 RX ORDER — ACETAMINOPHEN 500 MG
500 TABLET ORAL EVERY 4 HOURS PRN
Status: CANCELLED | OUTPATIENT
Start: 2022-07-15

## 2022-07-13 RX ORDER — DIPHENHYDRAMINE HYDROCHLORIDE 50 MG/ML
25-50 INJECTION INTRAMUSCULAR; INTRAVENOUS AS NEEDED
Status: CANCELLED | OUTPATIENT
Start: 2022-07-15

## 2022-07-13 RX ORDER — EPINEPHRINE 1 MG/ML
0.3 INJECTION INTRAMUSCULAR; INTRAVENOUS; SUBCUTANEOUS AS NEEDED
Status: CANCELLED | OUTPATIENT
Start: 2022-07-15

## 2022-07-13 RX ORDER — OMEPRAZOLE 40 MG/1
40 CAPSULE, DELAYED RELEASE ORAL EVERY MORNING
COMMUNITY
Start: 2022-05-20 | End: 2023-06-19 | Stop reason: SDUPTHER

## 2022-07-13 RX ORDER — FAMOTIDINE 10 MG/ML
20 INJECTION INTRAVENOUS AS NEEDED
Status: CANCELLED | OUTPATIENT
Start: 2022-07-14

## 2022-07-13 RX ORDER — ACETAMINOPHEN 500 MG
500 TABLET ORAL EVERY 4 HOURS PRN
Status: CANCELLED | OUTPATIENT
Start: 2022-07-16

## 2022-07-13 RX ORDER — SODIUM CHLORIDE 9 MG/ML
0-999 INJECTION, SOLUTION INTRAVENOUS CONTINUOUS PRN
Status: CANCELLED | OUTPATIENT
Start: 2022-07-14 | End: 2022-07-14

## 2022-07-13 RX ORDER — EPINEPHRINE 1 MG/ML
0.3 INJECTION INTRAMUSCULAR; INTRAVENOUS; SUBCUTANEOUS AS NEEDED
Status: CANCELLED | OUTPATIENT
Start: 2022-07-16

## 2022-07-13 RX ORDER — SODIUM CHLORIDE 9 MG/ML
0-999 INJECTION, SOLUTION INTRAVENOUS CONTINUOUS PRN
Status: CANCELLED | OUTPATIENT
Start: 2022-07-15 | End: 2022-07-15

## 2022-07-13 RX ORDER — ALBUTEROL SULFATE 90 UG/1
2-3 INHALANT RESPIRATORY (INHALATION) AS NEEDED
Status: CANCELLED | OUTPATIENT
Start: 2022-07-15

## 2022-07-13 RX ORDER — FAMOTIDINE 10 MG/ML
20 INJECTION INTRAVENOUS AS NEEDED
Status: CANCELLED | OUTPATIENT
Start: 2022-07-15

## 2022-07-13 RX ORDER — ALBUTEROL SULFATE 90 UG/1
2-3 INHALANT RESPIRATORY (INHALATION) AS NEEDED
Status: CANCELLED | OUTPATIENT
Start: 2022-07-14

## 2022-07-13 RX ORDER — ACETAMINOPHEN 500 MG
500 TABLET ORAL EVERY 4 HOURS PRN
Status: CANCELLED | OUTPATIENT
Start: 2022-07-14

## 2022-07-13 NOTE — TELEPHONE ENCOUNTER
Patient called and requesting antiviral therapy or monoclonal antibodies for Covid Symptoms     Referred patient to their PCP for assessment.  If PCP is  provider, route this note as high priority.    If patient has no PCP or PCP unavailable, assessment can be scheduled as a Telehealth billable visit with  [facility].       Patient onset of covid symptoms:  07/12/2022  Patient tested positive on 07/13/2022   Type of test: home test  Current Symptoms: fatigue, cough  At time of call, Is patient having shortness of breath?  Is patient have severe Headache?  Is patient having fever?  If so, please assess for triage protocol.    Nurse, please update isolation status for covid positive on patient's chart if patient tested from an at home covid antigen test.    Reviewed with patient understanding of isolation and quarantine advice.  Reviewed with patient resources (PCP, Nurse Triage, UC, ED) to contact or go to if symptoms worsen.     If provider requires additional testing please inform patient.  Patient can make an appointment for PCR test with [email or web address] either via the [facility] account or calling Covid Triage line at [phone] and selecting option 2.     The following was reviewed with the patient:     1. Monoclonal antibody and COVID-19 antiviral treatment is FDA approved and may be authorized as emergency use authorization.   This treatment is used to decrease Covid-19 symptoms and reduce risk of hospitalization.   2.   A provider must meet with you to educate, determine eligibility, and then order therapy if appropriate.  3. There are potential costs to you if your insurance company does not cover portions of the administration of the medicine.      Patient expressed understanding and agrees to be referred for appointment with provider to discuss eligibility.      Juan Diego Rondon

## 2022-07-13 NOTE — PROGRESS NOTES
Subjective   Per discussion with Love Posadas, DAVE, Juan Diego, has verbally consented to be treated via a telephone based visit: Yes. A total of 11 minutes were required for this telephone based visit.   Patient Location: Home in [place]  Provider Location: Clinic  Technology used by Provider: Phone    HPI  Juan Diego Rondon is a 67 y.o. male who scheduled a telemedicine visit to discuss COVID-19 virus infection outpatient antiviral/monoclonal antibody treatment.  He took an at home COVID-19 test on 7/13/2022, which returned positive.  His symptoms started on 7/12/2022.  He is experiencing fatigue, headache, sore throat, and cough. Denies fever, chest pain, dyspnea, and wheezing. He has received COVID-19 vaccines x 4. Self treatment with tylenol. He has history of autoimmune hepatitis, pernicious anemia, hyperlipidemia, hypertension, melanoma (2021), neurologic disorder (familial tremor and neuropathy), and anxiety. He takes prednisone 10 mg daily for autoimmune hepatitis. Denies history of MI and stroke.    The following have been reviewed and updated as appropriate in this visit:    No Known Allergies  Current Outpatient Medications   Medication Sig Dispense Refill   • omeprazole (PriLOSEC) 40 mg capsule Take 40 mg by mouth every morning     • predniSONE 10 mg tablet Take 10 mg by mouth daily     • valsartan (DIOVAN) 80 mg tablet Take 1 tablet by mouth daily     • fenofibrate (TRICOR) 48 mg tablet Take 48 mg by mouth daily     • FLUoxetine (PROzac) 40 mg capsule Take 40 mg by mouth daily     • hydrOXYzine (ATARAX) 25 mg tablet Take 25-50 mg by mouth nightly as needed     • ursodioL (ACTIGALL) 500 mg tablet Take 500 mg by mouth 2 times daily Every other day     • ursodioL (AMANDEEP) 250 mg tablet Take 750 mg by mouth In am and 500 mg in pm every other day-alternate with the 500 mg bid     • cyanocobalamin 1,000 mcg/mL injection Inject 1,000 mcg into the shoulder, thigh, or buttocks every 30 (thirty) days     •  rosuvastatin (CRESTOR) 10 mg tablet Take 10 mg by mouth every evening   30 6   • acetaminophen (TYLENOL) 500 mg tablet Take 500 mg by mouth every 6 (six) hours as needed for pain scale 1-3/10       No current facility-administered medications for this visit.     Past Medical History:   Diagnosis Date   • Allergic eosinophilic esophagitis    • Arthritis    • Autoimmune hepatitis (CMS/HCC) (HCC)    • Blood disorder     pernicious anemia   • Gastrointestinal disease     IBS   • Gout    • Hyperlipidemia    • Hypertension    • Melanoma (CMS/HCC) (HCC)    • Neurologic disorder    • Primary biliary cholangitis (CMS/HCC) (HCC)     autoimmune hepatitis   • Psychiatric illness     anxiety     Past Surgical History:   Procedure Laterality Date   • APPENDECTOMY     • COLONOSCOPY W/ BIOPSIES AND POLYPECTOMY  02/09/2009    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   • CT BIOPSY LIVER FUNCTION  12/2/2021    CT BIOPSY LIVER FUNCTION 12/2/2021 [facility] MIS CT SCAN   • ESOPHAGOGASTRODUODENOSCOPY N/A 5/16/2022    Procedure: ESOPHAGOGASTRODUODENOSCOPY with Biopsies;  Surgeon: Bradley Cook MD;  Location: [facility] Endoscopy;  Service: Endoscopy;  Laterality: N/A;   • EYE SURGERY Bilateral     cataract   • KNEE SURGERY Bilateral     3 surgeries on left and 2 on right knees - prior open medial meniscectomies bilateral knees in the 1970s   • SKIN BIOPSY     • SKIN CANCER EXCISION Left 10/15/2020    Melanoma removed from left forearm   • TONSILLECTOMY     • TOTAL HIP ARTHROPLASTY Left 4/21/2021    Procedure: LEFT TOTAL HIP ARTHROPLASTY;  Surgeon: Sj Reyes MD;  Location: [facility] OR;  Service: Orthopedics;  Laterality: Left;   • TOTAL HIP ARTHROPLASTY Right 3/22/2022    Procedure: RIGHT TOTAL HIP ARTHROPLASTY POSTERIOR;  Surgeon: Sj Reyes MD;  Location: [facility] OR;  Service: Orthopedics;  Laterality: Right;     Family History   Problem Relation Age of Onset   • Hypertension Mother    • Lung cancer Father    • Hypertension Father      Social  History     Socioeconomic History   • Marital status:    Tobacco Use   • Smoking status: Never Smoker   • Smokeless tobacco: Never Used   Substance and Sexual Activity   • Alcohol use: Yes     Alcohol/week: 1.0 standard drink     Types: 1 Glasses of wine per week     Comment: occ   • Drug use: Never     Social Determinants of Health     Tobacco Use: Low Risk    • Smoking Tobacco Use: Never Smoker   • Smokeless Tobacco Use: Never Used     Review of Systems    Objective    Height 6 foot 3 inches, weight 188 pounds, BMI 23.5, temperature 98.7, he does not have access to a pulse oximeter    Physical Exam  Pulmonary:      Comments: He is able to speak full sentences with no shortness of breath.  He does not display cough throughout visit.  Neurological:      Mental Status: He is alert and oriented to person, place, and time.   Psychiatric:         Mood and Affect: Mood normal.         Behavior: Behavior normal.         Thought Content: Thought content normal.         Judgment: Judgment normal.         Assessment/Plan   Diagnoses and all orders for this visit:    COVID-19 virus infection  -     COVID-19 Outpatient Therapy Referral to Pharmacist    Juan Diego Rondon has COVID-19 virus infection and desires to receive outpatient antiviral treatment. He tested positive for COVID-19 virus infection on 7/13/2022.  Date of symptom onset 7/12/22. He has mild to moderate COVID-19 virus infection and is NOT primarily admitted to the hospital for Covid or requiring oxygen therapy. He is considered high risk for COVID-19 virus infection due to history of autoimmune hepatitis, daily use of immunosuppressive medication, cardiovascular disease, and age. He has received Covid-19 vaccines x 4.  We discussed that outpatient COVID-19 antiviral options are emergency use authorized by the FDA for individuals considered high risk.  Will avoid Paxlovid due to daily prednisone and statin use. Recommend Remdesivir therapy. He wishes to  proceed with my recommendation, therefore, referral sent to  [facility] to facilitate Remdesivir infusion. Encouraged him to review Remdesivir patient fact sheet online.  Follow-up as needed.  Patient verbalized understanding and is agreeable to plan of care.    Love Posadas, CNP

## 2022-07-14 ENCOUNTER — HOSPITAL ENCOUNTER (OUTPATIENT)
Dept: INFUSION THERAPY | Facility: HOSPITAL | Age: 67
Discharge: HOME | End: 2022-07-14
Payer: MEDICARE

## 2022-07-14 VITALS
SYSTOLIC BLOOD PRESSURE: 125 MMHG | HEART RATE: 78 BPM | RESPIRATION RATE: 16 BRPM | TEMPERATURE: 98.4 F | DIASTOLIC BLOOD PRESSURE: 72 MMHG | OXYGEN SATURATION: 96 %

## 2022-07-14 DIAGNOSIS — U07.1 COVID-19: Primary | ICD-10-CM

## 2022-07-14 PROCEDURE — 96365 THER/PROPH/DIAG IV INF INIT: CPT

## 2022-07-14 PROCEDURE — 6360000200 HC RX 636 W HCPCS (ALT 250 FOR IP): Performed by: NURSE PRACTITIONER

## 2022-07-14 RX ADMIN — REMDESIVIR 200 MG: 100 INJECTION, POWDER, LYOPHILIZED, FOR SOLUTION INTRAVENOUS at 13:45

## 2022-07-15 ENCOUNTER — HOSPITAL ENCOUNTER (OUTPATIENT)
Dept: INFUSION THERAPY | Facility: HOSPITAL | Age: 67
Discharge: HOME | End: 2022-07-15
Payer: MEDICARE

## 2022-07-15 VITALS
SYSTOLIC BLOOD PRESSURE: 127 MMHG | TEMPERATURE: 99.3 F | HEART RATE: 76 BPM | OXYGEN SATURATION: 95 % | RESPIRATION RATE: 16 BRPM | DIASTOLIC BLOOD PRESSURE: 73 MMHG

## 2022-07-15 DIAGNOSIS — U07.1 COVID-19: Primary | ICD-10-CM

## 2022-07-15 PROCEDURE — 6360000200 HC RX 636 W HCPCS (ALT 250 FOR IP): Performed by: NURSE PRACTITIONER

## 2022-07-15 PROCEDURE — 96365 THER/PROPH/DIAG IV INF INIT: CPT

## 2022-07-15 RX ADMIN — REMDESIVIR 100 MG: 100 INJECTION, POWDER, LYOPHILIZED, FOR SOLUTION INTRAVENOUS at 08:01

## 2022-07-16 ENCOUNTER — HOSPITAL ENCOUNTER (OUTPATIENT)
Dept: INFUSION THERAPY | Facility: HOSPITAL | Age: 67
Discharge: HOME | End: 2022-07-16
Payer: MEDICARE

## 2022-07-16 VITALS
DIASTOLIC BLOOD PRESSURE: 68 MMHG | OXYGEN SATURATION: 92 % | SYSTOLIC BLOOD PRESSURE: 124 MMHG | RESPIRATION RATE: 16 BRPM | TEMPERATURE: 98.2 F | HEART RATE: 68 BPM

## 2022-07-16 DIAGNOSIS — U07.1 COVID-19: Primary | ICD-10-CM

## 2022-07-16 PROCEDURE — 96365 THER/PROPH/DIAG IV INF INIT: CPT

## 2022-07-16 PROCEDURE — 6360000200 HC RX 636 W HCPCS (ALT 250 FOR IP): Performed by: NURSE PRACTITIONER

## 2022-07-16 RX ADMIN — REMDESIVIR 100 MG: 100 INJECTION, POWDER, LYOPHILIZED, FOR SOLUTION INTRAVENOUS at 07:50

## 2022-08-04 ENCOUNTER — TELEPHONE (OUTPATIENT)
Dept: ORTHOPEDIC SURGERY | Facility: CLINIC | Age: 67
End: 2022-08-04
Payer: MEDICARE

## 2022-08-04 ENCOUNTER — HOSPITAL ENCOUNTER (EMERGENCY)
Facility: HOSPITAL | Age: 67
Discharge: HOME | End: 2022-08-04
Attending: FAMILY MEDICINE
Payer: MEDICARE

## 2022-08-04 ENCOUNTER — APPOINTMENT (OUTPATIENT)
Dept: RADIOLOGY | Facility: HOSPITAL | Age: 67
End: 2022-08-04
Payer: MEDICARE

## 2022-08-04 VITALS
OXYGEN SATURATION: 90 % | SYSTOLIC BLOOD PRESSURE: 125 MMHG | HEIGHT: 76 IN | HEART RATE: 83 BPM | DIASTOLIC BLOOD PRESSURE: 80 MMHG | BODY MASS INDEX: 22.77 KG/M2 | WEIGHT: 187 LBS | RESPIRATION RATE: 20 BRPM | TEMPERATURE: 97.7 F

## 2022-08-04 DIAGNOSIS — S22.49XA RIB FRACTURES: Primary | ICD-10-CM

## 2022-08-04 PROCEDURE — 99283 EMERGENCY DEPT VISIT LOW MDM: CPT | Performed by: FAMILY MEDICINE

## 2022-08-04 PROCEDURE — 71101 X-RAY EXAM UNILAT RIBS/CHEST: CPT | Mod: RT

## 2022-08-04 PROCEDURE — 71101 X-RAY EXAM UNILAT RIBS/CHEST: CPT | Mod: 26,RT | Performed by: RADIOLOGY

## 2022-08-04 RX ORDER — HYDROCODONE BITARTRATE AND ACETAMINOPHEN 5; 325 MG/1; MG/1
1-2 TABLET ORAL EVERY 6 HOURS PRN
Qty: 20 TABLET | Refills: 0 | Status: SHIPPED | OUTPATIENT
Start: 2022-08-04 | End: 2022-08-08 | Stop reason: HOSPADM

## 2022-08-04 ASSESSMENT — ENCOUNTER SYMPTOMS
PALPITATIONS: 0
FEVER: 0
COUGH: 0
ABDOMINAL PAIN: 0
EYE PAIN: 0
ARTHRALGIAS: 0
SHORTNESS OF BREATH: 1
SEIZURES: 0
CHILLS: 0
DYSURIA: 0
VOMITING: 0
HEMATURIA: 0
SORE THROAT: 0
BACK PAIN: 0
COLOR CHANGE: 0

## 2022-08-04 NOTE — ED PROVIDER NOTES
HPI:  Chief Complaint   Patient presents with   • Rib Injury     Patient is here for a fall that occurred last night. He fell injuring his right ribs when he fell last night.        67-year-old male presents with right-sided rib pain.  He states he fell last night.  He states he has not had balance issues.  He states he fell striking the anterior chest wall last night and now complains of right anterior chest wall/rib pain.  Does have pain with movement and breathing.      History provided by:  Patient   used: No    Chest Pain  Pain location:  R lateral chest  Pain quality: aching and sharp    Pain radiates to:  Does not radiate  Pain severity:  Moderate  Onset quality:  Sudden  Duration:  2 hours  Timing:  Constant  Progression:  Unchanged  Chronicity:  New  Context: breathing and trauma    Relieved by:  Nothing  Worsened by:  Deep breathing and exertion  Ineffective treatments:  None tried  Associated symptoms: shortness of breath    Associated symptoms: no abdominal pain, no back pain, no cough, no fever, no palpitations and no vomiting        HISTORY:  Past Medical History:   Diagnosis Date   • Allergic eosinophilic esophagitis    • Arthritis    • Autoimmune hepatitis (CMS/HCC) (HCC)    • Blood disorder     pernicious anemia   • Gastrointestinal disease     IBS   • Gout    • Hyperlipidemia    • Hypertension    • Melanoma (CMS/HCC) (HCC)    • Neurologic disorder    • Primary biliary cholangitis (CMS/HCC) (HCC)     autoimmune hepatitis   • Psychiatric illness     anxiety       Past Surgical History:   Procedure Laterality Date   • APPENDECTOMY     • COLONOSCOPY W/ BIOPSIES AND POLYPECTOMY  02/09/2009    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   • CT BIOPSY LIVER FUNCTION  12/2/2021    CT BIOPSY LIVER FUNCTION 12/2/2021 [facility] MIS CT SCAN   • ESOPHAGOGASTRODUODENOSCOPY N/A 5/16/2022    Procedure: ESOPHAGOGASTRODUODENOSCOPY with Biopsies;  Surgeon: Bradley Cook MD;  Location: [facility]  Endoscopy;  Service: Endoscopy;  Laterality: N/A;   • EYE SURGERY Bilateral     cataract   • KNEE SURGERY Bilateral     3 surgeries on left and 2 on right knees - prior open medial meniscectomies bilateral knees in the 1970s   • SKIN BIOPSY     • SKIN CANCER EXCISION Left 10/15/2020    Melanoma removed from left forearm   • TONSILLECTOMY     • TOTAL HIP ARTHROPLASTY Left 4/21/2021    Procedure: LEFT TOTAL HIP ARTHROPLASTY;  Surgeon: Sj Reyes MD;  Location: [facility] OR;  Service: Orthopedics;  Laterality: Left;   • TOTAL HIP ARTHROPLASTY Right 3/22/2022    Procedure: RIGHT TOTAL HIP ARTHROPLASTY POSTERIOR;  Surgeon: Sj Reyes MD;  Location: [facility] OR;  Service: Orthopedics;  Laterality: Right;       Family History   Problem Relation Age of Onset   • Hypertension Mother    • Lung cancer Father    • Hypertension Father        Social History     Tobacco Use   • Smoking status: Never Smoker   • Smokeless tobacco: Never Used   Substance Use Topics   • Alcohol use: Yes     Alcohol/week: 1.0 standard drink     Types: 1 Glasses of wine per week     Comment: occ   • Drug use: Never         ROS:  Review of Systems   Constitutional: Negative for chills and fever.   HENT: Negative for ear pain and sore throat.    Eyes: Negative for pain and visual disturbance.   Respiratory: Positive for shortness of breath. Negative for cough.    Cardiovascular: Positive for chest pain. Negative for palpitations.   Gastrointestinal: Negative for abdominal pain and vomiting.   Genitourinary: Negative for dysuria and hematuria.   Musculoskeletal: Negative for arthralgias and back pain.   Skin: Negative for color change and rash.   Neurological: Negative for seizures and syncope.   All other systems reviewed and are negative.      PE:  ED Triage Vitals [08/04/22 0615]   Temp Heart Rate Resp BP SpO2   36.5 °C (97.7 °F) 80 24 135/73 98 %      Mean BP (mmHg) Temp Source Heart Rate Source Patient Position BP Location   -- Temporal -- -- --       FiO2 (%)       --           Physical Exam  Vitals and nursing note reviewed.   Constitutional:       Appearance: He is well-developed.   HENT:      Head: Normocephalic and atraumatic.   Eyes:      Conjunctiva/sclera: Conjunctivae normal.      Pupils: Pupils are equal, round, and reactive to light.   Cardiovascular:      Rate and Rhythm: Normal rate and regular rhythm.      Heart sounds: Normal heart sounds. No murmur heard.  Pulmonary:      Effort: Pulmonary effort is normal. No respiratory distress.      Breath sounds: Normal breath sounds.   Abdominal:      General: Bowel sounds are normal.      Palpations: Abdomen is soft.      Tenderness: There is no abdominal tenderness.   Musculoskeletal:         General: Normal range of motion.      Cervical back: Normal range of motion and neck supple.   Skin:     General: Skin is warm and dry.      Capillary Refill: Capillary refill takes less than 2 seconds.   Neurological:      Mental Status: He is alert and oriented to person, place, and time.         ED LABS:  Labs Reviewed - No data to display      ED IMAGES:  X-ray ribs with PA chest right    (Results Pending)       ED PROCEDURES:  Procedures    ED COURSE:     67-year-old male presents with right anterior chest wall pain.  Patient is seen and examined.  Examination detailed above but significant for significant tenderness to the anterior lateral chest wall on the right.  Lungs are clear bilaterally with good air movement.  Heart is regular.  Tenderness over the right anterior lateral chest wall.    X-ray reveals minimally displaced fractures of the fourth fifth and sixth rib on the right.  Patient is given a rib belt with significant improvement.  He will be discharged home on hydrocodone as needed for pain.  He will follow-up if symptoms worsen or do not improve, otherwise as needed.     Sepsis Quality Bundle         MDM:  MDM  Number of Diagnoses or Management Options     Amount and/or Complexity of Data  Reviewed  Tests in the radiology section of CPT®: reviewed and ordered    Risk of Complications, Morbidity, and/or Mortality  Presenting problems: moderate  Diagnostic procedures: moderate  Management options: moderate    Patient Progress  Patient progress: stable      Final diagnoses:   [S22.49XA] Rib fractures     8/4/2022  7:29 AM                 Lalit Tamayo MD  08/04/22 0737

## 2022-08-04 NOTE — TELEPHONE ENCOUNTER
Pt called regarding ER visit where he was seen for 3 fx ribs. Please advise if pt is needing orthopedic follow up and when to schedule. If not needing appt, please call pt at phone # on file to discuss.

## 2022-08-04 NOTE — TELEPHONE ENCOUNTER
I returned a call to the patient.  I let him know that we typically do not see patients for rib fractures.  He states that he has broken ribs before.  He states he was given 5 days worth of pain medication and does not know if that is enough.  I let him know that we would recommend that he reach out to his family doctor if he needed for pain medication and if his shortness of breath is worsening.  He verbalized understanding.

## 2022-08-07 ENCOUNTER — APPOINTMENT (OUTPATIENT)
Dept: RADIOLOGY | Facility: HOSPITAL | Age: 67
End: 2022-08-07
Payer: MEDICARE

## 2022-08-07 ENCOUNTER — APPOINTMENT (OUTPATIENT)
Dept: CT IMAGING | Facility: HOSPITAL | Age: 67
End: 2022-08-07
Payer: MEDICARE

## 2022-08-07 ENCOUNTER — HOSPITAL ENCOUNTER (OUTPATIENT)
Facility: HOSPITAL | Age: 67
Setting detail: OBSERVATION
Discharge: HOME | End: 2022-08-08
Attending: EMERGENCY MEDICINE | Admitting: INTERNAL MEDICINE
Payer: MEDICARE

## 2022-08-07 DIAGNOSIS — R52 INADEQUATE PAIN CONTROL: ICD-10-CM

## 2022-08-07 DIAGNOSIS — S22.49XA MULTIPLE RIB FRACTURES: Primary | ICD-10-CM

## 2022-08-07 DIAGNOSIS — R09.02 HYPOXEMIA: ICD-10-CM

## 2022-08-07 LAB
ALBUMIN SERPL-MCNC: 3.6 G/DL (ref 3.5–5.3)
ALP SERPL-CCNC: 114 U/L (ref 45–115)
ALT SERPL-CCNC: 10 U/L (ref 7–52)
ANION GAP SERPL CALC-SCNC: 13 MMOL/L (ref 3–11)
AST SERPL-CCNC: 14 U/L
BASOPHILS # BLD AUTO: 0 10*3/UL
BASOPHILS NFR BLD AUTO: 0.5 % (ref 0–2)
BILIRUB SERPL-MCNC: 0.47 MG/DL (ref 0.2–1.4)
BUN SERPL-MCNC: 7 MG/DL (ref 7–25)
CALCIUM ALBUM COR SERPL-MCNC: 9.5 MG/DL (ref 8.6–10.3)
CALCIUM SERPL-MCNC: 9.2 MG/DL (ref 8.6–10.3)
CHLORIDE SERPL-SCNC: 98 MMOL/L (ref 98–107)
CO2 SERPL-SCNC: 21 MMOL/L (ref 21–32)
CREAT SERPL-MCNC: 0.67 MG/DL (ref 0.7–1.3)
EOSINOPHIL # BLD AUTO: 0.1 10*3/UL
EOSINOPHIL NFR BLD AUTO: 0.7 % (ref 0–3)
ERYTHROCYTE [DISTWIDTH] IN BLOOD BY AUTOMATED COUNT: 14.3 % (ref 11.5–15)
GFR SERPL CREATININE-BSD FRML MDRD: 102 ML/MIN/1.73M*2
GLUCOSE SERPL-MCNC: 146 MG/DL (ref 70–105)
HCT VFR BLD AUTO: 37.7 % (ref 38–50)
HGB BLD-MCNC: 13.1 G/DL (ref 13.2–17.2)
LYMPHOCYTES # BLD AUTO: 0.5 10*3/UL
LYMPHOCYTES NFR BLD AUTO: 5 % (ref 15–47)
MACROCYTOSIS PRESENCE IN BLOOD, ANALYZER: ABNORMAL
MCH RBC QN AUTO: 35.9 PG (ref 29–34)
MCHC RBC AUTO-ENTMCNC: 34.8 G/DL (ref 32–36)
MCV RBC AUTO: 103.3 FL (ref 82–97)
MONOCYTES # BLD AUTO: 0.6 10*3/UL
MONOCYTES NFR BLD AUTO: 6.3 % (ref 5–13)
NEUTROPHILS # BLD AUTO: 7.9 10*3/UL
NEUTROPHILS NFR BLD AUTO: 87.5 % (ref 46–70)
PLATELET # BLD AUTO: 392 10*3/UL (ref 130–350)
PMV BLD AUTO: 7.3 FL (ref 6.9–10.8)
POTASSIUM SERPL-SCNC: 3.7 MMOL/L (ref 3.5–5.1)
PROT SERPL-MCNC: 7.7 G/DL (ref 6–8.3)
RBC # BLD AUTO: 3.65 10*6/ΜL (ref 4.1–5.8)
SODIUM SERPL-SCNC: 132 MMOL/L (ref 135–145)
WBC # BLD AUTO: 9.1 10*3/UL (ref 3.7–9.6)

## 2022-08-07 PROCEDURE — 36415 COLL VENOUS BLD VENIPUNCTURE: CPT | Performed by: EMERGENCY MEDICINE

## 2022-08-07 PROCEDURE — 99285 EMERGENCY DEPT VISIT HI MDM: CPT | Performed by: EMERGENCY MEDICINE

## 2022-08-07 PROCEDURE — 80053 COMPREHEN METABOLIC PANEL: CPT | Performed by: EMERGENCY MEDICINE

## 2022-08-07 PROCEDURE — 85025 COMPLETE CBC W/AUTO DIFF WBC: CPT | Performed by: EMERGENCY MEDICINE

## 2022-08-07 PROCEDURE — 96375 TX/PRO/DX INJ NEW DRUG ADDON: CPT

## 2022-08-07 PROCEDURE — 99284 EMERGENCY DEPT VISIT MOD MDM: CPT | Performed by: EMERGENCY MEDICINE

## 2022-08-07 PROCEDURE — 71046 X-RAY EXAM CHEST 2 VIEWS: CPT

## 2022-08-07 PROCEDURE — 71260 CT THORAX DX C+: CPT | Mod: ME

## 2022-08-07 PROCEDURE — 6360000200 HC RX 636 W HCPCS (ALT 250 FOR IP): Mod: JW | Performed by: EMERGENCY MEDICINE

## 2022-08-07 RX ORDER — MORPHINE SULFATE 4 MG/ML
4 INJECTION, SOLUTION INTRAMUSCULAR; INTRAVENOUS ONCE
Status: COMPLETED | OUTPATIENT
Start: 2022-08-07 | End: 2022-08-07

## 2022-08-07 RX ORDER — DIAZEPAM 10 MG/2ML
5 INJECTION INTRAMUSCULAR ONCE
Status: COMPLETED | OUTPATIENT
Start: 2022-08-07 | End: 2022-08-07

## 2022-08-07 RX ADMIN — MORPHINE SULFATE 4 MG: 4 INJECTION, SOLUTION INTRAMUSCULAR; INTRAVENOUS at 23:00

## 2022-08-07 RX ADMIN — DIAZEPAM 5 MG: 5 INJECTION, SOLUTION INTRAMUSCULAR; INTRAVENOUS at 23:00

## 2022-08-08 ENCOUNTER — ANESTHESIA EVENT (OUTPATIENT)
Dept: MEDSURG UNIT | Facility: HOSPITAL | Age: 67
End: 2022-08-08
Payer: MEDICARE

## 2022-08-08 ENCOUNTER — APPOINTMENT (OUTPATIENT)
Dept: RADIOLOGY | Facility: HOSPITAL | Age: 67
End: 2022-08-08
Payer: MEDICARE

## 2022-08-08 ENCOUNTER — ANESTHESIA (OUTPATIENT)
Dept: MEDSURG UNIT | Facility: HOSPITAL | Age: 67
End: 2022-08-08
Payer: MEDICARE

## 2022-08-08 ENCOUNTER — ANCILLARY PROCEDURE (OUTPATIENT)
Dept: ULTRASOUND IMAGING | Facility: HOSPITAL | Age: 67
End: 2022-08-08
Payer: MEDICARE

## 2022-08-08 VITALS
RESPIRATION RATE: 15 BRPM | HEART RATE: 67 BPM | DIASTOLIC BLOOD PRESSURE: 70 MMHG | TEMPERATURE: 98.2 F | OXYGEN SATURATION: 92 % | SYSTOLIC BLOOD PRESSURE: 127 MMHG | WEIGHT: 190 LBS | HEIGHT: 75 IN | BODY MASS INDEX: 23.62 KG/M2

## 2022-08-08 PROBLEM — K20.0 EOSINOPHILIC ESOPHAGITIS: Status: ACTIVE | Noted: 2022-08-08

## 2022-08-08 PROBLEM — S27.0XXA TRAUMATIC FRACTURE OF RIBS WITH PNEUMOTHORAX: Status: ACTIVE | Noted: 2022-08-08

## 2022-08-08 PROBLEM — S22.49XA TRAUMATIC FRACTURE OF RIBS WITH PNEUMOTHORAX: Status: ACTIVE | Noted: 2022-08-08

## 2022-08-08 PROBLEM — E78.00 HYPERCHOLESTEROLEMIA: Status: ACTIVE | Noted: 2022-08-08

## 2022-08-08 PROBLEM — K74.3 PRIMARY BILIARY CHOLANGITIS (CMS/HCC): Status: ACTIVE | Noted: 2022-08-08

## 2022-08-08 PROBLEM — D51.0 PERNICIOUS ANEMIA: Status: ACTIVE | Noted: 2022-08-08

## 2022-08-08 PROBLEM — I10 HYPERTENSION: Status: ACTIVE | Noted: 2022-08-08

## 2022-08-08 PROBLEM — F32.A DEPRESSION: Status: ACTIVE | Noted: 2022-08-08

## 2022-08-08 LAB
ANION GAP SERPL CALC-SCNC: 11 MMOL/L (ref 3–11)
BUN SERPL-MCNC: 8 MG/DL (ref 7–25)
CALCIUM SERPL-MCNC: 9.3 MG/DL (ref 8.6–10.3)
CHLORIDE SERPL-SCNC: 99 MMOL/L (ref 98–107)
CO2 SERPL-SCNC: 24 MMOL/L (ref 21–32)
CREAT SERPL-MCNC: 0.75 MG/DL (ref 0.7–1.3)
ERYTHROCYTE [DISTWIDTH] IN BLOOD BY AUTOMATED COUNT: 14 % (ref 11.5–15)
EST. AVERAGE GLUCOSE BLD GHB EST-MCNC: 93.9 MG/DL
GFR SERPL CREATININE-BSD FRML MDRD: 98 ML/MIN/1.73M*2
GLUCOSE SERPL-MCNC: 108 MG/DL (ref 70–105)
HBA1C MFR BLD: 4.9 % (ref 4–6)
HCT VFR BLD AUTO: 36.6 % (ref 38–50)
HGB BLD-MCNC: 12.7 G/DL (ref 13.2–17.2)
MACROCYTOSIS PRESENCE IN BLOOD, ANALYZER: ABNORMAL
MAGNESIUM SERPL-MCNC: 1.7 MG/DL (ref 1.8–2.4)
MCH RBC QN AUTO: 35.7 PG (ref 29–34)
MCHC RBC AUTO-ENTMCNC: 34.7 G/DL (ref 32–36)
MCV RBC AUTO: 103.1 FL (ref 82–97)
PHOSPHATE SERPL-MCNC: 4.1 MG/DL (ref 2.5–4.9)
PLATELET # BLD AUTO: 373 10*3/UL (ref 130–350)
PMV BLD AUTO: 6.9 FL (ref 6.9–10.8)
POTASSIUM SERPL-SCNC: 3.4 MMOL/L (ref 3.5–5.1)
RBC # BLD AUTO: 3.55 10*6/ΜL (ref 4.1–5.8)
SODIUM SERPL-SCNC: 134 MMOL/L (ref 135–145)
WBC # BLD AUTO: 9.7 10*3/UL (ref 3.7–9.6)

## 2022-08-08 PROCEDURE — 80048 BASIC METABOLIC PNL TOTAL CA: CPT | Performed by: INTERNAL MEDICINE

## 2022-08-08 PROCEDURE — 2550000100 HC RX 255: Performed by: EMERGENCY MEDICINE

## 2022-08-08 PROCEDURE — 71046 X-RAY EXAM CHEST 2 VIEWS: CPT | Mod: 26,CMS | Performed by: RADIOLOGY

## 2022-08-08 PROCEDURE — 6360000200 HC RX 636 W HCPCS (ALT 250 FOR IP): Performed by: EMERGENCY MEDICINE

## 2022-08-08 PROCEDURE — 64461 PVB THORACIC SINGLE INJ SITE: CPT | Performed by: NURSE ANESTHETIST, CERTIFIED REGISTERED

## 2022-08-08 PROCEDURE — G0378 HOSPITAL OBSERVATION PER HR: HCPCS

## 2022-08-08 PROCEDURE — 64421 NJX AA&/STRD NTRCOST NRV EA: CPT | Performed by: NURSE ANESTHETIST, CERTIFIED REGISTERED

## 2022-08-08 PROCEDURE — 99284 EMERGENCY DEPT VISIT MOD MDM: CPT | Performed by: EMERGENCY MEDICINE

## 2022-08-08 PROCEDURE — 6360000200 HC RX 636 W HCPCS (ALT 250 FOR IP): Performed by: NURSE PRACTITIONER

## 2022-08-08 PROCEDURE — 6360000200 HC RX 636 W HCPCS (ALT 250 FOR IP): Performed by: NURSE ANESTHETIST, CERTIFIED REGISTERED

## 2022-08-08 PROCEDURE — 96376 TX/PRO/DX INJ SAME DRUG ADON: CPT

## 2022-08-08 PROCEDURE — 99218 *NO LONGER ACTIVE 2023 DO NOT USE* PR INITIAL OBSERVATION CARE/DAY 30 MINUTES: CPT | Performed by: SURGERY

## 2022-08-08 PROCEDURE — 83036 HEMOGLOBIN GLYCOSYLATED A1C: CPT | Mod: GZ | Performed by: INTERNAL MEDICINE

## 2022-08-08 PROCEDURE — C9290 INJ, BUPIVACAINE LIPOSOME: HCPCS | Performed by: NURSE ANESTHETIST, CERTIFIED REGISTERED

## 2022-08-08 PROCEDURE — 96365 THER/PROPH/DIAG IV INF INIT: CPT

## 2022-08-08 PROCEDURE — 96366 THER/PROPH/DIAG IV INF ADDON: CPT

## 2022-08-08 PROCEDURE — 6360000200 HC RX 636 W HCPCS (ALT 250 FOR IP): Performed by: INTERNAL MEDICINE

## 2022-08-08 PROCEDURE — 99236 HOSP IP/OBS SAME DATE HI 85: CPT | Performed by: NURSE PRACTITIONER

## 2022-08-08 PROCEDURE — 36415 COLL VENOUS BLD VENIPUNCTURE: CPT | Performed by: NURSE PRACTITIONER

## 2022-08-08 PROCEDURE — 71260 CT THORAX DX C+: CPT | Mod: 26,ME | Performed by: RADIOLOGY

## 2022-08-08 PROCEDURE — 71046 X-RAY EXAM CHEST 2 VIEWS: CPT | Mod: 26,NCP | Performed by: RADIOLOGY

## 2022-08-08 PROCEDURE — 6370000100 HC RX 637 (ALT 250 FOR IP): Performed by: NURSE PRACTITIONER

## 2022-08-08 PROCEDURE — G1004 CDSM NDSC: HCPCS | Performed by: RADIOLOGY

## 2022-08-08 PROCEDURE — 84100 ASSAY OF PHOSPHORUS: CPT | Performed by: NURSE PRACTITIONER

## 2022-08-08 PROCEDURE — 83735 ASSAY OF MAGNESIUM: CPT | Performed by: NURSE PRACTITIONER

## 2022-08-08 PROCEDURE — 71046 X-RAY EXAM CHEST 2 VIEWS: CPT

## 2022-08-08 PROCEDURE — 85027 COMPLETE CBC AUTOMATED: CPT | Performed by: INTERNAL MEDICINE

## 2022-08-08 PROCEDURE — 97161 PT EVAL LOW COMPLEX 20 MIN: CPT | Mod: GP | Performed by: PHYSICAL THERAPIST

## 2022-08-08 RX ORDER — ONDANSETRON 4 MG/1
4 TABLET, FILM COATED ORAL EVERY 6 HOURS PRN
Status: DISCONTINUED | OUTPATIENT
Start: 2022-08-08 | End: 2022-08-08 | Stop reason: HOSPADM

## 2022-08-08 RX ORDER — URSODIOL 250 MG/1
750 TABLET, FILM COATED ORAL EVERY OTHER DAY
Status: DISCONTINUED | OUTPATIENT
Start: 2022-08-09 | End: 2022-08-08 | Stop reason: HOSPADM

## 2022-08-08 RX ORDER — BUPIVACAINE HYDROCHLORIDE 2.5 MG/ML
INJECTION, SOLUTION EPIDURAL; INFILTRATION; INTRACAUDAL
Status: COMPLETED | OUTPATIENT
Start: 2022-08-08 | End: 2022-08-08

## 2022-08-08 RX ORDER — BISACODYL 10 MG/1
10 SUPPOSITORY RECTAL DAILY PRN
Status: DISCONTINUED | OUTPATIENT
Start: 2022-08-08 | End: 2022-08-08 | Stop reason: HOSPADM

## 2022-08-08 RX ORDER — AMOXICILLIN 250 MG
1 CAPSULE ORAL NIGHTLY PRN
Status: DISCONTINUED | OUTPATIENT
Start: 2022-08-08 | End: 2022-08-08 | Stop reason: HOSPADM

## 2022-08-08 RX ORDER — VALSARTAN 80 MG/1
80 TABLET ORAL DAILY
Status: DISCONTINUED | OUTPATIENT
Start: 2022-08-08 | End: 2022-08-08 | Stop reason: HOSPADM

## 2022-08-08 RX ORDER — BUPIVACAINE 13.3 MG/ML
INJECTION, SUSPENSION, LIPOSOMAL INFILTRATION
Status: COMPLETED | OUTPATIENT
Start: 2022-08-08 | End: 2022-08-08

## 2022-08-08 RX ORDER — ROSUVASTATIN CALCIUM 10 MG/1
10 TABLET, COATED ORAL EVERY EVENING
Status: DISCONTINUED | OUTPATIENT
Start: 2022-08-08 | End: 2022-08-08 | Stop reason: HOSPADM

## 2022-08-08 RX ORDER — HYDROXYZINE HYDROCHLORIDE 25 MG/1
25-50 TABLET, FILM COATED ORAL NIGHTLY PRN
Status: DISCONTINUED | OUTPATIENT
Start: 2022-08-08 | End: 2022-08-08 | Stop reason: HOSPADM

## 2022-08-08 RX ORDER — HYDROMORPHONE HYDROCHLORIDE 1 MG/ML
.4-.6 INJECTION, SOLUTION INTRAMUSCULAR; INTRAVENOUS; SUBCUTANEOUS
Status: DISCONTINUED | OUTPATIENT
Start: 2022-08-08 | End: 2022-08-08 | Stop reason: HOSPADM

## 2022-08-08 RX ORDER — URSODIOL 250 MG/1
500 TABLET, FILM COATED ORAL EVERY OTHER DAY
Status: DISCONTINUED | OUTPATIENT
Start: 2022-08-08 | End: 2022-08-08 | Stop reason: HOSPADM

## 2022-08-08 RX ORDER — OXYCODONE HYDROCHLORIDE 5 MG/1
5-10 TABLET ORAL EVERY 4 HOURS PRN
Status: DISCONTINUED | OUTPATIENT
Start: 2022-08-08 | End: 2022-08-08 | Stop reason: HOSPADM

## 2022-08-08 RX ORDER — MORPHINE SULFATE 2 MG/ML
2 INJECTION, SOLUTION INTRAMUSCULAR; INTRAVENOUS ONCE
Status: COMPLETED | OUTPATIENT
Start: 2022-08-08 | End: 2022-08-08

## 2022-08-08 RX ORDER — ONDANSETRON HYDROCHLORIDE 2 MG/ML
4 INJECTION, SOLUTION INTRAVENOUS EVERY 6 HOURS PRN
Status: DISCONTINUED | OUTPATIENT
Start: 2022-08-08 | End: 2022-08-08 | Stop reason: HOSPADM

## 2022-08-08 RX ORDER — SODIUM CHLORIDE 0.9 % (FLUSH) 0.9 %
3 SYRINGE (ML) INJECTION AS NEEDED
Status: DISCONTINUED | OUTPATIENT
Start: 2022-08-08 | End: 2022-08-08 | Stop reason: HOSPADM

## 2022-08-08 RX ORDER — URSODIOL 250 MG/1
500 TABLET, FILM COATED ORAL NIGHTLY
Status: DISCONTINUED | OUTPATIENT
Start: 2022-08-08 | End: 2022-08-08 | Stop reason: HOSPADM

## 2022-08-08 RX ORDER — MAGNESIUM SULFATE HEPTAHYDRATE 40 MG/ML
2 INJECTION, SOLUTION INTRAVENOUS ONCE
Status: COMPLETED | OUTPATIENT
Start: 2022-08-08 | End: 2022-08-08

## 2022-08-08 RX ORDER — OXYCODONE HYDROCHLORIDE 5 MG/1
5 TABLET ORAL EVERY 4 HOURS PRN
Qty: 8 TABLET | Refills: 0 | Status: SHIPPED | OUTPATIENT
Start: 2022-08-08 | End: 2022-08-11

## 2022-08-08 RX ORDER — FENOFIBRATE 48 MG/1
48 TABLET, FILM COATED ORAL DAILY
Status: DISCONTINUED | OUTPATIENT
Start: 2022-08-08 | End: 2022-08-08 | Stop reason: HOSPADM

## 2022-08-08 RX ORDER — FLUOXETINE HYDROCHLORIDE 20 MG/1
40 CAPSULE ORAL DAILY
Status: DISCONTINUED | OUTPATIENT
Start: 2022-08-08 | End: 2022-08-08 | Stop reason: HOSPADM

## 2022-08-08 RX ORDER — LANSOPRAZOLE 30 MG/1
30 CAPSULE, DELAYED RELEASE ORAL
Status: DISCONTINUED | OUTPATIENT
Start: 2022-08-08 | End: 2022-08-08 | Stop reason: HOSPADM

## 2022-08-08 RX ORDER — IOPAMIDOL 755 MG/ML
80 INJECTION, SOLUTION INTRAVASCULAR ONCE
Status: COMPLETED | OUTPATIENT
Start: 2022-08-08 | End: 2022-08-08

## 2022-08-08 RX ORDER — ACETAMINOPHEN 325 MG/1
325-650 TABLET ORAL EVERY 4 HOURS PRN
Status: DISCONTINUED | OUTPATIENT
Start: 2022-08-08 | End: 2022-08-08 | Stop reason: HOSPADM

## 2022-08-08 RX ORDER — ALUMINUM HYDROXIDE, MAGNESIUM HYDROXIDE, AND SIMETHICONE 1200; 120; 1200 MG/30ML; MG/30ML; MG/30ML
30 SUSPENSION ORAL 3 TIMES DAILY PRN
Status: DISCONTINUED | OUTPATIENT
Start: 2022-08-08 | End: 2022-08-08 | Stop reason: HOSPADM

## 2022-08-08 RX ORDER — PREDNISONE 10 MG/1
10 TABLET ORAL DAILY
Status: DISCONTINUED | OUTPATIENT
Start: 2022-08-08 | End: 2022-08-08 | Stop reason: HOSPADM

## 2022-08-08 RX ORDER — POLYETHYLENE GLYCOL (3350) 17 G/17G
17 POWDER, FOR SOLUTION ORAL DAILY PRN
Status: DISCONTINUED | OUTPATIENT
Start: 2022-08-08 | End: 2022-08-08 | Stop reason: HOSPADM

## 2022-08-08 RX ADMIN — BUPIVACAINE HYDROCHLORIDE 20 ML: 2.5 INJECTION, SOLUTION EPIDURAL; INFILTRATION; INTRACAUDAL at 11:20

## 2022-08-08 RX ADMIN — URSODIOL 500 MG: 250 TABLET ORAL at 09:32

## 2022-08-08 RX ADMIN — OXYCODONE HYDROCHLORIDE 5 MG: 5 TABLET ORAL at 02:14

## 2022-08-08 RX ADMIN — BUPIVACAINE 20 ML: 13.3 INJECTION, SUSPENSION, LIPOSOMAL INFILTRATION at 11:20

## 2022-08-08 RX ADMIN — VALSARTAN 80 MG: 80 TABLET, FILM COATED ORAL at 09:37

## 2022-08-08 RX ADMIN — MAGNESIUM SULFATE HEPTAHYDRATE 2 G: 2 INJECTION, SOLUTION INTRAVENOUS at 07:35

## 2022-08-08 RX ADMIN — IOPAMIDOL 80 ML: 755 INJECTION, SOLUTION INTRAVENOUS at 00:20

## 2022-08-08 RX ADMIN — PREDNISONE 10 MG: 10 TABLET ORAL at 09:33

## 2022-08-08 RX ADMIN — MORPHINE SULFATE 2 MG: 2 INJECTION, SOLUTION INTRAMUSCULAR; INTRAVENOUS at 00:27

## 2022-08-08 RX ADMIN — OXYCODONE HYDROCHLORIDE 5 MG: 5 TABLET ORAL at 06:15

## 2022-08-08 RX ADMIN — FLUOXETINE HYDROCHLORIDE 40 MG: 20 CAPSULE ORAL at 09:32

## 2022-08-08 RX ADMIN — FENOFIBRATE 48 MG: 48 TABLET ORAL at 09:33

## 2022-08-08 ASSESSMENT — ENCOUNTER SYMPTOMS
LIGHT-HEADEDNESS: 0
SEIZURES: 0
CONSTIPATION: 0
MUSCULOSKELETAL NEGATIVE: 1
SINUS PAIN: 0
BACK PAIN: 0
NAUSEA: 0
SINUS PRESSURE: 0
DIFFICULTY URINATING: 0
FREQUENCY: 0
GASTROINTESTINAL NEGATIVE: 1
FLANK PAIN: 0
FATIGUE: 0
MYALGIAS: 0
CHEST TIGHTNESS: 1
SORE THROAT: 0
PSYCHIATRIC NEGATIVE: 1
DECREASED CONCENTRATION: 0
APPETITE CHANGE: 0
NERVOUS/ANXIOUS: 0
SHORTNESS OF BREATH: 1
WOUND: 0
FEVER: 0
ACTIVITY CHANGE: 0
POLYPHAGIA: 0
ABDOMINAL DISTENTION: 0
PALPITATIONS: 0
RHINORRHEA: 0
DIZZINESS: 0
NEUROLOGICAL NEGATIVE: 1
PHOTOPHOBIA: 0
POLYDIPSIA: 0
NUMBNESS: 0
CONFUSION: 0
DIARRHEA: 0
EYE REDNESS: 0
EXERCISE TOLERANCE: GOOD (4-7 METS)
CHILLS: 0

## 2022-08-08 ASSESSMENT — ACTIVITIES OF DAILY LIVING (ADL)
ADEQUATE_TO_COMPLETE_ADL: YES
PATIENT'S MEMORY ADEQUATE TO SAFELY COMPLETE DAILY ACTIVITIES?: YES
ASSISTIVE_DEVICE: EYEGLASSES

## 2022-08-08 NOTE — ANESTHESIA POSTPROCEDURE EVALUATION
Patient: Juan Diego Rondon    Procedure Summary     Date: 08/08/22 Room / Location:     Anesthesia Start: 1110 Anesthesia Stop: 1130    Procedure: Pain consult for rib fractures Diagnosis:     Scheduled Providers:  Responsible Provider: Mroteza Taylor CRNA    Anesthesia Type: regional ASA Status: 3          Anesthesia Type: regional    Last vitals  Vitals Value Taken Time   BP     Temp     Pulse     Resp     SpO2     0-10 Pain Score         Anesthesia Post Evaluation    Patient location during evaluation: bedside  Patient participation: complete - patient participated  Level of consciousness: awake and alert  Pain management: adequate  Airway patency: patent  Anesthetic complications: no  Cardiovascular status: acceptable  Respiratory status: acceptable  Hydration status: acceptable  May dismiss recovered patient based on consultation with the appropriate physicians and/or meeting appropriate discharge criteria      Cosmetic?  This procedure is not cosmetic.

## 2022-08-08 NOTE — ANESTHESIA PROCEDURE NOTES
Peripheral Block    Patient location during procedure: pre-op  Start time: 8/8/2022 11:15 AM  End time: 8/8/2022 11:30 AM    Staffing  CRNA: Morteza Taylor CRNA  Performed: CRNA   Preanesthetic Checklist  Completed: patient identified, IV checked, site marked, risks and benefits discussed, surgical consent, monitors and equipment checked, pre-op evaluation and timeout performed  Peripheral Block  Patient position: supine  Prep: ChloraPrep  Patient monitoring: blood pressure monitoring and continuous pulse oximetry  Supplemental oxygen: nasal cannula  Block type: intercostal (Errector Spinae Plane Block (CPT 49532))  Laterality: right  Injection technique: single-shot  Procedures: ultrasound guided and landmark technique  Ultrasound image added to chart: yes    Needle  Needle type: SonoPlex   Needle gauge: 21 G  Needle length: 4 in      Medications Administered  BUPivacaine (MARCAINE) injection 0.25% - Peripheral nerve block - Peripheral nerve block   20 mL - 8/8/2022 11:20:00 AM  BUPivacaine liposome (EXPAREL) injection 1.3 % (13.3 mg/mL) - Peripherial nerve block - Peripheral nerve block   20 mL - 8/8/2022 11:20:00 AM  Assessment  Injection assessment: no apparent complications, negative aspiration for heme, no paresthesia on injection, incremental injection and local visualized surrounding nerve on ultrasound  Paresthesia pain: none  Heart rate change: no  Slow fractionated injection: yes  Additional Notes  U/S to Identify landmarks.  Counted ribs from T12 up to T5.  0.25% local (bupivicaine) 3 cc placed at skin.    Needle inserted with concurrent direct visualization throughout to contact to T6 transverse process.  LA infiltrated deep to ES muscle plane with great spread noted.  Patient had relief.  Reason for Block: At surgeon's request for acute post-op pain management

## 2022-08-08 NOTE — CONSULTATION
Trauma     TRAUMA HISTORY & PHYSICAL       8/8/2022  Juan Diego Rondon  [id number]     CHIEF COMPLAINT: fall from standing 4 days ago     HISTORY OF PRESENT ILLNESS:  Juan Diego Rondon is a 67 y.o.  male who presents as after a fall from standing on 8/4/22. He tripped on a rug and fell. He did present to the ER but at the time was doing ok and CXR showed only 2 rib fractures. He was discharged with norco and given flexeril by his PCP. He ran out of [place] yesterday and states the pain became unbearable.     In the ER last night he did require 1L O2 via nasal canula. CT scan was performed showing a small pneumothorax and rib fractures of the lateral 4-8 and 10th ribs as well as anterior fractures of ribs 4 and 5.        Patient Active Problem List   Diagnosis   • Osteoarthritis of right knee   • Chronic pain of right knee   • History of total hip arthroplasty, left   • Osteoarthritis of right hip   • Avascular necrosis of bone of right hip (CMS/HCC) (HCC)   • S/P total right hip arthroplasty   • COVID-19   • Traumatic fracture of ribs with pneumothorax   • Hypertension   • Hypercholesterolemia   • Eosinophilic esophagitis   • Pernicious anemia   • Primary biliary cholangitis (CMS/HCC) (HCC)   • Depression        Past Surgical History:   Procedure Laterality Date   • APPENDECTOMY     • COLONOSCOPY W/ BIOPSIES AND POLYPECTOMY  02/09/2009    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   • CT BIOPSY LIVER FUNCTION  12/2/2021    CT BIOPSY LIVER FUNCTION 12/2/2021 [facility] MIS CT SCAN   • ESOPHAGOGASTRODUODENOSCOPY N/A 5/16/2022    Procedure: ESOPHAGOGASTRODUODENOSCOPY with Biopsies;  Surgeon: Bradley Cook MD;  Location: [facility] Endoscopy;  Service: Endoscopy;  Laterality: N/A;   • EYE SURGERY Bilateral     cataract   • KNEE SURGERY Bilateral     3 surgeries on left and 2 on right knees - prior open medial meniscectomies bilateral knees in the 1970s   • SKIN BIOPSY     • SKIN CANCER EXCISION Left 10/15/2020    Melanoma  removed from left forearm   • TONSILLECTOMY     • TOTAL HIP ARTHROPLASTY Left 2021    Procedure: LEFT TOTAL HIP ARTHROPLASTY;  Surgeon: Sj Reyes MD;  Location: [facility] OR;  Service: Orthopedics;  Laterality: Left;   • TOTAL HIP ARTHROPLASTY Right 3/22/2022    Procedure: RIGHT TOTAL HIP ARTHROPLASTY POSTERIOR;  Surgeon: Sj Reyes MD;  Location: [facility] OR;  Service: Orthopedics;  Laterality: Right;        Medications Prior to Admission   Medication Sig Dispense Refill Last Dose   • omeprazole (PriLOSEC) 40 mg capsule Take 40 mg by mouth every morning   2022 at 0800   • predniSONE 10 mg tablet Take 10 mg by mouth daily   2022 at 0800   • valsartan (DIOVAN) 80 mg tablet Take 1 tablet by mouth daily   2022 at 0800   • fenofibrate (TRICOR) 48 mg tablet Take 48 mg by mouth daily   2022 at 2000   • FLUoxetine (PROzac) 40 mg capsule Take 40 mg by mouth daily   2022 at 0800   • hydrOXYzine (ATARAX) 25 mg tablet Take 25-50 mg by mouth nightly as needed   Past Week at Unknown time   • ursodioL (ACTIGALL) 500 mg tablet Take 500 mg by mouth 2 times daily Every other day   2022 at 2000   • ursodioL (AMANDEEP) 250 mg tablet Take 750 mg by mouth In am and 500 mg in pm every other day-alternate with the 500 mg bid   2022 at 0800   • cyanocobalamin 1,000 mcg/mL injection Inject 1,000 mcg into the shoulder, thigh, or buttocks every 30 (thirty) days   Past Week at Unknown time   • rosuvastatin (CRESTOR) 10 mg tablet Take 10 mg by mouth every evening   30 6 2022 at 0800   • HYDROcodone-acetaminophen (NORCO) 5-325 mg per tablet Take 1-2 tablets by mouth every 6 (six) hours as needed for pain scale 8-10/10 Max Daily Amount: 8 tablets 20 tablet 0    • acetaminophen (TYLENOL) 500 mg tablet Take 500 mg by mouth every 6 (six) hours as needed for pain scale 1-3/10            No Known Allergies     Family Status   Relation Name Status   • Mother     • Father          Social History      Socioeconomic History   • Marital status:    Tobacco Use   • Smoking status: Never Smoker   • Smokeless tobacco: Never Used   Vaping Use   • Vaping Use: Never used   Substance and Sexual Activity   • Alcohol use: Yes     Alcohol/week: 1.0 standard drink     Types: 1 Glasses of wine per week     Comment: occ   • Drug use: Never     Social Determinants of Health     Tobacco Use: Low Risk    • Smoking Tobacco Use: Never Smoker   • Smokeless Tobacco Use: Never Used        Resuscitation Status: Full Code     REVIEW OF SYSTEMS:  Pertinent items are noted in HPI.     PHYSICAL EXAMINATION:  PRIMARY SURVEY:  Vitals:    08/08/22 0145   BP: 118/73   Pulse: 82   Resp: 20   Temp: 36.8 °C (98.2 °F)   SpO2: 93%          SECONDARY SURVEY:  General Appearance: alert, appears stated age and cooperative  Head: normocephalic, atraumatic   Eyes: equal, round and reactive to light and accommodation  Pupils: Right:2mm reactive to light  Left: 2}mmreactive to light  Ears: normal TM's and external ear canals both ears  Face: no craniofacial deformities  Maxilla: stable Mandible: stable Zygoma: stable  Neck:Neck supple without adenopathy, thyromegaly or masses  Chest: stable Pain to palpation of the left side, good breath sounds bilaterally  Cardiac: Heart sounds are normal.  Regular rate and rhythm without murmur, gallop or rub.  Abdomen: soft, non-tender; bowel sounds normal; no masses, no organomegaly Surgical scars No  Pelvis: stable  Back: no pain to palpation  Extremities: Normal- symmetrical tone, muscle mass, strength, no deformities  Vascular: Palpable DP and PT pulses bilaterally  Skin:No visible or palpable abnormalities  Neurologic: Grossly normal  GCS: E 4 V 5 M 6      Radiology Studies Performed:  CT Chest reviewed as above     Other Investigations:  none       ASSESSMENT:  Juan Diego Rondon is a 67 y.o. Juan Diego Rondon involved in a fall from standing with the following injuries:      Rib fractures of 4-8 and 10  with anterior and lateral fractures of 4 and 5     PLAN:      At this point I would not recommend drainage of the air or fluid in the chest space as this is minimal and 4 days out stable. Will recommend aggressive pain control with tylenol, NSAIDs, narcotics and benzos. I discussed this with Emmett and that he will likely be very uncomfortable despite this regimen for quite some time. Surgery will continue to follow peripherally.          Job Hyde MD

## 2022-08-08 NOTE — DISCHARGE INSTRUCTIONS
Please take Tylenol for your pain primarily.  Only use the prescription medication if needed. The block that was given will last for 40 to 72 hours.  If your pain is uncontrolled you can return back to the emergency department.  If you become short of breath I would want you to return to the emergency department immediately.

## 2022-08-08 NOTE — PLAN OF CARE
Problem: Safety Adult - Fall  Goal: Free from fall injury  Description: INTERVENTIONS:    Inpatient - Please reference Cares/Safety Flowsheet under [place] Fall Risk for interventions.  Pediatrics - Please reference Peds Daily Cares/Safety Flowsheet under [facility] Pediatric Fall Assessment Fall Bundle for interventions  LD/OB - Please reference OB Shift Screening Flowsheet under OB Fall Risk for interventions.  Outcome: Not Progressing     Problem: Knowledge Deficit  Goal: Patient/family/caregiver demonstrates understanding of disease process, treatment plan, medications, and discharge instructions  Description: INTERVENTIONS:   1. Complete learning assessment and assess knowledge base  2. Provide teaching at level of understanding   3. Provide teaching via preferred learning methods  Outcome: Not Progressing     Problem: Potential for Compromised Skin Integrity  Goal: Skin Integrity is Maintained or Improved  Description: INTERVENTIONS:  1. Assess and monitor skin integrity  2. Collaborate with interdisciplinary team and initiate plans and interventions as needed  3. Alternate a full bath with partial baths for elderly   4. Monitor patient's hygiene practices   5. Collaborate with wound, ostomy, and continence nurse  Outcome: Not Progressing  Goal: Nutritional status is improving  Description: INTERVENTIONS:  1. Monitor and assess patient for malnutrition (ex- brittle hair, bruises, dry skin, pale skin and conjunctiva, muscle wasting, smooth red tongue, and disorientation)  2. Monitor patient's weight and dietary intake as ordered or per policy  3. Determine patient's food preferences and provide high-protein, high-caloric foods as appropriate  4. Assist patient with eating   5. Allow adequate time for meals   6. Encourage patient to take dietary supplement as ordered   7. Collaborate with dietitian  8. Include patient/family/caregiver in decisions related to nutrition  Outcome: Not Progressing  Goal: MOBILITY IS  MAINTAINED OR IMPROVED  Description: INTERVENTIONS  1. Collaborate with interdisciplinary team and initiate plan and interventions as ordered (PT/OT)  2. Encourage ambulation  3. Up to chair for meals  4. Monitor for signs of deconditioning  Outcome: Not Progressing     Problem: Urinary Incontinence  Goal: Perineal skin integrity is maintained or improved  Description: INTERVENTIONS:  1. Assess genitourinary system, perineal skin, labs (urinalysis), and history of incontinence to include past management, aggravating, and alleviating factors  2. Collaborate with interdisciplinary team including wound, ostomy, and continence nurse and initiate plans and interventions as needed  4. Consider urine containment device  5. Apply skin protectant   6. Develop skin care regimen  7. Provide privacy when changing patient's incontinence device to maintain their dignity  Outcome: Not Progressing     Problem: Respiratory - Adult  Goal: Achieves optimal ventilation and oxygenation  Description: INTERVENTIONS:  1. Assess for changes in respiratory status  2. Assess for changes in mentation and behavior  3. Position to facilitate oxygenation and minimize respiratory effort  4. Oxygen supplementation based on oxygen saturation or ABGs  5. Assess patient's ability to cough effectively  6. Encourage broncho-pulmonary hygiene including cough, deep breathe  7. Assess the need for suctioning   8. Assess and instruct to report SOB or any respiratory difficulty  9. Respiratory Therapy support as indicated, including medications and treatment.  Outcome: Not Progressing     Problem: Pain - Adult  Goal: Verbalizes/displays adequate comfort level or baseline comfort level  Description: INTERVENTIONS:  1. Encourage patient to monitor pain and request interventions  2. Assess pain using the appropriate pain scale  3. Administer analgesics based on type and severity of pain and evaluate response  4. Educate/Implement non-pharmacological measures as  appropriate and evaluate response  5. Consider cultural, developmental and social influences on pain and pain management  6. Notify Provider if interventions unsuccessful or patient reports new pain  Outcome: Not Progressing     Problem: Infection - Adult  Goal: Absence of infection during hospitalization  Description: INTERVENTIONS:  1. Assess and monitor for signs and symptoms of infection  2. Monitor lab/diagnostic results  3. Monitor all insertion sites/wounds/incisions  4. Monitor secretions for changes in amount and color  5. Administer medications as ordered  6. Educate and encourage patient and family to use good hand hygiene technique  7. Identify and educate in appropriate isolation precautions for identified infection/condition  Outcome: Not Progressing     Problem: Safety Adult  Goal: Patient will remain safe during hospitalization  Description: INTERVENTIONS    1. Assess patient for fall risk and implement interventions if needed  2. Use safe transport techniques  3. Assess patient using the appropriate [place] skin assessment scale  4. Assess patient for risk of aspiration  5. Assess patient for risk of elopement  6. Assess patient for risk of suicide  Outcome: Not Progressing     Problem: Daily Care  Goal: Daily care needs are met  Description: INTERVENTIONS:   1. Assess and monitor skin integrity  2. Identify patients at risk for skin breakdown on admission and per policy  3. Assess and monitor ability to perform self care and identify potential discharge needs  4. Assess skin integrity/risk for skin breakdown  5. Assist patient with activities of daily living as needed  6. Encourage independent activity per ability   7. Provide mouth care   8. Include patient/family/caregiver in decisions related to daily care   Outcome: Not Progressing     Problem: Discharge Barriers  Goal: Patient's discharge needs are met  Description: INTERVENTIONS:  1. Assess patient for self-management skills  2. Encourage  participation in management  3. Identify potential discharge barriers on admission and throughout hospital stay  4. Involve patient/family/caregiver in discharge planning process  5. Collaborate with case management/ for discharge needs  Outcome: Not Progressing

## 2022-08-08 NOTE — INTERDISCIPLINARY/THERAPY
Spiritual Care Services:     08/08/22 1055   Clinical Encounter Type   Referral By:  (Other)   Visited With Patient   Visit Type Initial   Judaism Affilation   Affiliated with [facility] Unable to assess   Spiritual/Emotional Distress   Level Moderate   Plan of Care   Spiritual Care Plan Initiated Provide supportive presence   Plan of Care Comments and emotional support   Spiritual Assessment   Completed by    Spiritual Interventions   Emotional/Spiritual Interventions Empathic and Reflective listening provided     Provide supportive presence; emotional support; empathetic/reflective listening; silent prayer

## 2022-08-08 NOTE — H&P
Hospitalist History and Physical       CHIEF COMPLAINT  Shortness of breath     HPI   Juan Diego Rondon is a 67 y.o. male, presented to the emergency room with complaints of shortness of breath.  His past medical history includes pernicious anemia,  Hypertension, hyperlipidemia, and biliary cholangitis.    Juan Diego is a pleasant and cooperative 67-year-old male who presented to the emergency room with complaints of shortness of breath and right-sided rib pain.  He reports that his pain began after he had a fall on 8/4/2022 when he fell at his home's when he tripped on a rug falling to the floor.  He was seen in the emergency room diagnosed with multiple right-sided rib fractures and discharged home with Norco.  He subsequently talked to his primary care provider and was prescribed Flexeril.  He did run out of [place] yesterday a.m. and states that the Flexeril did not provide any relief.  He presented to the emergency room this evening with complaints of increasing shortness of breath and pain that was uncontrollable at home.  He denies any fever, chills, nausea, vomiting, diarrhea, constipation, chest pain, palpitations, dysuria, recent illness, recent positive COVID-19 contacts, or recent travel.  He did test positive for COVID-19 on 7/13/2022.    PAST MEDICAL HISTORY  Past Medical History:   Diagnosis Date   • Allergic eosinophilic esophagitis    • Arthritis    • Autoimmune hepatitis (CMS/HCC) (HCC)    • Blood disorder     pernicious anemia   • Gastrointestinal disease     IBS   • Gout    • Hyperlipidemia    • Hypertension    • Melanoma (CMS/HCC) (HCC)    • Neurologic disorder    • Primary biliary cholangitis (CMS/HCC) (HCC)     autoimmune hepatitis   • Psychiatric illness     anxiety   • Shortness of breath     Hydropneumo on 8/7/22     SOCIAL HISTORY  Social History     Socioeconomic History   • Marital status:      Spouse name: Not on file   • Number of children: Not on file   • Years of education: Not on  file   • Highest education level: Not on file   Occupational History   • Not on file   Tobacco Use   • Smoking status: Never Smoker   • Smokeless tobacco: Never Used   Vaping Use   • Vaping Use: Never used   Substance and Sexual Activity   • Alcohol use: Yes     Alcohol/week: 1.0 standard drink     Types: 1 Glasses of wine per week     Comment: occ   • Drug use: Never   • Sexual activity: Not on file   Other Topics Concern   • Not on file   Social History Narrative   • Not on file     Social Determinants of Health     Financial Resource Strain: Not on file   Food Insecurity: Not on file   Transportation Needs: Not on file   Physical Activity: Not on file   Stress: Not on file   Social Connections: Not on file   Intimate Partner Violence: Not on file   Housing Stability: Not on file       Family History:  family history includes Hypertension in his father and mother; Lung cancer in his father.    Allergies:  No Known Allergies    Medications:   Prior to Admission medications    Medication Sig Start Date End Date Taking? Authorizing Provider   omeprazole (PriLOSEC) 40 mg capsule Take 40 mg by mouth every morning 5/20/22  Yes Historical Provider, MD   predniSONE 10 mg tablet Take 10 mg by mouth daily 1/10/22  Yes Historical Provider, MD   valsartan (DIOVAN) 80 mg tablet Take 1 tablet by mouth daily 1/11/22  Yes Historical Provider, MD   fenofibrate (TRICOR) 48 mg tablet Take 48 mg by mouth daily 1/8/22  Yes Historical Provider, MD   FLUoxetine (PROzac) 40 mg capsule Take 40 mg by mouth daily   Yes Historical Provider, MD   hydrOXYzine (ATARAX) 25 mg tablet Take 25-50 mg by mouth nightly as needed 3/7/21  Yes Historical Provider, MD   ursodioL (ACTIGALL) 500 mg tablet Take 500 mg by mouth 2 times daily Every other day   Yes Historical Provider, MD   ursodioL (AMANDEEP) 250 mg tablet Take 750 mg by mouth In am and 500 mg in pm every other day-alternate with the 500 mg bid   Yes Historical Provider, MD   cyanocobalamin 1,000  mcg/mL injection Inject 1,000 mcg into the shoulder, thigh, or buttocks every 30 (thirty) days 7/14/20  Yes Historical Provider, MD   rosuvastatin (CRESTOR) 10 mg tablet Take 10 mg by mouth every evening   6/23/14  Yes Conversion Provider   HYDROcodone-acetaminophen (NORCO) 5-325 mg per tablet Take 1-2 tablets by mouth every 6 (six) hours as needed for pain scale 8-10/10 Max Daily Amount: 8 tablets 8/4/22   Lalit Tamayo MD   acetaminophen (TYLENOL) 500 mg tablet Take 500 mg by mouth every 6 (six) hours as needed for pain scale 1-3/10    Historical Provider, MD        Current Facility-Administered Medications:   •  Insert peripheral IV, , , Once **AND** Maintain IV access, , , Until discontinued **AND** Saline lock IV, , , Once **AND** sodium chloride flush 3 mL, 3 mL, intravenous, PRN, Rachel Vieira CNP  •  acetaminophen (TYLENOL) tablet 325-650 mg, 325-650 mg, oral, q4h PRN, Rachel Vieira CNP  •  sennosides-docusate sodium (SENNA PLUS) 8.6-50 mg 1 tablet, 1 tablet, oral, Nightly PRN, Rachel Vieira CNP  •  bisacodyL (DULCOLAX) suppository 10 mg, 10 mg, rectal, Daily PRN, Rachel Vieira CNP  •  polyethylene glycol (GLYCOLAX) powder 17 g, 17 g, oral, Daily PRN, Rachel Vieira CNP  •  alum-mag hydroxide-simeth (MAALOX ADVANCED) suspension 30 mL, 30 mL, oral, 3x daily PRN, Rachel Vieira CNP  •  ondansetron (ZOFRAN) tablet 4 mg, 4 mg, oral, q6h PRN **OR** ondansetron (ZOFRAN) injection 4 mg, 4 mg, intravenous, q6h PRN, Rachel Vieira CNP  •  HYDROmorphone (DILAUDID) injection 0.4-0.6 mg, 0.4-0.6 mg, intravenous, q2h PRN, Rachel Vieira CNP  •  oxyCODONE (ROXICODONE) immediate release tablet 5-10 mg, 5-10 mg, oral, q4h PRN, Rachel Vieira CNP, 5 mg at 08/08/22 0214  •  fenofibrate (TRICOR) tablet 48 mg, 48 mg, oral, Daily, Rachel Vieira CNP  •  FLUoxetine (PROzac) capsule 40 mg, 40 mg, oral, Daily, Rachel Vieira CNP  •  hydrOXYzine (ATARAX) tablet  25-50 mg, 25-50 mg, oral, Nightly PRN, Rachel Vieira CNP  •  lansoprazole (PREVACID) capsule 30 mg, 30 mg, oral, Daily at 1600, Rachel Vieira CNP  •  predniSONE tablet 10 mg, 10 mg, oral, Daily, Rachel Vieira CNP  •  rosuvastatin (CRESTOR) tablet 10 mg, 10 mg, oral, q PM, Rachel Vieira CNP  •  valsartan (DIOVAN) tablet 80 mg, 80 mg, oral, Daily, Rachel Vieira CNP  •  [START ON 8/9/2022] ursodioL (AMANDEEP) tablet 750 mg, 750 mg, oral, Every other day, Rachel Vieira CNP  •  ursodioL (AMANDEEP) tablet 500 mg, 500 mg, oral, Every other day, Rachel Vieira CNP  •  ursodioL (AMANDEEP) tablet 500 mg, 500 mg, oral, Nightly, Rachel Vieira CNP       Review of Systems   Constitutional: Negative for activity change, appetite change, chills and fatigue.   HENT: Negative for congestion, postnasal drip, rhinorrhea, sinus pressure, sinus pain, sneezing and sore throat.    Eyes: Negative for photophobia, redness and visual disturbance.   Respiratory: Positive for shortness of breath.    Cardiovascular: Negative for chest pain, palpitations and leg swelling.   Gastrointestinal: Negative for abdominal distention, constipation, diarrhea and nausea.   Endocrine: Negative for polydipsia, polyphagia and polyuria.   Genitourinary: Negative for difficulty urinating, flank pain and frequency.   Musculoskeletal: Negative for back pain and myalgias.   Skin: Negative for rash and wound.   Allergic/Immunologic: Negative for environmental allergies, food allergies and immunocompromised state.   Neurological: Negative for dizziness, seizures, light-headedness and numbness.   Psychiatric/Behavioral: Negative for confusion and decreased concentration. The patient is not nervous/anxious.    All other systems reviewed and are negative.         Objective     Vital signs:  Temp:  [36.7 °C (98 °F)-36.8 °C (98.2 °F)] 36.8 °C (98.2 °F)  Heart Rate:  [] 82  Resp:  [18-20] 20  SpO2:  [88 %-94 %] 93 %  BP:  "(102-142)/(69-85) 118/73  SpO2/FiO2 Ratio Using Approximate FiO2 (%):  [391.7-465] 465  Estimated P/F Ratio Using Approximate FiO2 (%):  [335.6-394.9] 394.9    Vitals:    08/08/22 0145   BP: 118/73   Pulse: 82   Resp: 20   Temp: 36.8 °C (98.2 °F)   SpO2: 93%       Physical Exam   Vitals:    08/08/22 0015 08/08/22 0030 08/08/22 0145 08/08/22 0148   BP: 121/77 118/77 118/73    BP Location:   Left arm    Patient Position:   Sitting    Cuff Size:   Regular Adult    Pulse: 70 70 82    Resp:   20    Temp:   36.8 °C (98.2 °F)    TempSrc:   Oral    SpO2: 94% 94% 93%    Weight:    86.2 kg (190 lb)   Height:    1.905 m (6' 3\")     Weights (Current Encounter Only) (last 180 days)     Date/Time Weight    08/08/22 0148 86.2 kg (190 lb)        Exam:  Physical Exam  Vitals and nursing note reviewed.   Constitutional:       Appearance: Normal appearance. He is not ill-appearing or diaphoretic.   HENT:      Head: Normocephalic and atraumatic.      Right Ear: External ear normal.      Left Ear: External ear normal.      Nose: Nose normal.      Mouth/Throat:      Mouth: Mucous membranes are moist.      Pharynx: Oropharynx is clear.   Eyes:      General: No scleral icterus.     Pupils: Pupils are equal, round, and reactive to light.   Cardiovascular:      Rate and Rhythm: Normal rate and regular rhythm.      Pulses: Normal pulses.      Heart sounds: Normal heart sounds. No murmur heard.  Pulmonary:      Effort: Pulmonary effort is normal. No respiratory distress.      Breath sounds: Normal breath sounds. No wheezing or rales.   Abdominal:      General: Bowel sounds are normal. There is no distension.      Palpations: Abdomen is soft.      Tenderness: There is no abdominal tenderness.   Musculoskeletal:         General: Normal range of motion.      Cervical back: Normal range of motion.      Right lower leg: No edema.      Left lower leg: No edema.   Skin:     General: Skin is warm and dry.      Capillary Refill: Capillary refill takes " less than 2 seconds.      Findings: No lesion.   Neurological:      Mental Status: He is alert and oriented to person, place, and time.      Cranial Nerves: No cranial nerve deficit.      Motor: No weakness.   Psychiatric:         Mood and Affect: Mood normal.         Speech: Speech normal.         Behavior: Behavior normal. Behavior is cooperative.       RESULTS AND DECISION MAKING:  Lab Results   Component Value Date    COVID19 Negative 03/15/2022    WBC 9.1 08/07/2022    WBC 5.1 07/08/2022    WBC 13.4 (H) 03/23/2022    LYMPHSABS 0.50 08/07/2022    LYMPHSABS 1.10 07/08/2022    LYMPHSABS 1.40 11/09/2021    HGB 13.1 (L) 08/07/2022    HGB 13.1 (L) 07/08/2022    HGB 11.5 (L) 03/23/2022     (H) 08/07/2022     07/08/2022     03/23/2022    CRP <1.0 11/09/2021    FERRITIN 1,412.8 (H) 11/08/2021    FERRITIN 374.2 (H) 02/01/2021    BILITOT 0.47 08/07/2022    BILITOT 0.68 07/08/2022    BILITOT 0.37 05/20/2022    AST 14 08/07/2022    AST 18 07/08/2022    AST 17 05/20/2022    ALT 10 08/07/2022    ALT 13 07/08/2022    ALT 12 05/20/2022     Microbiology Results (last 21 days)     ** No results found for the last 504 hours. **        Results from last 4 days   Lab Units 08/07/22  2300   WBC AUTO 10*3/uL 9.1   HEMOGLOBIN g/dL 13.1*   HEMATOCRIT % 37.7*   PLATELETS AUTO 10*3/uL 392*     Results from last 4 days   Lab Units 08/07/22  2300   SODIUM mmol/L 132*   POTASSIUM MMOL/L 3.7   CHLORIDE mmol/L 98   CO2 mmol/L 21   BUN mg/dL 7   CREATININE mg/dL 0.67*   CALCIUM mg/dL 9.2   TOTAL PROTEIN g/dL 7.7   BILIRUBIN TOTAL mg/dL 0.47   ALK PHOS U/L 114   ALT U/L 10   AST U/L 14   GLUCOSE mg/dL 146*     Lab Results   Component Value Date    EZHWZMLV07 357 12/10/2021     Lab Results   Component Value Date    DDIMER 0.69 (HH) 08/24/2018     Lab Results   Component Value Date    HEPBSAB 13.2 02/15/2021     Lab Results   Component Value Date    CALCIUM 9.2 08/07/2022     I have reviewed all the lab  results.    IMAGING:  X-ray ribs with PA chest right    Result Date: 8/4/2022  Narrative: Exam: DX RIBS RIGHT W PA CHESTwith single view chest from 08/04/2022 Clinical History:  pain Procedure/Views: Right ribs 2 views with single view chest Comparison(s): 2/11/2020 Findings: There are mildly displaced right 4th, 5th, 7th and 8th rib fractures. There is no pneumothorax. The cardiac size is within normal limits. There is no significant pleural effusion. There is bibasilar atelectasis.     Impression: IMPRESSION: 1. Mildly displaced right 4th, 5th, 7th and 8th rib fractures. 2. No pneumothorax.      ASSESSMENT AND PLAN:     Assessment/Plan       Active Problems:    Traumatic fracture of ribs with pneumothorax    Hypertension    Hypercholesterolemia    Eosinophilic esophagitis    Primary biliary cholangitis (CMS/HCC) (HCC)    traumatic fracture of ribs with pneumothorax  · Fell on 8/4/2022 resulting in rib fracture/pneumothorax  · CT chest with IV contrast 8/7/2022   ·   1.  Impression right-sided pneumothorax with small fluid density right  sided pleural effusion in keeping with a hydropneumothorax.  ·  2.  Acute fractures through the lateral aspect of the right fourth through  eighth and 10th ribs.  Subacute fractures through the anterior lateral  aspect of the right fourth and fifth ribs.  · Continuous pulse oximetry  · Oxycodone as needed  · Consult to general surgery appreciate their input.    Primary biliary cholangitis  Autoimmune Hepatitis  · Follows with GI in [place]  · Continue ursodiol 500 mg every other a.m. and 750 mg the other a.m., 500 mg every at bedtime  depContinue prednisone 10 mg daily     Hypertension  Hypercholesterolemia  · Continue Crestor 10 mg q. at bedtime  · Continue fenofibrate 48 mg daily  · Continue Diovan 80 mg daily    Eosinophilic esophagitis  · Prevacid 30 mg daily    Depression  · Continue Prozac 40 mg daily      Patient's hospitalization plan has been discussed with the patient.   Time was given for questions and answers, with all questions answered to his satisfaction.    DVT Prophylaxis: SCD's- no anticoagulation due to concerns of bleeding with pneumothorax and possible chest tube    Code Status: DNR    I have discussed the CODE STATUS with the patient.    Total time spent: More than 75 minutes in reviewing prior documents, discussing with the ED physician, patient evaluation and admission management.  Greater than 50% of time spent in care coordination and counseling.    A voice recognition program was used to aid in documentation of this record. Sometimes words are not printed exactly as they were spoken.  While efforts were made to carefully edit and correct any inaccuracies, some areas may be present; please take these into context.  Please contact the provider if areas are identified.    Plan of care discussed with Dr. Mady CNP    3:37 AM, 8/8/2022   Attending Physician Statement:    I have discussed the case, including pertinent history and exam findings with the CNP. I have seen and examined the patient today 8/9/2022. The key elements of the encounter have been performed by me.  I agree with the assessment, plan and orders as documented above. I have reviewed all pertinent PMHx, PSHx, FamHx, SocialHx, medications, and allergies and updated history as appropriate.    In brief Admitted for rib fractures pain control hypoxemia secondary to splinting.     Remainder of medical problems as per above note.  DO RHONDA [email or web address]  8/11/2022 11:54 AM

## 2022-08-08 NOTE — ED PROVIDER NOTES
HPI:  Chief Complaint   Patient presents with   • Shortness of Breath     Increased shortness of breath today, worsening today.       This is a 67-year-old gentleman presents emergency department for reevaluation of difficulty breathing, worsening shortness of breath with right-sided rib pain.  He was seen in the emergency department on 8/4/2022 after he had fallen and was diagnosed with multiple right-sided rib fractures.  He was discharged home with hydrocodone/Tylenol and had no sniffing and pain relief.  He did talk to his primary care provider and was also started on Flexeril again with no pain relief.  Tonight the pain became too severe and he became much more short of breath which brought him to the emergency department          HISTORY:  Past Medical History:   Diagnosis Date   • Allergic eosinophilic esophagitis    • Arthritis    • Autoimmune hepatitis (CMS/HCC) (HCC)    • Blood disorder     pernicious anemia   • Gastrointestinal disease     IBS   • Gout    • Hyperlipidemia    • Hypertension    • Melanoma (CMS/HCC) (HCC)    • Neurologic disorder    • Primary biliary cholangitis (CMS/HCC) (HCC)     autoimmune hepatitis   • Psychiatric illness     anxiety       Past Surgical History:   Procedure Laterality Date   • APPENDECTOMY     • COLONOSCOPY W/ BIOPSIES AND POLYPECTOMY  02/09/2009    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   • CT BIOPSY LIVER FUNCTION  12/2/2021    CT BIOPSY LIVER FUNCTION 12/2/2021 [facility] MIS CT SCAN   • ESOPHAGOGASTRODUODENOSCOPY N/A 5/16/2022    Procedure: ESOPHAGOGASTRODUODENOSCOPY with Biopsies;  Surgeon: Bradley Cook MD;  Location: [facility] Endoscopy;  Service: Endoscopy;  Laterality: N/A;   • EYE SURGERY Bilateral     cataract   • KNEE SURGERY Bilateral     3 surgeries on left and 2 on right knees - prior open medial meniscectomies bilateral knees in the 1970s   • SKIN BIOPSY     • SKIN CANCER EXCISION Left 10/15/2020    Melanoma removed from left forearm   • TONSILLECTOMY     •  TOTAL HIP ARTHROPLASTY Left 4/21/2021    Procedure: LEFT TOTAL HIP ARTHROPLASTY;  Surgeon: Sj Reyes MD;  Location: [facility] OR;  Service: Orthopedics;  Laterality: Left;   • TOTAL HIP ARTHROPLASTY Right 3/22/2022    Procedure: RIGHT TOTAL HIP ARTHROPLASTY POSTERIOR;  Surgeon: Sj Reyes MD;  Location: [facility] OR;  Service: Orthopedics;  Laterality: Right;       Family History   Problem Relation Age of Onset   • Hypertension Mother    • Lung cancer Father    • Hypertension Father        Social History     Tobacco Use   • Smoking status: Never Smoker   • Smokeless tobacco: Never Used   Substance Use Topics   • Alcohol use: Yes     Alcohol/week: 1.0 standard drink     Types: 1 Glasses of wine per week     Comment: occ   • Drug use: Never         ROS:  Review of Systems   Constitutional: Negative for activity change, chills and fever.   HENT: Negative.    Respiratory: Positive for chest tightness and shortness of breath.    Cardiovascular: Positive for chest pain.   Gastrointestinal: Negative.    Genitourinary: Negative.    Musculoskeletal: Negative.    Neurological: Negative.    Psychiatric/Behavioral: Negative.        PE:  ED Triage Vitals   Temp Heart Rate Resp BP SpO2   08/07/22 2251 08/07/22 2251 08/07/22 2251 08/07/22 2251 08/07/22 2251   36.7 °C (98 °F) 97 18 142/85 92 %      Mean BP (mmHg) Temp Source Heart Rate Source Patient Position BP Location   08/07/22 2300 08/07/22 2251 -- -- --   80 Oral         FiO2 (%)       --                  Physical Exam  Vitals and nursing note reviewed.   Constitutional:       General: He is in acute distress.      Appearance: He is not toxic-appearing.   HENT:      Head: Normocephalic and atraumatic.   Eyes:      Extraocular Movements: Extraocular movements intact.      Pupils: Pupils are equal, round, and reactive to light.   Cardiovascular:      Rate and Rhythm: Regular rhythm. Tachycardia present.   Pulmonary:      Effort: No tachypnea.      Breath sounds: Examination of  the right-middle field reveals decreased breath sounds. Examination of the right-lower field reveals decreased breath sounds. Examination of the left-lower field reveals decreased breath sounds. Decreased breath sounds present. No wheezing, rhonchi or rales.   Chest:      Chest wall: Tenderness (Right anterior lateral chest from nipple line distal to 10th rib) present.   Abdominal:      General: Bowel sounds are normal.      Palpations: Abdomen is soft.      Tenderness: There is no guarding.   Skin:     General: Skin is warm.   Neurological:      General: No focal deficit present.      Mental Status: He is alert and oriented to person, place, and time.   Psychiatric:         Mood and Affect: Mood normal.         ED LABS:  Labs Reviewed   CBC WITH AUTO DIFFERENTIAL - Abnormal       Result Value    WBC 9.1      RBC 3.65 (*)     Hemoglobin 13.1 (*)     Hematocrit 37.7 (*)     .3 (*)     MCH 35.9 (*)     MCHC 34.8      RDW 14.3      Platelets 392 (*)     MPV 7.3      Neutrophils% 87.5 (*)     Lymphocytes% 5.0 (*)     Monocytes% 6.3      Eosinophils% 0.7      Basophils% 0.5      ANC (auto diff) 7.90      Lymphocytes Absolute 0.50      Monocytes Absolute 0.60      Eosinophils Absolute 0.10      Basophils Absolute 0.00      Macrocytosis 1+     COMPREHENSIVE METABOLIC PANEL - Abnormal    Sodium 132 (*)     Potassium 3.7      Chloride 98      CO2 21      Anion Gap 13 (*)     BUN 7      Creatinine 0.67 (*)     Glucose 146 (*)     Calcium 9.2      AST 14      ALT (SGPT) 10      Alkaline Phosphatase 114      Total Protein 7.7      Albumin 3.6      Total Bilirubin 0.47      Corrected Calcium 9.5      eGFR 102      Narrative:     Calculation based on the 2021 Chronic Kidney Disease Epidemiology Collaboration (CKD-EPI) equation refit without adjustment for race.         ED IMAGES:  X-ray chest 2 views    (Results Pending)   CT chest with IV contrast    (Results Pending)       ED PROCEDURES:  Procedures    ED COURSE:      67-year-old gentleman presents emergency department for reevaluation status post fall with right-sided rib fractures.  He has had inadequate pain control.  He has very poor inspiration on exam and decreased breath sounds on the right side.  Chest x-ray showed elevation of the right hemidiaphragm with infiltrate and multiple rib fractures.  CT chest with IV contrast was ordered for evaluation of possible hemothorax versus pneumonia.  CT chest did show a small right-sided pneumothorax with a small fluid density in the right side with pleural effusion.  He also has acute fractures of the lateral aspects of right ribs 4, 5, 6, 7, 8 and 10 with subacute fractures noted anterior lateral aspect of right fourth and fifth ribs.  He was given morphine 4 mg and then additional 2 mg with diazepam 5 mg IV.  He had moderate reduction of pain control in the emergency department.  He did require oxygen supplementation to maintain his oxygen saturation greater than 90%.  Because of his age, multiple fractures, poor pain control and hypoxemia he will need admission.  I discussed this patient with Dr. Gutierrez who agreed with admission.  He requested that the patient be admitted to the hospitalist service as this is not an acute injury.  I did discuss this with the hospitalist service and they did agree with accept the patient for admission.  Dr. Gutierrez will follow along in consult.     Sepsis Quality Bundle         MDM:  MDM  Number of Diagnoses or Management Options  Hypoxemia: new and requires workup  Inadequate pain control: established and worsening  Multiple rib fractures: established and worsening     Amount and/or Complexity of Data Reviewed  Clinical lab tests: reviewed and ordered  Tests in the radiology section of CPT®: ordered and reviewed    Risk of Complications, Morbidity, and/or Mortality  Presenting problems: high  Management options: moderate    Patient Progress  Patient progress: stable      Final diagnoses:    [S22.49XA] Multiple rib fractures   [R52] Inadequate pain control   [R09.02] Hypoxemia     8/8/2022 12:59 AM                 Roshni De La O, DO  08/08/22 0101       Roshni De La O, DO  08/08/22 0103

## 2022-08-08 NOTE — INTERDISCIPLINARY/THERAPY
[address] [facility]  [place] [address]  [phone]      Referring provider: Arpit Klein DO    Physical Therapy Initial Evaluation     Date of Service: 8/8/2022    Patient Name: Juan Diego Rondon  Referring Provider: No ref. provider found    Onset Date: August 7, 2022  SOC Date: (P) 08/08/22     Prior Hospitalizations: See EMR  HICN: [id number]    Primary Medical Diagnosis: Multiple rib fractures [S22.49XA]     Treatment Diagnosis: Impaired muscle performance     Pattern 5A: Primary Prevention/Risk Reduction for Loss of Balance and Falling        SUBJECTIVE:  HISTORY OF CURRENT COMPLAINT:   Patient reports she fell at home ambulating without any use of assistive device.  Patient reports he was walking from first hardwood floor to transition and caught his foot on the carpet.    PRIOR LEVEL OF FUNCTION:   Independent with mobility without use assistive device reported falls over the last few months    SOCIAL/OCCUPATIONAL/RECREATIONAL:  Patient lives with his wife and has 1-2 steps into his home    Pain:  Pain Assessment Scale  Pain Scale: (P) 0-10  Post PRN Pain Med Admin Reassessment - Pt Asleep/Off Unit: Eyes closed/Asleep  0-10 Pain Score: (P) 4  Patient's Stated Pain Goal: (P) No pain  Has Pain Adversely Affected Your Usual Activity, Sleep, Mood or Stress in the Last 24 Hours?: (P) Yes  How Has Pain Adversely Affected You?: Decreased mobility, Sleep disturbances  Pain Type: Acute pain  Pain Location: Chest  Pain Orientation: Right  Pain Descriptors: Sharp, Stabbing  Pain Frequency: Constant/continuous         Past Medical History:   Diagnosis Date    Allergic eosinophilic esophagitis     Arthritis     Autoimmune hepatitis (CMS/[facility]) ([facility])     Blood disorder     pernicious anemia    Depression 08/08/2022    Gastrointestinal disease     IBS    Gout     Hyperlipidemia     Hypertension     Melanoma (CMS/[facility]) ([facility])     Neurologic disorder     Pneumothorax 08/07/2022    Primary biliary cholangitis  (CMS/HCC) (HCC)     autoimmune hepatitis    Psychiatric illness     anxiety         Current Facility-Administered Medications:     Insert peripheral IV, , , Once **AND** Maintain IV access, , , Until discontinued **AND** Saline lock IV, , , Once **AND** sodium chloride flush 3 mL, 3 mL, intravenous, PRN, Rachel Vieira CNP    acetaminophen (TYLENOL) tablet 325-650 mg, 325-650 mg, oral, q4h PRN, Rachel Vieira CNP    sennosides-docusate sodium (SENNA PLUS) 8.6-50 mg 1 tablet, 1 tablet, oral, Nightly PRN, Rachel Vieira CNP    bisacodyL (DULCOLAX) suppository 10 mg, 10 mg, rectal, Daily PRN, Rachel Vieira CNP    polyethylene glycol (GLYCOLAX) powder 17 g, 17 g, oral, Daily PRN, Rachel Vieira CNP    alum-mag hydroxide-simeth (MAALOX ADVANCED) suspension 30 mL, 30 mL, oral, 3x daily PRN, Rachel Vieira CNP    ondansetron (ZOFRAN) tablet 4 mg, 4 mg, oral, q6h PRN **OR** ondansetron (ZOFRAN) injection 4 mg, 4 mg, intravenous, q6h PRN, Rachel Vieira CNP    HYDROmorphone (DILAUDID) injection 0.4-0.6 mg, 0.4-0.6 mg, intravenous, q2h PRN, Rachel Vieira CNP    oxyCODONE (ROXICODONE) immediate release tablet 5-10 mg, 5-10 mg, oral, q4h PRN, Rachel Vieira CNP, 5 mg at 08/08/22 0615    fenofibrate (TRICOR) tablet 48 mg, 48 mg, oral, Daily, Rachel Vieira CNP, 48 mg at 08/08/22 0933    FLUoxetine (PROzac) capsule 40 mg, 40 mg, oral, Daily, Rachel Vieira CNP, 40 mg at 08/08/22 0932    hydrOXYzine (ATARAX) tablet 25-50 mg, 25-50 mg, oral, Nightly PRN, Rachel Vieira CNP    lansoprazole (PREVACID) capsule 30 mg, 30 mg, oral, Daily at 1600, Rachel Vieira CNP    predniSONE tablet 10 mg, 10 mg, oral, Daily, Rachel Vieira CNP, 10 mg at 08/08/22 0933    rosuvastatin (CRESTOR) tablet 10 mg, 10 mg, oral, q PM, Rachel Vieira CNP    valsartan (DIOVAN) tablet 80 mg, 80 mg, oral, Daily, Rachel Vieira CNP, 80 mg at 08/08/22 0937    [START ON  8/9/2022] ursodioL (AMANDEEP) tablet 750 mg, 750 mg, oral, Every other day, Rachel Vieira CNP    ursodioL (AMANDEEP) tablet 500 mg, 500 mg, oral, Every other day, Rachel Vieira CNP, 500 mg at 08/08/22 0932    ursodioL (AMANDEEP) tablet 500 mg, 500 mg, oral, Nightly, Rachel Vieira CNP    Current Outpatient Medications:     omeprazole (PriLOSEC) 40 mg capsule, Take 40 mg by mouth every morning, Disp: , Rfl:     predniSONE 10 mg tablet, Take 10 mg by mouth daily, Disp: , Rfl:     valsartan (DIOVAN) 80 mg tablet, Take 1 tablet by mouth daily, Disp: , Rfl:     fenofibrate (TRICOR) 48 mg tablet, Take 48 mg by mouth daily, Disp: , Rfl:     FLUoxetine (PROzac) 40 mg capsule, Take 40 mg by mouth daily, Disp: , Rfl:     hydrOXYzine (ATARAX) 25 mg tablet, Take 25-50 mg by mouth nightly as needed, Disp: , Rfl:     ursodioL (ACTIGALL) 500 mg tablet, Take 500 mg by mouth 2 times daily Every other day, Disp: , Rfl:     ursodioL (AMANDEEP) 250 mg tablet, Take 750 mg by mouth In am and 500 mg in pm every other day-alternate with the 500 mg bid, Disp: , Rfl:     cyanocobalamin 1,000 mcg/mL injection, Inject 1,000 mcg into the shoulder, thigh, or buttocks every 30 (thirty) days, Disp: , Rfl:     rosuvastatin (CRESTOR) 10 mg tablet, Take 10 mg by mouth every evening  , Disp: 30, Rfl: 6    oxyCODONE (ROXICODONE) 5 mg immediate release tablet, Take 1 tablet (5 mg total) by mouth every 4 (four) hours as needed for pain scale 8-10/10 for up to 3 days Max Daily Amount: 30 mg, Disp: 8 tablet, Rfl: 0    acetaminophen (TYLENOL) 500 mg tablet, Take 500 mg by mouth every 6 (six) hours as needed for pain scale 1-3/10, Disp: , Rfl:     ALLERGIES:  Patient has no known allergies.    CURRENT ASSISTIVE OR ADAPTIVE EQUIPMENT:  Patient reports he has a single-point cane and front wheel walker at home.  Stephenson Elyssa Risk Score: 70  FALL RISK Interventions: Patient instructed in safe use of single-point cane using in left upper extremity to assist  right lower extremity.  Patient also instructed in balance compensatory techniques increasing with of his base of support and decreasing step length bilaterally to increase balance when he feels unsteady.  Patient able to demonstrate and verbalize understanding.    DIAGNOSTIC TESTING:  See EMR  ACTIVITIES LIMITED BY PATIENT COMPLAINT: Rib pain    PATIENT'S GOALS FOR THERAPY:   To be discharged home today.    OBJECTIVE:    Patient up in wheelchair being prepared for discharge this afternoon.  Patient will end up dissipate in therapy prior to discharge.    Training: To and from sit to stand transfers without assistive device at independent without loss of balance.  To and from sit to stand transfers x2.         Ambulation 1  Surface 1: (P) Level surface  Device 1: (P) Single point cane  Assistance 1: (P) Modified independent  Quality of Gait 1: (P) Step to gait pattern, use of cane in left upper extremity, patient tends to take extended step length bilaterally.  Ambulation Distance (meters): (P) 50 meters  Comments 1: (P) Patient had mild balance deviation during counterclockwise turn with mild deviation to the left.  Patient able to recover using single-point cane      Lower extremity range of motion and strength assessment: Bilateral lower extremity range of motion ankles knees and hips grossly within functional limits and no significant unilateral deficits.  Lower extremity strength not formally tested but observed at least 3/5 for ankles knees and hips with no significant deficits.                                                                                            EDUCATION:  Education                      No education to display                      Time Calculation  Start Time: 1506  Stop Time: 1520  Time Calculation (min): 14 min                        EVALUATION:  Low complexity: no personal factors or comorbidities affecting plan of care; evaluation of 1-2 body structures, functions, or activity  limitations; stable or uncomplicated clinical presentation.         INITIAL TREATMENT:  Patient education instruction on compensatory techniques for increased balance during ambulation and standing.      ASSESSMENT:  Patient presents with status post fall with increase rib fracture pain..  This patient would benefit from patient therapy services for functional balance home exercise program..    Rehab Potential:    Good    Barriers to outcome: None       GOALS:        Multi-Disciplinary Problems (from Physical Therapy)      Active Problems       Not on file                    PLAN:  It is recommended that the client attend rehabilitative therapy for up to 7 visits per week with an expected duration through Certification Period: (P) 08/09/22.  Interventions during the course of treatment may include any combination of the following:  neuromuscular re-education, and other balance training .   A voice recognition software and device was used to aid in medical record documentation. Some words may be printed not exactly as they were spoken. Efforts were made to carefully edit and correct any inaccuracies; however, some errors may be present.             Thank you for allowing us to share in the care of this patient. If you have any questions, recommendations, or further concerns regarding this patient, please feel free to contact us at [address] [facility] [address]  Dept: [phone]      Signed by: HERLINDA ADAMSON, PT 8/8/2022  3:31 PM      * I have reviewed the plan of care and certify a continuing need for medically necessary services    Co-signed by:_________________________ Date and Time:________________

## 2022-08-08 NOTE — ANESTHESIA PREPROCEDURE EVALUATION
"Pre-Procedure Assessment    Patient: Juan Diego Rondon, male, 67 y.o.    Ht Readings from Last 1 Encounters:   08/08/22 1.905 m (6' 3\")     Wt Readings from Last 1 Encounters:   08/08/22 86.2 kg (190 lb)       Last Vitals  BP      Temp      Pulse     Resp      SpO2 92 % (08/08/22 1016)    Pain Score         Problem list reviewed and Medical history reviewed           Airway   Mallampati: II  TM distance: >3 FB  Neck ROM: full      Dental - normal exam     Pulmonary - normal exam     ROS comment: Hypoxic from rib fractures 4,5,7,8    Small Pneumothorax that doesn't need innervation  Cardiovascular   Exercise tolerance: good (4-7 METS)  (+) hypertension,     Rhythm: regular  Rate: normal    Mental Status/Neuro/Psych    Pt is alert.      (+) psychiatric history, arthritis,     GI/Hepatic/Renal    (+) liver disease,     Endo/Other    (+) history of cancer,   Abdominal           Social History     Tobacco Use   • Smoking status: Never Smoker   • Smokeless tobacco: Never Used   Substance Use Topics   • Alcohol use: Yes     Alcohol/week: 1.0 standard drink     Types: 1 Glasses of wine per week     Comment: occ      Hematology   WBC   Date Value Ref Range Status   08/07/2022 9.1 3.7 - 9.6 10*3/uL Final     RBC   Date Value Ref Range Status   08/07/2022 3.65 (L) 4.10 - 5.80 10*6/µL Final     MCV   Date Value Ref Range Status   08/07/2022 103.3 (H) 82.0 - 97.0 fL Final     Hemoglobin   Date Value Ref Range Status   08/07/2022 13.1 (L) 13.2 - 17.2 g/dL Final     Hematocrit   Date Value Ref Range Status   08/07/2022 37.7 (L) 38.0 - 50.0 % Final     Platelets   Date Value Ref Range Status   08/07/2022 392 (H) 130 - 350 10*3/uL Final      Coagulation   Protime   Date Value Ref Range Status   05/20/2022 10.9 9.4 - 12.5 seconds Final     INR   Date Value Ref Range Status   05/20/2022 0.9 <=1.1 Final      General Chemistry   Calcium   Date Value Ref Range Status   08/07/2022 9.2 8.6 - 10.3 mg/dL Final     BUN   Date Value Ref Range " Status   08/07/2022 7 7 - 25 mg/dL Final     Creatinine   Date Value Ref Range Status   08/07/2022 0.67 (L) 0.70 - 1.30 mg/dL Final     Glucose   Date Value Ref Range Status   08/07/2022 146 (H) 70 - 105 mg/dL Final     Sodium   Date Value Ref Range Status   08/07/2022 132 (L) 135 - 145 mmol/L Final     Potassium   Date Value Ref Range Status   08/07/2022 3.7 3.5 - 5.1 MMOL/L Final     Magnesium   Date Value Ref Range Status   08/08/2022 1.7 (L) 1.8 - 2.4 mg/dL Final     CO2   Date Value Ref Range Status   08/07/2022 21 21 - 32 mmol/L Final     Chloride   Date Value Ref Range Status   08/07/2022 98 98 - 107 mmol/L Final     Anesthesia Plan    ASA 3   NPO status reviewed: > 8 hours    Regional  Regional type: other (GLYNN block for rib fractures)   Laterality: right           Additional Comments: Thorough discussion of R/B/A had with patient including possibility of incomplete pain coverage.            Anesthetic plan and risks discussed with patient.

## 2022-08-08 NOTE — DISCHARGE SUMMARY
Discharge Summary    PCP: Seun Vásquez DO    Principal Diagnosis:   Traumatic fracture of ribs with pneumothorax    Admit Date:8/7/2022  Discharge Date: 8/8/2022      Admission Diagnosis:   Present on Admission:  • Traumatic fracture of ribs with pneumothorax  • Hypercholesterolemia  • Hypertension  • Eosinophilic esophagitis  • Primary biliary cholangitis (CMS/HCC) (HCC)  • Depression       Discharge Diagnosis:  Patient Active Problem List   Diagnosis   • Osteoarthritis of right knee   • Chronic pain of right knee   • History of total hip arthroplasty, left   • Osteoarthritis of right hip   • Avascular necrosis of bone of right hip (CMS/HCC) (HCC)   • S/P total right hip arthroplasty   • COVID-19   • Traumatic fracture of ribs with pneumothorax   • Hypertension   • Hypercholesterolemia   • Eosinophilic esophagitis   • Pernicious anemia   • Primary biliary cholangitis (CMS/HCC) (HCC)   • Depression        Hospital Course: 67-year-old man with a past medical history of IBS gout hypertension hyperlipidemia primary biliary cholangitis autoimmune hepatitis Came to the emergency room for rib pain he had originally fallen on 8/4/2022 he had seen been seen by the emergency department and Imaging did not appreciate All of the rib fractures.  He was found to have rib fractures 4 through 8 and 10 on the right.  He was started on opiates for pain control and Anesthesia was able to provide a scalene block for pain control ideally lasting 72 hours.  He was feeling much better after his block and was able to be discharged home without incident.He is to follow-up with his primary care physician.     Physical Exam  Constitutional:       General: He is awake. He is not in acute distress.  Neck:      Vascular: No JVD.   Cardiovascular:      Rate and Rhythm: Normal rate and regular rhythm.      Heart sounds: Normal heart sounds, S1 normal and S2 normal. No murmur heard.    No S3 or S4 sounds.   Pulmonary:      Effort: Pulmonary  effort is normal.      Breath sounds: Normal breath sounds. No rhonchi or rales.   Abdominal:      Palpations: Abdomen is soft.      Tenderness: There is no abdominal tenderness. There is no guarding or rebound.   Musculoskeletal:      Right lower leg: No edema.      Left lower leg: No edema.   Neurological:      Mental Status: He is alert and oriented to person, place, and time.   Psychiatric:         Mood and Affect: Mood and affect normal.             Pending test results: None    Consults:  1. Anesthesia  2.     Procedures:  1. Right scalene block    Condition at discharge: Stable    Disposition: home    Discharge Medications:     Your medication list      START taking these medications      Instructions Last Dose Given Next Dose Due   oxyCODONE 5 mg immediate release tablet  Commonly known as: ROXICODONE      Take 1 tablet (5 mg total) by mouth every 4 (four) hours as needed for pain scale 8-10/10 for up to 3 days Max Daily Amount: 30 mg          CONTINUE taking these medications      Instructions Last Dose Given Next Dose Due   acetaminophen 500 mg tablet  Commonly known as: TYLENOL           Crestor 10 mg tablet  Generic drug: rosuvastatin           cyanocobalamin 1,000 mcg/mL injection           fenofibrate 48 mg tablet  Commonly known as: TRICOR           FLUoxetine 40 mg capsule  Commonly known as: PROzac           hydrOXYzine 25 mg tablet  Commonly known as: ATARAX           omeprazole 40 mg capsule  Commonly known as: PriLOSEC           predniSONE 10 mg tablet           ursodioL 500 mg tablet  Commonly known as: ACTIGALL           ursodioL 250 mg tablet  Commonly known as: AMANDEEP           valsartan 80 mg tablet  Commonly known as: DIOVAN              STOP taking these medications    HYDROcodone-acetaminophen 5-325 mg per tablet  Commonly known as: NORCO              Where to Get Your Medications      These medications were sent to [id number] - [place], [place] - [address] AT [address] OF  [facility][1]  [address] [place] [address] [place] [address]    Phone: [phone]   · oxyCODONE 5 mg immediate release tablet          Activity: activity as tolerated  Diet: regular diet    Follow-up post discharge:   1. Follow-up with primary care physician on healing of rib fractures and pain control.    35 minutes face-to-face discussion, medication reconciliation, orders etc. were required for their discharge, performed by me today.    Arpit Klein DO    2:56 PM 8/8/2022    Labs from this hospital stay  Results for orders placed or performed during the hospital encounter of 08/07/22   CBC w/auto differential Blood, Venous   Result Value Ref Range    WBC 9.1 3.7 - 9.6 10*3/uL    RBC 3.65 (L) 4.10 - 5.80 10*6/µL    Hemoglobin 13.1 (L) 13.2 - 17.2 g/dL    Hematocrit 37.7 (L) 38.0 - 50.0 %    .3 (H) 82.0 - 97.0 fL    MCH 35.9 (H) 29.0 - 34.0 pg    MCHC 34.8 32.0 - 36.0 g/dL    RDW 14.3 11.5 - 15.0 %    Platelets 392 (H) 130 - 350 10*3/uL    MPV 7.3 6.9 - 10.8 fL    Neutrophils% 87.5 (H) 46.0 - 70.0 %    Lymphocytes% 5.0 (L) 15.0 - 47.0 %    Monocytes% 6.3 5.0 - 13.0 %    Eosinophils% 0.7 0.0 - 3.0 %    Basophils% 0.5 0.0 - 2.0 %    ANC (auto diff) 7.90 10*3/UL    Lymphocytes Absolute 0.50 10*3/uL    Monocytes Absolute 0.60 10*3/uL    Eosinophils Absolute 0.10 10*3/uL    Basophils Absolute 0.00 10*3/uL    Macrocytosis 1+    Comprehensive metabolic panel Blood, Venous   Result Value Ref Range    Sodium 132 (L) 135 - 145 mmol/L    Potassium 3.7 3.5 - 5.1 MMOL/L    Chloride 98 98 - 107 mmol/L    CO2 21 21 - 32 mmol/L    Anion Gap 13 (H) 3 - 11 mmol/L    BUN 7 7 - 25 mg/dL    Creatinine 0.67 (L) 0.70 - 1.30 mg/dL    Glucose 146 (H) 70 - 105 mg/dL    Calcium 9.2 8.6 - 10.3 mg/dL    AST 14 <40 U/L    ALT (SGPT) 10 7 - 52 U/L    Alkaline Phosphatase 114 45 - 115 U/L    Total Protein 7.7 6.0 - 8.3 g/dL    Albumin 3.6 3.5 - 5.3 g/dL    Total Bilirubin 0.47 0.20 - 1.40 mg/dL    Corrected Calcium 9.5 8.6 -  10.3 mg/dL    eGFR 102 >60 mL/min/1.73m*2   Magnesium Blood, Venous   Result Value Ref Range    Magnesium 1.7 (L) 1.8 - 2.4 mg/dL   Phosphorus Blood, Venous   Result Value Ref Range    Phosphorus 4.1 2.5 - 4.9 mg/dL   CBC Blood, Venous   Result Value Ref Range    WBC 9.7 (H) 3.7 - 9.6 10*3/uL    RBC 3.55 (L) 4.10 - 5.80 10*6/µL    Hemoglobin 12.7 (L) 13.2 - 17.2 g/dL    Hematocrit 36.6 (L) 38.0 - 50.0 %    .1 (H) 82.0 - 97.0 fL    MCH 35.7 (H) 29.0 - 34.0 pg    MCHC 34.7 32.0 - 36.0 g/dL    RDW 14.0 11.5 - 15.0 %    Platelets 373 (H) 130 - 350 10*3/uL    MPV 6.9 6.9 - 10.8 fL    Macrocytosis 1+    Basic metabolic panel Blood, Venous   Result Value Ref Range    Sodium 134 (L) 135 - 145 mmol/L    Potassium 3.4 (L) 3.5 - 5.1 MMOL/L    Chloride 99 98 - 107 mmol/L    CO2 24 21 - 32 mmol/L    BUN 8 7 - 25 mg/dL    Creatinine 0.75 0.70 - 1.30 mg/dL    Glucose 108 (H) 70 - 105 mg/dL    Calcium 9.3 8.6 - 10.3 mg/dL    Anion Gap 11 3 - 11 mmol/L    eGFR 98 >60 mL/min/1.73m*2       Radiology  X-ray chest 2 views    Result Date: 8/8/2022  Narrative: EXAMINATION: DX CHEST 2 VW EXAMINATION DATE: 08/07/2022 HISTORY:  dyspnea COMPARISON: 8/4/2022 TECHNIQUE: Chest 2 view FINDINGS: The cardiac size is within normal limits. There is worsening left basilar atelectasis. There is a small right-sided pneumothorax. There is pleural separation superiorly of approximately 8 mm. There is a trace right-sided pleural effusion. There is left basilar atelectasis. There are mild degenerative changes of the thoracic spine. There is redemonstration of right 4th, 5th and 7th rib fractures.     Impression: IMPRESSION: 1. Small right-sided pneumothorax. 2. Redemonstration of multiple right-sided rib fractures. 3. Worsening right basilar atelectasis.     X-ray chest 2 views    Result Date: 8/8/2022  Narrative: EXAMINATION: DX CHEST 2 VW EXAMINATION DATE: 08/08/2022 HISTORY:  pneumothorax COMPARISON: 8/7/2022 TECHNIQUE: Chest 2 view FINDINGS: The  cardiac size is stable. There is a small right-sided pneumothorax. There is pleural separation superiorly by approximately 11 mm. There is right basilar atelectasis. There is a trace right-sided pleural effusion. There is redemonstration of multiple right-sided rib fractures.     Impression: IMPRESSION: 1. Stable small right-sided pneumothorax. 2. Redemonstration of multiple right-sided rib fractures. 3. Trace right-sided pleural effusion.     X-ray ribs with PA chest right    Result Date: 8/4/2022  Narrative: Exam: DX RIBS RIGHT W PA CHESTwith single view chest from 08/04/2022 Clinical History:  pain Procedure/Views: Right ribs 2 views with single view chest Comparison(s): 2/11/2020 Findings: There are mildly displaced right 4th, 5th, 7th and 8th rib fractures. There is no pneumothorax. The cardiac size is within normal limits. There is no significant pleural effusion. There is bibasilar atelectasis.     Impression: IMPRESSION: 1. Mildly displaced right 4th, 5th, 7th and 8th rib fractures. 2. No pneumothorax.    CT chest with IV contrast    Result Date: 8/8/2022  Narrative: Exam: CT of the chest with contrast from 08/08/2022 Clinical History:  Chest trauma  blunt Comparison(s): 8/4/2022 Technique: Helical axial imaging was performed through the chest after intravenous administration of 80 cc of Isovue-370. Coronal and sagittal reformatted images were generated. Dose Reduction Technique: Dose reduction technique utilized; automatic/anatomic modulation of X-ray tube current (Auto mA). Findings: There is cardiomegaly. The thoracic aorta contains atherosclerotic calcification without aneurysm. There is no mediastinal or hilar lymphadenopathy. There is no axillary lymphadenopathy. The thyroid appears unremarkable. The trachea is patent. There is a small right-sided pneumothorax. There is a small right-sided pleural effusion. There is atelectasis within the right lung base. There is atelectasis within the left lung base.  There is no acute abnormality within the visualized upper abdomen. Hepatic steatosis. There is no acute compression fracture of the thoracic spine. There are mildly displaced right lateral 4th, 5th, 6th, 7th, 8th and 10th rib fractures. There are fractures of the anterior right 4th and 5th ribs at the costochondral junction.     Impression: IMPRESSION: 1. Small right-sided pneumothorax. There is a small right-sided pleural effusion keeping with a hydropneumothorax. 2. Mildly displaced lateral right 4th through 8th and 10th rib fractures. Right anterior 4th and 5th rib fractures at the costochondral junction. Results were called to Dr. De La O by Dr. Chidi Segura on 8/8/2022. On-call imaging was performed and a preliminary report was provided by ad following the procedure. Agree with preliminary report.    US guided nerve block (anesthesia)    Result Date: 8/8/2022  Narrative: Please see procedure notes for interpretation of this study.

## 2022-08-09 ENCOUNTER — PATIENT OUTREACH (OUTPATIENT)
Dept: FAMILY MEDICINE | Facility: CLINIC | Age: 67
End: 2022-08-09
Payer: MEDICARE

## 2022-08-09 NOTE — PROGRESS NOTES
Juan Diego Rondon was contacted following recent hospitalization at [facility]. The patient was discharged from the hospital on 8/8/2022 .The Discharge Summary and After Visit Summary were reviewed. The patient's main diagnosis during the hospitalization was Traumatic fracture of ribs with pneumothorax: .  Followup appointment: 8/11/22 with PCP. Any additional testing and labs will be discussed at the patient's upcoming post-hospital follow up appointment.    Transitional Care Management Follow Up (most recent)     Transitional Care Management - 08/09/22 1441        OTHER    Date of post hospital outreach 08/09/22     Contact Status Contact Done     Speaking with the Patient or Patient's Caregiver? Patient     Is the patient on the Diabetes registry? N     Were patient's home medications changed or did they have any new medications added during the hospitalization? Y     Did someone go over the discharge summary with the patient or the patient's caregiver and discuss the medications listed on it? Y     Does the patient or patient's caregiver have any questions about the medication changes? N     Patient verbalized understanding of when to contact health care provider or when to get help right away? Y     Discharge instructions reviewed with patient or patient's caregiver and all questions answered? Y     Is there a Home Health/DME Plan being enacted? Please note name of HH agency, DME vendor, contacted/received N     Does patient have psychosocial issues that might impact their health status? None     Does patient have financial barriers to care? None                  Coby Johnson RN  August 9, 2022 2:44 PM

## 2022-08-11 ENCOUNTER — OFFICE VISIT (OUTPATIENT)
Dept: FAMILY MEDICINE | Facility: CLINIC | Age: 67
End: 2022-08-11
Payer: MEDICARE

## 2022-08-11 ENCOUNTER — ANCILLARY PROCEDURE (OUTPATIENT)
Dept: RADIOLOGY | Facility: CLINIC | Age: 67
End: 2022-08-11
Payer: MEDICARE

## 2022-08-11 VITALS
WEIGHT: 190 LBS | OXYGEN SATURATION: 97 % | RESPIRATION RATE: 16 BRPM | TEMPERATURE: 97.7 F | BODY MASS INDEX: 23.75 KG/M2 | SYSTOLIC BLOOD PRESSURE: 122 MMHG | DIASTOLIC BLOOD PRESSURE: 80 MMHG | HEART RATE: 78 BPM

## 2022-08-11 DIAGNOSIS — K74.3 PRIMARY BILIARY CHOLANGITIS (CMS/HCC): ICD-10-CM

## 2022-08-11 DIAGNOSIS — L21.9 SEBORRHEIC DERMATITIS: ICD-10-CM

## 2022-08-11 DIAGNOSIS — S22.41XD: Primary | ICD-10-CM

## 2022-08-11 DIAGNOSIS — S27.0XXD: Primary | ICD-10-CM

## 2022-08-11 PROCEDURE — 71046 X-RAY EXAM CHEST 2 VIEWS: CPT | Mod: 26,CMS | Performed by: RADIOLOGY

## 2022-08-11 PROCEDURE — 99213 OFFICE O/P EST LOW 20 MIN: CPT | Performed by: FAMILY MEDICINE

## 2022-08-11 PROCEDURE — G0463 HOSPITAL OUTPT CLINIC VISIT: HCPCS | Performed by: FAMILY MEDICINE

## 2022-08-11 PROCEDURE — 71046 X-RAY EXAM CHEST 2 VIEWS: CPT

## 2022-08-11 RX ORDER — HYDROCODONE BITARTRATE AND ACETAMINOPHEN 5; 325 MG/1; MG/1
1 TABLET ORAL EVERY 6 HOURS PRN
Qty: 30 TABLET | Refills: 0 | Status: SHIPPED | OUTPATIENT
Start: 2022-08-11 | End: 2022-08-18 | Stop reason: SDUPTHER

## 2022-08-11 RX ORDER — HYDROCORTISONE 25 MG/G
CREAM TOPICAL 2 TIMES DAILY PRN
Qty: 30 G | Refills: 0 | Status: SHIPPED | OUTPATIENT
Start: 2022-08-11 | End: 2024-04-30 | Stop reason: SDUPTHER

## 2022-08-11 ASSESSMENT — ENCOUNTER SYMPTOMS
SLEEP DISTURBANCE: 0
HEMATURIA: 0
CONSTIPATION: 0
PALPITATIONS: 0
DIFFICULTY URINATING: 0
ADENOPATHY: 0
COUGH: 1
POLYDIPSIA: 0
SHORTNESS OF BREATH: 0
CHEST TIGHTNESS: 0
RHINORRHEA: 0
APPETITE CHANGE: 0
VOMITING: 0
MYALGIAS: 0
NERVOUS/ANXIOUS: 1
EYE REDNESS: 0
WHEEZING: 0
DIZZINESS: 0
ARTHRALGIAS: 1
JOINT SWELLING: 0
UNEXPECTED WEIGHT CHANGE: 0
BRUISES/BLEEDS EASILY: 0
BACK PAIN: 0
NAUSEA: 0
FEVER: 0
DYSURIA: 0
FATIGUE: 1
ABDOMINAL PAIN: 0
FREQUENCY: 0
CHILLS: 0
SORE THROAT: 0
SINUS PRESSURE: 0
NUMBNESS: 0
HEADACHES: 0
TROUBLE SWALLOWING: 0
BLOOD IN STOOL: 0
EYE DISCHARGE: 0
EYE PAIN: 0
DIARRHEA: 0
WOUND: 0
DYSPHORIC MOOD: 0
WEAKNESS: 0
VOICE CHANGE: 0
DIAPHORESIS: 0

## 2022-08-11 ASSESSMENT — PAIN SCALES - GENERAL: PAINLEVEL: 9

## 2022-08-11 NOTE — PROGRESS NOTES
"Subjective      Juan Diego Rondon is a 67 y.o. male who presents for Follow-up (Hospitalization from 8/7/22-8/8/22 due to Traumatic fracture of ribs with pneumothorax, hypertension, hypercholesterolemia, eosinophilic esophagitis, primary biliary cholangitis, depression.)  .    Transitional Care Management Progress Note: Face-to-Face Visit    Patient presents with his wife for a transitional care visit status post hospitalization from 8/7/22-8/8/22 due to traumatic fracture of ribs with pneumothorax, hypertension, hypercholesterolemia, eosinophilic esophagitis, primary biliary cholangitis, and depression. He states overall he does feel that his rib pain has mildly improved since hospital discharge. He is currently treating pain with Tylenol but states this really is not helpful. He was prescribed oxycodone 5mg every 4 hours as needed at discharge but patient states he does not like how this medication makes him feel so he has discontinued it. Patient with primary biliary cholangitis and follows with Dr. Cook in [place] for this.  No acute issues or concerns.    The discharge summary and/or Transitional Care Management documentation was reviewed. Medication reconciliation was performed as indicated via the \"Henry as Reviewed\" timestamp in encounter.    Juan Diego Rondon was contacted by Transitional Care Management services 1 day after his discharge. This encounter and supporting documentation was reviewed.    The complexity of medical decision making for this patient's transitional care is moderate.        The following have been reviewed and updated as appropriate in this visit:   Tobacco  Allergies  Meds  Problems  Med Hx  Surg Hx  Fam Hx           No Known Allergies  Current Outpatient Medications   Medication Sig Dispense Refill   • oxyCODONE (ROXICODONE) 5 mg immediate release tablet Take 1 tablet (5 mg total) by mouth every 4 (four) hours as needed for pain scale 8-10/10 for up to 3 days Max Daily " Amount: 30 mg 8 tablet 0   • omeprazole (PriLOSEC) 40 mg capsule Take 40 mg by mouth every morning     • predniSONE 10 mg tablet Take 10 mg by mouth daily     • valsartan (DIOVAN) 80 mg tablet Take 1 tablet by mouth daily     • fenofibrate (TRICOR) 48 mg tablet Take 48 mg by mouth daily     • acetaminophen (TYLENOL) 500 mg tablet Take 500 mg by mouth every 6 (six) hours as needed for pain scale 1-3/10     • FLUoxetine (PROzac) 40 mg capsule Take 40 mg by mouth daily     • ursodioL (ACTIGALL) 500 mg tablet Take 500 mg by mouth 2 times daily Every other day     • ursodioL (AMANDEEP) 250 mg tablet Take 750 mg by mouth In am and 500 mg in pm every other day-alternate with the 500 mg bid     • cyanocobalamin 1,000 mcg/mL injection Inject 1,000 mcg into the shoulder, thigh, or buttocks every 30 (thirty) days     • rosuvastatin (CRESTOR) 10 mg tablet Take 10 mg by mouth every evening   30 6   • hydrocortisone 2.5 % cream Apply topically 2 (two) times a day as needed for rash 30 g 0   • HYDROcodone-acetaminophen (NORCO) 5-325 mg per tablet Take 1 tablet by mouth every 6 (six) hours as needed for pain scale 4-7/10 Max Daily Amount: 4 tablets 30 tablet 0   • hydrOXYzine (ATARAX) 25 mg tablet Take 25-50 mg by mouth nightly as needed       No current facility-administered medications for this visit.     Past Medical History:   Diagnosis Date   • Allergic eosinophilic esophagitis    • Arthritis    • Autoimmune hepatitis (CMS/HCC) (HCC)    • Blood disorder     pernicious anemia   • Depression 08/08/2022   • Gastrointestinal disease     IBS   • Gout    • Hyperlipidemia    • Hypertension    • Melanoma (CMS/HCC) (HCC)    • Neurologic disorder    • Pneumothorax 08/07/2022   • Primary biliary cholangitis (CMS/HCC) (HCC)     autoimmune hepatitis   • Psychiatric illness     anxiety     Past Surgical History:   Procedure Laterality Date   • APPENDECTOMY     • COLONOSCOPY W/ BIOPSIES AND POLYPECTOMY  02/09/2009    2 tubular adenoma low-grade  glial myoma polyps   • CT BIOPSY LIVER FUNCTION  12/2/2021    CT BIOPSY LIVER FUNCTION 12/2/2021 [facility] MIS CT SCAN   • ESOPHAGOGASTRODUODENOSCOPY N/A 5/16/2022    Procedure: ESOPHAGOGASTRODUODENOSCOPY with Biopsies;  Surgeon: Bradley Cook MD;  Location: [facility] Endoscopy;  Service: Endoscopy;  Laterality: N/A;   • EYE SURGERY Bilateral     cataract   • KNEE SURGERY Bilateral     3 surgeries on left and 2 on right knees - prior open medial meniscectomies bilateral knees in the 1970s   • SKIN BIOPSY     • SKIN CANCER EXCISION Left 10/15/2020    Melanoma removed from left forearm   • TONSILLECTOMY     • TOTAL HIP ARTHROPLASTY Left 4/21/2021    Procedure: LEFT TOTAL HIP ARTHROPLASTY;  Surgeon: Sj Reyes MD;  Location: [facility] OR;  Service: Orthopedics;  Laterality: Left;   • TOTAL HIP ARTHROPLASTY Right 3/22/2022    Procedure: RIGHT TOTAL HIP ARTHROPLASTY POSTERIOR;  Surgeon: Sj Reyes MD;  Location: [facility] OR;  Service: Orthopedics;  Laterality: Right;     Family History   Problem Relation Age of Onset   • Hypertension Mother    • Lung cancer Father    • Hypertension Father      Social History     Socioeconomic History   • Marital status:    Tobacco Use   • Smoking status: Never Smoker   • Smokeless tobacco: Never Used   Vaping Use   • Vaping Use: Never used   Substance and Sexual Activity   • Alcohol use: Yes     Alcohol/week: 1.0 standard drink     Types: 1 Glasses of wine per week     Comment: occ   • Drug use: Never     Social Determinants of Health     Tobacco Use: Low Risk    • Smoking Tobacco Use: Never Smoker   • Smokeless Tobacco Use: Never Used       Review of Systems   Constitutional: Positive for fatigue. Negative for appetite change, chills, diaphoresis, fever and unexpected weight change.   HENT: Positive for congestion. Negative for dental problem, ear discharge, ear pain, hearing loss, mouth sores, nosebleeds, rhinorrhea, sinus pressure, sore throat, tinnitus, trouble swallowing and voice change.     Eyes: Negative for pain, discharge, redness and visual disturbance.   Respiratory: Positive for cough. Negative for chest tightness, shortness of breath and wheezing.    Cardiovascular: Negative for chest pain, palpitations and leg swelling.   Gastrointestinal: Negative for abdominal pain, blood in stool, constipation, diarrhea, nausea and vomiting.   Endocrine: Negative for cold intolerance, heat intolerance, polydipsia and polyuria.   Genitourinary: Negative for difficulty urinating, dysuria, frequency and hematuria.   Musculoskeletal: Positive for arthralgias. Negative for back pain, joint swelling and myalgias.   Skin: Positive for rash. Negative for wound.   Allergic/Immunologic: Negative for environmental allergies.   Neurological: Negative for dizziness, weakness, numbness and headaches.   Hematological: Negative for adenopathy. Does not bruise/bleed easily.   Psychiatric/Behavioral: Negative for behavioral problems, dysphoric mood and sleep disturbance. The patient is nervous/anxious.        Objective     Vitals  /80 (BP Location: Left arm, Patient Position: Sitting, Cuff Size: Large Adult)   Pulse 78   Temp 36.5 °C (97.7 °F) (Temporal)   Resp 16   Wt 86.2 kg (190 lb)   SpO2 97%   BMI 23.75 kg/m²     Physical Exam  Vitals and nursing note reviewed.   Constitutional:       General: He is not in acute distress.     Appearance: Normal appearance. He is not ill-appearing, toxic-appearing or diaphoretic.   Eyes:      General: No scleral icterus.        Right eye: No discharge.         Left eye: No discharge.   Cardiovascular:      Rate and Rhythm: Normal rate and regular rhythm.      Heart sounds: Normal heart sounds. No murmur heard.    No friction rub. No gallop.   Pulmonary:      Effort: Pulmonary effort is normal. No respiratory distress.      Breath sounds: Normal breath sounds. No stridor. No wheezing, rhonchi or rales.   Chest:      Chest wall: Tenderness present.   Abdominal:      General:  Abdomen is flat. There is no distension.      Palpations: Abdomen is soft. There is no mass.      Tenderness: There is no abdominal tenderness. There is no guarding or rebound.      Hernia: No hernia is present.   Musculoskeletal:      Cervical back: Neck supple.      Right lower leg: No edema.      Left lower leg: No edema.   Lymphadenopathy:      Cervical: No cervical adenopathy.   Skin:     General: Skin is warm and dry.      Capillary Refill: Capillary refill takes less than 2 seconds.      Findings: No rash.   Neurological:      Mental Status: He is alert.         Procedures    Assessment/Plan   Diagnoses and all orders for this visit:    Traumatic fracture of ribs of right side with pneumothorax with routine healing, subsequent encounter  Comments:  Symptoms improving.  Will send for repeat chest x-ray to reevaluate pneumothorax. We will change hydrocodone from oxycodone as he does not seem to be tolerating side effects of this and has done better with hydrocodone in the past.  Return precautions given.  Follow-up as needed.  Orders:  -     X-ray chest 2 views  -     HYDROcodone-acetaminophen (NORCO) 5-325 mg per tablet; Take 1 tablet by mouth every 6 (six) hours as needed for pain scale 4-7/10 Max Daily Amount: 4 tablets    Primary biliary cholangitis (CMS/HCC) (HCC)  Comments:  Continue to follow-up with GI.  Follow-up with me as needed.    Seborrheic dermatitis  Comments:  Will refill hydrocortisone cream to be used as needed.  Cream need to be non-fluorinated due to prior allergy to this.  Orders:  -     hydrocortisone 2.5 % cream; Apply topically 2 (two) times a day as needed for rash        Return in about 1 week (around 8/18/2022) for Follow Up.    Cuba Haley MD    Portions of this note were documented by Kate Peters as I performed the exam and collected the information from Juan Diego Rondon. I attest that I have reviewed the information as documented, verified the accuracy of the documentation  and added additional information as needed.

## 2022-08-15 ENCOUNTER — ANCILLARY PROCEDURE (OUTPATIENT)
Dept: RADIOLOGY | Facility: CLINIC | Age: 67
End: 2022-08-15
Payer: MEDICARE

## 2022-08-15 DIAGNOSIS — S22.41XD: ICD-10-CM

## 2022-08-15 DIAGNOSIS — S27.0XXD: ICD-10-CM

## 2022-08-18 ENCOUNTER — ANCILLARY PROCEDURE (OUTPATIENT)
Dept: RADIOLOGY | Facility: CLINIC | Age: 67
End: 2022-08-18
Payer: MEDICARE

## 2022-08-18 ENCOUNTER — OFFICE VISIT (OUTPATIENT)
Dept: FAMILY MEDICINE | Facility: CLINIC | Age: 67
End: 2022-08-18
Payer: MEDICARE

## 2022-08-18 VITALS
SYSTOLIC BLOOD PRESSURE: 120 MMHG | HEART RATE: 96 BPM | BODY MASS INDEX: 23.25 KG/M2 | WEIGHT: 186 LBS | TEMPERATURE: 98.1 F | DIASTOLIC BLOOD PRESSURE: 84 MMHG | OXYGEN SATURATION: 97 %

## 2022-08-18 DIAGNOSIS — S22.41XD: Primary | ICD-10-CM

## 2022-08-18 DIAGNOSIS — S27.0XXD: Primary | ICD-10-CM

## 2022-08-18 PROCEDURE — G0463 HOSPITAL OUTPT CLINIC VISIT: HCPCS | Performed by: FAMILY MEDICINE

## 2022-08-18 PROCEDURE — 99213 OFFICE O/P EST LOW 20 MIN: CPT | Performed by: FAMILY MEDICINE

## 2022-08-18 PROCEDURE — 71046 X-RAY EXAM CHEST 2 VIEWS: CPT

## 2022-08-18 PROCEDURE — 71046 X-RAY EXAM CHEST 2 VIEWS: CPT | Mod: 26,CMS | Performed by: RADIOLOGY

## 2022-08-18 RX ORDER — HYDROCODONE BITARTRATE AND ACETAMINOPHEN 5; 325 MG/1; MG/1
1 TABLET ORAL EVERY 6 HOURS PRN
Qty: 30 TABLET | Refills: 0 | Status: SHIPPED | OUTPATIENT
Start: 2022-08-18 | End: 2022-09-02 | Stop reason: SDUPTHER

## 2022-08-18 NOTE — PROGRESS NOTES
Subjective      Juan Diego Rondon is a 67 y.o. male who presents for Follow-up (Follow up on ribs. He states they are still painful. No fever, chills or shortness of breath/)  .    Patient presents for a follow up regarding raumatic fracture of ribs with pneumothorax. He continues taking hydrocodone-acetaminophen for pain, currently using 1/2 tablet three times daily for pain control. Pain does increase when he is more active. He denies fevers, chills, shortness of breath, or cough.         The following have been reviewed and updated as appropriate in this visit:   Tobacco  Allergies  Meds  Problems  Med Hx  Surg Hx  Fam Hx         No Known Allergies  Current Outpatient Medications   Medication Sig Dispense Refill    hydrocortisone 2.5 % cream Apply topically 2 (two) times a day as needed for rash 30 g 0    omeprazole (PriLOSEC) 40 mg capsule Take 40 mg by mouth every morning      predniSONE 10 mg tablet Take 10 mg by mouth daily      valsartan (DIOVAN) 80 mg tablet Take 1 tablet by mouth daily      fenofibrate (TRICOR) 48 mg tablet Take 48 mg by mouth daily      acetaminophen (TYLENOL) 500 mg tablet Take 500 mg by mouth every 6 (six) hours as needed for pain scale 1-3/10      FLUoxetine (PROzac) 40 mg capsule Take 40 mg by mouth daily      hydrOXYzine (ATARAX) 25 mg tablet Take 25-50 mg by mouth nightly as needed      ursodioL (ACTIGALL) 500 mg tablet Take 500 mg by mouth 2 times daily Every other day      ursodioL (AMANDEEP) 250 mg tablet Take 750 mg by mouth In am and 500 mg in pm every other day-alternate with the 500 mg bid      cyanocobalamin 1,000 mcg/mL injection Inject 1,000 mcg into the shoulder, thigh, or buttocks every 30 (thirty) days      rosuvastatin (CRESTOR) 10 mg tablet Take 10 mg by mouth every evening   30 6    HYDROcodone-acetaminophen (NORCO) 5-325 mg per tablet Take 1 tablet by mouth every 6 (six) hours as needed for pain scale 4-7/10 Max Daily Amount: 4 tablets 30 tablet 0     No current  facility-administered medications for this visit.     Past Medical History:   Diagnosis Date    Allergic eosinophilic esophagitis     Arthritis     Autoimmune hepatitis (CMS/HCC) (HCC)     Blood disorder     pernicious anemia    Depression 08/08/2022    Gastrointestinal disease     IBS    Gout     Hyperlipidemia     Hypertension     Melanoma (CMS/HCC) (HCC)     Neurologic disorder     Pneumothorax 08/07/2022    Primary biliary cholangitis (CMS/HCC) (HCC)     autoimmune hepatitis    Psychiatric illness     anxiety     Past Surgical History:   Procedure Laterality Date    APPENDECTOMY      COLONOSCOPY W/ BIOPSIES AND POLYPECTOMY  02/09/2009    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    CT BIOPSY LIVER FUNCTION  12/2/2021    CT BIOPSY LIVER FUNCTION 12/2/2021 [facility] MIS CT SCAN    ESOPHAGOGASTRODUODENOSCOPY N/A 5/16/2022    Procedure: ESOPHAGOGASTRODUODENOSCOPY with Biopsies;  Surgeon: Bradley Cook MD;  Location: [facility] Endoscopy;  Service: Endoscopy;  Laterality: N/A;    EYE SURGERY Bilateral     cataract    KNEE SURGERY Bilateral     3 surgeries on left and 2 on right knees - prior open medial meniscectomies bilateral knees in the 1970s    SKIN BIOPSY      SKIN CANCER EXCISION Left 10/15/2020    Melanoma removed from left forearm    TONSILLECTOMY      TOTAL HIP ARTHROPLASTY Left 4/21/2021    Procedure: LEFT TOTAL HIP ARTHROPLASTY;  Surgeon: Sj Reyes MD;  Location: [facility] OR;  Service: Orthopedics;  Laterality: Left;    TOTAL HIP ARTHROPLASTY Right 3/22/2022    Procedure: RIGHT TOTAL HIP ARTHROPLASTY POSTERIOR;  Surgeon: Sj Reyes MD;  Location: [facility] OR;  Service: Orthopedics;  Laterality: Right;     Family History   Problem Relation Age of Onset    Hypertension Mother     Lung cancer Father     Hypertension Father      Social History     Socioeconomic History    Marital status:    Tobacco Use    Smoking status: Never    Smokeless tobacco: Never   Vaping Use    Vaping Use: Never used   Substance and  Sexual Activity    Alcohol use: Yes     Alcohol/week: 1.0 standard drink     Types: 1 Glasses of wine per week     Comment: occ    Drug use: Never     Social Determinants of Health     Tobacco Use: Low Risk     Smoking Tobacco Use: Never    Smokeless Tobacco Use: Never       Review of Systems    Objective     Vitals  /84 (BP Location: Left arm, Patient Position: Sitting)   Pulse 96   Temp 36.7 °C (98.1 °F) (Temporal)   Wt 84.4 kg (186 lb)   SpO2 97%   BMI 23.25 kg/m²     Physical Exam  Vitals and nursing note reviewed.   Constitutional:       General: He is not in acute distress.     Appearance: Normal appearance. He is not ill-appearing, toxic-appearing or diaphoretic.   Eyes:      General: No scleral icterus.        Right eye: No discharge.         Left eye: No discharge.   Cardiovascular:      Rate and Rhythm: Normal rate and regular rhythm.      Heart sounds: Normal heart sounds. No murmur heard.    No friction rub. No gallop.   Pulmonary:      Effort: Pulmonary effort is normal. No respiratory distress.      Breath sounds: Normal breath sounds. No stridor. No wheezing, rhonchi or rales.   Chest:      Chest wall: Tenderness present.   Abdominal:      General: Abdomen is flat. There is no distension.      Palpations: Abdomen is soft. There is no mass.      Tenderness: There is no abdominal tenderness. There is no guarding or rebound.      Hernia: No hernia is present.   Musculoskeletal:      Cervical back: Neck supple.      Right lower leg: No edema.      Left lower leg: No edema.   Lymphadenopathy:      Cervical: No cervical adenopathy.   Skin:     General: Skin is warm and dry.      Capillary Refill: Capillary refill takes less than 2 seconds.      Findings: No rash.   Neurological:      Mental Status: He is alert.     Procedures    Assessment/Plan   Diagnoses and all orders for this visit:    Traumatic fracture of ribs of right side with pneumothorax with routine healing, subsequent  encounter  Comments:  Symptoms improving. Continue hydrocodone as needed for pain. Return and safety precautions given.Follow-up as needed.  If any worsening symptoms he will let us know.  Orders:  -     HYDROcodone-acetaminophen (NORCO) 5-325 mg per tablet; Take 1 tablet by mouth every 6 (six) hours as needed for pain scale 4-7/10 Max Daily Amount: 4 tablets      Return if symptoms worsen or fail to improve.    Cuba Haley MD    Portions of this note were documented by Kate Peters as I performed the exam and collected the information from Juan Diego Rondon. I attest that I have reviewed the information as documented, verified the accuracy of the documentation and added additional information as needed.

## 2022-08-24 ENCOUNTER — HOSPITAL ENCOUNTER (OUTPATIENT)
Dept: ULTRASOUND IMAGING | Facility: HOSPITAL | Age: 67
Discharge: HOME | End: 2022-08-24
Payer: MEDICARE

## 2022-08-24 DIAGNOSIS — K74.60 CIRRHOSIS (CMS/HCC): ICD-10-CM

## 2022-08-24 PROCEDURE — 76700 US EXAM ABDOM COMPLETE: CPT | Mod: 26 | Performed by: INTERNAL MEDICINE

## 2022-08-24 PROCEDURE — 76700 US EXAM ABDOM COMPLETE: CPT

## 2022-09-02 ENCOUNTER — OFFICE VISIT (OUTPATIENT)
Dept: URGENT CARE | Facility: CLINIC | Age: 67
End: 2022-09-02
Payer: MEDICARE

## 2022-09-02 ENCOUNTER — ANCILLARY PROCEDURE (OUTPATIENT)
Dept: RADIOLOGY | Facility: CLINIC | Age: 67
End: 2022-09-02
Payer: MEDICARE

## 2022-09-02 VITALS
WEIGHT: 186.6 LBS | SYSTOLIC BLOOD PRESSURE: 131 MMHG | BODY MASS INDEX: 23.32 KG/M2 | OXYGEN SATURATION: 93 % | TEMPERATURE: 97.9 F | DIASTOLIC BLOOD PRESSURE: 89 MMHG | HEART RATE: 90 BPM | RESPIRATION RATE: 16 BRPM

## 2022-09-02 DIAGNOSIS — S27.0XXD: ICD-10-CM
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
## 2022-09-02 DIAGNOSIS — S22.41XD: ICD-10-CM

## 2022-09-02 DIAGNOSIS — R52 ACUTE PAIN: Primary | ICD-10-CM

## 2022-09-02 PROCEDURE — 71046 X-RAY EXAM CHEST 2 VIEWS: CPT | Mod: 26,CMS | Performed by: INTERNAL MEDICINE

## 2022-09-02 PROCEDURE — 71046 X-RAY EXAM CHEST 2 VIEWS: CPT

## 2022-09-02 PROCEDURE — G0463 HOSPITAL OUTPT CLINIC VISIT: HCPCS

## 2022-09-02 PROCEDURE — 99213 OFFICE O/P EST LOW 20 MIN: CPT

## 2022-09-02 RX ORDER — HYDROCODONE BITARTRATE AND ACETAMINOPHEN 5; 325 MG/1; MG/1
1 TABLET ORAL EVERY 6 HOURS PRN
Qty: 20 TABLET | Refills: 0 | Status: SHIPPED | OUTPATIENT
Start: 2022-09-02 | End: 2022-09-02 | Stop reason: SDUPTHER

## 2022-09-02 RX ORDER — HYDROCODONE BITARTRATE AND ACETAMINOPHEN 5; 325 MG/1; MG/1
1 TABLET ORAL EVERY 6 HOURS PRN
Qty: 20 TABLET | Refills: 0 | Status: SHIPPED | OUTPATIENT
Start: 2022-09-02 | End: 2023-01-31 | Stop reason: ALTCHOICE

## 2022-09-02 ASSESSMENT — ENCOUNTER SYMPTOMS
CARDIOVASCULAR NEGATIVE: 1
COUGH: 1
SHORTNESS OF BREATH: 1
ARTHRALGIAS: 1
NEUROLOGICAL NEGATIVE: 1
GASTROINTESTINAL NEGATIVE: 1
PSYCHIATRIC NEGATIVE: 1
CONSTITUTIONAL NEGATIVE: 1

## 2022-09-02 NOTE — PROGRESS NOTES
Subjective      Juan Diego Rondon is a 67 y.o. male who presents for shortness of breath.      Shortness of Breath  Associated symptoms: cough      Patient presents to urgent care for increasing shortness of breath and productive cough that started yesterday.  States he fell 4 weeks ago and fractured his right fifth through ninth ribs with pneumothorax.  He has been being followed for this by [facility].  He has been taking a half tab of hydrocodone about 3 times a day for pain which has been helping. Denies fever, chills, N/V/D.    The following have been reviewed and updated as appropriate in this visit:          No Known Allergies  Current Outpatient Medications   Medication Sig Dispense Refill    hydrocortisone 2.5 % cream Apply topically 2 (two) times a day as needed for rash 30 g 0    omeprazole (PriLOSEC) 40 mg capsule Take 40 mg by mouth every morning      predniSONE 10 mg tablet Take 10 mg by mouth daily      valsartan (DIOVAN) 80 mg tablet Take 1 tablet by mouth daily      fenofibrate (TRICOR) 48 mg tablet Take 48 mg by mouth daily      acetaminophen (TYLENOL) 500 mg tablet Take 500 mg by mouth every 6 (six) hours as needed for pain scale 1-3/10      FLUoxetine (PROzac) 40 mg capsule Take 40 mg by mouth daily      hydrOXYzine (ATARAX) 25 mg tablet Take 25-50 mg by mouth nightly as needed      ursodioL (ACTIGALL) 500 mg tablet Take 500 mg by mouth 2 times daily Every other day      ursodioL (AMANDEEP) 250 mg tablet Take 750 mg by mouth In am and 500 mg in pm every other day-alternate with the 500 mg bid      cyanocobalamin 1,000 mcg/mL injection Inject 1,000 mcg into the shoulder, thigh, or buttocks every 30 (thirty) days      rosuvastatin (CRESTOR) 10 mg tablet Take 10 mg by mouth every evening   30 6    HYDROcodone-acetaminophen (NORCO) 5-325 mg per tablet Take 1 tablet by mouth every 6 (six) hours as needed for pain scale 4-7/10 Max Daily Amount: 4 tablets 20 tablet 0     No current facility-administered  medications for this visit.     Past Medical History:   Diagnosis Date    Allergic eosinophilic esophagitis     Arthritis     Autoimmune hepatitis (CMS/HCC) (HCC)     Blood disorder     pernicious anemia    Depression 08/08/2022    Gastrointestinal disease     IBS    Gout     Hyperlipidemia     Hypertension     Melanoma (CMS/HCC) (HCC)     Neurologic disorder     Pneumothorax 08/07/2022    Primary biliary cholangitis (CMS/HCC) (HCC)     autoimmune hepatitis    Psychiatric illness     anxiety     Past Surgical History:   Procedure Laterality Date    APPENDECTOMY      COLONOSCOPY W/ BIOPSIES AND POLYPECTOMY  02/09/2009    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    CT BIOPSY LIVER FUNCTION  12/2/2021    CT BIOPSY LIVER FUNCTION 12/2/2021 [facility] MIS CT SCAN    ESOPHAGOGASTRODUODENOSCOPY N/A 5/16/2022    Procedure: ESOPHAGOGASTRODUODENOSCOPY with Biopsies;  Surgeon: Bradley Cook MD;  Location: [facility];  Service: Endoscopy;  Laterality: N/A;    EYE SURGERY Bilateral     cataract    KNEE SURGERY Bilateral     3 surgeries on left and 2 on right knees - prior open medial meniscectomies bilateral knees in the 1970s    SKIN BIOPSY      SKIN CANCER EXCISION Left 10/15/2020    Melanoma removed from left forearm    TONSILLECTOMY      TOTAL HIP ARTHROPLASTY Left 4/21/2021    Procedure: LEFT TOTAL HIP ARTHROPLASTY;  Surgeon: Sj Reyes MD;  Location: [facility] OR;  Service: Orthopedics;  Laterality: Left;    TOTAL HIP ARTHROPLASTY Right 3/22/2022    Procedure: RIGHT TOTAL HIP ARTHROPLASTY POSTERIOR;  Surgeon: Sj Reyes MD;  Location: [facility] OR;  Service: Orthopedics;  Laterality: Right;     Family History   Problem Relation Age of Onset    Hypertension Mother     Lung cancer Father     Hypertension Father      Social History     Socioeconomic History    Marital status:    Tobacco Use    Smoking status: Never    Smokeless tobacco: Never   Vaping Use    Vaping Use: Never used   Substance and Sexual Activity     Alcohol use: Yes     Alcohol/week: 1.0 standard drink     Types: 1 Glasses of wine per week     Comment: occ    Drug use: Never     Social Determinants of Health     Tobacco Use: Low Risk     Smoking Tobacco Use: Never    Smokeless Tobacco Use: Never       Review of Systems   Constitutional: Negative.    HENT: Negative.     Respiratory:  Positive for cough and shortness of breath.    Cardiovascular: Negative.    Gastrointestinal: Negative.    Musculoskeletal:  Positive for arthralgias.        Positive for rib pain   Skin: Negative.    Neurological: Negative.    Psychiatric/Behavioral: Negative.       Objective   /89 (BP Location: Left arm, Patient Position: Sitting)   Pulse 90   Temp 36.6 °C (97.9 °F) (Temporal)   Resp 16   Wt 84.6 kg (186 lb 9.6 oz)   SpO2 93%   BMI 23.32 kg/m²     Physical Exam  Constitutional:       General: He is not in acute distress.     Appearance: Normal appearance. He is not ill-appearing.   Cardiovascular:      Rate and Rhythm: Normal rate and regular rhythm.      Heart sounds: Normal heart sounds, S1 normal and S2 normal.   Pulmonary:      Effort: Pulmonary effort is normal.      Breath sounds: Examination of the right-upper field reveals decreased breath sounds. Examination of the right-middle field reveals decreased breath sounds. Examination of the right-lower field reveals decreased breath sounds. Decreased breath sounds present.   Chest:      Chest wall: Tenderness (tenderness to palpation of right side of chest. correlates to rib fracture location) present.   Abdominal:      General: Abdomen is flat. Bowel sounds are normal.      Palpations: Abdomen is soft.      Tenderness: There is no abdominal tenderness.   Skin:     Findings: No ecchymosis.   Neurological:      Mental Status: He is alert and oriented to person, place, and time.       Assessment/Plan   Diagnoses and all orders for this visit:    Acute pain  -     X-ray chest 2 views    Fall, initial encounter  -      X-ray chest 2 views    Traumatic fracture of ribs of right side with pneumothorax with routine healing, subsequent encounter  Comments:  Symptoms improving. Continue hydrocodone as needed for pain. Return and safety precautions given.  Orders:  -     HYDROcodone-acetaminophen (NORCO) 5-325 mg per tablet; Take 1 tablet by mouth every 6 (six) hours as needed for pain scale 4-7/10 Max Daily Amount: 4 tablets  -     X-ray chest 2 views; Future    X-ray showed small right apical pneumothorax.  Redemonstration of mildly displaced right anterior lateral fifth through ninth rib fractures.  Discussed results with patient.  Did discuss patient with Dr. Haley, recommend incentive spirometer and repeat x-ray with follow-up next week for re-evaluation and possible further imaging. Future x-ray order placed.  Follow-up with Dr. Montenegro 9/7 @ 11:00.  Patient to follow-up with urgent care/ER sooner if symptoms worsen, fever, chills, worsening shortness of breath or chest pain.  Patient in agreement with plan and verbalized understanding.    Rosa Isela Weber, CNP

## 2022-09-07 ENCOUNTER — ANCILLARY PROCEDURE (OUTPATIENT)
Dept: RADIOLOGY | Facility: CLINIC | Age: 67
End: 2022-09-07
Payer: MEDICARE

## 2022-09-07 ENCOUNTER — OFFICE VISIT (OUTPATIENT)
Dept: FAMILY MEDICINE | Facility: CLINIC | Age: 67
End: 2022-09-07
Payer: MEDICARE

## 2022-09-07 VITALS
WEIGHT: 188 LBS | DIASTOLIC BLOOD PRESSURE: 76 MMHG | SYSTOLIC BLOOD PRESSURE: 122 MMHG | TEMPERATURE: 97.8 F | BODY MASS INDEX: 23.5 KG/M2 | OXYGEN SATURATION: 97 % | HEART RATE: 77 BPM

## 2022-09-07 DIAGNOSIS — S22.41XD: Primary | ICD-10-CM

## 2022-09-07 DIAGNOSIS — S27.0XXD: Primary | ICD-10-CM

## 2022-09-07 DIAGNOSIS — R06.02 SOB (SHORTNESS OF BREATH): ICD-10-CM

## 2022-09-07 PROBLEM — K75.4 AUTOIMMUNE HEPATITIS (CMS/HCC): Status: ACTIVE | Noted: 2022-09-07

## 2022-09-07 PROBLEM — K90.0 CELIAC DISEASE: Status: ACTIVE | Noted: 2022-09-07

## 2022-09-07 PROBLEM — K29.40 ATROPHIC GASTRITIS: Status: ACTIVE | Noted: 2022-09-07

## 2022-09-07 PROCEDURE — 71046 X-RAY EXAM CHEST 2 VIEWS: CPT

## 2022-09-07 PROCEDURE — 71046 X-RAY EXAM CHEST 2 VIEWS: CPT | Mod: 26,CMS | Performed by: RADIOLOGY

## 2022-09-07 PROCEDURE — G0463 HOSPITAL OUTPT CLINIC VISIT: HCPCS | Performed by: FAMILY MEDICINE

## 2022-09-07 PROCEDURE — 99214 OFFICE O/P EST MOD 30 MIN: CPT | Performed by: FAMILY MEDICINE

## 2022-09-07 ASSESSMENT — ENCOUNTER SYMPTOMS: CONSTIPATION: 0

## 2022-09-07 NOTE — PROGRESS NOTES
Subjective      HPI  Juan Diego Rondon is a 67 y.o. male who presents for follow up on fall from 5 weeks ago.   He came in last week to  for SOB and had a redemonstration of pneumothorax that was thought to have previously resolved.   Dr Haley wanted him rechecked to make certain he was doing better, otherwise, he had recommended doing a CT.   Emmett reports that he is doing better but still pretty sore.   He finally was able to sleep in his bed.  He is using his incentive spirometer and has helped his breathing.   He has broken ribs in the past but never this many.   He is taking Tylenol during the day and if the pain is worse at night he will take 1/2 a hydrocodone.   If he takes a deep breath or coughs he has some pain but overall feels that the spirometer is very helpful.        The following have been reviewed and updated as appropriate in this visit:    No Known Allergies  Current Outpatient Medications   Medication Sig Dispense Refill    HYDROcodone-acetaminophen (NORCO) 5-325 mg per tablet Take 1 tablet by mouth every 6 (six) hours as needed for pain scale 4-7/10 Max Daily Amount: 4 tablets 20 tablet 0    hydrocortisone 2.5 % cream Apply topically 2 (two) times a day as needed for rash 30 g 0    omeprazole (PriLOSEC) 40 mg capsule Take 40 mg by mouth every morning      predniSONE 10 mg tablet Take 10 mg by mouth daily      valsartan (DIOVAN) 80 mg tablet Take 1 tablet by mouth daily      fenofibrate (TRICOR) 48 mg tablet Take 48 mg by mouth daily      acetaminophen (TYLENOL) 500 mg tablet Take 500 mg by mouth every 6 (six) hours as needed for pain scale 1-3/10      FLUoxetine (PROzac) 40 mg capsule Take 40 mg by mouth daily      hydrOXYzine (ATARAX) 25 mg tablet Take 25-50 mg by mouth nightly as needed      ursodioL (ACTIGALL) 500 mg tablet Take 500 mg by mouth 2 times daily Every other day      ursodioL (AMANDEEP) 250 mg tablet Take 750 mg by mouth In am and 500 mg in pm every other day-alternate with the  500 mg bid      cyanocobalamin 1,000 mcg/mL injection Inject 1,000 mcg into the shoulder, thigh, or buttocks every 30 (thirty) days      rosuvastatin (CRESTOR) 10 mg tablet Take 10 mg by mouth every evening   30 6     No current facility-administered medications for this visit.     Past Medical History:   Diagnosis Date    Allergic eosinophilic esophagitis     Arthritis     Autoimmune hepatitis (CMS/HCC) (HCC)     Blood disorder     pernicious anemia    Depression 08/08/2022    Gastrointestinal disease     IBS    Gout     Hyperlipidemia     Hypertension     Melanoma (CMS/HCC) (HCC)     Neurologic disorder     Pneumothorax 08/07/2022    Primary biliary cholangitis (CMS/HCC) (HCC)     autoimmune hepatitis    Psychiatric illness     anxiety     Past Surgical History:   Procedure Laterality Date    APPENDECTOMY      COLONOSCOPY W/ BIOPSIES AND POLYPECTOMY  02/09/2009    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    CT BIOPSY LIVER FUNCTION  12/2/2021    CT BIOPSY LIVER FUNCTION 12/2/2021 [facility] MIS CT SCAN    ESOPHAGOGASTRODUODENOSCOPY N/A 5/16/2022    Procedure: ESOPHAGOGASTRODUODENOSCOPY with Biopsies;  Surgeon: Bradley Cook MD;  Location: [facility];  Service: Endoscopy;  Laterality: N/A;    EYE SURGERY Bilateral     cataract    KNEE SURGERY Bilateral     3 surgeries on left and 2 on right knees - prior open medial meniscectomies bilateral knees in the 1970s    SKIN BIOPSY      SKIN CANCER EXCISION Left 10/15/2020    Melanoma removed from left forearm    TONSILLECTOMY      TOTAL HIP ARTHROPLASTY Left 4/21/2021    Procedure: LEFT TOTAL HIP ARTHROPLASTY;  Surgeon: Sj Reyes MD;  Location: [facility] OR;  Service: Orthopedics;  Laterality: Left;    TOTAL HIP ARTHROPLASTY Right 3/22/2022    Procedure: RIGHT TOTAL HIP ARTHROPLASTY POSTERIOR;  Surgeon: Sj Reyes MD;  Location: [facility] OR;  Service: Orthopedics;  Laterality: Right;       Review of Systems   Gastrointestinal:  Negative for constipation.     Objective    /76 (BP Location: Left arm, Patient Position: Sitting, Cuff Size: Regular Adult)   Pulse 77   Temp 36.6 °C (97.8 °F) (Temporal)   Wt 85.3 kg (188 lb)   SpO2 97%   BMI 23.50 kg/m²     Physical Exam  Vitals and nursing note reviewed.   Constitutional:       Appearance: Normal appearance. He is well-developed.   HENT:      Head: Normocephalic and atraumatic.   Eyes:      Conjunctiva/sclera: Conjunctivae normal.      Pupils: Pupils are equal, round, and reactive to light.   Neck:      Thyroid: No thyromegaly.   Cardiovascular:      Rate and Rhythm: Normal rate and regular rhythm.      Heart sounds: Normal heart sounds. No murmur heard.    No friction rub. No gallop.   Pulmonary:      Effort: Pulmonary effort is normal.      Breath sounds: Normal breath sounds. No wheezing or rales.   Lymphadenopathy:      Cervical: No cervical adenopathy.   Skin:     General: Skin is warm and dry.   Neurological:      Mental Status: He is alert.       Assessment/Plan     1. Traumatic fracture of ribs of right side with pneumothorax with routine healing, subsequent encounter    2. SOB (shortness of breath)    1 - 2.  He did have pain with deep inspiration today but otherwise his chest sounds good.  No abnormal breath sounds noted.  His chest x-ray has now come back reporting resolution of the pneumothorax.  Emmett was [facility] of [facility] and advised to follow his symptoms clinically.  If he has worsening symptoms or does not completely resolve he is to let us know.  I did ask him about pain medicine he reported that he still had plenty of his hydrocodone as he is only taking a half tab at at bedtime.      Portions of this note was documented by Jillian Calvo as I performed the exam and collected the information from Juan Diego Rondon. I attest that I have reviewed the information as documented, verified the accuracy of the documentation and added additional information as needed.       JASS VILLAR,  MD  09/07/22

## 2022-09-19 ENCOUNTER — APPOINTMENT (OUTPATIENT)
Dept: LAB | Facility: CLINIC | Age: 67
End: 2022-09-19
Payer: MEDICARE

## 2022-09-19 DIAGNOSIS — K74.3 PRIMARY BILIARY CHOLANGITIS (CMS/HCC): ICD-10-CM

## 2022-09-19 DIAGNOSIS — K74.60 HEPATIC CIRRHOSIS, UNSPECIFIED HEPATIC CIRRHOSIS TYPE, UNSPECIFIED WHETHER ASCITES PRESENT (CMS/HCC): ICD-10-CM

## 2022-09-19 LAB
ALBUMIN SERPL-MCNC: 3.8 G/DL (ref 3.5–5.3)
ALP SERPL-CCNC: 90 U/L (ref 45–115)
ALT SERPL-CCNC: 9 U/L (ref 7–52)
ANION GAP SERPL CALC-SCNC: 13 MMOL/L (ref 3–11)
AST SERPL-CCNC: 11 U/L
BASOPHILS # BLD AUTO: 0.1 10*3/UL
BASOPHILS NFR BLD AUTO: 1.1 % (ref 0–2)
BILIRUB SERPL-MCNC: 0.44 MG/DL (ref 0.2–1.4)
BUN SERPL-MCNC: 10 MG/DL (ref 7–25)
CALCIUM ALBUM COR SERPL-MCNC: 10 MG/DL (ref 8.6–10.3)
CALCIUM SERPL-MCNC: 9.8 MG/DL (ref 8.6–10.3)
CHLORIDE SERPL-SCNC: 107 MMOL/L (ref 98–107)
CO2 SERPL-SCNC: 21 MMOL/L (ref 21–32)
CREAT SERPL-MCNC: 0.68 MG/DL (ref 0.7–1.3)
EOSINOPHIL # BLD AUTO: 0.1 10*3/UL
EOSINOPHIL NFR BLD AUTO: 1.8 % (ref 0–3)
ERYTHROCYTE [DISTWIDTH] IN BLOOD BY AUTOMATED COUNT: 13.4 % (ref 11.5–15)
GFR SERPL CREATININE-BSD FRML MDRD: 101 ML/MIN/1.73M*2
GLUCOSE SERPL-MCNC: 82 MG/DL (ref 70–105)
HCT VFR BLD AUTO: 39.1 % (ref 38–50)
HGB BLD-MCNC: 13.3 G/DL (ref 13.2–17.2)
LYMPHOCYTES # BLD AUTO: 1.7 10*3/UL
LYMPHOCYTES NFR BLD AUTO: 33.2 % (ref 15–47)
MACROCYTOSIS PRESENCE IN BLOOD, ANALYZER: ABNORMAL
MCH RBC QN AUTO: 34.4 PG (ref 29–34)
MCHC RBC AUTO-ENTMCNC: 33.9 G/DL (ref 32–36)
MCV RBC AUTO: 101.3 FL (ref 82–97)
MONOCYTES # BLD AUTO: 0.5 10*3/UL
MONOCYTES NFR BLD AUTO: 9.1 % (ref 5–13)
NEUTROPHILS # BLD AUTO: 2.9 10*3/UL
NEUTROPHILS NFR BLD AUTO: 54.8 % (ref 46–70)
PLATELET # BLD AUTO: 263 10*3/UL (ref 130–350)
PMV BLD AUTO: 6.9 FL (ref 6.9–10.8)
POTASSIUM SERPL-SCNC: 3.7 MMOL/L (ref 3.5–5.1)
PROT SERPL-MCNC: 7.6 G/DL (ref 6–8.3)
RBC # BLD AUTO: 3.86 10*6/ΜL (ref 4.1–5.8)
SODIUM SERPL-SCNC: 141 MMOL/L (ref 135–145)
WBC # BLD AUTO: 5.2 10*3/UL (ref 3.7–9.6)

## 2022-09-19 PROCEDURE — 80053 COMPREHEN METABOLIC PANEL: CPT

## 2022-09-19 PROCEDURE — 85025 COMPLETE CBC W/AUTO DIFF WBC: CPT

## 2022-09-19 PROCEDURE — 36415 COLL VENOUS BLD VENIPUNCTURE: CPT

## 2022-09-24 ENCOUNTER — HEALTH MAINTENANCE LETTER (OUTPATIENT)
Age: 67
End: 2022-09-24

## 2022-09-27 ENCOUNTER — CLINICAL SUPPORT (OUTPATIENT)
Dept: FAMILY MEDICINE | Facility: CLINIC | Age: 67
End: 2022-09-27
Payer: MEDICARE

## 2022-09-27 ENCOUNTER — IMMUNIZATION (OUTPATIENT)
Dept: FAMILY MEDICINE | Facility: CLINIC | Age: 67
End: 2022-09-27
Payer: MEDICARE

## 2022-09-27 DIAGNOSIS — Z23 ENCOUNTER FOR VACCINATION: Primary | ICD-10-CM

## 2022-09-27 PROCEDURE — 91313 MODERNA BIVALENT BOOSTER SARS-COV-2 VACCINE (BOOSTER): CPT | Performed by: FAMILY MEDICINE

## 2022-09-27 PROCEDURE — 90694 VACC AIIV4 NO PRSRV 0.5ML IM: CPT | Performed by: FAMILY MEDICINE

## 2022-11-01 ENCOUNTER — APPOINTMENT (OUTPATIENT)
Dept: LAB | Facility: CLINIC | Age: 67
End: 2022-11-01
Payer: MEDICARE

## 2022-11-01 DIAGNOSIS — K20.0 ESOPHAGITIS, EOSINOPHILIC: ICD-10-CM

## 2022-11-01 DIAGNOSIS — K74.02 ADVANCED HEPATIC FIBROSIS: ICD-10-CM

## 2022-11-01 DIAGNOSIS — R68.81 EARLY SATIETY: ICD-10-CM

## 2022-11-01 DIAGNOSIS — K90.9 STEATORRHEA: ICD-10-CM

## 2022-11-01 DIAGNOSIS — Z86.0101 HX OF ADENOMATOUS POLYP OF COLON: ICD-10-CM

## 2022-11-01 DIAGNOSIS — K74.3 PRIMARY BILIARY CHOLANGITIS (CMS/HCC): ICD-10-CM

## 2022-11-01 LAB
AFP SERPL-MCNC: 4 NG/ML
ALBUMIN SERPL-MCNC: 3.9 G/DL (ref 3.5–5.3)
ALP SERPL-CCNC: 105 U/L (ref 45–115)
ALT SERPL-CCNC: 14 U/L (ref 7–52)
AST SERPL-CCNC: 23 U/L
BILIRUB DIRECT SERPL-MCNC: 0.38 MG/DL
BILIRUB SERPL-MCNC: 1.45 MG/DL (ref 0.2–1.4)
IGG SERPL-MCNC: 707 MG/DL (ref 700–1600)
PROT SERPL-MCNC: 7.5 G/DL (ref 6–8.3)

## 2022-11-01 PROCEDURE — 36415 COLL VENOUS BLD VENIPUNCTURE: CPT

## 2022-11-01 PROCEDURE — 82784 ASSAY IGA/IGD/IGG/IGM EACH: CPT

## 2022-11-01 PROCEDURE — 82105 ALPHA-FETOPROTEIN SERUM: CPT

## 2022-11-01 PROCEDURE — 80076 HEPATIC FUNCTION PANEL: CPT

## 2022-11-29 ENCOUNTER — APPOINTMENT (OUTPATIENT)
Dept: LAB | Facility: CLINIC | Age: 67
End: 2022-11-29
Payer: MEDICARE

## 2022-11-29 DIAGNOSIS — Z12.5 PROSTATE CANCER SCREENING: Primary | ICD-10-CM

## 2022-11-29 DIAGNOSIS — M25.50 ARTHRALGIA, UNSPECIFIED JOINT: ICD-10-CM

## 2022-11-29 DIAGNOSIS — E78.5 HYPERLIPIDEMIA, UNSPECIFIED HYPERLIPIDEMIA TYPE: ICD-10-CM

## 2022-11-29 LAB
CHOLEST SERPL-MCNC: 192 MG/DL (ref 0–199)
FASTING STATUS PATIENT QL REPORTED: YES
HDLC SERPL-MCNC: 122 MG/DL
LDLC SERPL CALC-MCNC: 53 MG/DL (ref 20–99)
PSA SERPL-MCNC: 1.45 NG/ML (ref 0–4)
TRIGL SERPL-MCNC: 86 MG/DL
URATE SERPL-MCNC: 5.7 MG/DL (ref 4.4–7.6)

## 2022-11-29 PROCEDURE — 84550 ASSAY OF BLOOD/URIC ACID: CPT

## 2022-11-29 PROCEDURE — 36415 COLL VENOUS BLD VENIPUNCTURE: CPT

## 2022-11-29 PROCEDURE — 84153 ASSAY OF PSA TOTAL: CPT

## 2022-11-29 PROCEDURE — 80061 LIPID PANEL: CPT

## 2022-12-31 ENCOUNTER — OFFICE VISIT (OUTPATIENT)
Dept: URGENT CARE | Facility: CLINIC | Age: 67
End: 2022-12-31
Payer: MEDICARE

## 2022-12-31 VITALS
DIASTOLIC BLOOD PRESSURE: 92 MMHG | HEART RATE: 82 BPM | TEMPERATURE: 98.2 F | BODY MASS INDEX: 24.12 KG/M2 | WEIGHT: 193 LBS | OXYGEN SATURATION: 97 % | RESPIRATION RATE: 18 BRPM | SYSTOLIC BLOOD PRESSURE: 147 MMHG

## 2022-12-31 DIAGNOSIS — K12.2 UVULITIS: ICD-10-CM

## 2022-12-31 DIAGNOSIS — K12.2 UVULITIS: Primary | ICD-10-CM

## 2022-12-31 PROCEDURE — G0463 HOSPITAL OUTPT CLINIC VISIT: HCPCS | Performed by: FAMILY MEDICINE

## 2022-12-31 PROCEDURE — 99213 OFFICE O/P EST LOW 20 MIN: CPT | Performed by: FAMILY MEDICINE

## 2022-12-31 RX ORDER — AMOXICILLIN 500 MG/1
1000 CAPSULE ORAL 2 TIMES DAILY
Qty: 40 CAPSULE | Refills: 0 | Status: SHIPPED | OUTPATIENT
Start: 2022-12-31 | End: 2023-01-10

## 2022-12-31 RX ORDER — FAMOTIDINE 20 MG/1
20 TABLET, FILM COATED ORAL 2 TIMES DAILY
COMMUNITY
Start: 2022-12-22

## 2022-12-31 ASSESSMENT — ENCOUNTER SYMPTOMS
SHORTNESS OF BREATH: 0
COUGH: 0
VOICE CHANGE: 1
ABDOMINAL PAIN: 0
FEVER: 0
APPETITE CHANGE: 0
VOMITING: 0
STRIDOR: 0
DIARRHEA: 0
TROUBLE SWALLOWING: 1
HEMATURIA: 0
MYALGIAS: 1
NAUSEA: 0
SORE THROAT: 1
ACTIVITY CHANGE: 0

## 2022-12-31 NOTE — PROGRESS NOTES
Subjective      HPI  Juan Diego Rondon is a 67 y.o. male who presents for sore throat.    Ana has had a sore throat for a few days, hurts to swallow.  Even having trouble with his secretions.  Voice is hoarse.  No cough or congestion.  Was exposed to strep by his granddaughters this past week.  They also had influenza.  Emmett is immunized for flu and COVID.    Does have autoimmune disease.        The following have been reviewed and updated as appropriate in this visit:      No Known Allergies  Current Outpatient Medications   Medication Sig Dispense Refill    famotidine (PEPCID) 20 mg tablet Take 20 mg by mouth 2 (two) times a day      HYDROcodone-acetaminophen (NORCO) 5-325 mg per tablet Take 1 tablet by mouth every 6 (six) hours as needed for pain scale 4-7/10 Max Daily Amount: 4 tablets 20 tablet 0    hydrocortisone 2.5 % cream Apply topically 2 (two) times a day as needed for rash 30 g 0    omeprazole (PriLOSEC) 40 mg capsule Take 40 mg by mouth every morning      predniSONE 10 mg tablet Take 10 mg by mouth daily      valsartan (DIOVAN) 80 mg tablet Take 1 tablet by mouth daily      fenofibrate (TRICOR) 48 mg tablet Take 48 mg by mouth daily      acetaminophen (TYLENOL) 500 mg tablet Take 500 mg by mouth every 6 (six) hours as needed for pain scale 1-3/10      FLUoxetine (PROzac) 40 mg capsule Take 40 mg by mouth daily      hydrOXYzine (ATARAX) 25 mg tablet Take 25-50 mg by mouth nightly as needed      ursodioL (ACTIGALL) 500 mg tablet Take 500 mg by mouth 2 times daily Every other day      ursodioL (AMANDEEP) 250 mg tablet Take 750 mg by mouth In am and 500 mg in pm every other day-alternate with the 500 mg bid      cyanocobalamin 1,000 mcg/mL injection Inject 1,000 mcg into the shoulder, thigh, or buttocks every 30 (thirty) days      rosuvastatin (CRESTOR) 10 mg tablet Take 10 mg by mouth every evening   30 6    magic mouthwash (maalox/prednisolone/diphenhydramine/lidocaine) Swish and spit 5 mL every 4 (four)  hours as needed (sore throat) 250 mL 0    amoxicillin (AMOXIL) 500 mg capsule Take 2 capsules (1,000 mg total) by mouth 2 (two) times a day for 10 days 40 capsule 0     No current facility-administered medications for this visit.     Past Medical History:   Diagnosis Date    Allergic eosinophilic esophagitis     Arthritis     Autoimmune hepatitis (CMS/HCC) (HCC)     Blood disorder     pernicious anemia    Depression 08/08/2022    Gastrointestinal disease     IBS    Gout     Hyperlipidemia     Hypertension     Melanoma (CMS/HCC) (HCC)     Neurologic disorder     Pneumothorax 08/07/2022    Primary biliary cholangitis (CMS/HCC) (HCC)     autoimmune hepatitis    Psychiatric illness     anxiety     Past Surgical History:   Procedure Laterality Date    APPENDECTOMY      COLONOSCOPY W/ BIOPSIES AND POLYPECTOMY  02/09/2009    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    CT BIOPSY LIVER FUNCTION  12/2/2021    CT BIOPSY LIVER FUNCTION 12/2/2021 [facility] MIS CT SCAN    ESOPHAGOGASTRODUODENOSCOPY N/A 5/16/2022    Procedure: ESOPHAGOGASTRODUODENOSCOPY with Biopsies;  Surgeon: Bradley Cook MD;  Location: [facility] Endoscopy;  Service: Endoscopy;  Laterality: N/A;    EYE SURGERY Bilateral     cataract    KNEE SURGERY Bilateral     3 surgeries on left and 2 on right knees - prior open medial meniscectomies bilateral knees in the 1970s    SKIN BIOPSY      SKIN CANCER EXCISION Left 10/15/2020    Melanoma removed from left forearm    TONSILLECTOMY      TOTAL HIP ARTHROPLASTY Left 4/21/2021    Procedure: LEFT TOTAL HIP ARTHROPLASTY;  Surgeon: Sj Reyes MD;  Location: [facility] OR;  Service: Orthopedics;  Laterality: Left;    TOTAL HIP ARTHROPLASTY Right 3/22/2022    Procedure: RIGHT TOTAL HIP ARTHROPLASTY POSTERIOR;  Surgeon: Sj Reyes MD;  Location: [facility] OR;  Service: Orthopedics;  Laterality: Right;     Family History   Problem Relation Age of Onset    Hypertension Mother     Lung cancer Father     Hypertension Father      Social  History     Socioeconomic History    Marital status:    Tobacco Use    Smoking status: Never    Smokeless tobacco: Never   Vaping Use    Vaping Use: Never used   Substance and Sexual Activity    Alcohol use: Yes     Alcohol/week: 1.0 standard drink     Types: 1 Glasses of wine per week     Comment: occ    Drug use: Never     Social Determinants of Health     Tobacco Use: Low Risk     Smoking Tobacco Use: Never    Smokeless Tobacco Use: Never       Review of Systems   Constitutional:  Negative for activity change, appetite change and fever.   HENT:  Positive for sore throat, trouble swallowing and voice change. Negative for congestion.    Respiratory:  Negative for cough, shortness of breath and stridor.    Cardiovascular:  Negative for chest pain and leg swelling.   Gastrointestinal:  Negative for abdominal pain, diarrhea, nausea and vomiting.   Genitourinary:  Negative for hematuria.   Musculoskeletal:  Positive for myalgias (nothing acute).   Skin:  Negative for rash.     Objective   /92 (BP Location: Left arm, Patient Position: Sitting)   Pulse 82   Temp 36.8 °C (98.2 °F) (Temporal)   Resp 18   Wt 87.5 kg (193 lb)   SpO2 97%   BMI 24.12 kg/m²     Physical Exam  Constitutional:       Appearance: Normal appearance.   HENT:      Head: Normocephalic and atraumatic.      Ears:      Comments: Bilateral hearing aids     Nose: Nose normal.      Mouth/Throat:      Mouth: Mucous membranes are moist. No oral lesions or angioedema.      Pharynx: Uvula midline. Posterior oropharyngeal erythema and uvula swelling present.      Comments: Uvula erythematous and swollen, but midline with adequate air movement  Neurological:      Mental Status: He is alert.       Assessment/Plan   Diagnoses and all orders for this visit:    Uvulitis  -     magic mouthwash (maalox/prednisolone/diphenhydramine/lidocaine); Swish and spit 5 mL every 4 (four) hours as needed (sore throat)  -     amoxicillin (AMOXIL) 500 mg capsule;  Take 2 capsules (1,000 mg total) by mouth 2 (two) times a day for 10 days    Declines strep and influenza testing.  Will treat empirically for strep, uvulitis, due to exposure and immune suppression.  Also magic mouthwash for symptom management.  FU if symptoms do not improve or if any signs or symptoms of airway obstruction.      ANTON VILLAR MD

## 2023-01-06 DIAGNOSIS — E78.00 HYPERCHOLESTEROLEMIA: Primary | ICD-10-CM

## 2023-01-06 RX ORDER — FENOFIBRATE 48 MG/1
48 TABLET, FILM COATED ORAL DAILY
Qty: 90 TABLET | Refills: 4 | Status: SHIPPED | OUTPATIENT
Start: 2023-01-06 | End: 2025-02-21

## 2023-01-30 ENCOUNTER — OFFICE VISIT (OUTPATIENT)
Dept: URGENT CARE | Facility: CLINIC | Age: 68
End: 2023-01-30
Payer: MEDICARE

## 2023-01-30 VITALS
WEIGHT: 193 LBS | BODY MASS INDEX: 24.12 KG/M2 | OXYGEN SATURATION: 98 % | DIASTOLIC BLOOD PRESSURE: 86 MMHG | TEMPERATURE: 97.2 F | SYSTOLIC BLOOD PRESSURE: 124 MMHG | RESPIRATION RATE: 18 BRPM | HEART RATE: 68 BPM

## 2023-01-30 DIAGNOSIS — R53.1 WEAKNESS: Primary | ICD-10-CM

## 2023-01-30 ASSESSMENT — ENCOUNTER SYMPTOMS
ARTHRALGIAS: 1
MYALGIAS: 1
WEAKNESS: 1

## 2023-01-30 NOTE — PROGRESS NOTES
Subjective      Juan Diego Rondon is a 67 y.o. male who presents for wrist and leg pain.    Patient presents today with ongoing pain in multiple areas including his wrist, leg, elbow.  He has a history of autoimmune hepatitis, celiac's disease and atrophic gastritis.  He is seeing GI for his hepatitis.  He is also seen rheumatology before.  He states that his pain has flared up over the last week.  He would like to see Dr. Haley however could not get into see him until June.  He states over the last week his pain has worsened and he has had to start using his cane.  He continues to take steroids on a daily basis with minimal relief.  He really would just like to get to the bottom of his symptoms.  I did discuss with patient that a number of test could be run today in the urgent care but ultimately he would need primary care to monitor his symptoms and make formal referrals.  He is agreeable to this and a phone call is made and the patient will be set up to see Dr. Haley tomorrow.      The following have been reviewed and updated as appropriate in this visit:          No Known Allergies  Current Outpatient Medications   Medication Sig Dispense Refill    fenofibrate (TRICOR) 48 mg tablet Take 1 tablet (48 mg total) by mouth daily 90 tablet 4    famotidine (PEPCID) 20 mg tablet Take 20 mg by mouth 2 (two) times a day      hydrocortisone 2.5 % cream Apply topically 2 (two) times a day as needed for rash 30 g 0    omeprazole (PriLOSEC) 40 mg capsule Take 40 mg by mouth every morning      predniSONE 10 mg tablet Take 5 mg by mouth daily      valsartan (DIOVAN) 80 mg tablet Take 1 tablet by mouth daily      acetaminophen (TYLENOL) 500 mg tablet Take 500 mg by mouth every 6 (six) hours as needed for pain scale 1-3/10      FLUoxetine (PROzac) 40 mg capsule Take 40 mg by mouth daily      hydrOXYzine (ATARAX) 25 mg tablet Take 25-50 mg by mouth nightly as needed      ursodioL (ACTIGALL) 500 mg tablet Take 500 mg by  mouth 2 times daily Every other day      ursodioL (AMANDEEP) 250 mg tablet Take 750 mg by mouth In am and 500 mg in pm every other day-alternate with the 500 mg bid      cyanocobalamin 1,000 mcg/mL injection Inject 1,000 mcg into the shoulder, thigh, or buttocks every 30 (thirty) days      rosuvastatin (CRESTOR) 10 mg tablet Take 10 mg by mouth every evening   30 6    magic mouthwash (maalox/prednisolone/diphenhydramine/lidocaine) Swish and spit 5 mL every 4 (four) hours as needed (sore throat) (Patient not taking: Reported on 1/30/2023) 250 mL 0    HYDROcodone-acetaminophen (NORCO) 5-325 mg per tablet Take 1 tablet by mouth every 6 (six) hours as needed for pain scale 4-7/10 Max Daily Amount: 4 tablets (Patient not taking: Reported on 1/30/2023) 20 tablet 0     No current facility-administered medications for this visit.     Past Medical History:   Diagnosis Date    Allergic eosinophilic esophagitis     Arthritis     Autoimmune hepatitis (CMS/HCC) (HCC)     Blood disorder     pernicious anemia    Depression 08/08/2022    Gastrointestinal disease     IBS    Gout     Hyperlipidemia     Hypertension     Melanoma (CMS/HCC) (HCC)     Neurologic disorder     Pneumothorax 08/07/2022    Primary biliary cholangitis (CMS/HCC) (HCC)     autoimmune hepatitis    Psychiatric illness     anxiety     Past Surgical History:   Procedure Laterality Date    APPENDECTOMY      COLONOSCOPY W/ BIOPSIES AND POLYPECTOMY  02/09/2009    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    CT BIOPSY LIVER FUNCTION  12/2/2021    CT BIOPSY LIVER FUNCTION 12/2/2021 [facility] MIS CT SCAN    ESOPHAGOGASTRODUODENOSCOPY N/A 5/16/2022    Procedure: ESOPHAGOGASTRODUODENOSCOPY with Biopsies;  Surgeon: Bradley Cook MD;  Location: [facility] Endoscopy;  Service: Endoscopy;  Laterality: N/A;    EYE SURGERY Bilateral     cataract    KNEE SURGERY Bilateral     3 surgeries on left and 2 on right knees - prior open medial meniscectomies bilateral knees in the 1970s    SKIN BIOPSY       SKIN CANCER EXCISION Left 10/15/2020    Melanoma removed from left forearm    TONSILLECTOMY      TOTAL HIP ARTHROPLASTY Left 4/21/2021    Procedure: LEFT TOTAL HIP ARTHROPLASTY;  Surgeon: Sj Reyes MD;  Location: [facility] OR;  Service: Orthopedics;  Laterality: Left;    TOTAL HIP ARTHROPLASTY Right 3/22/2022    Procedure: RIGHT TOTAL HIP ARTHROPLASTY POSTERIOR;  Surgeon: Sj Reyes MD;  Location: [facility] OR;  Service: Orthopedics;  Laterality: Right;     Family History   Problem Relation Age of Onset    Hypertension Mother     Lung cancer Father     Hypertension Father      Social History     Socioeconomic History    Marital status:    Tobacco Use    Smoking status: Never    Smokeless tobacco: Never   Vaping Use    Vaping Use: Never used   Substance and Sexual Activity    Alcohol use: Yes     Alcohol/week: 1.0 standard drink     Types: 1 Glasses of wine per week     Comment: occ    Drug use: Never     Social Determinants of Health     Tobacco Use: Low Risk     Smoking Tobacco Use: Never    Smokeless Tobacco Use: Never       Review of Systems   Musculoskeletal:  Positive for arthralgias and myalgias.   Neurological:  Positive for weakness.     Objective   /86 (BP Location: Left arm, Patient Position: Sitting)   Pulse 68   Temp 36.2 °C (97.2 °F) (Temporal)   Resp 18   Wt 87.5 kg (193 lb)   SpO2 98%   BMI 24.12 kg/m²     Physical Exam  Vitals and nursing note reviewed.   Constitutional:       Appearance: Normal appearance.   Neurological:      Mental Status: He is alert.       No results found for this or any previous visit (from the past 24 hour(s)).    Assessment/Plan   Diagnoses and all orders for this visit:    Weakness    I did have a discussion with the patient regarding his symptoms and his longstanding history of autoimmune issues.  He does see a rheumatologist intermittently as well as GI for his hepatitis.  He needs to have a primary care provider to manage his ongoing symptoms.  He  is aware of this and would like to set appointment up with them however it was going to be 6 months till this appointment could occur.  I was able to get him into see Dr. Haley tomorrow he was agreeable to that and no further testing here today in urgent care.      Arabella Sharma, CNP

## 2023-01-31 ENCOUNTER — ANCILLARY PROCEDURE (OUTPATIENT)
Dept: RADIOLOGY | Facility: CLINIC | Age: 68
End: 2023-01-31
Payer: MEDICARE

## 2023-01-31 ENCOUNTER — OFFICE VISIT (OUTPATIENT)
Dept: FAMILY MEDICINE | Facility: CLINIC | Age: 68
End: 2023-01-31
Payer: MEDICARE

## 2023-01-31 ENCOUNTER — LAB (OUTPATIENT)
Dept: LAB | Facility: CLINIC | Age: 68
End: 2023-01-31
Payer: MEDICARE

## 2023-01-31 VITALS
TEMPERATURE: 98.3 F | WEIGHT: 190 LBS | SYSTOLIC BLOOD PRESSURE: 120 MMHG | BODY MASS INDEX: 23.75 KG/M2 | DIASTOLIC BLOOD PRESSURE: 90 MMHG | OXYGEN SATURATION: 97 % | HEART RATE: 107 BPM

## 2023-01-31 DIAGNOSIS — M25.562 ACUTE PAIN OF LEFT KNEE: ICD-10-CM

## 2023-01-31 DIAGNOSIS — M25.532 LEFT WRIST PAIN: ICD-10-CM

## 2023-01-31 DIAGNOSIS — M25.50 MULTIPLE JOINT PAIN: Primary | ICD-10-CM

## 2023-01-31 DIAGNOSIS — K75.4 AUTOIMMUNE HEPATITIS (CMS/HCC): ICD-10-CM

## 2023-01-31 LAB
ALBUMIN SERPL-MCNC: 4.1 G/DL (ref 3.5–5.3)
ALP SERPL-CCNC: 109 U/L (ref 45–115)
ALT SERPL-CCNC: 29 U/L (ref 7–52)
ANION GAP SERPL CALC-SCNC: 16 MMOL/L (ref 3–11)
AST SERPL-CCNC: 44 U/L
BASOPHILS # BLD AUTO: 0 10*3/UL
BASOPHILS NFR BLD AUTO: 0.3 % (ref 0–2)
BILIRUB SERPL-MCNC: 1.44 MG/DL (ref 0.2–1.4)
BUN SERPL-MCNC: 12 MG/DL (ref 7–25)
CALCIUM ALBUM COR SERPL-MCNC: 9.8 MG/DL (ref 8.6–10.3)
CALCIUM SERPL-MCNC: 9.9 MG/DL (ref 8.6–10.3)
CHLORIDE SERPL-SCNC: 100 MMOL/L (ref 98–107)
CO2 SERPL-SCNC: 19 MMOL/L (ref 21–32)
CREAT SERPL-MCNC: 1.08 MG/DL (ref 0.7–1.3)
CRP SERPL-MCNC: 2.6 MG/L
EOSINOPHIL # BLD AUTO: 0 10*3/UL
EOSINOPHIL NFR BLD AUTO: 0 % (ref 0–3)
ERYTHROCYTE [DISTWIDTH] IN BLOOD BY AUTOMATED COUNT: 14.4 % (ref 11.5–15)
ERYTHROCYTE [SEDIMENTATION RATE] IN BLOOD: 50 MM/HR
GFR SERPL CREATININE-BSD FRML MDRD: 75 ML/MIN/1.73M*2
GLUCOSE SERPL-MCNC: 134 MG/DL (ref 70–105)
HCT VFR BLD AUTO: 41 % (ref 38–50)
HGB BLD-MCNC: 13.7 G/DL (ref 13.2–17.2)
LYMPHOCYTES # BLD AUTO: 0.8 10*3/UL
LYMPHOCYTES NFR BLD AUTO: 6.8 % (ref 15–47)
MACROCYTOSIS PRESENCE IN BLOOD, ANALYZER: ABNORMAL
MCH RBC QN AUTO: 34.4 PG (ref 29–34)
MCHC RBC AUTO-ENTMCNC: 33.4 G/DL (ref 32–36)
MCV RBC AUTO: 103 FL (ref 82–97)
MONOCYTES # BLD AUTO: 0.9 10*3/UL
MONOCYTES NFR BLD AUTO: 8.3 % (ref 5–13)
NEUTROPHILS # BLD AUTO: 9.3 10*3/UL
NEUTROPHILS NFR BLD AUTO: 84.6 % (ref 46–70)
PLATELET # BLD AUTO: 239 10*3/UL (ref 130–350)
PMV BLD AUTO: 7.5 FL (ref 6.9–10.8)
POTASSIUM SERPL-SCNC: 3.9 MMOL/L (ref 3.5–5.1)
PROT SERPL-MCNC: 7.9 G/DL (ref 6–8.3)
RBC # BLD AUTO: 3.98 10*6/ΜL (ref 4.1–5.8)
SODIUM SERPL-SCNC: 135 MMOL/L (ref 135–145)
WBC # BLD AUTO: 11.1 10*3/UL (ref 3.7–9.6)

## 2023-01-31 PROCEDURE — 73562 X-RAY EXAM OF KNEE 3: CPT | Mod: LT

## 2023-01-31 PROCEDURE — 73562 X-RAY EXAM OF KNEE 3: CPT | Mod: 26,LT | Performed by: RADIOLOGY

## 2023-01-31 PROCEDURE — 86038 ANTINUCLEAR ANTIBODIES: CPT | Performed by: FAMILY MEDICINE

## 2023-01-31 PROCEDURE — 85652 RBC SED RATE AUTOMATED: CPT | Performed by: FAMILY MEDICINE

## 2023-01-31 PROCEDURE — 86140 C-REACTIVE PROTEIN: CPT | Performed by: FAMILY MEDICINE

## 2023-01-31 PROCEDURE — 80053 COMPREHEN METABOLIC PANEL: CPT | Performed by: FAMILY MEDICINE

## 2023-01-31 PROCEDURE — G0463 HOSPITAL OUTPT CLINIC VISIT: HCPCS | Performed by: FAMILY MEDICINE

## 2023-01-31 PROCEDURE — 73110 X-RAY EXAM OF WRIST: CPT | Mod: LT

## 2023-01-31 PROCEDURE — 73110 X-RAY EXAM OF WRIST: CPT | Mod: 26,LT | Performed by: RADIOLOGY

## 2023-01-31 PROCEDURE — 99214 OFFICE O/P EST MOD 30 MIN: CPT | Performed by: FAMILY MEDICINE

## 2023-01-31 PROCEDURE — 86431 RHEUMATOID FACTOR QUANT: CPT | Performed by: FAMILY MEDICINE

## 2023-01-31 PROCEDURE — 36415 COLL VENOUS BLD VENIPUNCTURE: CPT | Performed by: FAMILY MEDICINE

## 2023-01-31 PROCEDURE — 85025 COMPLETE CBC W/AUTO DIFF WBC: CPT | Performed by: FAMILY MEDICINE

## 2023-02-01 LAB
CYCLIC CITRULLINATED PEPTIDE AB INDEX: 1 U/ML
CYCLIC CITRULLINATED PEPTIDE AB: NEGATIVE
RF IGA SER-ACNC: 3 IU/ML
RF IGA SER-ACNC: <0.1 RATIO
RF IGA SER-ACNC: NEGATIVE [IU]/ML
RF IGA SER-ACNC: NEGATIVE [IU]/ML
RF IGM SER-ACNC: 2 U/ML
RF IGM SER-ACNC: NEGATIVE [IU]/ML

## 2023-02-01 NOTE — PROGRESS NOTES
Subjective      Juan Diego Rondon is a 67 y.o. male who presents for Follow-up (Patient is here today to follow up from urgent care. He is having an autoimmune flare up and is having bad body aches.)  .    HPI  Patient presents due to concerns of multiple joint pain that has been worsening over the last couple years and most specifically the last couple of months.  He has diffuse arthralgias but his knees and wrist seems to be the most significant with left knee and left wrist being the worst of these.  He describes some intermittent effusions.  He is wondering if this is related to his autoimmune hepatitis or if a secondary issue may be going on.  The pain has become debilitating recently with yesterday it being very difficult for him to even get out of his chair because the pain was so significant.  He denies any recent changes in medications or new issues.  He denies any fevers or chills.  He states he has had an arthrocentesis in the past showing uric acid crystals but is never been specifically treated for gout.    The following have been reviewed and updated as appropriate in this visit:   Tobacco  Allergies  Meds  Problems  Med Hx  Surg Hx  Fam Hx         No Known Allergies  Current Outpatient Medications   Medication Sig Dispense Refill    fenofibrate (TRICOR) 48 mg tablet Take 1 tablet (48 mg total) by mouth daily 90 tablet 4    famotidine (PEPCID) 20 mg tablet Take 20 mg by mouth 2 (two) times a day      hydrocortisone 2.5 % cream Apply topically 2 (two) times a day as needed for rash 30 g 0    omeprazole (PriLOSEC) 40 mg capsule Take 40 mg by mouth every morning      predniSONE 10 mg tablet Take 5 mg by mouth daily      valsartan (DIOVAN) 80 mg tablet Take 1 tablet by mouth daily      acetaminophen (TYLENOL) 500 mg tablet Take 500 mg by mouth every 6 (six) hours as needed for pain scale 1-3/10      FLUoxetine (PROzac) 40 mg capsule Take 40 mg by mouth daily      hydrOXYzine (ATARAX) 25 mg tablet  Take 25-50 mg by mouth nightly as needed      ursodioL (ACTIGALL) 500 mg tablet Take 500 mg by mouth 2 times daily Every other day      ursodioL (AMANDEEP) 250 mg tablet Take 750 mg by mouth In am and 500 mg in pm every other day-alternate with the 500 mg bid      cyanocobalamin 1,000 mcg/mL injection Inject 1,000 mcg into the shoulder, thigh, or buttocks every 30 (thirty) days      rosuvastatin (CRESTOR) 10 mg tablet Take 10 mg by mouth every evening   30 6     No current facility-administered medications for this visit.     Past Medical History:   Diagnosis Date    Allergic eosinophilic esophagitis     Arthritis     Autoimmune hepatitis (CMS/HCC) (HCC)     Blood disorder     pernicious anemia    Depression 08/08/2022    Gastrointestinal disease     IBS    Gout     Hyperlipidemia     Hypertension     Melanoma (CMS/HCC) (HCC)     Neurologic disorder     Pneumothorax 08/07/2022    Primary biliary cholangitis (CMS/HCC) (HCC)     autoimmune hepatitis    Psychiatric illness     anxiety     Past Surgical History:   Procedure Laterality Date    APPENDECTOMY      COLONOSCOPY W/ BIOPSIES AND POLYPECTOMY  02/09/2009    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    CT BIOPSY LIVER FUNCTION  12/2/2021    CT BIOPSY LIVER FUNCTION 12/2/2021 [facility] MIS CT SCAN    ESOPHAGOGASTRODUODENOSCOPY N/A 5/16/2022    Procedure: ESOPHAGOGASTRODUODENOSCOPY with Biopsies;  Surgeon: Bradley Cook MD;  Location: [facility] Endoscopy;  Service: Endoscopy;  Laterality: N/A;    EYE SURGERY Bilateral     cataract    KNEE SURGERY Bilateral     3 surgeries on left and 2 on right knees - prior open medial meniscectomies bilateral knees in the 1970s    SKIN BIOPSY      SKIN CANCER EXCISION Left 10/15/2020    Melanoma removed from left forearm    TONSILLECTOMY      TOTAL HIP ARTHROPLASTY Left 4/21/2021    Procedure: LEFT TOTAL HIP ARTHROPLASTY;  Surgeon: Sj Reyes MD;  Location: [facility] OR;  Service: Orthopedics;  Laterality: Left;    TOTAL HIP ARTHROPLASTY Right  3/22/2022    Procedure: RIGHT TOTAL HIP ARTHROPLASTY POSTERIOR;  Surgeon: Sj Reyes MD;  Location: [facility] OR;  Service: Orthopedics;  Laterality: Right;     Family History   Problem Relation Age of Onset    Hypertension Mother     Lung cancer Father     Hypertension Father      Social History     Socioeconomic History    Marital status:    Tobacco Use    Smoking status: Never    Smokeless tobacco: Never   Vaping Use    Vaping Use: Never used   Substance and Sexual Activity    Alcohol use: Yes     Alcohol/week: 1.0 standard drink     Types: 1 Glasses of wine per week     Comment: occ    Drug use: Never     Social Determinants of Health     Tobacco Use: Low Risk     Smoking Tobacco Use: Never    Smokeless Tobacco Use: Never       Review of Systems    Objective     Vitals  /90 (BP Location: Left arm, Patient Position: Sitting)   Pulse 107   Temp 36.8 °C (98.3 °F) (Temporal)   Wt 86.2 kg (190 lb)   SpO2 97%   BMI 23.75 kg/m²     Physical Exam  Vitals and nursing note reviewed.   Constitutional:       General: He is not in acute distress.     Appearance: Normal appearance. He is not ill-appearing, toxic-appearing or diaphoretic.   Eyes:      General: No scleral icterus.        Right eye: No discharge.         Left eye: No discharge.   Cardiovascular:      Rate and Rhythm: Normal rate and regular rhythm.      Heart sounds: Normal heart sounds. No murmur heard.    No friction rub. No gallop.   Pulmonary:      Effort: Pulmonary effort is normal. No respiratory distress.      Breath sounds: Normal breath sounds. No stridor. No wheezing, rhonchi or rales.   Abdominal:      General: Abdomen is flat. There is no distension.      Palpations: Abdomen is soft. There is no mass.      Tenderness: There is no abdominal tenderness. There is no guarding or rebound.      Hernia: No hernia is present.   Musculoskeletal:         General: Swelling, tenderness and deformity present.      Cervical back: Neck supple.       Right lower leg: No edema.      Left lower leg: No edema.      Comments: Severe tenderness to palpation diffusely the left wrist and left knee.  Left knee effusion present.  No obvious wrist or hand effusions.  Diffuse tenderness to palpation basically all joints.  No significant myalgias.   Lymphadenopathy:      Cervical: No cervical adenopathy.   Skin:     General: Skin is warm and dry.      Capillary Refill: Capillary refill takes less than 2 seconds.      Findings: No rash.   Neurological:      Mental Status: He is alert.       Procedures    Assessment/Plan   Diagnoses and all orders for this visit:    Multiple joint pain  -     CBC w/auto differential Blood, Venous  -     Comprehensive metabolic panel Blood, Venous  -     MAX Screen reflex Blood, Venous  -     C-reactive protein (Inflammation) Blood, Venous  -     Cyclic citrul peptide antibody Blood, Venous  -     Rheumatoid factor Blood, Venous  -     Sedimentation rate, automated Blood, Venous    Autoimmune hepatitis (CMS/HCC) (HCC)    Acute pain of left knee  -     X-ray knee 3 views left    Left wrist pain  -     X-ray wrist 3 or more views left    Patient with diffuse joint pain concerning for inflammatory arthritis.  Gout seems most likely.  I doubt this is related to his autoimmune hepatitis although I am not an expert in this field and I did explain this to the patient.  Rheumatoid arthritis less likely.  Lupus less likely.  I recommended additional laboratory evaluation.  He did have a recent uric acid so we will not repeat this.  We will likely start a prednisone taper pending lab results.  We will also send for x-ray of left knee and left wrist given these are his most severe areas of tenderness.  Return precautions given.  Patient will follow-up pending results.    Return if symptoms worsen or fail to improve.    Cuba Haley MD

## 2023-03-14 ENCOUNTER — LAB (OUTPATIENT)
Dept: LAB | Facility: CLINIC | Age: 68
End: 2023-03-14
Payer: MEDICARE

## 2023-03-14 DIAGNOSIS — K20.0 EOSINOPHILIC ESOPHAGITIS: ICD-10-CM

## 2023-03-14 DIAGNOSIS — K74.02 ADVANCED HEPATIC FIBROSIS: ICD-10-CM

## 2023-03-14 DIAGNOSIS — Z86.0101 HISTORY OF ADENOMATOUS POLYP OF COLON: ICD-10-CM

## 2023-03-14 DIAGNOSIS — E78.5 HYPERLIPIDEMIA, UNSPECIFIED HYPERLIPIDEMIA TYPE: ICD-10-CM

## 2023-03-14 DIAGNOSIS — M25.50 ARTHRALGIA, UNSPECIFIED JOINT: ICD-10-CM

## 2023-03-14 DIAGNOSIS — Z12.5 PROSTATE CANCER SCREENING: ICD-10-CM

## 2023-03-14 DIAGNOSIS — K90.9 STEATORRHEA: ICD-10-CM

## 2023-03-14 DIAGNOSIS — K74.3 PRIMARY BILIARY CHOLANGITIS (CMS/HCC): ICD-10-CM

## 2023-03-14 DIAGNOSIS — E53.8 B12 DEFICIENCY: ICD-10-CM

## 2023-03-14 LAB
ALBUMIN SERPL-MCNC: 4 G/DL (ref 3.5–5.3)
ALP SERPL-CCNC: 84 U/L (ref 45–115)
ALT SERPL-CCNC: 9 U/L (ref 7–52)
AST SERPL-CCNC: 12 U/L
BILIRUB DIRECT SERPL-MCNC: 0 MG/DL
BILIRUB SERPL-MCNC: 0.64 MG/DL (ref 0.2–1.4)
PROT SERPL-MCNC: 7.4 G/DL (ref 6–8.3)

## 2023-03-14 PROCEDURE — 36415 COLL VENOUS BLD VENIPUNCTURE: CPT

## 2023-03-14 PROCEDURE — 80076 HEPATIC FUNCTION PANEL: CPT

## 2023-05-08 ENCOUNTER — HEALTH MAINTENANCE LETTER (OUTPATIENT)
Age: 68
End: 2023-05-08

## 2023-05-09 ENCOUNTER — CLINICAL SUPPORT (OUTPATIENT)
Dept: FAMILY MEDICINE | Facility: CLINIC | Age: 68
End: 2023-05-09
Payer: MEDICARE

## 2023-05-09 VITALS — RESPIRATION RATE: 18 BRPM

## 2023-05-09 DIAGNOSIS — Z11.1 VISIT FOR TB SKIN TEST: Primary | ICD-10-CM

## 2023-05-09 ASSESSMENT — PAIN SCALES - GENERAL: PAINLEVEL: 0-NO PAIN

## 2023-05-09 NOTE — PROGRESS NOTES
PPD Placement note  Juan Diego Rondon, 67 y.o. male is here today for placement of PPD test  Reason for PPD test: Employment  Pt taken PPD test before: yes  Verified in allergy area and with patient that they are not allergic to the products PPD is made of (Phenol or Tween). Yes  Is patient taking any oral or IV steroid medication now or have they taken it in the last month? no  Has the patient ever received the BCG vaccine?: no  Has the patient been in recent contact with anyone known or suspected of having active TB disease?: no       Date of exposure (if applicable): n/a       Name of person they were exposed to (if applicable): n/a  Patient's Country of origin?: [place]  O: Alert and oriented in NAD.  P:  PPD placed on 5/9/2023.  Patient advised to return for reading within 48-72 hours.  Patient voiced no complaints of pain or discomfort at this time.

## 2023-05-11 ENCOUNTER — CLINICAL SUPPORT (OUTPATIENT)
Dept: FAMILY MEDICINE | Facility: CLINIC | Age: 68
End: 2023-05-11
Payer: MEDICARE

## 2023-05-11 DIAGNOSIS — Z11.1 VISIT FOR TB SKIN TEST: ICD-10-CM

## 2023-05-11 LAB — TB SKIN TEST: 0 MM (ref 0–15)

## 2023-05-11 PROCEDURE — 86580 TB INTRADERMAL TEST: CPT | Performed by: FAMILY MEDICINE

## 2023-05-11 NOTE — PROGRESS NOTES
PPD Reading Note  PPD read and results entered in [employer].  Result: 0 mm induration.  Interpretation: Negative. Read by Cha MIRELES CMA. Interpreted by Tomasa TURCIOS LPN  If test not read within 48-72 hours of initial placement, patient advised to repeat in other arm 1-3 weeks after this test.  Allergic reaction: no

## 2023-05-25 ENCOUNTER — OFFICE VISIT (OUTPATIENT)
Dept: ORTHOPEDIC SURGERY | Facility: CLINIC | Age: 68
End: 2023-05-25
Payer: MEDICARE

## 2023-05-25 VITALS — SYSTOLIC BLOOD PRESSURE: 138 MMHG | DIASTOLIC BLOOD PRESSURE: 62 MMHG

## 2023-05-25 DIAGNOSIS — M70.62 GREATER TROCHANTERIC BURSITIS OF BOTH HIPS: ICD-10-CM

## 2023-05-25 DIAGNOSIS — M70.61 GREATER TROCHANTERIC BURSITIS OF BOTH HIPS: ICD-10-CM

## 2023-05-25 DIAGNOSIS — M25.551 BILATERAL HIP PAIN: Primary | ICD-10-CM

## 2023-05-25 DIAGNOSIS — M25.552 BILATERAL HIP PAIN: Primary | ICD-10-CM

## 2023-05-25 PROCEDURE — 20610 DRAIN/INJ JOINT/BURSA W/O US: CPT | Mod: 50,PO | Performed by: NURSE PRACTITIONER

## 2023-05-25 PROCEDURE — 6360000200 HC RX 636 W HCPCS (ALT 250 FOR IP): Mod: PO | Performed by: NURSE PRACTITIONER

## 2023-05-25 PROCEDURE — G0463 HOSPITAL OUTPT CLINIC VISIT: HCPCS | Mod: PO | Performed by: NURSE PRACTITIONER

## 2023-05-25 PROCEDURE — 99214 OFFICE O/P EST MOD 30 MIN: CPT | Mod: 25 | Performed by: NURSE PRACTITIONER

## 2023-05-25 RX ORDER — LIDOCAINE HYDROCHLORIDE 10 MG/ML
2 INJECTION, SOLUTION INFILTRATION; PERINEURAL
Status: COMPLETED | OUTPATIENT
Start: 2023-05-25 | End: 2023-05-25

## 2023-05-25 RX ORDER — BUPIVACAINE HYDROCHLORIDE 5 MG/ML
2 INJECTION, SOLUTION EPIDURAL; INTRACAUDAL
Status: COMPLETED | OUTPATIENT
Start: 2023-05-25 | End: 2023-05-25

## 2023-05-25 RX ORDER — BETAMETHASONE SODIUM PHOSPHATE AND BETAMETHASONE ACETATE 3; 3 MG/ML; MG/ML
6 INJECTION, SUSPENSION INTRA-ARTICULAR; INTRALESIONAL; INTRAMUSCULAR; SOFT TISSUE
Status: COMPLETED | OUTPATIENT
Start: 2023-05-25 | End: 2023-05-25

## 2023-05-25 RX ADMIN — BUPIVACAINE HYDROCHLORIDE 2 ML: 5 INJECTION, SOLUTION EPIDURAL; INTRACAUDAL at 15:15

## 2023-05-25 RX ADMIN — LIDOCAINE HYDROCHLORIDE 2 ML: 10 INJECTION, SOLUTION INFILTRATION; PERINEURAL at 15:15

## 2023-05-25 RX ADMIN — BETAMETHASONE SODIUM PHOSPHATE AND BETAMETHASONE ACETATE 6 MG: 3; 3 INJECTION, SUSPENSION INTRA-ARTICULAR; INTRALESIONAL; INTRAMUSCULAR at 15:15

## 2023-05-25 NOTE — PROGRESS NOTES
Follow-up and Injections of the Right Hip (14m3d s/p RIGHT TOTAL HIP ARTHROPLASTY POSTERIOR with Little 3/22/22. Having bursitis pain 9/10), Follow-up and Injections of the Left Hip (2y1m s/p LEFT TOTAL HIP ARTHROPLASTY with Little 4/21/21. Bursitis pain 6/10), and Pain (Taking tylenol PRN)      HPI  Emmett is a pleasant 68-year-old male who presents today for follow-up of bilateral lateral hip pain.  He is having bursitis pain.  Today's rating pain 9 out of 10 did have injections his last hip 2 years ago.  He is status post bilateral hip arthroplasty with Dr. Reyes.  Denies any fevers, chills, night sweats does have difficulty sleeping on his sides.  He is requesting steroid injections into his hips bilateral.    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    CT BIOPSY LIVER FUNCTION  12/2/2021    CT BIOPSY LIVER FUNCTION 12/2/2021 [facility] MIS CT SCAN    ESOPHAGOGASTRODUODENOSCOPY N/A 5/16/2022    Procedure: ESOPHAGOGASTRODUODENOSCOPY with Biopsies;  Surgeon: Bradley Cook MD;  Location: [facility] Endoscopy;  Service: Endoscopy;  Laterality: N/A;    EYE SURGERY Bilateral     cataract    KNEE SURGERY Bilateral     3 surgeries on left and 2 on right knees - prior open medial meniscectomies bilateral knees in the 1970s    SKIN BIOPSY      SKIN CANCER EXCISION Left 10/15/2020    Melanoma removed from left forearm    TONSILLECTOMY      TOTAL HIP ARTHROPLASTY  Left 4/21/2021    Procedure: LEFT TOTAL HIP ARTHROPLASTY;  Surgeon: Sj Reyes MD;  Location: [facility] OR;  Service: Orthopedics;  Laterality: Left;    TOTAL HIP ARTHROPLASTY Right 3/22/2022    Procedure: RIGHT TOTAL HIP ARTHROPLASTY POSTERIOR;  Surgeon: Sj Reyes MD;  Location: [facility] OR;  Service: Orthopedics;  Laterality: Right;     Prior to Admission medications    Medication Sig Start Date End Date Taking? Authorizing Provider   fenofibrate (TRICOR) 48 mg tablet Take 1 tablet (48 mg total) by mouth daily 1/6/23 1/6/24  Cuba Haley MD   famotidine (PEPCID) 20 mg tablet Take 20 mg by mouth 2 (two) times a day 12/22/22   Hoda Blount MD   hydrocortisone 2.5 % cream Apply topically 2 (two) times a day as needed for rash 8/11/22 8/11/23  Cuba Haley MD   omeprazole (PriLOSEC) 40 mg capsule Take 40 mg by mouth every morning 5/20/22   Hoda Blount MD   predniSONE 10 mg tablet Take 5 mg by mouth daily 1/10/22   Hoda Blount MD   valsartan (DIOVAN) 80 mg tablet Take 1 tablet by mouth daily 1/11/22   Hoda Blount MD   acetaminophen (TYLENOL) 500 mg tablet Take 500 mg by mouth every 6 (six) hours as needed for pain scale 1-3/10    Hoda Blount MD   FLUoxetine (PROzac) 40 mg capsule Take 40 mg by mouth daily    Hoda Blount MD   hydrOXYzine (ATARAX) 25 mg tablet Take 25-50 mg by mouth nightly as needed 3/7/21   Hoda Blount MD   ursodioL (ACTIGALL) 500 mg tablet Take 500 mg by mouth 2 times daily Every other day    Hoda Blount MD   ursodioL (AMANDEEP) 250 mg tablet Take 750 mg by mouth In am and 500 mg in pm every other day-alternate with the 500 mg bid    Hoda Blount MD   cyanocobalamin 1,000 mcg/mL injection Inject 1,000 mcg into the shoulder, thigh, or buttocks every 30 (thirty) days 7/14/20   Hoda Blount MD   rosuvastatin (CRESTOR) 10 mg tablet Take 10 mg by mouth every evening   6/23/14   ProviderKwaku  Known Allergies  Social History     Socioeconomic History    Marital status:      Spouse name: Not on file    Number of children: Not on file    Years of education: Not on file    Highest education level: Not on file   Occupational History    Not on file   Tobacco Use    Smoking status: Never    Smokeless tobacco: Never   Vaping Use    Vaping Use: Never used   Substance and Sexual Activity    Alcohol use: Yes     Alcohol/week: 1.0 standard drink of alcohol     Types: 1 Glasses of wine per week     Comment: occ    Drug use: Never    Sexual activity: Not on file   Other Topics Concern    Not on file   Social History Narrative    Not on file     Social Determinants of Health     Financial Resource Strain: Not on file   Food Insecurity: Not on file   Transportation Needs: Not on file   Physical Activity: Not on file   Stress: Not on file   Social Connections: Not on file   Intimate Partner Violence: Not on file   Housing Stability: Not on file     Family History   Problem Relation Age of Onset    Hypertension Mother     Lung cancer Father     Hypertension Father      GENERAL/CONSTITUTIONAL:  The patient denies fever, fatigue, weakness, weight gain or weight loss.  CARDIOVASCULAR:  The patient denies any heart concerns with good perfusion throughout.    RESPIRATORY:  The patient denies lung concerns with no SOB.  GASTROINTESTINAL:  The patient denies nausea, vomiting, or any abdominal concerns.  MUSCULOSKELETAL: Bilateral bursitis  SKIN AND BREASTS: The patient denies any skin rashes or concerns with skin.  NEUROLOGIC:  The patient denies headache, extremity tingling or weakness.  PSYCHIATRIC: The patient denies any psychiatric concerns.  HEMATOLOGIC: The patient denies bleeding issues or clotting deficiencies.    ALLERGIC: The patient denies any allergies.  /62     Right Hip Exam     Tenderness   The patient is experiencing tenderness in the greater trochanter.    Other   Erythema: absent  Scars: absent  (Posterior lateral total hip arthroplasty)  Sensation: normal  Pulse: present      Left Hip Exam     Tenderness   The patient is experiencing tenderness in the greater trochanter.    Tests   ADRIA: negative  Monica: negative    Other   Scars: absent (posterior lateral total hip arthroplasty)  Sensation: normal  Pulse: present            Physical Exam     Assessment/Plan   Bilateral hip greater trochanter bursitis  Plan:  - Today reviewed treatment plan with Emmett concerning the above diagnosis.  - Treatment-we will treat him with steroid injections done in the greater trochanteric area bilateral hips.  Tolerated procedure well.  See procedure note.  Continue to ice and modify his activity pain and discomfort.  He will do simple stretching exercises at home.  He will continue with his home exercise regiment.  - Dihbll-[id number] will follow-up as needed with no future points made today.

## 2023-05-25 NOTE — PROGRESS NOTES
Bilateral greater troch bursa steroid injection  Procedure was performed bilaterally. Date/Time: 5/25/2023 3:15 PM  Performed by: Luis Perez CNP       Consent    Verbal consent was obtained from the patient. Written consent was not obtained from the patient. Risks, benefits, and alternatives were discussed. Consent given by patient. The patient states understanding of the procedure being performed. Patient ID confirmed verbally with the patient.       Wound 1 Procedure Details  An injection was given to Juan Diego in his hip.    Procedure Details   Patient was prepped and draped in the usual sterile fashion. Ethyl chloride spray local anesthesia was used.   A 22 G 3.5 inch gauge needle was inserted into the using a lateral approach . Aspirate was not obtained.   The left site was injected with 6 mg betamethasone acet,sod phos 6 mg/mL, 2 mL BUPivacaine (PF) 0.5 % (5 mg/mL); 2 mL lidocaine 10 mg/mL (1 %).  The right site was injected with 6 mg betamethasone acet,sod phos 6 mg/mL, 2 mL BUPivacaine (PF) 0.5 % (5 mg/mL); 2 mL lidocaine 10 mg/mL (1 %), .   Needle removed without complication.     Post-Procedure  Patient tolerated the procedure well with no immediate complications. A standard bandage was applied. Advised patient to avoid strenous activity for 24-48 hours. Advised patient to use ice, NSAIDs or acetaminophen for pain as needed.     Procedure Comments  6mg celestone mixed with 2 ml 1% Lidocaine and 2 ml 0.5% Bupivacaine  injected into bilateral greater Trochanteric bursas

## 2023-06-19 ENCOUNTER — OFFICE VISIT (OUTPATIENT)
Dept: FAMILY MEDICINE | Facility: CLINIC | Age: 68
End: 2023-06-19
Payer: MEDICARE

## 2023-06-19 ENCOUNTER — LAB (OUTPATIENT)
Dept: LAB | Facility: CLINIC | Age: 68
End: 2023-06-19
Payer: MEDICARE

## 2023-06-19 VITALS
BODY MASS INDEX: 23.57 KG/M2 | HEART RATE: 75 BPM | OXYGEN SATURATION: 93 % | TEMPERATURE: 98.6 F | DIASTOLIC BLOOD PRESSURE: 74 MMHG | WEIGHT: 188.6 LBS | SYSTOLIC BLOOD PRESSURE: 116 MMHG

## 2023-06-19 DIAGNOSIS — I10 PRIMARY HYPERTENSION: Primary | ICD-10-CM

## 2023-06-19 DIAGNOSIS — K29.40 ATROPHIC GASTRITIS, PRESENCE OF BLEEDING UNSPECIFIED: ICD-10-CM

## 2023-06-19 DIAGNOSIS — F32.A DEPRESSION, UNSPECIFIED DEPRESSION TYPE: ICD-10-CM

## 2023-06-19 DIAGNOSIS — K21.9 GASTROESOPHAGEAL REFLUX DISEASE, UNSPECIFIED WHETHER ESOPHAGITIS PRESENT: ICD-10-CM

## 2023-06-19 DIAGNOSIS — K75.4 AUTOIMMUNE HEPATITIS (CMS/HCC): ICD-10-CM

## 2023-06-19 DIAGNOSIS — K74.3 PRIMARY BILIARY CHOLANGITIS (CMS/HCC): ICD-10-CM

## 2023-06-19 LAB
ALBUMIN SERPL-MCNC: 4.1 G/DL (ref 3.5–5.3)
ALP SERPL-CCNC: 84 U/L (ref 45–115)
ALT SERPL-CCNC: 10 U/L (ref 7–52)
AST SERPL-CCNC: 13 U/L
BILIRUB DIRECT SERPL-MCNC: 0.16 MG/DL
BILIRUB SERPL-MCNC: 0.69 MG/DL (ref 0.2–1.4)
PROT SERPL-MCNC: 7.8 G/DL (ref 6–8.3)

## 2023-06-19 PROCEDURE — 99214 OFFICE O/P EST MOD 30 MIN: CPT | Performed by: FAMILY MEDICINE

## 2023-06-19 PROCEDURE — 80076 HEPATIC FUNCTION PANEL: CPT

## 2023-06-19 PROCEDURE — G0463 HOSPITAL OUTPT CLINIC VISIT: HCPCS | Performed by: FAMILY MEDICINE

## 2023-06-19 PROCEDURE — 36415 COLL VENOUS BLD VENIPUNCTURE: CPT

## 2023-06-19 RX ORDER — VALSARTAN 80 MG/1
80 TABLET ORAL DAILY
Qty: 90 TABLET | Refills: 4 | Status: SHIPPED | OUTPATIENT
Start: 2023-06-19 | End: 2024-09-27 | Stop reason: SDUPTHER

## 2023-06-19 RX ORDER — FLUOXETINE HYDROCHLORIDE 40 MG/1
40 CAPSULE ORAL DAILY
Qty: 90 CAPSULE | Refills: 4 | Status: SHIPPED | OUTPATIENT
Start: 2023-06-19 | End: 2024-09-20 | Stop reason: SDUPTHER

## 2023-06-19 RX ORDER — OMEPRAZOLE 40 MG/1
40 CAPSULE, DELAYED RELEASE ORAL EVERY MORNING
Qty: 90 CAPSULE | Refills: 4 | Status: SHIPPED | OUTPATIENT
Start: 2023-06-19 | End: 2024-06-18

## 2023-06-19 ASSESSMENT — ENCOUNTER SYMPTOMS
SINUS PRESSURE: 0
NUMBNESS: 0
ARTHRALGIAS: 1
HEADACHES: 0
FEVER: 0
DIZZINESS: 0
DYSPHORIC MOOD: 0
DIARRHEA: 0
APPETITE CHANGE: 0
NERVOUS/ANXIOUS: 1
MYALGIAS: 0
RHINORRHEA: 0
SHORTNESS OF BREATH: 0
BACK PAIN: 1
ABDOMINAL PAIN: 1
UNEXPECTED WEIGHT CHANGE: 0
SORE THROAT: 0
COUGH: 0
JOINT SWELLING: 0
WEAKNESS: 0
WOUND: 0
CONSTIPATION: 0
EYE PAIN: 0
CHEST TIGHTNESS: 0
CHILLS: 0
ROS GI COMMENTS: HEARTBURN

## 2023-06-19 NOTE — PROGRESS NOTES
OUTPATIENT PROGRESS NOTE    HPI: Juan Diego Rondon  is a 68 y.o. male who presents for Medicare Physical and Arthritis (Patient states the arthritis in his wrists, elbows and ankles seems to be getting worse. )    Since his last appointment he has been doing well overall. 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 He continues to have significant joint pain, especially in his knees and wrists. The prednisone burst he had from his last appointment did help.  Generally, he manages the pain with acetaminophen which is marginally helpful.  He avoids Motrin as advised by his gastroenterologist.  He has used Voltaren gel in the past and it is somewhat helpful.  Otherwise he denies any acute complaints today. He follow regularly with gastroenterology for his autoimmune hepatitis and primary biliary cholangitis.  He gets regular EGDs and colonoscopies for this.  Additionally, patient states he is up-to-date on pneumococcal vaccines as well as shingles vaccine.      Review of Systems   Constitutional:  Negative for appetite change, chills, fever and unexpected weight change.   HENT:  Negative for congestion, ear pain, rhinorrhea, sinus pressure and sore throat.    Eyes:  Negative for pain and visual disturbance.   Respiratory:  Negative for cough, chest tightness and shortness of breath.    Cardiovascular:  Negative for chest pain and leg swelling.   Gastrointestinal:  Positive for abdominal pain. Negative for constipation and diarrhea.        Heartburn   Musculoskeletal:  Positive for arthralgias and back pain. Negative for joint swelling and myalgias.   Skin:  Negative for rash and wound.   Allergic/Immunologic: Positive for environmental allergies.   Neurological:  Negative for dizziness, weakness, numbness and headaches.   Psychiatric/Behavioral:  Negative for dysphoric mood. The patient is nervous/anxious.         /74 (BP Location: Left arm, Patient Position:  Sitting, Cuff Size: Regular Adult)   Pulse 75   Temp 37 °C (98.6 °F)   Wt 85.5 kg (188 lb 9.6 oz)   SpO2 93%   BMI 23.57 kg/m²   EXAM:  Physical Exam  Constitutional:       General: He is not in acute distress.     Appearance: He is normal weight. He is not ill-appearing.   HENT:      Head: Normocephalic and atraumatic.   Eyes:      General: No scleral icterus.     Extraocular Movements: Extraocular movements intact.   Cardiovascular:      Rate and Rhythm: Normal rate and regular rhythm.      Heart sounds: No murmur heard.  Pulmonary:      Effort: Pulmonary effort is normal. No respiratory distress.      Breath sounds: Normal breath sounds.   Abdominal:      General: There is no distension.      Tenderness: There is no abdominal tenderness.   Musculoskeletal:         General: No deformity.      Right lower leg: No edema.      Left lower leg: No edema.   Skin:     General: Skin is warm and dry.      Coloration: Skin is not jaundiced.   Neurological:      General: No focal deficit present.      Mental Status: He is alert and oriented to person, place, and time.   Psychiatric:         Mood and Affect: Mood normal.         Behavior: Behavior normal.       ASSESSMENT & PLAN:   Problem List          Cardiac and Vasculature    Hypertension - Primary    Relevant Medications    valsartan (DIOVAN) 80 mg tablet       Gastrointestinal and Abdominal    Atrophic gastritis    Relevant Medications    omeprazole (PriLOSEC) 40 mg capsule       Mental Health    Depression    Relevant Medications    FLUoxetine (PROzac) 40 mg capsule   Overall patient is doing well.  Continues with frequent joint pain, mostly attributable to osteoarthritis.  Patient does have history of gout, however most recent uric acid was normal, low suspicion on exam today.  Patient does also have history of autoimmune hepatitis, recent inflammatory and autoimmune work-up relatively unremarkable in January 2023.  We will hold off on any additional lab work at  this time.  Patient continues to follow-up with gastroenterology.     Up to date on health maintenance items.  Last colonoscopy reported through [facility] on 12/18/2019.    Return for follow-up in 1 year or sooner if needed.       Singh Weaver,   06/19/238:58 AM    A voice recognition program was used to aid in documentation of this record. Sometimes words are not printed exactly as they were spoken.  While efforts were made to carefully edit and correct any inaccuracies, some errors may be present; please take these into context.  Please contact provider if you need assistance interpreting any part of this medical record or notice any mistakes.

## 2023-08-30 DIAGNOSIS — E78.00 HYPERCHOLESTEROLEMIA: Primary | ICD-10-CM

## 2023-08-30 NOTE — TELEPHONE ENCOUNTER
Last ordered 8/8/22 at pt discharge.   Tried calling, pt was unable to hear me.  Unaware of where pt is getting this filled at.

## 2023-08-30 NOTE — TELEPHONE ENCOUNTER
Medication refill request:  Medication(s):  rosuvastatin not filled due to Historical Provider listed, please update to PCP if appropriate to fill

## 2023-08-30 NOTE — TELEPHONE ENCOUNTER
No care due was identified.  [facility] Embedded Care Due Messages. Reference number: [id number]. 8/30/2023 11:02:31 AM SARAH

## 2023-08-31 RX ORDER — ROSUVASTATIN CALCIUM 10 MG/1
10 TABLET, COATED ORAL DAILY
Qty: 90 TABLET | Refills: 0 | Status: SHIPPED | OUTPATIENT
Start: 2023-08-31 | End: 2023-12-04

## 2023-09-21 ENCOUNTER — LAB (OUTPATIENT)
Dept: LAB | Facility: CLINIC | Age: 68
End: 2023-09-21
Payer: MEDICARE

## 2023-09-21 DIAGNOSIS — K75.4 HEPATITIS, AUTOIMMUNE (CMS/HCC): ICD-10-CM

## 2023-09-21 DIAGNOSIS — K74.3 PRIMARY BILIARY CHOLANGITIS (CMS/HCC): Primary | ICD-10-CM

## 2023-09-21 LAB
ALBUMIN SERPL-MCNC: 4 G/DL (ref 3.5–5.3)
ALP SERPL-CCNC: 67 U/L (ref 45–115)
ALT SERPL-CCNC: 16 U/L (ref 7–52)
AST SERPL-CCNC: 21 U/L
BILIRUB DIRECT SERPL-MCNC: 0.17 MG/DL
BILIRUB SERPL-MCNC: 0.78 MG/DL (ref 0.2–1.4)
IGG SERPL-MCNC: 562 MG/DL (ref 700–1600)
PROT SERPL-MCNC: 7.2 G/DL (ref 6–8.3)

## 2023-09-21 PROCEDURE — 36415 COLL VENOUS BLD VENIPUNCTURE: CPT

## 2023-09-21 PROCEDURE — 80076 HEPATIC FUNCTION PANEL: CPT

## 2023-09-21 PROCEDURE — 82784 ASSAY IGA/IGD/IGG/IGM EACH: CPT

## 2023-09-21 NOTE — TELEPHONE ENCOUNTER
Medication refill request:  Medication(s):  atarax not filled due to Historical Provider listed, please update to PCP if appropriate to fill

## 2023-09-21 NOTE — TELEPHONE ENCOUNTER
No care due was identified.  [facility] Embedded Care Due Messages. Reference number: [id number]. 9/21/2023 5:42:22 PM MDT

## 2023-09-24 RX ORDER — HYDROXYZINE HYDROCHLORIDE 25 MG/1
TABLET, FILM COATED ORAL
Qty: 60 TABLET | Refills: 0 | Status: SHIPPED | OUTPATIENT
Start: 2023-09-24 | End: 2024-06-17 | Stop reason: ALTCHOICE

## 2023-12-04 DIAGNOSIS — E78.00 HYPERCHOLESTEROLEMIA: ICD-10-CM

## 2023-12-04 RX ORDER — ROSUVASTATIN CALCIUM 10 MG/1
10 TABLET, COATED ORAL DAILY
Qty: 90 TABLET | Refills: 0 | Status: SHIPPED | OUTPATIENT
Start: 2023-12-04 | End: 2024-03-11

## 2023-12-04 NOTE — TELEPHONE ENCOUNTER
Care Due:                  Date            Visit Type   Department     Provider  --------------------------------------------------------------------------------                                           [facility]  Last Visit: 06-      [facility]       IVÁN RIVAS  Next Visit: None Scheduled  None         None Found                                                            Last  Test          Frequency    Reason                     Performed    Due Date  --------------------------------------------------------------------------------  Lipid Panel.  12 months..  fenofibrate, rosuvastatin  Not Found    Overdue    [employer] Embedded Care Due Messages. Reference number: [id number]. 12/04/2023 1:32:13 AM MST

## 2023-12-04 NOTE — TELEPHONE ENCOUNTER
Medication refill request:  Medication(s):  crestor not filled due to Orders due: Review care due message/pended orders

## 2024-01-29 ENCOUNTER — LAB (OUTPATIENT)
Dept: LAB | Facility: CLINIC | Age: 69
End: 2024-01-29
Payer: MEDICARE

## 2024-01-29 DIAGNOSIS — D47.2 MONOCLONAL GAMMOPATHIES: ICD-10-CM

## 2024-01-29 DIAGNOSIS — R80.9 PROTEINURIA, UNSPECIFIED TYPE: ICD-10-CM

## 2024-01-29 DIAGNOSIS — R70.0 ESR RAISED: ICD-10-CM

## 2024-01-29 LAB
BACTERIA #/AREA URNS HPF: NORMAL /HPF
BILIRUB UR QL: ABNORMAL
CLARITY UR: CLEAR
COLOR UR: YELLOW
CREAT UR-MCNC: 241.3 MG/DL
GLUCOSE UR QL: NEGATIVE MG/DL
HGB UR QL: NEGATIVE
KETONES UR-MCNC: ABNORMAL MG/DL
LDH SERPL L TO P-CCNC: 109 U/L (ref 87–246)
LEUKOCYTE ESTERASE UR QL STRIP: NEGATIVE
MUCOUS THREADS #/AREA URNS HPF: PRESENT /[HPF]
NITRITE UR QL: NEGATIVE
PH UR: 5.5 PH
PROT 24H UR-MCNC: 20.8 MG/DL (ref 0–21.4)
PROT UR STRIP-MCNC: NEGATIVE MG/DL
PROTEIN/CREATININE RATIO URINE: 0.09 MG/MG
RBC #/AREA URNS HPF: NORMAL /HPF
SP GR UR: 1.02 (ref 1–1.03)
SQUAMOUS #/AREA URNS HPF: NORMAL /HPF
UROBILINOGEN UR-MCNC: 0.2 MG/DL
WBC #/AREA URNS HPF: NORMAL /HPF

## 2024-01-29 PROCEDURE — 0077U IG PARAPROTEIN QUAL BLD/UR: CPT

## 2024-01-29 PROCEDURE — 81001 URINALYSIS AUTO W/SCOPE: CPT

## 2024-01-29 PROCEDURE — 83615 LACTATE (LD) (LDH) ENZYME: CPT

## 2024-01-29 PROCEDURE — 82232 ASSAY OF BETA-2 PROTEIN: CPT

## 2024-01-29 PROCEDURE — 36415 COLL VENOUS BLD VENIPUNCTURE: CPT

## 2024-01-29 PROCEDURE — 82570 ASSAY OF URINE CREATININE: CPT

## 2024-01-30 LAB — B2 MICROGLOB SERPL-MCNC: 2.65 MCG/ML (ref 1.21–2.7)

## 2024-01-31 ENCOUNTER — LAB (OUTPATIENT)
Dept: LAB | Facility: CLINIC | Age: 69
End: 2024-01-31
Payer: MEDICARE

## 2024-01-31 DIAGNOSIS — R70.0 ESR RAISED: ICD-10-CM

## 2024-01-31 DIAGNOSIS — D47.2 MONOCLONAL GAMMOPATHIES: ICD-10-CM

## 2024-01-31 DIAGNOSIS — R80.9 PROTEINURIA, UNSPECIFIED TYPE: ICD-10-CM

## 2024-01-31 LAB
M PROTEIN ISOTYPE SERPL MS.MALDI-TOF: ABNORMAL
M PROTEIN SERPL QL: POSITIVE
TYPE OF THERAP AB ADMINISTERED: ABNORMAL

## 2024-01-31 PROCEDURE — 86335 IMMUNFIX E-PHORSIS/URINE/CSF: CPT

## 2024-01-31 PROCEDURE — 84156 ASSAY OF PROTEIN URINE: CPT

## 2024-01-31 PROCEDURE — 84999 UNLISTED CHEMISTRY PROCEDURE: CPT

## 2024-01-31 PROCEDURE — 83520 IMMUNOASSAY QUANT NOS NONAB: CPT

## 2024-02-03 LAB — MISC MAYO RESULT(REF): NORMAL

## 2024-02-06 ENCOUNTER — TELEPHONE (OUTPATIENT)
Dept: FAMILY MEDICINE | Facility: CLINIC | Age: 69
End: 2024-02-06
Payer: MEDICARE

## 2024-02-06 ENCOUNTER — OFFICE VISIT (OUTPATIENT)
Dept: FAMILY MEDICINE | Facility: CLINIC | Age: 69
End: 2024-02-06
Payer: MEDICARE

## 2024-02-06 ENCOUNTER — TELEPHONE (OUTPATIENT)
Dept: FAMILY MEDICINE | Facility: CLINIC | Age: 69
End: 2024-02-06

## 2024-02-06 VITALS
HEART RATE: 76 BPM | TEMPERATURE: 98.4 F | SYSTOLIC BLOOD PRESSURE: 140 MMHG | OXYGEN SATURATION: 94 % | WEIGHT: 191 LBS | DIASTOLIC BLOOD PRESSURE: 80 MMHG | BODY MASS INDEX: 23.87 KG/M2

## 2024-02-06 DIAGNOSIS — D47.2 MONOCLONAL GAMMOPATHIES: Primary | ICD-10-CM

## 2024-02-06 DIAGNOSIS — R70.0 ESR RAISED: ICD-10-CM

## 2024-02-06 DIAGNOSIS — M25.50 MULTIPLE JOINT PAIN: ICD-10-CM

## 2024-02-06 PROCEDURE — 99214 OFFICE O/P EST MOD 30 MIN: CPT | Performed by: FAMILY MEDICINE

## 2024-02-06 PROCEDURE — G0463 HOSPITAL OUTPT CLINIC VISIT: HCPCS | Performed by: FAMILY MEDICINE

## 2024-02-06 RX ORDER — GABAPENTIN 100 MG/1
100 CAPSULE ORAL NIGHTLY
COMMUNITY
Start: 2024-01-31 | End: 2024-05-29

## 2024-02-06 NOTE — TELEPHONE ENCOUNTER
Pt states he received a call -unsure who from- and his hearing aides were acting up so he couldn't hear anything. If you called, please call back, he has them working again.

## 2024-02-06 NOTE — TELEPHONE ENCOUNTER
Pt states he got some lab results back that indicate a possible Myeloma. He'd like to discuss a referral with Dr. Haley and can't get in to see him anytime soon. Please call.

## 2024-02-06 NOTE — PROGRESS NOTES
Subjective      Juan Diego Rondon is a 68 y.o. male who presents for Follow-up (Patient has questions about recent lab results.)  .    Patient presents with his wife to discuss results of recent labs. These labs were ordered by Dr. Javier with [facility] due to concerns of monoclonal gammopathies; ESR raised, and proteinuria. He continues to have diffuse joint pain, no improvement with gabapentin 100mg nightly. After discussion patient would like to be referred to oncology in [place] for further evaluation. He is using Tylenol as needed for pain with little relief. He has taken tramadol in the past but this did not agree with him. He has tolerated Tylenol #3 and hydrocodone in the past. He does not tolerate oxycodone well.         The following have been reviewed and updated as appropriate in this visit:   Tobacco  Allergies  Meds  Problems  Med Hx  Surg Hx  Fam Hx         No Known Allergies  Current Outpatient Medications   Medication Sig Dispense Refill    gabapentin (NEURONTIN) 100 mg capsule Take 1 capsule (100 mg total) by mouth nightly      rosuvastatin (CRESTOR) 10 mg tablet Take 1 tablet by mouth daily 90 tablet 0    hydrOXYzine (ATARAX) 25 mg tablet Take 1-2 tablets by mouth nightly as needed for anxiety 60 tablet 0    valsartan (DIOVAN) 80 mg tablet Take 1 tablet (80 mg total) by mouth daily 90 tablet 4    FLUoxetine (PROzac) 40 mg capsule Take 1 capsule (40 mg total) by mouth daily 90 capsule 4    omeprazole (PriLOSEC) 40 mg capsule Take 1 capsule (40 mg total) by mouth every morning 90 capsule 4    famotidine (PEPCID) 20 mg tablet Take 1 tablet (20 mg total) by mouth 2 (two) times a day      acetaminophen (TYLENOL) 500 mg tablet Take 1 tablet (500 mg total) by mouth every 6 (six) hours as needed for pain scale 1-3/10      ursodioL (ACTIGALL) 500 mg tablet Take 1 tablet (500 mg total) by mouth 2 times daily Every other day      ursodioL (AMANDEEP) 250 mg tablet Take 3 tablets (750 mg total) by  mouth In am and 500 mg in pm every other day-alternate with the 500 mg bid      cyanocobalamin 1,000 mcg/mL injection Inject 1 mL (1,000 mcg total) into the shoulder, thigh, or buttocks every 30 (thirty) days      fenofibrate (TRICOR) 48 mg tablet Take 1 tablet (48 mg total) by mouth daily 90 tablet 4    hydrocortisone 2.5 % cream Apply topically 2 (two) times a day as needed for rash 30 g 0     No current facility-administered medications for this visit.     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    CT BIOPSY LIVER FUNCTION  12/2/2021    CT BIOPSY LIVER FUNCTION 12/2/2021 [facility] MIS CT SCAN    ESOPHAGOGASTRODUODENOSCOPY N/A 5/16/2022    Procedure: ESOPHAGOGASTRODUODENOSCOPY with Biopsies;  Surgeon: Bradley Cook MD;  Location: [facility];  Service: Endoscopy;  Laterality: N/A;    EYE SURGERY Bilateral     cataract    KNEE SURGERY Bilateral     3 surgeries on left and 2 on right knees - prior open medial meniscectomies bilateral knees in the 1970s    SKIN BIOPSY      SKIN CANCER EXCISION Left 10/15/2020    Melanoma removed from left forearm    TONSILLECTOMY      TOTAL HIP ARTHROPLASTY Left 4/21/2021    Procedure: LEFT TOTAL HIP ARTHROPLASTY;  Surgeon: jS Reyes MD;  Location: [facility] OR;  Service: Orthopedics;  Laterality: Left;    TOTAL HIP ARTHROPLASTY Right 3/22/2022    Procedure: RIGHT TOTAL HIP ARTHROPLASTY POSTERIOR;  Surgeon: Sj  MD Amy;  Location: [facility];  Service: Orthopedics;  Laterality: Right;     Family History   Problem Relation Age of Onset    Hypertension Mother     Lung cancer Father     Hypertension Father      Social History     Socioeconomic History    Marital status:    Tobacco Use    Smoking status: Never    Smokeless tobacco: Never   Vaping Use    Vaping Use: Never used   Substance and Sexual Activity    Alcohol use: Yes     Alcohol/week: 1.0 standard drink of alcohol     Types: 1 Glasses of wine per week     Comment: occ    Drug use: Never     Social Determinants of Health     Tobacco Use: Low Risk  (6/19/2023)    Patient History     Smoking Tobacco Use: Never     Smokeless Tobacco Use: Never       Review of Systems    Objective     Vitals  /80 (BP Location: Left arm, Patient Position: Sitting)   Pulse 76   Temp 36.9 °C (98.4 °F) (Temporal)   Wt 86.6 kg (191 lb)   SpO2 94%   BMI 23.87 kg/m²     Physical Exam  Vitals and nursing note reviewed.   Constitutional:       General: He is not in acute distress.     Appearance: Normal appearance. He is not ill-appearing, toxic-appearing or diaphoretic.   Musculoskeletal:      Right lower leg: No edema.      Left lower leg: No edema.   Neurological:      Mental Status: He is alert.      Gait: Gait abnormal.   Psychiatric:         Mood and Affect: Mood normal.         Procedures    Assessment/Plan   Diagnoses and all orders for this visit:    Monoclonal gammopathies  Comments:  Will refer to hematology oncology for further evaluation.  Follow-up with me as needed.  Orders:  -     Ambulatory referral to Hematology Oncology; Future    ESR raised  Comments:  Referral sent to hematology/oncology.  Orders:  -     Ambulatory referral to Hematology Oncology; Future    Multiple joint pain  Comments:  Increase gabapentin to 100mg twice daily. If no improvement of pain will consider hydrocodone. Will have to determine this based on his liver disease        Return if  symptoms worsen or fail to improve.    Cuba Haley MD    Portions of this note were documented by Kate Peters as I performed the exam and collected the information from Juan Diego Rondon. I attest that I have reviewed the information as documented, verified the accuracy of the documentation and added additional information as needed.

## 2024-02-07 ENCOUNTER — LAB (OUTPATIENT)
Dept: LAB | Facility: CLINIC | Age: 69
End: 2024-02-07
Payer: MEDICARE

## 2024-02-07 DIAGNOSIS — D47.2 MONOCLONAL GAMMOPATHIES: ICD-10-CM

## 2024-02-07 LAB
ALBUMIN SERPL-MCNC: 3.9 G/DL (ref 3.5–5.3)
ALP SERPL-CCNC: 87 U/L (ref 45–115)
ALT SERPL-CCNC: 9 U/L (ref 7–52)
ANION GAP SERPL CALC-SCNC: 10 MMOL/L (ref 3–11)
AST SERPL-CCNC: 15 U/L
BASOPHILS # BLD AUTO: 0.1 10*3/UL
BASOPHILS NFR BLD AUTO: 0.8 % (ref 0–2)
BILIRUB SERPL-MCNC: 0.65 MG/DL (ref 0.2–1.4)
BUN SERPL-MCNC: 10 MG/DL (ref 7–25)
CALCIUM ALBUM COR SERPL-MCNC: 10 MG/DL (ref 8.6–10.3)
CALCIUM SERPL-MCNC: 9.9 MG/DL (ref 8.6–10.3)
CHLORIDE SERPL-SCNC: 102 MMOL/L (ref 98–107)
CO2 SERPL-SCNC: 24 MMOL/L (ref 21–32)
CREAT SERPL-MCNC: 0.79 MG/DL (ref 0.7–1.3)
EGFRCR SERPLBLD CKD-EPI 2021: 96 ML/MIN/1.73M*2
EOSINOPHIL # BLD AUTO: 0.1 10*3/UL
EOSINOPHIL NFR BLD AUTO: 0.8 % (ref 0–3)
ERYTHROCYTE [DISTWIDTH] IN BLOOD BY AUTOMATED COUNT: 13.6 % (ref 11.5–15)
GLUCOSE SERPL-MCNC: 102 MG/DL (ref 70–105)
HCT VFR BLD AUTO: 41.4 % (ref 38–50)
HGB BLD-MCNC: 14.3 G/DL (ref 13.2–17.2)
LYMPHOCYTES # BLD AUTO: 1.3 10*3/UL
LYMPHOCYTES NFR BLD AUTO: 13.1 % (ref 15–47)
MACROCYTOSIS PRESENCE IN BLOOD, ANALYZER: ABNORMAL
MCH RBC QN AUTO: 36 PG (ref 29–34)
MCHC RBC AUTO-ENTMCNC: 34.6 G/DL (ref 32–36)
MCV RBC AUTO: 103.9 FL (ref 82–97)
MONOCYTES # BLD AUTO: 0.8 10*3/UL
MONOCYTES NFR BLD AUTO: 8.4 % (ref 5–13)
NEUTROPHILS # BLD AUTO: 7.3 10*3/UL
NEUTROPHILS NFR BLD AUTO: 76.9 % (ref 46–70)
PLATELET # BLD AUTO: 226 10*3/UL (ref 130–350)
PMV BLD AUTO: 7.3 FL (ref 6.9–10.8)
POTASSIUM SERPL-SCNC: 4.1 MMOL/L (ref 3.5–5.1)
PROT SERPL-MCNC: 8 G/DL (ref 6–8.3)
RBC # BLD AUTO: 3.99 10*6/ΜL (ref 4.1–5.8)
SODIUM SERPL-SCNC: 136 MMOL/L (ref 135–145)
WBC # BLD AUTO: 9.5 10*3/UL (ref 3.7–9.6)

## 2024-02-07 PROCEDURE — 85025 COMPLETE CBC W/AUTO DIFF WBC: CPT

## 2024-02-07 PROCEDURE — 80053 COMPREHEN METABOLIC PANEL: CPT

## 2024-02-07 PROCEDURE — 36415 COLL VENOUS BLD VENIPUNCTURE: CPT

## 2024-02-07 PROCEDURE — 83521 IG LIGHT CHAINS FREE EACH: CPT

## 2024-02-07 NOTE — TELEPHONE ENCOUNTER
----- Message from Cuba Haley MD sent at 2024 10:20 AM MST -----  Regarding: FW: Referral to  Hematology  Please order and let patient know.  Thanks  ----- Message -----  From: Hannah Vargas RN  Sent: 2024   9:31 AM MST  To: Cuba Haley MD; #  Subject: Referral to  Hematology                        Dr Haley & Staff:  Thank You for the referral for ESR raised, Monoclonal gammopathies.  I will be working this up under [facility].  Our protocol for this dx requires you to please order the followin) Serum Light Chain Assay  2) CBC with Auto Diff  3) CMP  4) CT-PET (Skull Base to Mid-Thigh)    Thank You,   [facility] Referral Nurse

## 2024-02-09 LAB
KAPPA LC FREE SER-MCNC: 6.41 MG/DL (ref 0.33–1.94)
KAPPA LC FREE/LAMBDA FREE SER: 3.98 {RATIO} (ref 0.26–1.65)
LAMBDA LC FREE SERPL-MCNC: 1.61 MG/DL (ref 0.57–2.63)

## 2024-02-12 ENCOUNTER — TELEPHONE (OUTPATIENT)
Dept: FAMILY MEDICINE | Facility: CLINIC | Age: 69
End: 2024-02-12
Payer: MEDICARE

## 2024-02-12 NOTE — TELEPHONE ENCOUNTER
Patient states the increase in gabapentin is not helping. He states that the pain is worse at night and is not getting much sleep. Would you like to try the hydrocodone?

## 2024-02-12 NOTE — TELEPHONE ENCOUNTER
Unfortunately it looks like the only hydrocodone options that we have now for pain that do not also include Tylenol are long-acting which I would not recommend to start.  If he could call his liver specialist and see if they would be okay with us using up to 1000 mg daily of Tylenol I think that would be our place to start.  If they are not okay with his using that we may need to talk about different options and see what other possibilities we would have.  Thanks

## 2024-02-12 NOTE — TELEPHONE ENCOUNTER
Pt would like to discuss the medication he's been taking as he doesn't think they're helping. Please call back when possible.

## 2024-02-13 NOTE — TELEPHONE ENCOUNTER
Notified Emmett of Dr Haley's note.  Emmett will try to get contact his Liver specialist and get information regarding the Tylenol.

## 2024-02-14 DIAGNOSIS — M25.50 MULTIPLE JOINT PAIN: Primary | ICD-10-CM

## 2024-02-14 RX ORDER — HYDROCODONE BITARTRATE AND ACETAMINOPHEN 5; 325 MG/1; MG/1
1 TABLET ORAL 2 TIMES DAILY PRN
Qty: 30 TABLET | Refills: 0 | Status: SHIPPED | OUTPATIENT
Start: 2024-02-14 | End: 2024-02-27 | Stop reason: SDUPTHER

## 2024-02-25 ENCOUNTER — TELEPHONE (OUTPATIENT)
Dept: FAMILY MEDICINE | Facility: CLINIC | Age: 69
End: 2024-02-25
Payer: MEDICARE

## 2024-02-25 DIAGNOSIS — M25.50 MULTIPLE JOINT PAIN: ICD-10-CM

## 2024-02-26 NOTE — TELEPHONE ENCOUNTER
Pharmacy calling in to state they could only give 7 due to insurance regulations. They need a whole new prescription for the rest of the month. [facility] is still not receiving escripts, so they either need it sent to a different pharmacy or a hard copy script given to the patient. Please call.

## 2024-02-26 NOTE — TELEPHONE ENCOUNTER
Care Due:                  Date            Visit Type   Department     Provider  --------------------------------------------------------------------------------                                           [facility]  Last Visit: 02-      SAME DAY     UMER RIVAS  Next Visit: None Scheduled  None         None Found                                                            Last  Test          Frequency    Reason                     Performed    Due Date  --------------------------------------------------------------------------------  Lipid Panel.  12 months..  fenofibrate, rosuvastatin  Not Found    Overdue    [employer] Embedded Care Due Messages. Reference number: [id number]. 2/25/2024 6:29:28 PM MST

## 2024-02-26 NOTE — TELEPHONE ENCOUNTER
Left a Message for Emmett to call back.  Will ask him if he would like to  a script from Dr Haley or go to another pharmacy?

## 2024-02-26 NOTE — TELEPHONE ENCOUNTER
Will need to print this as he uses [facility] for his pharmacy.  Please make sure he is okay with this.  Thanks

## 2024-02-27 ENCOUNTER — TELEPHONE (OUTPATIENT)
Dept: FAMILY MEDICINE | Facility: CLINIC | Age: 69
End: 2024-02-27

## 2024-02-27 DIAGNOSIS — M25.50 MULTIPLE JOINT PAIN: ICD-10-CM

## 2024-02-27 RX ORDER — HYDROCODONE BITARTRATE AND ACETAMINOPHEN 5; 325 MG/1; MG/1
1 TABLET ORAL 2 TIMES DAILY PRN
Qty: 30 TABLET | Refills: 0 | Status: SHIPPED | OUTPATIENT
Start: 2024-02-27 | End: 2024-03-13 | Stop reason: ALTCHOICE

## 2024-02-27 RX ORDER — HYDROCODONE BITARTRATE AND ACETAMINOPHEN 5; 325 MG/1; MG/1
1 TABLET ORAL 2 TIMES DAILY PRN
Qty: 30 TABLET | Refills: 0 | OUTPATIENT
Start: 2024-02-27

## 2024-02-27 NOTE — TELEPHONE ENCOUNTER
Cristiane from [facility] calling in regard to this patient's PET scan. The medical necessity doesn't meet medicare standards. They need to speak with someone today to discuss the appropriate medical necessity needed for coverage. Please call to discuss.

## 2024-03-06 ENCOUNTER — LAB (OUTPATIENT)
Dept: ONCOLOGY | Facility: CLINIC | Age: 69
End: 2024-03-06
Payer: MEDICARE

## 2024-03-06 ENCOUNTER — OFFICE VISIT (OUTPATIENT)
Dept: ONCOLOGY | Facility: CLINIC | Age: 69
End: 2024-03-06
Payer: MEDICARE

## 2024-03-06 VITALS
HEIGHT: 74 IN | DIASTOLIC BLOOD PRESSURE: 83 MMHG | SYSTOLIC BLOOD PRESSURE: 133 MMHG | HEART RATE: 66 BPM | BODY MASS INDEX: 24.9 KG/M2 | OXYGEN SATURATION: 91 % | WEIGHT: 194 LBS | TEMPERATURE: 97.5 F

## 2024-03-06 DIAGNOSIS — R70.0 ESR RAISED: ICD-10-CM

## 2024-03-06 DIAGNOSIS — D47.2 MONOCLONAL GAMMOPATHIES: ICD-10-CM

## 2024-03-06 LAB — FOLATE SERPL-MCNC: 6 NG/ML

## 2024-03-06 PROCEDURE — 82746 ASSAY OF FOLIC ACID SERUM: CPT

## 2024-03-06 PROCEDURE — 85810 BLOOD VISCOSITY EXAMINATION: CPT

## 2024-03-06 PROCEDURE — 99205 OFFICE O/P NEW HI 60 MIN: CPT | Performed by: INTERNAL MEDICINE

## 2024-03-06 PROCEDURE — G0463 HOSPITAL OUTPT CLINIC VISIT: HCPCS

## 2024-03-06 PROCEDURE — 0077U IG PARAPROTEIN QUAL BLD/UR: CPT

## 2024-03-06 PROCEDURE — 82784 ASSAY IGA/IGD/IGG/IGM EACH: CPT

## 2024-03-06 PROCEDURE — 36415 COLL VENOUS BLD VENIPUNCTURE: CPT

## 2024-03-06 ASSESSMENT — PAIN SCALES - GENERAL: PAINLEVEL: 4

## 2024-03-06 NOTE — PROGRESS NOTES
Hematology/Oncology Clinic Note    REFERRAL SOURCE: Cuba Haley MD     REASON FOR REFERRAL: Monoclonal gammopathy    HISTORY OF PRESENT ILLNESS:  Juan Diego Rondon is a 68 y.o.-old gentleman referred for further evaluation and treatment of monoclonal gammopathy.  Patient recently underwent laboratory testing in January 2024 including an SPEP which showed a monoclonal protein measuring 1.4 g/dL.  Further workup showed kappa free light chains elevated at 6.41 with a kappa/lambda ratio of 3.98.  Quantitative immunoglobulins showed elevated IgM of 2256, IgG 698, IgA 114.  LDH was normal at 2 109 and beta-2 microglobulin 2.65.  Gastric panel showed creatinine of 0.79 calcium 9.9, corrected calcium 10.0.  PET scan was done which showed no specific uptake with no osseous lesions noted.    He has been having some issues with arthralgias primarily in his hips and wrists.  He has an occasional night sweat.  He denies any lymphadenopathy.    REVIEW OF SYSTEMS:   A 12 point review of systems was completed.  Pertinents noted in HPI; otherwise negative.    Past Medical History  Past Medical History:   Diagnosis Date    Allergic eosinophilic esophagitis     Arthritis     Autoimmune hepatitis (CMS/HCC)     Blood disorder     pernicious anemia    Depression 08/08/2022    Gastrointestinal disease     IBS    Gout     Hyperlipidemia     Hypertension     Melanoma (CMS/HCC)     Neurologic disorder     Pneumothorax 08/07/2022    Primary biliary cholangitis (CMS/HCC)     autoimmune hepatitis    Psychiatric illness     anxiety       Past Surgical History  Past Surgical History:   Procedure Laterality Date    APPENDECTOMY      COLONOSCOPY W/ BIOPSIES AND POLYPECTOMY  02/09/2009    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    CT BIOPSY LIVER FUNCTION  12/2/2021    CT BIOPSY LIVER FUNCTION 12/2/2021 [facility] MIS CT SCAN    ESOPHAGOGASTRODUODENOSCOPY N/A 5/16/2022    Procedure: ESOPHAGOGASTRODUODENOSCOPY with Biopsies;  Surgeon: Bradley  MD Joey;  Location: [facility];  Service: Endoscopy;  Laterality: N/A;    EYE SURGERY Bilateral     cataract    KNEE SURGERY Bilateral     3 surgeries on left and 2 on right knees - prior open medial meniscectomies bilateral knees in the 1970s    SKIN BIOPSY      SKIN CANCER EXCISION Left 10/15/2020    Melanoma removed from left forearm    TONSILLECTOMY      TOTAL HIP ARTHROPLASTY Left 4/21/2021    Procedure: LEFT TOTAL HIP ARTHROPLASTY;  Surgeon: Sj Reyes MD;  Location: [facility] OR;  Service: Orthopedics;  Laterality: Left;    TOTAL HIP ARTHROPLASTY Right 3/22/2022    Procedure: RIGHT TOTAL HIP ARTHROPLASTY POSTERIOR;  Surgeon: Sj Reyes MD;  Location: [facility] OR;  Service: Orthopedics;  Laterality: Right;       MEDICATIONS:    Current Outpatient Medications   Medication Sig Dispense Refill    HYDROcodone-acetaminophen (NORCO) 5-325 mg per tablet Take 1 tablet by mouth 2 (two) times a day as needed for pain scale 4-7/10. Max Daily Amount: 2 tablets 30 tablet 0    rosuvastatin (CRESTOR) 10 mg tablet Take 1 tablet by mouth daily 90 tablet 0    hydrOXYzine (ATARAX) 25 mg tablet Take 1-2 tablets by mouth nightly as needed for anxiety 60 tablet 0    valsartan (DIOVAN) 80 mg tablet Take 1 tablet (80 mg total) by mouth daily 90 tablet 4    FLUoxetine (PROzac) 40 mg capsule Take 1 capsule (40 mg total) by mouth daily 90 capsule 4    omeprazole (PriLOSEC) 40 mg capsule Take 1 capsule (40 mg total) by mouth every morning 90 capsule 4    fenofibrate (TRICOR) 48 mg tablet Take 1 tablet (48 mg total) by mouth daily 90 tablet 4    famotidine (PEPCID) 20 mg tablet Take 1 tablet (20 mg total) by mouth 2 (two) times a day      hydrocortisone 2.5 % cream Apply topically 2 (two) times a day as needed for rash 30 g 0    acetaminophen (TYLENOL) 500 mg tablet Take 1 tablet (500 mg total) by mouth every 6 (six) hours as needed for pain scale 1-3/10      ursodioL (ACTIGALL) 500 mg tablet Take 1 tablet (500 mg total) by mouth 2  times daily Every other day      ursodioL (AMANDEEP) 250 mg tablet Take 3 tablets (750 mg total) by mouth In am and 500 mg in pm every other day-alternate with the 500 mg bid      cyanocobalamin 1,000 mcg/mL injection Inject 1 mL (1,000 mcg total) into the shoulder, thigh, or buttocks every 30 (thirty) days      gabapentin (NEURONTIN) 100 mg capsule Take 1 capsule (100 mg total) by mouth nightly       No current facility-administered medications for this visit.       Allergies  Allergies have been reviewed.  Juan Diego has No Known Allergies.    Social History  Social History     Socioeconomic History    Marital status:      Spouse name: Not on file    Number of children: Not on file    Years of education: Not on file    Highest education level: Not on file   Occupational History    Not on file   Tobacco Use    Smoking status: Never    Smokeless tobacco: Never   Vaping Use    Vaping Use: Never used   Substance and Sexual Activity    Alcohol use: Yes     Alcohol/week: 1.0 standard drink of alcohol     Types: 1 Glasses of wine per week     Comment: occ    Drug use: Never    Sexual activity: Defer   Other Topics Concern    Not on file   Social History Narrative    Not on file     Social Determinants of Health     Financial Resource Strain: Not on file   Food Insecurity: Not on file   Transportation Needs: Not on file   Physical Activity: Not on file   Stress: Not on file   Social Connections: Not on file   Intimate Partner Violence: Not on file   Housing Stability: Not on file       Family History  Family History   Problem Relation Age of Onset    Diabetes Mother         Type 2    Hypertension Mother     Alzheimer's disease Mother     Lung cancer Father     Hypertension Father     Prostate cancer Brother     Heart failure Brother     Heart attack Maternal Grandmother     Hypertension Maternal Grandfather     Prostate cancer Maternal Grandfather        PHYSICAL EXAMINATION:  Vitals:    03/06/24 0810   BP: 133/83   Temp:  "36.4 °C (97.5 °F)   TempSrc: Temporal   Pulse: 66   SpO2: 91%   Height: 1.867 m (6' 1.5\")   Weight: 88 kg (194 lb)   PainSc:   4       General: Pleasant,in no acute distress  Head: Normocephalic, atraumatic  Eyes: No scleral icterus  Oral cavity: Mucous membranes moist, no oral ulcerations/lesions appreciated  Lymphatic system: No cervical, supraclavicular, axillary, or inguinal lymphadenopathy palpable  Lungs: Clear to auscultation bilaterally without wheezes or crackles  Heart: Regular rate and rhythm without murmur  Extremities: No cyanosis or edema noted  Skin: No obvious rashes or lesion  Psych: Normal affect, no obvious signs of anxiety/depression    ECOG PS:1    RESULTS REVIEWED:   Results for orders placed or performed in visit on 02/07/24   CBC w/auto differential Blood, Venous   Result Value Ref Range    WBC 9.5 3.7 - 9.6 10*3/uL    RBC 3.99 (L) 4.10 - 5.80 10*6/µL    Hemoglobin 14.3 13.2 - 17.2 g/dL    Hematocrit 41.4 38.0 - 50.0 %    .9 (H) 82.0 - 97.0 fL    MCH 36.0 (H) 29.0 - 34.0 pg    MCHC 34.6 32.0 - 36.0 g/dL    RDW 13.6 11.5 - 15.0 %    Platelets 226 130 - 350 10*3/uL    MPV 7.3 6.9 - 10.8 fL    Neutrophils% 76.9 (H) 46.0 - 70.0 %    Lymphocytes% 13.1 (L) 15.0 - 47.0 %    Monocytes% 8.4 5.0 - 13.0 %    Eosinophils% 0.8 0.0 - 3.0 %    Basophils% 0.8 0.0 - 2.0 %    ANC (auto diff) 7.30 10*3/UL    Lymphocytes Absolute 1.30 10*3/uL    Monocytes Absolute 0.80 10*3/uL    Eosinophils Absolute 0.10 10*3/uL    Basophils Absolute 0.10 10*3/uL    Macrocytosis 1+    Comprehensive metabolic panel Blood, Venous   Result Value Ref Range    Sodium 136 135 - 145 mmol/L    Potassium 4.1 3.5 - 5.1 MMOL/L    Chloride 102 98 - 107 mmol/L    CO2 24 21 - 32 mmol/L    Anion Gap 10 3 - 11 mmol/L    BUN 10 7 - 25 mg/dL    Creatinine 0.79 0.70 - 1.30 mg/dL    Glucose 102 70 - 105 mg/dL    Calcium 9.9 8.6 - 10.3 mg/dL    AST 15 <40 U/L    ALT (SGPT) 9 7 - 52 U/L    Alkaline Phosphatase 87 45 - 115 U/L    Total Protein " 8.0 6.0 - 8.3 g/dL    Albumin 3.9 3.5 - 5.3 g/dL    Total Bilirubin 0.65 0.20 - 1.40 mg/dL    Corrected Calcium 10.0 8.6 - 10.3 mg/dL    eGFR 96 >60 mL/min/1.73m*2   Immunoglobulin free LT chains blood Blood, Venous   Result Value Ref Range    Kappa Free Light Chain, S 6.41 (H) 0.3300 - 1.94 mg/dL    Lambda Free Light Chain, S 1.61 0.5700 - 2.63 mg/dL    Kappa/Lambda FLC Ratio 3.98 (H) 0.2600 - 1.65        ASSESSMENT/PLAN    #Monoclonal gammopathy.  Reviewed all of the patient's outside records in detail today.  I also reviewed all of his laboratories and imaging studies.  She has recently undergone fairly extensive workup for arthralgias.  Included in most pain was SPEP which showed a monoclonal protein measuring 1.4 g/dL.  Kappa free light chain was elevated at 6.41 with a kappa/lambda ratio of 3.98.  IgM level was elevated at 2256.  PET scan showed no metabolic uptake or osseous lesions.  I had a long discussion with him today about the natural history, prognosis and treatment of MGUS, smoldering myeloma and multiple myeloma.  He has no CRAB features so very unlikely that this would represent active myeloma.  Given his elevated IgM level this could also represent lymphoplasmacytic lymphoma.  However the, this would be unlikely given the negative PET scan.  I would recommend proceeding with a bone marrow biopsy to obtain definitive diagnosis.    -Labs today  -Bone marrow biopsy  -Follow-up 2 weeks to review results    I would like to thank Cuba Richards MD for this consultation.     NCCN guidelines were consulted in order to help in the management of the patient     Luis Ruffin MD, PhD  Hematology and Oncology

## 2024-03-07 DIAGNOSIS — E78.00 HYPERCHOLESTEROLEMIA: ICD-10-CM

## 2024-03-07 NOTE — TELEPHONE ENCOUNTER
No care due was identified.  Health [facility] Embedded Care Due Messages. Reference number: [id number]. 3/07/2024 1:36:25 PM MST

## 2024-03-07 NOTE — TELEPHONE ENCOUNTER
No care due was identified.  Health [facility] Embedded Care Due Messages. Reference number: [id number]. 3/07/2024 1:32:02 AM MST

## 2024-03-08 LAB
IGA SERPL-MCNC: 102 MG/DL (ref 61–356)
IGG SERPL-MCNC: 815 MG/DL (ref 767–1590)
IGM SERPL-MCNC: 1990 MG/DL (ref 37–286)
IMMUNOLOGIST REVIEW: ABNORMAL
Lab: YES
M PROTEIN SERPL QL: POSITIVE
M-PROTEIN MK(REF): 1.52 G/DL
TYPE OF THERAP AB ADMINISTERED: ABNORMAL
VISC SER: 1.3 CPOISE

## 2024-03-08 NOTE — TELEPHONE ENCOUNTER
Medication refill request:  Medication(s):  rosuvastatin not filled due to Lab (s) are due and Patient does not have a future appointment scheduled within 90 days

## 2024-03-11 RX ORDER — ROSUVASTATIN CALCIUM 10 MG/1
10 TABLET, COATED ORAL DAILY
OUTPATIENT
Start: 2024-03-11

## 2024-03-11 RX ORDER — ROSUVASTATIN CALCIUM 10 MG/1
10 TABLET, COATED ORAL DAILY
Qty: 90 TABLET | Refills: 4 | Status: SHIPPED | OUTPATIENT
Start: 2024-03-11

## 2024-03-12 ENCOUNTER — TELEPHONE (OUTPATIENT)
Dept: FAMILY MEDICINE | Facility: CLINIC | Age: 69
End: 2024-03-12
Payer: MEDICARE

## 2024-03-12 ENCOUNTER — OFFICE VISIT (OUTPATIENT)
Dept: URGENT CARE | Facility: CLINIC | Age: 69
End: 2024-03-12
Payer: MEDICARE

## 2024-03-12 VITALS
DIASTOLIC BLOOD PRESSURE: 86 MMHG | SYSTOLIC BLOOD PRESSURE: 154 MMHG | OXYGEN SATURATION: 95 % | WEIGHT: 193 LBS | RESPIRATION RATE: 18 BRPM | BODY MASS INDEX: 25.12 KG/M2 | HEART RATE: 80 BPM | TEMPERATURE: 97.3 F

## 2024-03-12 DIAGNOSIS — M87.051 AVASCULAR NECROSIS OF BONE OF RIGHT HIP (CMS/HCC): Primary | ICD-10-CM

## 2024-03-12 DIAGNOSIS — K14.1 GEOGRAPHIC TONGUE: Primary | ICD-10-CM

## 2024-03-12 DIAGNOSIS — M25.50 MULTIPLE JOINT PAIN: ICD-10-CM

## 2024-03-12 PROCEDURE — G0463 HOSPITAL OUTPT CLINIC VISIT: HCPCS

## 2024-03-12 PROCEDURE — 99213 OFFICE O/P EST LOW 20 MIN: CPT

## 2024-03-12 RX ORDER — HYDROCODONE BITARTRATE AND ACETAMINOPHEN 10; 325 MG/1; MG/1
1 TABLET ORAL EVERY 6 HOURS PRN
Qty: 120 TABLET | Refills: 0 | Status: SHIPPED | OUTPATIENT
Start: 2024-03-12 | End: 2024-03-13 | Stop reason: ALTCHOICE

## 2024-03-12 RX ORDER — HYDROCODONE BITARTRATE AND ACETAMINOPHEN 5; 325 MG/1; MG/1
1 TABLET ORAL 2 TIMES DAILY PRN
OUTPATIENT
Start: 2024-03-12

## 2024-03-12 RX ORDER — HYDROCODONE BITARTRATE AND ACETAMINOPHEN 10; 325 MG/1; MG/1
1 TABLET ORAL EVERY 6 HOURS PRN
Qty: 30 TABLET | Refills: 0 | Status: SHIPPED | OUTPATIENT
Start: 2024-03-12 | End: 2024-03-12 | Stop reason: SDUPTHER

## 2024-03-12 ASSESSMENT — ENCOUNTER SYMPTOMS
CARDIOVASCULAR NEGATIVE: 1
PSYCHIATRIC NEGATIVE: 1
VOICE CHANGE: 0
ARTHRALGIAS: 1
SORE THROAT: 0
RESPIRATORY NEGATIVE: 1
CONSTITUTIONAL NEGATIVE: 1
FEVER: 0
TROUBLE SWALLOWING: 0
NEUROLOGICAL NEGATIVE: 1
CHILLS: 0

## 2024-03-12 NOTE — TELEPHONE ENCOUNTER
Pt states that Dr. Haley sent in a script to [facility] for:    Hydrocodone  mg per tablet and they do not have that, they have the .  [facility] told patient a new script needs to be sent in for patient.     Please send and call pt so knows to go and .

## 2024-03-12 NOTE — PROGRESS NOTES
Subjective      Juan Diego Rondon is a 68 y.o. male who presents for mouth pain.    ROBY Tomas presents today for mouth pain starting last night.  Patient  is currently being worked up for his monoclonal gammopathy by Dr. Ruffin with hematology.  Patient is scheduled for a bone marrow biopsy tomorrow.  Patient noticed his tongue started to become sore last night.  Patient states that eating food makes the pain worse.  Patient tried Mylanta with Benadryl swish and spit and this slightly improved his symptoms.  Patient is concerned that he has geographic tongue or thrush.  Patient denies fever, chills, sore throat, difficulty swallowing, difficulty breathing, or chest pain.    The following have been reviewed and updated as appropriate in this visit:          No Known Allergies  Current Outpatient Medications   Medication Sig Dispense Refill    rosuvastatin (CRESTOR) 10 mg tablet Take 1 tablet by mouth daily 90 tablet 4    folic acid 1 mg tablet Take 1 tablet (1 mg total) by mouth daily 30 tablet 5    HYDROcodone-acetaminophen (NORCO) 5-325 mg per tablet Take 1 tablet by mouth 2 (two) times a day as needed for pain scale 4-7/10. Max Daily Amount: 2 tablets 30 tablet 0    hydrOXYzine (ATARAX) 25 mg tablet Take 1-2 tablets by mouth nightly as needed for anxiety 60 tablet 0    valsartan (DIOVAN) 80 mg tablet Take 1 tablet (80 mg total) by mouth daily 90 tablet 4    FLUoxetine (PROzac) 40 mg capsule Take 1 capsule (40 mg total) by mouth daily 90 capsule 4    omeprazole (PriLOSEC) 40 mg capsule Take 1 capsule (40 mg total) by mouth every morning 90 capsule 4    fenofibrate (TRICOR) 48 mg tablet Take 1 tablet (48 mg total) by mouth daily 90 tablet 4    famotidine (PEPCID) 20 mg tablet Take 1 tablet (20 mg total) by mouth 2 (two) times a day      hydrocortisone 2.5 % cream Apply topically 2 (two) times a day as needed for rash 30 g 0    acetaminophen (TYLENOL) 500 mg tablet Take 1 tablet (500 mg total) by mouth every 6 (six)  hours as needed for pain scale 1-3/10      ursodioL (ACTIGALL) 500 mg tablet Take 1 tablet (500 mg total) by mouth 2 times daily Every other day      ursodioL (AMANDEEP) 250 mg tablet Take 3 tablets (750 mg total) by mouth In am and 500 mg in pm every other day-alternate with the 500 mg bid      cyanocobalamin 1,000 mcg/mL injection Inject 1 mL (1,000 mcg total) into the shoulder, thigh, or buttocks every 30 (thirty) days      maalox/lidocaine/diphenhydramine (1:1:1) oral solution Swish and spit 10 mL every 6 (six) hours as needed (oral pain) 120 mL 0    gabapentin (NEURONTIN) 100 mg capsule Take 1 capsule (100 mg total) by mouth nightly       No current facility-administered medications for this visit.     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    CT BIOPSY LIVER FUNCTION  12/2/2021    CT BIOPSY LIVER FUNCTION 12/2/2021 [facility] MIS CT SCAN    ESOPHAGOGASTRODUODENOSCOPY N/A 5/16/2022    Procedure: ESOPHAGOGASTRODUODENOSCOPY with Biopsies;  Surgeon: Bradley Cook MD;  Location: [facility];  Service: Endoscopy;  Laterality: N/A;    EYE SURGERY Bilateral     cataract    KNEE SURGERY Bilateral     3 surgeries on left and 2 on right knees - prior open medial meniscectomies bilateral knees in the 1970s    SKIN BIOPSY      SKIN CANCER EXCISION Left 10/15/2020    Melanoma removed from left forearm    TONSILLECTOMY      TOTAL HIP ARTHROPLASTY Left  4/21/2021    Procedure: LEFT TOTAL HIP ARTHROPLASTY;  Surgeon: Sj Reyes MD;  Location: [facility] OR;  Service: Orthopedics;  Laterality: Left;    TOTAL HIP ARTHROPLASTY Right 3/22/2022    Procedure: RIGHT TOTAL HIP ARTHROPLASTY POSTERIOR;  Surgeon: Sj Reyes MD;  Location: [facility] OR;  Service: Orthopedics;  Laterality: Right;     Family History   Problem Relation Age of Onset    Diabetes Mother         Type 2    Hypertension Mother     Alzheimer's disease Mother     Lung cancer Father     Hypertension Father     Prostate cancer Brother     Heart failure Brother     Heart attack Maternal Grandmother     Hypertension Maternal Grandfather     Prostate cancer Maternal Grandfather      Social History     Socioeconomic History    Marital status:    Tobacco Use    Smoking status: Never    Smokeless tobacco: Never   Vaping Use    Vaping Use: Never used   Substance and Sexual Activity    Alcohol use: Yes     Alcohol/week: 1.0 standard drink of alcohol     Types: 1 Glasses of wine per week     Comment: occ    Drug use: Never    Sexual activity: Defer     Social Determinants of Health     Tobacco Use: Low Risk  (3/6/2024)    Patient History     Smoking Tobacco Use: Never     Smokeless Tobacco Use: Never       Review of Systems   Constitutional: Negative.  Negative for chills and fever.   HENT:  Positive for mouth sores. Negative for drooling, sore throat, trouble swallowing and voice change.    Respiratory: Negative.     Cardiovascular: Negative.    Musculoskeletal:  Positive for arthralgias.   Neurological: Negative.    Psychiatric/Behavioral: Negative.         Objective   /86   Pulse 80   Temp 36.3 °C (97.3 °F)   Resp 18   Wt 87.5 kg (193 lb)   SpO2 95%   BMI 25.12 kg/m²     Physical Exam  Constitutional:       General: He is not in acute distress.     Appearance: Normal appearance. He is not toxic-appearing.   HENT:      Right Ear: Tympanic membrane normal.      Left Ear: Tympanic membrane normal.       Nose: Nose normal.      Mouth/Throat:      Mouth: Mucous membranes are moist. No oral lesions.      Tongue: Lesions present.      Pharynx: Oropharynx is clear. No pharyngeal swelling, oropharyngeal exudate or posterior oropharyngeal erythema.      Tonsils: No tonsillar exudate or tonsillar abscesses.      Comments: depapillated, red patches with circumferential, white, polycyclic borders to dorsal aspect of tongue. See picture below.   Cardiovascular:      Rate and Rhythm: Normal rate and regular rhythm.      Pulses: Normal pulses.      Heart sounds: Normal heart sounds.   Pulmonary:      Effort: Pulmonary effort is normal. No respiratory distress.      Breath sounds: Normal breath sounds.   Abdominal:      General: Abdomen is flat.      Palpations: Abdomen is soft.   Skin:     General: Skin is warm and dry.      Capillary Refill: Capillary refill takes less than 2 seconds.   Neurological:      Mental Status: He is alert and oriented to person, place, and time.   Psychiatric:         Mood and Affect: Mood normal.         Behavior: Behavior normal.         Assessment/Plan   Diagnoses and all orders for this visit:    Geographic tongue  -     maalox/lidocaine/diphenhydramine (1:1:1) oral solution; Swish and spit 10 mL every 6 (six) hours as needed (oral pain)    Patient symptoms consistent with geographic tongue no signs of thrush.  Will treat with Magic mouthwash swish and spit.  Advised patient to follow-up with Dr. Ruffin or his PCP.  Patient in no acute distress today.  Urgent care as needed.  Patient verbalized understanding and was in agreement with this plan.    Rosa Isela Weber, DAVE

## 2024-03-12 NOTE — TELEPHONE ENCOUNTER
No care due was identified.  [facility] Embedded Care Due Messages. Reference number: [id number]. 3/12/2024 2:38:22 PM MDT

## 2024-03-12 NOTE — TELEPHONE ENCOUNTER
No care due was identified.  [facility] Embedded Care Due Messages. Reference number: [id number]. 3/12/2024 10:30:59 AM SARAH

## 2024-03-13 ENCOUNTER — PROCEDURE VISIT (OUTPATIENT)
Dept: ONCOLOGY | Facility: CLINIC | Age: 69
End: 2024-03-13
Payer: MEDICARE

## 2024-03-13 ENCOUNTER — LAB (OUTPATIENT)
Dept: ONCOLOGY | Facility: CLINIC | Age: 69
End: 2024-03-13
Payer: MEDICARE

## 2024-03-13 DIAGNOSIS — D47.2 MONOCLONAL GAMMOPATHIES: ICD-10-CM

## 2024-03-13 LAB
BASOPHILS # BLD AUTO: 0 10*3/UL
BASOPHILS NFR BLD AUTO: 0.5 % (ref 0–2)
EOSINOPHIL # BLD AUTO: 0.2 10*3/UL
EOSINOPHIL NFR BLD AUTO: 3 % (ref 0–3)
ERYTHROCYTE [DISTWIDTH] IN BLOOD BY AUTOMATED COUNT: 13.6 % (ref 11.5–15)
HCT VFR BLD AUTO: 39.5 % (ref 38–50)
HGB BLD-MCNC: 13.8 G/DL (ref 13.2–17.2)
LYMPHOCYTES # BLD AUTO: 1.7 10*3/UL
LYMPHOCYTES NFR BLD AUTO: 27.6 % (ref 15–47)
MACROCYTOSIS PRESENCE IN BLOOD, ANALYZER: ABNORMAL
MCH RBC QN AUTO: 36.4 PG (ref 29–34)
MCHC RBC AUTO-ENTMCNC: 34.9 G/DL (ref 32–36)
MCV RBC AUTO: 104.2 FL (ref 82–97)
MONOCYTES # BLD AUTO: 0.7 10*3/UL
MONOCYTES NFR BLD AUTO: 11.1 % (ref 5–13)
NEUTROPHILS # BLD AUTO: 3.6 10*3/UL
NEUTROPHILS NFR BLD AUTO: 57.8 % (ref 46–70)
PLATELET # BLD AUTO: 218 10*3/UL (ref 130–350)
PMV BLD AUTO: 7.3 FL (ref 6.9–10.8)
RBC # BLD AUTO: 3.79 10*6/ΜL (ref 4.1–5.8)
RETICS/RBC NFR AUTO: 2.13 % (ref 0.5–2.5)
RETICULOCYTE PRODUCTION INDEX: 2 %
WBC # BLD AUTO: 6.2 10*3/UL (ref 3.7–9.6)

## 2024-03-13 PROCEDURE — 38222 DX BONE MARROW BX & ASPIR: CPT | Performed by: NURSE PRACTITIONER

## 2024-03-13 PROCEDURE — 88305 TISSUE EXAM BY PATHOLOGIST: CPT | Performed by: NURSE PRACTITIONER

## 2024-03-13 PROCEDURE — 88274 CYTOGENETICS 25-99: CPT | Performed by: NURSE PRACTITIONER

## 2024-03-13 PROCEDURE — 84999 UNLISTED CHEMISTRY PROCEDURE: CPT | Performed by: NURSE PRACTITIONER

## 2024-03-13 PROCEDURE — 88189 FLOWCYTOMETRY/READ 16 & >: CPT | Performed by: NURSE PRACTITIONER

## 2024-03-13 PROCEDURE — 88184 FLOWCYTOMETRY/ TC 1 MARKER: CPT | Performed by: NURSE PRACTITIONER

## 2024-03-13 PROCEDURE — 36415 COLL VENOUS BLD VENIPUNCTURE: CPT

## 2024-03-13 PROCEDURE — 88311 DECALCIFY TISSUE: CPT | Performed by: NURSE PRACTITIONER

## 2024-03-13 PROCEDURE — 88271 CYTOGENETICS DNA PROBE: CPT | Performed by: NURSE PRACTITIONER

## 2024-03-13 PROCEDURE — 88313 SPECIAL STAINS GROUP 2: CPT | Performed by: NURSE PRACTITIONER

## 2024-03-13 PROCEDURE — 85025 COMPLETE CBC W/AUTO DIFF WBC: CPT

## 2024-03-13 PROCEDURE — 85045 AUTOMATED RETICULOCYTE COUNT: CPT

## 2024-03-13 PROCEDURE — 88341 IMHCHEM/IMCYTCHM EA ADD ANTB: CPT | Performed by: NURSE PRACTITIONER

## 2024-03-13 PROCEDURE — 88360 TUMOR IMMUNOHISTOCHEM/MANUAL: CPT | Performed by: NURSE PRACTITIONER

## 2024-03-13 PROCEDURE — 88185 FLOWCYTOMETRY/TC ADD-ON: CPT | Performed by: NURSE PRACTITIONER

## 2024-03-13 PROCEDURE — 88291 CYTO/MOLECULAR REPORT: CPT | Performed by: NURSE PRACTITIONER

## 2024-03-13 PROCEDURE — 88342 IMHCHEM/IMCYTCHM 1ST ANTB: CPT | Performed by: NURSE PRACTITIONER

## 2024-03-13 RX ORDER — HYDROCODONE BITARTRATE AND ACETAMINOPHEN 10; 300 MG/1; MG/1
1 TABLET ORAL EVERY 6 HOURS PRN
Qty: 30 TABLET | Refills: 0 | Status: SHIPPED | OUTPATIENT
Start: 2024-03-13 | End: 2024-06-17 | Stop reason: SDUPTHER

## 2024-03-13 NOTE — PROGRESS NOTES
"Biopsy bone marrow    Date/Time: 3/13/2024 11:14 AM    Performed by: Mallorie Sierra CNP  Authorized by: Luis Ruffin MD  Consent: Verbal consent obtained. Written consent obtained.  Risks and benefits: risks, benefits and alternatives were discussed  Consent given by: patient  Patient understanding: patient states understanding of the procedure being performed  Patient consent: the patient's understanding of the procedure matches consent given  Procedure consent: procedure consent matches procedure scheduled  Relevant documents: relevant documents present and verified  Test results: test results available and properly labeled  Site marked: the operative site was marked  Imaging studies available: not needed.  Required items: required blood products, implants, devices, and special equipment available  Patient identity confirmed: verbally with patient and arm band  Time out: Immediately prior to procedure a \"time out\" was called to verify the correct patient, procedure, equipment, support staff and site/side marked as required.  Preparation: Patient was prepped and draped in the usual sterile fashion.  Local anesthesia used: yes  Anesthesia: local infiltration    Anesthesia:  Local anesthesia used: yes  Local Anesthetic: lidocaine 1% without epinephrine  Anesthetic total: 13 mL    Sedation:  Patient sedated: no    Patient tolerance: patient tolerated the procedure well with no immediate complications  Comments: PROCEDURE SUMMARY:  The patient was laid in the prone position. The right posterior superior iliac spine was prepped and draped in a sterile fashion. The crest of the posterior superior iliac spine was located, and the skin as well as the surface of the bone was anesthetized with 1% lidocaine.  A small incision was made with a small scalpel blade at the surface of the skin previously anesthetized. The biopsy needle was introduced, and bone marrow aspirate was obtained using a  and without difficulty. " The needle was further advanced into the bone cavity with the  and for collection of the core. The needle was removed and a bone marrow biopsy was obtained without complications.  Blood loss minimal.  Pressure applied to the site until bleeding stopped.  A sterile pressure dressing was placed at the site.  This procedure was done by myself, a  was present to assist in making the slides and handling the bone marrow sample collected. Instructions for post bone marrow biopsy procedure and bathing provided to the patient upon completion.

## 2024-03-13 NOTE — TELEPHONE ENCOUNTER
Notified Emmett that the new Hydrocodone script was sent to [facility].

## 2024-03-14 ENCOUNTER — TELEPHONE (OUTPATIENT)
Dept: ONCOLOGY | Facility: CLINIC | Age: 69
End: 2024-03-14
Payer: MEDICARE

## 2024-03-14 LAB
FLOW CYTOMETRY SPECIALIST REVIEW: NORMAL
PATH REPORT.FINAL DX SPEC: NORMAL
PATH REPORT.MICROSCOPIC SPEC OTHER STN: NORMAL
PCPDS PRE-ANALYSIS CELL SORT(REF): NORMAL
REASON FOR REFERRAL(REF): NORMAL
RELEVANT DIAGNOSTIC TESTS/LABORATORY DATA NOTE: NORMAL
SPECIMEN SOURCE: NORMAL

## 2024-03-14 NOTE — TELEPHONE ENCOUNTER
Pt called in wanting to speak with someone about his biopsy results, saw that they were posted to my chart. Please call back, thank you

## 2024-03-15 NOTE — TELEPHONE ENCOUNTER
Called and spoke to patient. Advised patient there are some results that are still in process. Advised patient Dr Ruffin would review results at follow up appointment. Patient verbalized understanding and had no further questions or concerns.

## 2024-03-19 LAB
ANNOTATION COMMENT IMP: NORMAL
CELLS W CYTOGENETIC ABNL BLD/T: NORMAL
CHROM ANALY OVERALL INTERP-IMP: NORMAL
CHROM ANALY RESULT (ISCN): NORMAL
CLINICAL CYTOGENETICIST REVIEW: NORMAL
LAB TEST METHOD: NORMAL
PROVIDER SIGNING NAME: NORMAL
REASON FOR REFERRAL(REF): NORMAL
SPECIMEN SOURCE: NORMAL
TEST PERFORMANCE INFO SPEC: NORMAL

## 2024-04-02 ENCOUNTER — OFFICE VISIT (OUTPATIENT)
Dept: ONCOLOGY | Facility: CLINIC | Age: 69
End: 2024-04-02
Payer: MEDICARE

## 2024-04-02 VITALS
WEIGHT: 194 LBS | DIASTOLIC BLOOD PRESSURE: 82 MMHG | HEIGHT: 74 IN | BODY MASS INDEX: 24.9 KG/M2 | HEART RATE: 58 BPM | TEMPERATURE: 97.5 F | OXYGEN SATURATION: 92 % | SYSTOLIC BLOOD PRESSURE: 129 MMHG

## 2024-04-02 DIAGNOSIS — C83.00 LYMPHOPLASMACYTIC LYMPHOMA (CMS/HCC): Primary | ICD-10-CM

## 2024-04-02 DIAGNOSIS — D47.2 MONOCLONAL GAMMOPATHIES: ICD-10-CM

## 2024-04-02 PROCEDURE — 99214 OFFICE O/P EST MOD 30 MIN: CPT | Performed by: INTERNAL MEDICINE

## 2024-04-02 PROCEDURE — G0463 HOSPITAL OUTPT CLINIC VISIT: HCPCS

## 2024-04-02 RX ORDER — DIPHENHYDRAMINE HCL 25 MG
25 CAPSULE ORAL ONCE
Status: CANCELLED | OUTPATIENT
Start: 2024-05-03

## 2024-04-02 RX ORDER — FAMOTIDINE 10 MG/ML
20 INJECTION INTRAVENOUS ONCE AS NEEDED
Status: CANCELLED | OUTPATIENT
Start: 2024-04-12

## 2024-04-02 RX ORDER — FAMOTIDINE 10 MG/ML
20 INJECTION INTRAVENOUS ONCE AS NEEDED
Status: CANCELLED | OUTPATIENT
Start: 2024-04-19

## 2024-04-02 RX ORDER — DIPHENHYDRAMINE HCL 25 MG
25 CAPSULE ORAL ONCE
Status: CANCELLED | OUTPATIENT
Start: 2024-04-26

## 2024-04-02 RX ORDER — DIPHENHYDRAMINE HYDROCHLORIDE 50 MG/ML
50 INJECTION INTRAMUSCULAR; INTRAVENOUS ONCE AS NEEDED
Status: CANCELLED | OUTPATIENT
Start: 2024-04-19

## 2024-04-02 RX ORDER — EPINEPHRINE 1 MG/ML
0.3 INJECTION INTRAMUSCULAR; INTRAVENOUS; SUBCUTANEOUS AS NEEDED
Status: CANCELLED | OUTPATIENT
Start: 2024-05-03

## 2024-04-02 RX ORDER — SODIUM CHLORIDE 9 MG/ML
50 INJECTION, SOLUTION INTRAVENOUS AS NEEDED
Status: CANCELLED | OUTPATIENT
Start: 2024-04-26

## 2024-04-02 RX ORDER — SODIUM CHLORIDE 9 MG/ML
50 INJECTION, SOLUTION INTRAVENOUS AS NEEDED
Status: CANCELLED | OUTPATIENT
Start: 2024-04-19

## 2024-04-02 RX ORDER — FAMOTIDINE 10 MG/ML
20 INJECTION INTRAVENOUS ONCE AS NEEDED
Status: CANCELLED | OUTPATIENT
Start: 2024-05-03

## 2024-04-02 RX ORDER — EPINEPHRINE 1 MG/ML
0.3 INJECTION INTRAMUSCULAR; INTRAVENOUS; SUBCUTANEOUS AS NEEDED
Status: CANCELLED | OUTPATIENT
Start: 2024-04-12

## 2024-04-02 RX ORDER — SODIUM CHLORIDE 9 MG/ML
50 INJECTION, SOLUTION INTRAVENOUS AS NEEDED
Status: CANCELLED | OUTPATIENT
Start: 2024-04-12

## 2024-04-02 RX ORDER — ACETAMINOPHEN 325 MG/1
650 TABLET ORAL ONCE
Status: CANCELLED | OUTPATIENT
Start: 2024-04-12

## 2024-04-02 RX ORDER — ACETAMINOPHEN 325 MG/1
650 TABLET ORAL ONCE
Status: CANCELLED | OUTPATIENT
Start: 2024-04-19

## 2024-04-02 RX ORDER — DIPHENHYDRAMINE HYDROCHLORIDE 50 MG/ML
50 INJECTION INTRAMUSCULAR; INTRAVENOUS ONCE AS NEEDED
Status: CANCELLED | OUTPATIENT
Start: 2024-05-03

## 2024-04-02 RX ORDER — DIPHENHYDRAMINE HYDROCHLORIDE 50 MG/ML
50 INJECTION INTRAMUSCULAR; INTRAVENOUS ONCE AS NEEDED
Status: CANCELLED | OUTPATIENT
Start: 2024-04-26

## 2024-04-02 RX ORDER — FAMOTIDINE 10 MG/ML
20 INJECTION INTRAVENOUS ONCE AS NEEDED
Status: CANCELLED | OUTPATIENT
Start: 2024-04-26

## 2024-04-02 RX ORDER — EPINEPHRINE 1 MG/ML
0.3 INJECTION INTRAMUSCULAR; INTRAVENOUS; SUBCUTANEOUS AS NEEDED
Status: CANCELLED | OUTPATIENT
Start: 2024-04-26

## 2024-04-02 RX ORDER — DIPHENHYDRAMINE HYDROCHLORIDE 50 MG/ML
50 INJECTION INTRAMUSCULAR; INTRAVENOUS ONCE AS NEEDED
Status: CANCELLED | OUTPATIENT
Start: 2024-04-12

## 2024-04-02 RX ORDER — ACETAMINOPHEN 325 MG/1
650 TABLET ORAL ONCE
Status: CANCELLED | OUTPATIENT
Start: 2024-04-26

## 2024-04-02 RX ORDER — ACETAMINOPHEN 325 MG/1
650 TABLET ORAL ONCE
Status: CANCELLED | OUTPATIENT
Start: 2024-05-03

## 2024-04-02 RX ORDER — SODIUM CHLORIDE 9 MG/ML
50 INJECTION, SOLUTION INTRAVENOUS AS NEEDED
Status: CANCELLED | OUTPATIENT
Start: 2024-05-03

## 2024-04-02 RX ORDER — DIPHENHYDRAMINE HCL 25 MG
25 CAPSULE ORAL ONCE
Status: CANCELLED | OUTPATIENT
Start: 2024-04-19

## 2024-04-02 RX ORDER — DIPHENHYDRAMINE HCL 25 MG
25 CAPSULE ORAL ONCE
Status: CANCELLED | OUTPATIENT
Start: 2024-04-12

## 2024-04-02 RX ORDER — EPINEPHRINE 1 MG/ML
0.3 INJECTION INTRAMUSCULAR; INTRAVENOUS; SUBCUTANEOUS AS NEEDED
Status: CANCELLED | OUTPATIENT
Start: 2024-04-19

## 2024-04-02 ASSESSMENT — PAIN SCALES - GENERAL: PAINLEVEL: 8

## 2024-04-02 NOTE — PROGRESS NOTES
Hematology/Oncology Clinic Note    REASON FOR REFERRAL: Monoclonal gammopathy    HISTORY OF PRESENT ILLNESS:  Juan Diego Rondon is a 68 y.o.-old gentleman referred for further evaluation and treatment of monoclonal gammopathy.  Patient recently underwent laboratory testing in January 2024 including an SPEP which showed a monoclonal protein measuring 1.4 g/dL.  Further workup showed kappa free light chains elevated at 6.41 with a kappa/lambda ratio of 3.98.  Quantitative immunoglobulins showed elevated IgM of 2256, IgG 698, IgA 114.  LDH was normal at 2 109 and beta-2 microglobulin 2.65.  Gastric panel showed creatinine of 0.79 calcium 9.9, corrected calcium 10.0.  PET scan was done which showed no specific uptake with no osseous lesions noted.    He has been having some issues with arthralgias primarily in his hips and wrists.  He has an occasional night sweat.  He denies any lymphadenopathy.    REVIEW OF SYSTEMS:   A 12 point review of systems was completed.  Pertinents noted in HPI; otherwise negative.    Past Medical History  Past Medical History:   Diagnosis Date    Allergic eosinophilic esophagitis     Arthritis     Autoimmune hepatitis (CMS/HCC)     Blood disorder     pernicious anemia    Depression 08/08/2022    Gastrointestinal disease     IBS    Gout     Hyperlipidemia     Hypertension     Melanoma (CMS/HCC)     Neurologic disorder     Pneumothorax 08/07/2022    Primary biliary cholangitis (CMS/HCC)     autoimmune hepatitis    Psychiatric illness     anxiety       Past Surgical History  Past Surgical History:   Procedure Laterality Date    APPENDECTOMY      COLONOSCOPY W/ BIOPSIES AND POLYPECTOMY  02/09/2009    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    CT BIOPSY LIVER FUNCTION  12/2/2021    CT BIOPSY LIVER FUNCTION 12/2/2021 [facility] MIS CT SCAN    ESOPHAGOGASTRODUODENOSCOPY N/A 5/16/2022    Procedure: ESOPHAGOGASTRODUODENOSCOPY with Biopsies;  Surgeon: Bradley Cook MD;  Location: [facility] Endoscopy;   Service: Endoscopy;  Laterality: N/A;    EYE SURGERY Bilateral     cataract    KNEE SURGERY Bilateral     3 surgeries on left and 2 on right knees - prior open medial meniscectomies bilateral knees in the 1970s    SKIN BIOPSY      SKIN CANCER EXCISION Left 10/15/2020    Melanoma removed from left forearm    TONSILLECTOMY      TOTAL HIP ARTHROPLASTY Left 4/21/2021    Procedure: LEFT TOTAL HIP ARTHROPLASTY;  Surgeon: Sj Reyes MD;  Location: [facility] OR;  Service: Orthopedics;  Laterality: Left;    TOTAL HIP ARTHROPLASTY Right 3/22/2022    Procedure: RIGHT TOTAL HIP ARTHROPLASTY POSTERIOR;  Surgeon: Sj Reyes MD;  Location: [facility] OR;  Service: Orthopedics;  Laterality: Right;       MEDICATIONS:    Current Outpatient Medications   Medication Sig Dispense Refill    HYDROcodone-acetaminophen (VICODIN)  mg per tablet Take 1 tablet by mouth every 6 (six) hours as needed for pain scale 8-10/10 Max Daily Amount: 4 tablets 30 tablet 0    maalox/lidocaine/diphenhydramine (1:1:1) oral solution Swish and spit 10 mL every 6 (six) hours as needed (oral pain) 120 mL 0    rosuvastatin (CRESTOR) 10 mg tablet Take 1 tablet by mouth daily 90 tablet 4    folic acid 1 mg tablet Take 1 tablet (1 mg total) by mouth daily 30 tablet 5    valsartan (DIOVAN) 80 mg tablet Take 1 tablet (80 mg total) by mouth daily 90 tablet 4    FLUoxetine (PROzac) 40 mg capsule Take 1 capsule (40 mg total) by mouth daily 90 capsule 4    omeprazole (PriLOSEC) 40 mg capsule Take 1 capsule (40 mg total) by mouth every morning 90 capsule 4    fenofibrate (TRICOR) 48 mg tablet Take 1 tablet (48 mg total) by mouth daily 90 tablet 4    famotidine (PEPCID) 20 mg tablet Take 1 tablet (20 mg total) by mouth 2 (two) times a day      hydrocortisone 2.5 % cream Apply topically 2 (two) times a day as needed for rash 30 g 0    acetaminophen (TYLENOL) 500 mg tablet Take 1 tablet (500 mg total) by mouth every 6 (six) hours as needed for pain scale 1-3/10       ursodioL (ACTIGALL) 500 mg tablet Take 1 tablet (500 mg total) by mouth 2 times daily Every other day      ursodioL (AMANDEEP) 250 mg tablet Take 3 tablets (750 mg total) by mouth In am and 500 mg in pm every other day-alternate with the 500 mg bid      cyanocobalamin 1,000 mcg/mL injection Inject 1 mL (1,000 mcg total) into the shoulder, thigh, or buttocks every 30 (thirty) days      gabapentin (NEURONTIN) 100 mg capsule Take 1 capsule (100 mg total) by mouth nightly      hydrOXYzine (ATARAX) 25 mg tablet Take 1-2 tablets by mouth nightly as needed for anxiety (Patient not taking: Reported on 4/2/2024) 60 tablet 0     No current facility-administered medications for this visit.       Allergies  Allergies have been reviewed.  Juan Diego has No Known Allergies.    Social History  Social History     Socioeconomic History    Marital status:      Spouse name: Not on file    Number of children: Not on file    Years of education: Not on file    Highest education level: Not on file   Occupational History    Not on file   Tobacco Use    Smoking status: Never    Smokeless tobacco: Never   Vaping Use    Vaping Use: Never used   Substance and Sexual Activity    Alcohol use: Yes     Alcohol/week: 1.0 standard drink of alcohol     Types: 1 Glasses of wine per week     Comment: occ    Drug use: Never    Sexual activity: Defer   Other Topics Concern    Not on file   Social History Narrative    Not on file     Social Determinants of Health     Financial Resource Strain: Not on file   Food Insecurity: Not on file   Transportation Needs: Not on file   Physical Activity: Not on file   Stress: Not on file   Social Connections: Not on file   Intimate Partner Violence: Not on file   Housing Stability: Not on file       Family History  Family History   Problem Relation Age of Onset    Diabetes Mother         Type 2    Hypertension Mother     Alzheimer's disease Mother     Lung cancer Father     Hypertension Father     Prostate cancer  "Brother     Heart failure Brother     Heart attack Maternal Grandmother     Hypertension Maternal Grandfather     Prostate cancer Maternal Grandfather        PHYSICAL EXAMINATION:  Vitals:    04/02/24 0953   BP: 129/82   Temp: 36.4 °C (97.5 °F)   TempSrc: Temporal   Pulse: 58   SpO2: 92%   Height: 1.867 m (6' 1.5\")   Weight: 88 kg (194 lb)   PainSc:   8       General: Pleasant,in no acute distress  Head: Normocephalic, atraumatic  Eyes: No scleral icterus  Heart: Regular rate and rhythm without murmur  Extremities: No cyanosis or edema noted  Skin: No obvious rashes or lesion  Psych: Normal affect, no obvious signs of anxiety/depression    ECOG PS:1    RESULTS REVIEWED:   Results for orders placed or performed in visit on 03/13/24   Flow cytometry Bone Marrow Aspirate   Result Value Ref Range    LCMS Result Performed     Final Diagnosis: See Comment     Special Studies: See Comment     Microscopic Description See Comment     Reason for Referral See Comment     Specimen Source Bone Marrow    Plasma Cell Proliferative Disorder (PCPD), FISH Bone Marrow Aspirate   Result Value Ref Range    Result Summary Normal     Interpretation See Comment     Result Table See Comment     Result See Comment     Reason for Referral See Comment     Specimen Bone Marrow     Method See Comment     Additional Information See Comment     Disclaimer See Comment     Released By See Comment    Plasma Cell Proliferative Disorder, Pre-Analysis Cell Sorting Bone Marrow Aspirate   Result Value Ref Range    PCPDS Pre-Analysis Cell Sort Performed         ASSESSMENT/PLAN    # Lymphoplasmacytic lymphoma.  He underwent extensive workup for arthralgias.  Included in most pain was SPEP which showed a monoclonal protein measuring 1.4 g/dL.  Kappa free light chain was elevated at 6.41 with a kappa/lambda ratio of 3.98.  IgM level was elevated at 2256.  PET scan showed no metabolic uptake or osseous lesions.  I reviewed results of his bone marrow biopsy " today which were can assistant with lymphoplasmacytic lymphoma involving approximately 10% of bone marrow cellularity.  MYD88 L265P was positive.  I had a long discussion with him today about the natural history, prognosis and treatment of lymphoplasmacytic lymphoma.  I am not entirely convinced that all of his symptoms can be explained by the current diagnosis.  Is definitely possible that this could be causing fatigue and night sweats but I doubt it would be responsible for his arthralgias.  Nevertheless given that he is symptomatic I think it would be reasonable to start him on treatment to see if his symptoms improved.  I would recommend a course of weekly rituximab for 4 weeks.  I reviewed schedule and potential side effects with him today.    -Weekly rituximab for 4 weeks.  Schedule and side effects reviewed today  -Monitor IgM weekly  -Follow-up after he completes treatment with repeat labs    NCCN guidelines were consulted in order to help in the management of the patient     Luis Ruffin MD, PhD  Hematology and Oncology

## 2024-04-04 ENCOUNTER — TELEPHONE (OUTPATIENT)
Dept: ONCOLOGY | Facility: CLINIC | Age: 69
End: 2024-04-04
Payer: MEDICARE

## 2024-04-04 DIAGNOSIS — C83.00 LYMPHOPLASMACYTIC LYMPHOMA (CMS/HCC): Primary | ICD-10-CM

## 2024-04-04 RX ORDER — PROCHLORPERAZINE MALEATE 10 MG
10 TABLET ORAL EVERY 6 HOURS PRN
Qty: 30 TABLET | Refills: 5 | Status: SHIPPED | OUTPATIENT
Start: 2024-04-04 | End: 2024-04-29 | Stop reason: ALTCHOICE

## 2024-04-04 NOTE — LETTER
April 4, 2024      You have been scheduled for your first cycle of treatment.  These are the upcoming appointments including labs, provider visit, and the infusion.  The current schedule is only for your first cycle of treatment.  Our schedulers will update this schedule with each visit and will usually be 1-2 months in advance.      Home antinausea medication prescriptions have been sent to your pharmacy.  Bring these medications with you on the first day.  A pharmacist will review and provide instruction on how to use them.     Please arrive to check in at [facility] admissions for labs and MD visit 15 minutes prior to appointment.   Visitation restrictions are in place to prevent unnecessary environmental and infection exposure for the patients and the visitors. You may have no more than 2 visitors accompanying you during your treatment appointments. No visitors under the age of 12 years may accompany you during treatment.  Children ages 12 years and older must be accompanied by an adult other than the patient.      Feel free to bring drinks and snacks as desired.  We do have a small selection of soda, juice, coffee and tea and snack items available.  Plan to wear comfortable clothing and feel free to bring anything necessary to remain comfortable during your treatment. Please choose a button down/half zip or loose necked shirt to allow your nurse to more easily access your port being used for treatment .    Please call [facility] for any questions or concerns you may have. You can call us during business hours at [phone] Monday-Friday 8:00 a.m. - 5:00 p.m.

## 2024-04-04 NOTE — TELEPHONE ENCOUNTER
You have been scheduled for your first cycle of treatment.  These are the upcoming appointments including labs, provider visit, and the infusion.  The current schedule is only for your first cycle of treatment.  Our schedulers will update this schedule with each visit and will usually be 1-2 months in advance.      Home antinausea medication prescriptions have been sent to your pharmacy.  Bring these medications with you on the first day.  A pharmacist will review and provide instruction on how to use them.     Please arrive to check in at [facility] admissions for labs and MD visit 15 minutes prior to appointment.   Visitation restrictions are in place to prevent unnecessary environmental and infection exposure for the patients and the visitors. You may have no more than 2 visitors accompanying you during your treatment appointments. No visitors under the age of 12 years may accompany you during treatment.  Children ages 12 years and older must be accompanied by an adult other than the patient.      Feel free to bring drinks and snacks as desired.  We do have a small selection of soda, juice, coffee and tea and snack items available.  Plan to wear comfortable clothing and feel free to bring anything necessary to remain comfortable during your treatment. Please choose a button down/half zip or loose necked shirt to allow your nurse to more easily access your port being used for treatment .    Please call [facility] for any questions or concerns you may have. You can call us during business hours at [phone] Monday-Friday 8:00 a.m. - 5:00 p.m.

## 2024-04-08 ENCOUNTER — TELEPHONE (OUTPATIENT)
Dept: ONCOLOGY | Facility: CLINIC | Age: 69
End: 2024-04-08
Payer: MEDICARE

## 2024-04-08 ENCOUNTER — LAB (OUTPATIENT)
Dept: LAB | Facility: HOSPITAL | Age: 69
End: 2024-04-08
Payer: MEDICARE

## 2024-04-08 DIAGNOSIS — C83.00 LYMPHOPLASMACYTIC LYMPHOMA (CMS/HCC): ICD-10-CM

## 2024-04-08 LAB
HBV CORE AB SERPL QL IA: NORMAL
HBV SURFACE AG SER QL: NORMAL
HCV AB SER QL: NORMAL

## 2024-04-08 PROCEDURE — 87340 HEPATITIS B SURFACE AG IA: CPT | Mod: GZ

## 2024-04-08 PROCEDURE — 86704 HEP B CORE ANTIBODY TOTAL: CPT | Mod: GZ

## 2024-04-08 PROCEDURE — 86803 HEPATITIS C AB TEST: CPT

## 2024-04-08 PROCEDURE — 36415 COLL VENOUS BLD VENIPUNCTURE: CPT

## 2024-04-08 NOTE — TELEPHONE ENCOUNTER
Fabian called to confirm that labs needed for today were HEP labs only.  Fabian verbalize understanding.    Confirmed with [facility] (CNP) after reviewing his tx plan.

## 2024-04-12 ENCOUNTER — OFFICE VISIT (OUTPATIENT)
Dept: ONCOLOGY | Facility: CLINIC | Age: 69
End: 2024-04-12
Payer: MEDICARE

## 2024-04-12 ENCOUNTER — OFFICE VISIT NO LOS (OUTPATIENT)
Dept: ONCOLOGY | Facility: CLINIC | Age: 69
End: 2024-04-12
Payer: MEDICARE

## 2024-04-12 ENCOUNTER — INFUSION (OUTPATIENT)
Dept: ONCOLOGY | Facility: CLINIC | Age: 69
End: 2024-04-12
Payer: MEDICARE

## 2024-04-12 ENCOUNTER — LAB (OUTPATIENT)
Dept: ONCOLOGY | Facility: CLINIC | Age: 69
End: 2024-04-12
Payer: MEDICARE

## 2024-04-12 VITALS
DIASTOLIC BLOOD PRESSURE: 67 MMHG | BODY MASS INDEX: 25.03 KG/M2 | HEIGHT: 74 IN | HEART RATE: 62 BPM | OXYGEN SATURATION: 94 % | TEMPERATURE: 97.2 F | SYSTOLIC BLOOD PRESSURE: 127 MMHG | WEIGHT: 195 LBS

## 2024-04-12 DIAGNOSIS — C83.00 LYMPHOPLASMACYTIC LYMPHOMA (CMS/HCC): Primary | ICD-10-CM

## 2024-04-12 DIAGNOSIS — C83.00 LYMPHOPLASMACYTIC LYMPHOMA (CMS/HCC): ICD-10-CM

## 2024-04-12 LAB
ALBUMIN SERPL-MCNC: 3.8 G/DL (ref 3.5–5.3)
ALP SERPL-CCNC: 84 U/L (ref 45–115)
ALT SERPL-CCNC: 11 U/L (ref 7–52)
ANION GAP SERPL CALC-SCNC: 11 MMOL/L (ref 3–11)
AST SERPL-CCNC: 21 U/L
BASOPHILS # BLD AUTO: 0 10*3/UL
BASOPHILS NFR BLD AUTO: 0.7 % (ref 0–2)
BILIRUB SERPL-MCNC: 1.03 MG/DL (ref 0.2–1.4)
BUN SERPL-MCNC: 9 MG/DL (ref 7–25)
CALCIUM ALBUM COR SERPL-MCNC: 9.7 MG/DL (ref 8.6–10.3)
CALCIUM SERPL-MCNC: 9.5 MG/DL (ref 8.6–10.3)
CHLORIDE SERPL-SCNC: 99 MMOL/L (ref 98–107)
CO2 SERPL-SCNC: 24 MMOL/L (ref 21–32)
CREAT SERPL-MCNC: 0.8 MG/DL (ref 0.7–1.3)
EGFRCR SERPLBLD CKD-EPI 2021: 96 ML/MIN/1.73M*2
EOSINOPHIL # BLD AUTO: 0.1 10*3/UL
EOSINOPHIL NFR BLD AUTO: 1.6 % (ref 0–3)
ERYTHROCYTE [DISTWIDTH] IN BLOOD BY AUTOMATED COUNT: 13.5 % (ref 11.5–15)
GLUCOSE SERPL-MCNC: 118 MG/DL (ref 70–105)
HCT VFR BLD AUTO: 39.9 % (ref 38–50)
HGB BLD-MCNC: 14 G/DL (ref 13.2–17.2)
IGM SERPL-MCNC: 2116 MG/DL (ref 40–230)
LDH SERPL L TO P-CCNC: 147 U/L (ref 87–246)
LYMPHOCYTES # BLD AUTO: 1.3 10*3/UL
LYMPHOCYTES NFR BLD AUTO: 19.9 % (ref 15–47)
MACROCYTOSIS PRESENCE IN BLOOD, ANALYZER: ABNORMAL
MCH RBC QN AUTO: 36.6 PG (ref 29–34)
MCHC RBC AUTO-ENTMCNC: 35.1 G/DL (ref 32–36)
MCV RBC AUTO: 104.1 FL (ref 82–97)
MONOCYTES # BLD AUTO: 0.6 10*3/UL
MONOCYTES NFR BLD AUTO: 9.8 % (ref 5–13)
NEUTROPHILS # BLD AUTO: 4.4 10*3/UL
NEUTROPHILS NFR BLD AUTO: 68 % (ref 46–70)
PLATELET # BLD AUTO: 201 10*3/UL (ref 130–350)
PMV BLD AUTO: 7.5 FL (ref 6.9–10.8)
POTASSIUM SERPL-SCNC: 3.6 MMOL/L (ref 3.5–5.1)
PROT SERPL-MCNC: 7.4 G/DL (ref 6–8.3)
RBC # BLD AUTO: 3.83 10*6/ΜL (ref 4.1–5.8)
SODIUM SERPL-SCNC: 134 MMOL/L (ref 135–145)
URATE SERPL-MCNC: 4.1 MG/DL (ref 4.4–7.6)
WBC # BLD AUTO: 6.5 10*3/UL (ref 3.7–9.6)

## 2024-04-12 PROCEDURE — 99214 OFFICE O/P EST MOD 30 MIN: CPT | Performed by: INTERNAL MEDICINE

## 2024-04-12 PROCEDURE — 96415 CHEMO IV INFUSION ADDL HR: CPT

## 2024-04-12 PROCEDURE — 96413 CHEMO IV INFUSION 1 HR: CPT

## 2024-04-12 PROCEDURE — 6360000200 HC RX 636 W HCPCS (ALT 250 FOR IP): Mod: TB,JZ | Performed by: INTERNAL MEDICINE

## 2024-04-12 PROCEDURE — 82784 ASSAY IGA/IGD/IGG/IGM EACH: CPT

## 2024-04-12 PROCEDURE — 36415 COLL VENOUS BLD VENIPUNCTURE: CPT

## 2024-04-12 PROCEDURE — 84550 ASSAY OF BLOOD/URIC ACID: CPT

## 2024-04-12 PROCEDURE — 85025 COMPLETE CBC W/AUTO DIFF WBC: CPT

## 2024-04-12 PROCEDURE — G0463 HOSPITAL OUTPT CLINIC VISIT: HCPCS

## 2024-04-12 PROCEDURE — 80053 COMPREHEN METABOLIC PANEL: CPT

## 2024-04-12 PROCEDURE — 83615 LACTATE (LD) (LDH) ENZYME: CPT

## 2024-04-12 PROCEDURE — 2580000300 HC RX 258: Performed by: INTERNAL MEDICINE

## 2024-04-12 PROCEDURE — 2500000200 HC RX 250 WO HCPCS: Performed by: INTERNAL MEDICINE

## 2024-04-12 RX ORDER — ACETAMINOPHEN 325 MG/1
650 TABLET ORAL ONCE
Status: COMPLETED | OUTPATIENT
Start: 2024-04-12 | End: 2024-04-12

## 2024-04-12 RX ORDER — SODIUM CHLORIDE 9 MG/ML
50 INJECTION, SOLUTION INTRAVENOUS AS NEEDED
Status: DISCONTINUED | OUTPATIENT
Start: 2024-04-12 | End: 2024-04-12 | Stop reason: HOSPADM

## 2024-04-12 RX ORDER — DIPHENHYDRAMINE HCL 25 MG
25 CAPSULE ORAL ONCE
Status: COMPLETED | OUTPATIENT
Start: 2024-04-12 | End: 2024-04-12

## 2024-04-12 RX ADMIN — ACETAMINOPHEN 650 MG: 325 TABLET ORAL at 09:41

## 2024-04-12 RX ADMIN — DIPHENHYDRAMINE HYDROCHLORIDE 25 MG: 25 CAPSULE ORAL at 09:41

## 2024-04-12 RX ADMIN — RITUXIMAB 800 MG: 10 INJECTION, SOLUTION INTRAVENOUS at 10:45

## 2024-04-12 RX ADMIN — SODIUM CHLORIDE 50 ML/HR: 9 INJECTION, SOLUTION INTRAVENOUS at 09:40

## 2024-04-12 ASSESSMENT — PAIN SCALES - GENERAL: PAINLEVEL: 0-NO PAIN

## 2024-04-12 NOTE — PROGRESS NOTES
Hematology/Oncology Clinic Note    REASON FOR REFERRAL: Monoclonal gammopathy    HISTORY OF PRESENT ILLNESS:  Juan Diego Rondon is a 68 y.o.-old gentleman referred for further evaluation and treatment of monoclonal gammopathy.  Patient recently underwent laboratory testing in January 2024 including an SPEP which showed a monoclonal protein measuring 1.4 g/dL.  Further workup showed kappa free light chains elevated at 6.41 with a kappa/lambda ratio of 3.98.  Quantitative immunoglobulins showed elevated IgM of 2256, IgG 698, IgA 114.  LDH was normal at 2 109 and beta-2 microglobulin 2.65.  Gastric panel showed creatinine of 0.79 calcium 9.9, corrected calcium 10.0.  PET scan was done which showed no specific uptake with no osseous lesions noted.    He has been having some issues with arthralgias primarily in his hips and wrists.  He has an occasional night sweat.  He denies any lymphadenopathy.    REVIEW OF SYSTEMS:   A 12 point review of systems was completed.  Pertinents noted in HPI; otherwise negative.    Past Medical History  Past Medical History:   Diagnosis Date    Allergic eosinophilic esophagitis     Arthritis     Autoimmune hepatitis (CMS/HCC)     Blood disorder     pernicious anemia    Depression 08/08/2022    Gastrointestinal disease     IBS    Gout     Hyperlipidemia     Hypertension     Melanoma (CMS/HCC)     Neurologic disorder     Pneumothorax 08/07/2022    Primary biliary cholangitis (CMS/HCC)     autoimmune hepatitis    Psychiatric illness     anxiety       Past Surgical History  Past Surgical History:   Procedure Laterality Date    APPENDECTOMY      COLONOSCOPY W/ BIOPSIES AND POLYPECTOMY  02/09/2009    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    CT BIOPSY LIVER FUNCTION  12/2/2021    CT BIOPSY LIVER FUNCTION 12/2/2021 [facility] MIS CT SCAN    ESOPHAGOGASTRODUODENOSCOPY N/A 5/16/2022    Procedure: ESOPHAGOGASTRODUODENOSCOPY with Biopsies;  Surgeon: Bradley Cook MD;  Location: [facility] Endoscopy;   Service: Endoscopy;  Laterality: N/A;    EYE SURGERY Bilateral     cataract    KNEE SURGERY Bilateral     3 surgeries on left and 2 on right knees - prior open medial meniscectomies bilateral knees in the 1970s    SKIN BIOPSY      SKIN CANCER EXCISION Left 10/15/2020    Melanoma removed from left forearm    TONSILLECTOMY      TOTAL HIP ARTHROPLASTY Left 4/21/2021    Procedure: LEFT TOTAL HIP ARTHROPLASTY;  Surgeon: Sj Reyes MD;  Location: [facility] OR;  Service: Orthopedics;  Laterality: Left;    TOTAL HIP ARTHROPLASTY Right 3/22/2022    Procedure: RIGHT TOTAL HIP ARTHROPLASTY POSTERIOR;  Surgeon: Sj Reyes MD;  Location: [facility] OR;  Service: Orthopedics;  Laterality: Right;       MEDICATIONS:    Current Outpatient Medications   Medication Sig Dispense Refill    HYDROcodone-acetaminophen (VICODIN)  mg per tablet Take 1 tablet by mouth every 6 (six) hours as needed for pain scale 8-10/10 Max Daily Amount: 4 tablets 30 tablet 0    maalox/lidocaine/diphenhydramine (1:1:1) oral solution Swish and spit 10 mL every 6 (six) hours as needed (oral pain) 120 mL 0    rosuvastatin (CRESTOR) 10 mg tablet Take 1 tablet by mouth daily 90 tablet 4    folic acid 1 mg tablet Take 1 tablet (1 mg total) by mouth daily 30 tablet 5    valsartan (DIOVAN) 80 mg tablet Take 1 tablet (80 mg total) by mouth daily 90 tablet 4    FLUoxetine (PROzac) 40 mg capsule Take 1 capsule (40 mg total) by mouth daily 90 capsule 4    omeprazole (PriLOSEC) 40 mg capsule Take 1 capsule (40 mg total) by mouth every morning 90 capsule 4    fenofibrate (TRICOR) 48 mg tablet Take 1 tablet (48 mg total) by mouth daily 90 tablet 4    famotidine (PEPCID) 20 mg tablet Take 1 tablet (20 mg total) by mouth 2 (two) times a day      hydrocortisone 2.5 % cream Apply topically 2 (two) times a day as needed for rash 30 g 0    acetaminophen (TYLENOL) 500 mg tablet Take 1 tablet (500 mg total) by mouth every 6 (six) hours as needed for pain scale 1-3/10       ursodioL (ACTIGALL) 500 mg tablet Take 1 tablet (500 mg total) by mouth 2 times daily Every other day      ursodioL (AMANDEEP) 250 mg tablet Take 3 tablets (750 mg total) by mouth In am and 500 mg in pm every other day-alternate with the 500 mg bid      cyanocobalamin 1,000 mcg/mL injection Inject 1 mL (1,000 mcg total) into the shoulder, thigh, or buttocks every 30 (thirty) days      prochlorperazine (COMPAZINE) 10 mg tablet Take 1 tablet (10 mg total) by mouth every 6 (six) hours as needed for nausea or vomiting (Patient not taking: Reported on 4/12/2024) 30 tablet 5    gabapentin (NEURONTIN) 100 mg capsule Take 1 capsule (100 mg total) by mouth nightly      hydrOXYzine (ATARAX) 25 mg tablet Take 1-2 tablets by mouth nightly as needed for anxiety (Patient not taking: Reported on 4/2/2024) 60 tablet 0     No current facility-administered medications for this visit.       Allergies  Allergies have been reviewed.  Juan Diego has No Known Allergies.    Social History  Social History     Socioeconomic History    Marital status:      Spouse name: Not on file    Number of children: Not on file    Years of education: Not on file    Highest education level: Not on file   Occupational History    Not on file   Tobacco Use    Smoking status: Never    Smokeless tobacco: Never   Vaping Use    Vaping Use: Never used   Substance and Sexual Activity    Alcohol use: Yes     Alcohol/week: 1.0 standard drink of alcohol     Types: 1 Glasses of wine per week     Comment: occ    Drug use: Never    Sexual activity: Defer   Other Topics Concern    Not on file   Social History Narrative    Not on file     Social Determinants of Health     Financial Resource Strain: Not on file   Food Insecurity: Not on file   Transportation Needs: Not on file   Physical Activity: Not on file   Stress: Not on file   Social Connections: Not on file   Intimate Partner Violence: Not on file   Housing Stability: Not on file       Family History  Family History  "  Problem Relation Age of Onset    Diabetes Mother         Type 2    Hypertension Mother     Alzheimer's disease Mother     Lung cancer Father     Hypertension Father     Prostate cancer Brother     Heart failure Brother     Heart attack Maternal Grandmother     Hypertension Maternal Grandfather     Prostate cancer Maternal Grandfather        PHYSICAL EXAMINATION:  Vitals:    04/12/24 0905   BP: 127/67   Temp: 36.2 °C (97.2 °F)   TempSrc: Temporal   Pulse: 62   SpO2: 94%   Height: 1.867 m (6' 1.5\")   Weight: 88.5 kg (195 lb)   PainSc: 0-No pain       General: Pleasant,in no acute distress  Head: Normocephalic, atraumatic  Eyes: No scleral icterus  Heart: Regular rate and rhythm without murmur  Extremities: No cyanosis or edema noted  Skin: No obvious rashes or lesion  Psych: Normal affect, no obvious signs of anxiety/depression    ECOG PS:1    RESULTS REVIEWED:   Results for orders placed or performed in visit on 04/12/24   CBC w/auto differential Blood, Venous   Result Value Ref Range    WBC 6.5 3.7 - 9.6 10*3/uL    RBC 3.83 (L) 4.10 - 5.80 10*6/µL    Hemoglobin 14.0 13.2 - 17.2 g/dL    Hematocrit 39.9 38.0 - 50.0 %    .1 (H) 82.0 - 97.0 fL    MCH 36.6 (H) 29.0 - 34.0 pg    MCHC 35.1 32.0 - 36.0 g/dL    RDW 13.5 11.5 - 15.0 %    Platelets 201 130 - 350 10*3/uL    MPV 7.5 6.9 - 10.8 fL    Neutrophils% 68.0 46.0 - 70.0 %    Lymphocytes% 19.9 15.0 - 47.0 %    Monocytes% 9.8 5.0 - 13.0 %    Eosinophils% 1.6 0.0 - 3.0 %    Basophils% 0.7 0.0 - 2.0 %    ANC (auto diff) 4.40 10*3/UL    Lymphocytes Absolute 1.30 10*3/uL    Monocytes Absolute 0.60 10*3/uL    Eosinophils Absolute 0.10 10*3/uL    Basophils Absolute 0.00 10*3/uL    Macrocytosis 1+    Comprehensive metabolic panel Blood, Venous   Result Value Ref Range    Sodium 134 (L) 135 - 145 mmol/L    Potassium 3.6 3.5 - 5.1 MMOL/L    Chloride 99 98 - 107 mmol/L    CO2 24 21 - 32 mmol/L    Anion Gap 11 3 - 11 mmol/L    BUN 9 7 - 25 mg/dL    Creatinine 0.80 0.70 - " 1.30 mg/dL    Glucose 118 (H) 70 - 105 mg/dL    Calcium 9.5 8.6 - 10.3 mg/dL    AST 21 <40 U/L    ALT (SGPT) 11 7 - 52 U/L    Alkaline Phosphatase 84 45 - 115 U/L    Total Protein 7.4 6.0 - 8.3 g/dL    Albumin 3.8 3.5 - 5.3 g/dL    Total Bilirubin 1.03 0.20 - 1.40 mg/dL    Corrected Calcium 9.7 8.6 - 10.3 mg/dL    eGFR 96 >60 mL/min/1.73m*2   Uric acid Blood, Venous   Result Value Ref Range    Uric Acid 4.1 (L) 4.4 - 7.6 mg/dL   LDH (Lactate dehydrogenase) Blood, Venous   Result Value Ref Range     87 - 246 U/L        ASSESSMENT/PLAN    # Lymphoplasmacytic lymphoma.  He underwent extensive workup for arthralgias.  Included in most pain was SPEP which showed a monoclonal protein measuring 1.4 g/dL.  Kappa free light chain was elevated at 6.41 with a kappa/lambda ratio of 3.98.  IgM level was elevated at 2256.  PET scan showed no metabolic uptake or osseous lesions.  I reviewed results of his bone marrow biopsy today which were can assistant with lymphoplasmacytic lymphoma involving approximately 10% of bone marrow cellularity.  MYD88 L265P was positive.  I had a long discussion with him today about the natural history, prognosis and treatment of lymphoplasmacytic lymphoma.  I am not entirely convinced that all of his symptoms can be explained by the current diagnosis.  Is definitely possible that this could be causing fatigue and night sweats but I doubt it would be responsible for his arthralgias.  Nevertheless given that he is symptomatic I think it would be reasonable to start him on treatment to see if his symptoms improved.  I would recommend a course of weekly rituximab for 4 weeks.  I reviewed schedule and potential side effects with him today.    -Weekly rituximab for 4 weeks.  Schedule and side effects reviewed today  -Monitor IgM weekly  -Follow-up after he completes treatment with repeat labs    NCCN guidelines were consulted in order to help in the management of the patient     Luis Ruffin MD,  PhD  Hematology and Oncology

## 2024-04-12 NOTE — PROGRESS NOTES
SW provided introductory visit to pt. SW explained role of Patient Support Team. Pt completed NCCN Distress Screening. Reports having some slight anxiety related to unknown, but otherwise coping well. Pt denied any needs or concerns at this time. SW provided contact information and encouraged pt to reach out as needed.

## 2024-04-18 ENCOUNTER — TELEPHONE (OUTPATIENT)
Dept: ONCOLOGY | Facility: CLINIC | Age: 69
End: 2024-04-18
Payer: MEDICARE

## 2024-04-18 ENCOUNTER — LAB (OUTPATIENT)
Dept: LAB | Facility: HOSPITAL | Age: 69
End: 2024-04-18
Payer: MEDICARE

## 2024-04-18 DIAGNOSIS — C83.00 LYMPHOPLASMACYTIC LYMPHOMA (CMS/HCC): ICD-10-CM

## 2024-04-18 DIAGNOSIS — C83.00 LYMPHOPLASMACYTIC LYMPHOMA (CMS/HCC): Primary | ICD-10-CM

## 2024-04-18 LAB
BASOPHILS # BLD AUTO: 0 10*3/UL
BASOPHILS NFR BLD AUTO: 0.5 % (ref 0–2)
EOSINOPHIL # BLD AUTO: 0.2 10*3/UL
EOSINOPHIL NFR BLD AUTO: 1.8 % (ref 0–3)
ERYTHROCYTE [DISTWIDTH] IN BLOOD BY AUTOMATED COUNT: 13.6 % (ref 11.5–15)
HCT VFR BLD AUTO: 42.8 % (ref 38–50)
HGB BLD-MCNC: 14.4 G/DL (ref 13.2–17.2)
IGM SERPL-MCNC: 2140 MG/DL (ref 40–230)
LYMPHOCYTES # BLD AUTO: 1.1 10*3/UL
LYMPHOCYTES NFR BLD AUTO: 11.9 % (ref 15–47)
MACROCYTOSIS PRESENCE IN BLOOD, ANALYZER: ABNORMAL
MCH RBC QN AUTO: 34.9 PG (ref 29–34)
MCHC RBC AUTO-ENTMCNC: 33.6 G/DL (ref 32–36)
MCV RBC AUTO: 103.7 FL (ref 82–97)
MONOCYTES # BLD AUTO: 0.8 10*3/UL
MONOCYTES NFR BLD AUTO: 7.9 % (ref 5–13)
NEUTROPHILS # BLD AUTO: 7.5 10*3/UL
NEUTROPHILS NFR BLD AUTO: 77.9 % (ref 46–70)
PLATELET # BLD AUTO: 241 10*3/UL (ref 130–350)
PMV BLD AUTO: 7.3 FL (ref 6.9–10.8)
RBC # BLD AUTO: 4.12 10*6/ΜL (ref 4.1–5.8)
WBC # BLD AUTO: 9.6 10*3/UL (ref 3.7–9.6)

## 2024-04-18 PROCEDURE — 36415 COLL VENOUS BLD VENIPUNCTURE: CPT

## 2024-04-18 PROCEDURE — 85025 COMPLETE CBC W/AUTO DIFF WBC: CPT

## 2024-04-18 PROCEDURE — 82784 ASSAY IGA/IGD/IGG/IGM EACH: CPT

## 2024-04-18 NOTE — TELEPHONE ENCOUNTER
Discussed labs with the patient. Values are within limits for treatment, patient verbalized understanding and denied any questions or concerns at this time.

## 2024-04-19 ENCOUNTER — INFUSION (OUTPATIENT)
Dept: ONCOLOGY | Facility: CLINIC | Age: 69
End: 2024-04-19
Payer: MEDICARE

## 2024-04-19 VITALS
TEMPERATURE: 97.7 F | HEART RATE: 65 BPM | DIASTOLIC BLOOD PRESSURE: 86 MMHG | OXYGEN SATURATION: 94 % | SYSTOLIC BLOOD PRESSURE: 127 MMHG

## 2024-04-19 DIAGNOSIS — C83.00 LYMPHOPLASMACYTIC LYMPHOMA (CMS/HCC): Primary | ICD-10-CM

## 2024-04-19 PROCEDURE — 2580000300 HC RX 258: Performed by: INTERNAL MEDICINE

## 2024-04-19 PROCEDURE — G0463 HOSPITAL OUTPT CLINIC VISIT: HCPCS

## 2024-04-19 PROCEDURE — 96415 CHEMO IV INFUSION ADDL HR: CPT

## 2024-04-19 PROCEDURE — 6360000200 HC RX 636 W HCPCS (ALT 250 FOR IP): Mod: TB,JZ | Performed by: INTERNAL MEDICINE

## 2024-04-19 PROCEDURE — 2500000200 HC RX 250 WO HCPCS: Performed by: INTERNAL MEDICINE

## 2024-04-19 PROCEDURE — 96413 CHEMO IV INFUSION 1 HR: CPT

## 2024-04-19 RX ORDER — ACETAMINOPHEN 325 MG/1
650 TABLET ORAL ONCE
Status: COMPLETED | OUTPATIENT
Start: 2024-04-19 | End: 2024-04-19

## 2024-04-19 RX ORDER — SODIUM CHLORIDE 9 MG/ML
50 INJECTION, SOLUTION INTRAVENOUS AS NEEDED
Status: DISCONTINUED | OUTPATIENT
Start: 2024-04-19 | End: 2024-04-19 | Stop reason: HOSPADM

## 2024-04-19 RX ORDER — DIPHENHYDRAMINE HCL 25 MG
25 CAPSULE ORAL ONCE
Status: COMPLETED | OUTPATIENT
Start: 2024-04-19 | End: 2024-04-19

## 2024-04-19 RX ADMIN — DIPHENHYDRAMINE HYDROCHLORIDE 25 MG: 25 CAPSULE ORAL at 08:26

## 2024-04-19 RX ADMIN — ACETAMINOPHEN 650 MG: 325 TABLET ORAL at 08:26

## 2024-04-19 RX ADMIN — RITUXIMAB 800 MG: 10 INJECTION, SOLUTION INTRAVENOUS at 09:03

## 2024-04-19 RX ADMIN — SODIUM CHLORIDE 50 ML/HR: 9 INJECTION, SOLUTION INTRAVENOUS at 08:18

## 2024-04-25 ENCOUNTER — LAB (OUTPATIENT)
Dept: LAB | Facility: HOSPITAL | Age: 69
End: 2024-04-25
Payer: MEDICARE

## 2024-04-25 ENCOUNTER — TELEPHONE (OUTPATIENT)
Dept: ONCOLOGY | Facility: CLINIC | Age: 69
End: 2024-04-25
Payer: MEDICARE

## 2024-04-25 DIAGNOSIS — C83.00 LYMPHOPLASMACYTIC LYMPHOMA (CMS/HCC): ICD-10-CM

## 2024-04-25 LAB
BASOPHILS # BLD AUTO: 0.1 10*3/UL
BASOPHILS NFR BLD AUTO: 0.8 % (ref 0–2)
EOSINOPHIL # BLD AUTO: 0.1 10*3/UL
EOSINOPHIL NFR BLD AUTO: 1.3 % (ref 0–3)
ERYTHROCYTE [DISTWIDTH] IN BLOOD BY AUTOMATED COUNT: 13.6 % (ref 11.5–15)
HCT VFR BLD AUTO: 43.9 % (ref 38–50)
HGB BLD-MCNC: 14.9 G/DL (ref 13.2–17.2)
IGM SERPL-MCNC: 2239 MG/DL (ref 40–230)
LYMPHOCYTES # BLD AUTO: 1 10*3/UL
LYMPHOCYTES NFR BLD AUTO: 14.2 % (ref 15–47)
MACROCYTOSIS PRESENCE IN BLOOD, ANALYZER: ABNORMAL
MCH RBC QN AUTO: 35.2 PG (ref 29–34)
MCHC RBC AUTO-ENTMCNC: 34 G/DL (ref 32–36)
MCV RBC AUTO: 103.5 FL (ref 82–97)
MONOCYTES # BLD AUTO: 0.7 10*3/UL
MONOCYTES NFR BLD AUTO: 9.3 % (ref 5–13)
NEUTROPHILS # BLD AUTO: 5.3 10*3/UL
NEUTROPHILS NFR BLD AUTO: 74.4 % (ref 46–70)
PLATELET # BLD AUTO: 237 10*3/UL (ref 130–350)
PMV BLD AUTO: 7.2 FL (ref 6.9–10.8)
RBC # BLD AUTO: 4.25 10*6/ΜL (ref 4.1–5.8)
WBC # BLD AUTO: 7.1 10*3/UL (ref 3.7–9.6)

## 2024-04-25 PROCEDURE — 82784 ASSAY IGA/IGD/IGG/IGM EACH: CPT

## 2024-04-25 PROCEDURE — 36415 COLL VENOUS BLD VENIPUNCTURE: CPT

## 2024-04-25 PROCEDURE — 85025 COMPLETE CBC W/AUTO DIFF WBC: CPT

## 2024-04-26 ENCOUNTER — INFUSION (OUTPATIENT)
Dept: ONCOLOGY | Facility: CLINIC | Age: 69
End: 2024-04-26
Payer: MEDICARE

## 2024-04-26 VITALS
DIASTOLIC BLOOD PRESSURE: 82 MMHG | OXYGEN SATURATION: 92 % | WEIGHT: 191.8 LBS | TEMPERATURE: 97.4 F | HEART RATE: 66 BPM | BODY MASS INDEX: 24.96 KG/M2 | SYSTOLIC BLOOD PRESSURE: 122 MMHG

## 2024-04-26 DIAGNOSIS — C83.00 LYMPHOPLASMACYTIC LYMPHOMA (CMS/HCC): Primary | ICD-10-CM

## 2024-04-26 PROCEDURE — 2500000200 HC RX 250 WO HCPCS: Performed by: INTERNAL MEDICINE

## 2024-04-26 PROCEDURE — 96415 CHEMO IV INFUSION ADDL HR: CPT

## 2024-04-26 PROCEDURE — G0463 HOSPITAL OUTPT CLINIC VISIT: HCPCS

## 2024-04-26 PROCEDURE — 6360000200 HC RX 636 W HCPCS (ALT 250 FOR IP): Mod: TB,JZ | Performed by: INTERNAL MEDICINE

## 2024-04-26 PROCEDURE — 2580000300 HC RX 258: Performed by: INTERNAL MEDICINE

## 2024-04-26 PROCEDURE — 96413 CHEMO IV INFUSION 1 HR: CPT

## 2024-04-26 RX ORDER — SODIUM CHLORIDE 9 MG/ML
50 INJECTION, SOLUTION INTRAVENOUS AS NEEDED
Status: DISCONTINUED | OUTPATIENT
Start: 2024-04-26 | End: 2024-04-26 | Stop reason: HOSPADM

## 2024-04-26 RX ORDER — DIPHENHYDRAMINE HCL 25 MG
25 CAPSULE ORAL ONCE
Status: COMPLETED | OUTPATIENT
Start: 2024-04-26 | End: 2024-04-26

## 2024-04-26 RX ORDER — ACETAMINOPHEN 325 MG/1
650 TABLET ORAL ONCE
Status: COMPLETED | OUTPATIENT
Start: 2024-04-26 | End: 2024-04-26

## 2024-04-26 RX ADMIN — SODIUM CHLORIDE 50 ML/HR: 9 INJECTION, SOLUTION INTRAVENOUS at 10:14

## 2024-04-26 RX ADMIN — ACETAMINOPHEN 650 MG: 325 TABLET ORAL at 10:15

## 2024-04-26 RX ADMIN — DIPHENHYDRAMINE HYDROCHLORIDE 25 MG: 25 CAPSULE ORAL at 10:15

## 2024-04-26 RX ADMIN — RITUXIMAB 800 MG: 10 INJECTION, SOLUTION INTRAVENOUS at 10:51

## 2024-04-29 ENCOUNTER — OFFICE VISIT (OUTPATIENT)
Dept: FAMILY MEDICINE | Facility: CLINIC | Age: 69
End: 2024-04-29
Payer: MEDICARE

## 2024-04-29 VITALS
DIASTOLIC BLOOD PRESSURE: 70 MMHG | SYSTOLIC BLOOD PRESSURE: 132 MMHG | OXYGEN SATURATION: 94 % | WEIGHT: 193.7 LBS | BODY MASS INDEX: 25.21 KG/M2 | TEMPERATURE: 97.4 F | HEART RATE: 65 BPM

## 2024-04-29 DIAGNOSIS — K74.3 PRIMARY BILIARY CHOLANGITIS (CMS/HCC): ICD-10-CM

## 2024-04-29 DIAGNOSIS — M87.051 AVASCULAR NECROSIS OF BONE OF RIGHT HIP (CMS/HCC): Primary | ICD-10-CM

## 2024-04-29 DIAGNOSIS — C83.00 LYMPHOPLASMACYTIC LYMPHOMA (CMS/HCC): ICD-10-CM

## 2024-04-29 DIAGNOSIS — K75.4 AUTOIMMUNE HEPATITIS (CMS/HCC): ICD-10-CM

## 2024-04-29 PROCEDURE — G2211 COMPLEX E/M VISIT ADD ON: HCPCS | Performed by: FAMILY MEDICINE

## 2024-04-29 PROCEDURE — 99214 OFFICE O/P EST MOD 30 MIN: CPT | Performed by: FAMILY MEDICINE

## 2024-04-29 PROCEDURE — G0463 HOSPITAL OUTPT CLINIC VISIT: HCPCS | Performed by: FAMILY MEDICINE

## 2024-04-29 RX ORDER — PREDNISONE 1 MG/1
1 TABLET ORAL DAILY
COMMUNITY
Start: 2024-02-12 | End: 2024-06-07 | Stop reason: ALTCHOICE

## 2024-04-29 RX ORDER — ASPIRIN 81 MG/1
81 TABLET ORAL
COMMUNITY
End: 2024-12-16

## 2024-04-29 NOTE — PROGRESS NOTES
Subjective      Juan Diego Rondon is a 68 y.o. male who presents for Med Management (Patient is here today for a medication check. He state his mouth is sore and is not eating well. Also is sleeping a lot. )  .    Patient presents for a follow up regarding medications and hx of avascular necrosis of bone of right hip and lymphplasmatic lymphoma. He is receiving once weekly infusions of riTUXimab, last was completed on 4/26/24. He continues taking hydrocodone-acetaminophen 10-300mg 1-2 tablets daily and Tylenol as needed for pain control. He reports oncology did not feel the need to prescribe his hydrocodone as they did not feel his pain was related to lymphplasmatic lymphoma. Patient does mention he is sleeping more than usual, mouth is quite sore and his appetite is poor. He states everything tasted extremely salty. Patient was seen by urgent care on 3/12/24 due to geographic tongue. He continues using MAALOX/LIDOCAINE/DIPHENHYDRAMINE mouth wash as directed.     Patient reports he has a mild productive cough and night sweats within the last 7 days. He also has diffuse abdominal pain. He denies fevers or chills. He has had some headaches but these are easily controlled by Tylenol use. He does have diarrhea and constipation issues. He does feel that diarrhea is improving with ursodiol use and Metamucil as needed.      The following have been reviewed and updated as appropriate in this visit:   Tobacco  Allergies  Meds  Problems  Med Hx  Surg Hx  Fam Hx         No Known Allergies  Current Outpatient Medications   Medication Sig Dispense Refill    HYDROcodone-acetaminophen (VICODIN)  mg per tablet Take 1 tablet by mouth every 6 (six) hours as needed for pain scale 8-10/10 Max Daily Amount: 4 tablets 30 tablet 0    maalox/lidocaine/diphenhydramine (1:1:1) oral solution Swish and spit 10 mL every 6 (six) hours as needed (oral pain) 120 mL 0    rosuvastatin (CRESTOR) 10 mg tablet Take 1 tablet by mouth daily  90 tablet 4    folic acid 1 mg tablet Take 1 tablet (1 mg total) by mouth daily 30 tablet 5    hydrOXYzine (ATARAX) 25 mg tablet Take 1-2 tablets by mouth nightly as needed for anxiety 60 tablet 0    FLUoxetine (PROzac) 40 mg capsule Take 1 capsule (40 mg total) by mouth daily 90 capsule 4    omeprazole (PriLOSEC) 40 mg capsule Take 1 capsule (40 mg total) by mouth every morning 90 capsule 4    fenofibrate (TRICOR) 48 mg tablet Take 1 tablet (48 mg total) by mouth daily 90 tablet 4    famotidine (PEPCID) 20 mg tablet Take 1 tablet (20 mg total) by mouth 2 (two) times a day      hydrocortisone 2.5 % cream Apply topically 2 (two) times a day as needed for rash 30 g 0    acetaminophen (TYLENOL) 500 mg tablet Take 1 tablet (500 mg total) by mouth every 6 (six) hours as needed for pain scale 1-3/10      ursodioL (ACTIGALL) 500 mg tablet Take 1 tablet (500 mg total) by mouth 2 times daily Every other day      cyanocobalamin 1,000 mcg/mL injection Inject 1 mL (1,000 mcg total) into the shoulder, thigh, or buttocks every 30 (thirty) days      aspirin 81 mg EC tablet Take 1 tablet (81 mg total) by mouth      predniSONE 1 mg tablet Take 1 tablet (1 mg total) by mouth daily      gabapentin (NEURONTIN) 100 mg capsule Take 1 capsule (100 mg total) by mouth nightly      valsartan (DIOVAN) 80 mg tablet Take 1 tablet (80 mg total) by mouth daily 90 tablet 4    ursodioL (AMANDEEP) 250 mg tablet Take 3 tablets (750 mg total) by mouth In am and 500 mg in pm every other day-alternate with the 500 mg bid       No current facility-administered medications for this visit.     Past Medical History:   Diagnosis Date    Allergic eosinophilic esophagitis     Arthritis     Autoimmune hepatitis (CMS/HCC)     Blood disorder     pernicious anemia    Depression 08/08/2022    Gastrointestinal disease     IBS    Gout     Hyperlipidemia     Hypertension     Lymphoplasmacytic leukemia (CMS/HCC)     Melanoma (CMS/HCC)     Neurologic disorder      Pneumothorax 08/07/2022    Primary biliary cholangitis (CMS/HCC)     autoimmune hepatitis    Psychiatric illness     anxiety     Past Surgical History:   Procedure Laterality Date    APPENDECTOMY      COLONOSCOPY W/ BIOPSIES AND POLYPECTOMY  02/09/2009    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    CT BIOPSY LIVER FUNCTION  12/2/2021    CT BIOPSY LIVER FUNCTION 12/2/2021 [facility] MIS CT SCAN    ESOPHAGOGASTRODUODENOSCOPY N/A 5/16/2022    Procedure: ESOPHAGOGASTRODUODENOSCOPY with Biopsies;  Surgeon: Bradley Cook MD;  Location: [facility];  Service: Endoscopy;  Laterality: N/A;    EYE SURGERY Bilateral     cataract    KNEE SURGERY Bilateral     3 surgeries on left and 2 on right knees - prior open medial meniscectomies bilateral knees in the 1970s    SKIN BIOPSY      SKIN CANCER EXCISION Left 10/15/2020    Melanoma removed from left forearm    TONSILLECTOMY      TOTAL HIP ARTHROPLASTY Left 4/21/2021    Procedure: LEFT TOTAL HIP ARTHROPLASTY;  Surgeon: Sj Reyse MD;  Location: [facility] OR;  Service: Orthopedics;  Laterality: Left;    TOTAL HIP ARTHROPLASTY Right 3/22/2022    Procedure: RIGHT TOTAL HIP ARTHROPLASTY POSTERIOR;  Surgeon: Sj Reyes MD;  Location: [facility] OR;  Service: Orthopedics;  Laterality: Right;     Family History   Problem Relation Age of Onset    Diabetes Mother         Type 2    Hypertension Mother     Alzheimer's disease Mother     Lung cancer Father     Hypertension Father     Prostate cancer Brother     Heart failure Brother     Heart attack Maternal Grandmother     Hypertension Maternal Grandfather     Prostate cancer Maternal Grandfather      Social History     Socioeconomic History    Marital status:    Tobacco Use    Smoking status: Never    Smokeless tobacco: Never   Vaping Use    Vaping Use: Never used   Substance and Sexual Activity    Alcohol use: Yes     Alcohol/week: 1.0 standard drink of alcohol     Types: 1 Glasses of wine per week     Comment: occ    Drug use: Never     Sexual activity: Defer     Social Determinants of Health     Tobacco Use: Low Risk  (4/29/2024)    Patient History     Smoking Tobacco Use: Never     Smokeless Tobacco Use: Never       Review of Systems    Objective     Vitals  /70 (BP Location: Left arm, Patient Position: Sitting)   Pulse 65   Temp 36.3 °C (97.4 °F) (Temporal)   Wt 87.9 kg (193 lb 11.2 oz)   SpO2 94%   BMI 25.21 kg/m²     Physical Exam  Vitals and nursing note reviewed.   Constitutional:       General: He is not in acute distress.     Appearance: Normal appearance. He is well-developed. He is obese. He is not ill-appearing, toxic-appearing or diaphoretic.   HENT:      Head: Normocephalic and atraumatic.      Mouth/Throat:      Pharynx: Posterior oropharyngeal erythema present. No oropharyngeal exudate.   Neck:      Thyroid: No thyromegaly.      Vascular: No carotid bruit.      Trachea: No tracheal deviation.   Cardiovascular:      Rate and Rhythm: Normal rate and regular rhythm.      Pulses: Normal pulses.      Heart sounds: Normal heart sounds. No murmur heard.     No friction rub. No gallop.   Pulmonary:      Effort: Pulmonary effort is normal. No respiratory distress.      Breath sounds: Normal breath sounds. No stridor. No wheezing, rhonchi or rales.   Chest:      Chest wall: No tenderness.   Abdominal:      General: Bowel sounds are normal. There is no distension.      Palpations: Abdomen is soft. There is no mass.      Tenderness: There is no abdominal tenderness. There is no guarding or rebound.      Hernia: No hernia is present.   Musculoskeletal:         General: No tenderness or signs of injury.      Cervical back: Neck supple. No tenderness.      Right lower leg: No edema.      Left lower leg: No edema.   Lymphadenopathy:      Cervical: No cervical adenopathy.   Skin:     General: Skin is warm and dry.      Coloration: Skin is not pale.      Findings: No erythema or rash.   Neurological:      Mental Status: He is alert and  oriented to person, place, and time.      Gait: Gait normal.      Deep Tendon Reflexes: Reflexes are normal and symmetric. Reflexes normal.   Psychiatric:         Mood and Affect: Mood normal.         Procedures    Assessment/Plan   Diagnoses and all orders for this visit:    Avascular necrosis of bone of right hip (CMS/HCC)  Comments:  Continue current pain medications.  Follow-up as needed.    Lymphoplasmacytic lymphoma (CMS/HCC)  Comments:  Continue follow-up with oncology.  Follow with me as needed.    Autoimmune hepatitis (CMS/HCC)  Comments:  No acute issues.  Will continue to follow.    Primary biliary cholangitis (CMS/HCC)  Comments:  No acute concerns.  Will continue to follow.         Return in about 3 months (around 7/29/2024) for Follow Up.    Cuba Haley MD    Portions of this note were documented by Kate Peters as I performed the exam and collected the information from Juan Diego Rondon. I attest that I have reviewed the information as documented, verified the accuracy of the documentation and added additional information as needed.

## 2024-04-30 DIAGNOSIS — L21.9 SEBORRHEIC DERMATITIS: ICD-10-CM

## 2024-04-30 DIAGNOSIS — M25.50 MULTIPLE JOINT PAIN: ICD-10-CM

## 2024-04-30 RX ORDER — HYDROCODONE BITARTRATE AND ACETAMINOPHEN 10; 325 MG/1; MG/1
1 TABLET ORAL EVERY 6 HOURS PRN
Qty: 30 TABLET | Refills: 0 | Status: SHIPPED | OUTPATIENT
Start: 2024-04-30 | End: 2024-05-28 | Stop reason: SDUPTHER

## 2024-04-30 NOTE — TELEPHONE ENCOUNTER
Medication refill request:  Medication(s):  Lorcet HD not filled due to Non-delegated medication - provider fill only

## 2024-04-30 NOTE — TELEPHONE ENCOUNTER
Care Due:                  Date            Visit Type   Department     Provider  --------------------------------------------------------------------------------                              ESTABLISHED   [facility]  Last Visit: 04-      PATIENT      MEDICINE       IVÁN RIVAS  Next Visit: None Scheduled  None         None Found                                                            Last  Test          Frequency    Reason                     Performed    Due Date  --------------------------------------------------------------------------------  Lipid Panel.  12 months..  fenofibrate, rosuvastatin  Not Found    Overdue    [employer] Embedded Care Due Messages. Reference number: [id number]. 4/30/2024 9:27:20 AM SARAH

## 2024-05-01 RX ORDER — HYDROCORTISONE 25 MG/G
CREAM TOPICAL 2 TIMES DAILY PRN
Qty: 30 G | Refills: 0 | Status: SHIPPED | OUTPATIENT
Start: 2024-05-01 | End: 2025-05-01

## 2024-05-01 NOTE — TELEPHONE ENCOUNTER
No care due was identified.  [facility] Embedded Care Due Messages. Reference number: [id number]. 4/30/2024 7:45:35 PM MDT

## 2024-05-02 ENCOUNTER — TELEPHONE (OUTPATIENT)
Dept: ONCOLOGY | Facility: CLINIC | Age: 69
End: 2024-05-02
Payer: MEDICARE

## 2024-05-02 ENCOUNTER — LAB (OUTPATIENT)
Dept: LAB | Facility: HOSPITAL | Age: 69
End: 2024-05-02
Payer: MEDICARE

## 2024-05-02 DIAGNOSIS — C83.00 LYMPHOPLASMACYTIC LYMPHOMA (CMS/HCC): ICD-10-CM

## 2024-05-02 LAB
BASOPHILS # BLD AUTO: 0.1 10*3/UL
BASOPHILS NFR BLD AUTO: 0.7 % (ref 0–2)
EOSINOPHIL # BLD AUTO: 0.1 10*3/UL
EOSINOPHIL NFR BLD AUTO: 1 % (ref 0–3)
ERYTHROCYTE [DISTWIDTH] IN BLOOD BY AUTOMATED COUNT: 13.6 % (ref 11.5–15)
HCT VFR BLD AUTO: 44.2 % (ref 38–50)
HGB BLD-MCNC: 15.2 G/DL (ref 13.2–17.2)
IGM SERPL-MCNC: 2415 MG/DL (ref 40–230)
LYMPHOCYTES # BLD AUTO: 1.3 10*3/UL
LYMPHOCYTES NFR BLD AUTO: 16.1 % (ref 15–47)
MACROCYTOSIS PRESENCE IN BLOOD, ANALYZER: ABNORMAL
MCH RBC QN AUTO: 35.5 PG (ref 29–34)
MCHC RBC AUTO-ENTMCNC: 34.4 G/DL (ref 32–36)
MCV RBC AUTO: 103.1 FL (ref 82–97)
MONOCYTES # BLD AUTO: 0.7 10*3/UL
MONOCYTES NFR BLD AUTO: 9.6 % (ref 5–13)
NEUTROPHILS # BLD AUTO: 5.7 10*3/UL
NEUTROPHILS NFR BLD AUTO: 72.6 % (ref 46–70)
PLATELET # BLD AUTO: 259 10*3/UL (ref 130–350)
PMV BLD AUTO: 7.5 FL (ref 6.9–10.8)
RBC # BLD AUTO: 4.28 10*6/ΜL (ref 4.1–5.8)
WBC # BLD AUTO: 7.8 10*3/UL (ref 3.7–9.6)

## 2024-05-02 PROCEDURE — 82784 ASSAY IGA/IGD/IGG/IGM EACH: CPT

## 2024-05-02 PROCEDURE — 85025 COMPLETE CBC W/AUTO DIFF WBC: CPT

## 2024-05-02 PROCEDURE — 36415 COLL VENOUS BLD VENIPUNCTURE: CPT

## 2024-05-02 NOTE — TELEPHONE ENCOUNTER
Attempted to call patient with lab results.  No answer and no VM left on a generic messaging service.  Noted patient has accessed [employer] and has viewed the blood count results.  Labs adequate for treatment tomorrow.

## 2024-05-03 ENCOUNTER — INFUSION (OUTPATIENT)
Dept: ONCOLOGY | Facility: CLINIC | Age: 69
End: 2024-05-03
Payer: MEDICARE

## 2024-05-03 VITALS — HEART RATE: 71 BPM | DIASTOLIC BLOOD PRESSURE: 79 MMHG | SYSTOLIC BLOOD PRESSURE: 140 MMHG | OXYGEN SATURATION: 93 %

## 2024-05-03 DIAGNOSIS — C83.00 LYMPHOPLASMACYTIC LYMPHOMA (CMS/HCC): Primary | ICD-10-CM

## 2024-05-03 PROCEDURE — 96415 CHEMO IV INFUSION ADDL HR: CPT

## 2024-05-03 PROCEDURE — G0463 HOSPITAL OUTPT CLINIC VISIT: HCPCS

## 2024-05-03 PROCEDURE — 96413 CHEMO IV INFUSION 1 HR: CPT

## 2024-05-03 PROCEDURE — 2500000200 HC RX 250 WO HCPCS: Performed by: INTERNAL MEDICINE

## 2024-05-03 PROCEDURE — 2580000300 HC RX 258: Performed by: INTERNAL MEDICINE

## 2024-05-03 PROCEDURE — 6360000200 HC RX 636 W HCPCS (ALT 250 FOR IP): Mod: TB,JZ | Performed by: INTERNAL MEDICINE

## 2024-05-03 RX ORDER — ACETAMINOPHEN 325 MG/1
650 TABLET ORAL ONCE
Status: COMPLETED | OUTPATIENT
Start: 2024-05-03 | End: 2024-05-03

## 2024-05-03 RX ORDER — DIPHENHYDRAMINE HCL 25 MG
25 CAPSULE ORAL ONCE
Status: COMPLETED | OUTPATIENT
Start: 2024-05-03 | End: 2024-05-03

## 2024-05-03 RX ORDER — SODIUM CHLORIDE 9 MG/ML
50 INJECTION, SOLUTION INTRAVENOUS AS NEEDED
Status: DISCONTINUED | OUTPATIENT
Start: 2024-05-03 | End: 2024-05-03 | Stop reason: HOSPADM

## 2024-05-03 RX ADMIN — SODIUM CHLORIDE 50 ML/HR: 9 INJECTION, SOLUTION INTRAVENOUS at 09:26

## 2024-05-03 RX ADMIN — ACETAMINOPHEN 650 MG: 325 TABLET ORAL at 09:27

## 2024-05-03 RX ADMIN — RITUXIMAB 800 MG: 10 INJECTION, SOLUTION INTRAVENOUS at 09:57

## 2024-05-03 RX ADMIN — DIPHENHYDRAMINE HYDROCHLORIDE 25 MG: 25 CAPSULE ORAL at 09:27

## 2024-05-28 ENCOUNTER — OFFICE VISIT (OUTPATIENT)
Dept: URGENT CARE | Facility: CLINIC | Age: 69
End: 2024-05-28
Payer: MEDICARE

## 2024-05-28 ENCOUNTER — TELEPHONE (OUTPATIENT)
Dept: URGENT CARE | Facility: CLINIC | Age: 69
End: 2024-05-28

## 2024-05-28 ENCOUNTER — LAB (OUTPATIENT)
Dept: LAB | Facility: HOSPITAL | Age: 69
End: 2024-05-28
Payer: MEDICARE

## 2024-05-28 VITALS
HEART RATE: 68 BPM | BODY MASS INDEX: 23.69 KG/M2 | SYSTOLIC BLOOD PRESSURE: 122 MMHG | TEMPERATURE: 97.2 F | RESPIRATION RATE: 16 BRPM | OXYGEN SATURATION: 94 % | DIASTOLIC BLOOD PRESSURE: 78 MMHG | WEIGHT: 182 LBS

## 2024-05-28 DIAGNOSIS — K13.79 PAIN IN ORAL CAVITY: Primary | ICD-10-CM

## 2024-05-28 DIAGNOSIS — M25.50 MULTIPLE JOINT PAIN: ICD-10-CM

## 2024-05-28 DIAGNOSIS — B37.9 CANDIDA INFECTION: ICD-10-CM

## 2024-05-28 DIAGNOSIS — C83.00 LYMPHOPLASMACYTIC LYMPHOMA (CMS/HCC): ICD-10-CM

## 2024-05-28 LAB
ALBUMIN SERPL-MCNC: 3.6 G/DL (ref 3.5–5.3)
ALP SERPL-CCNC: 88 U/L (ref 45–115)
ALT SERPL-CCNC: 12 U/L (ref 7–52)
ANION GAP SERPL CALC-SCNC: 13 MMOL/L (ref 3–11)
AST SERPL-CCNC: 20 U/L
BASOPHILS # BLD AUTO: 0 10*3/UL
BASOPHILS NFR BLD AUTO: 0.6 % (ref 0–2)
BILIRUB SERPL-MCNC: 0.41 MG/DL (ref 0.2–1.4)
BUN SERPL-MCNC: 6 MG/DL (ref 7–25)
CALCIUM ALBUM COR SERPL-MCNC: 9.6 MG/DL (ref 8.6–10.3)
CALCIUM SERPL-MCNC: 9.3 MG/DL (ref 8.6–10.3)
CHLORIDE SERPL-SCNC: 104 MMOL/L (ref 98–107)
CO2 SERPL-SCNC: 22 MMOL/L (ref 21–32)
CREAT SERPL-MCNC: 0.71 MG/DL (ref 0.7–1.3)
EGFRCR SERPLBLD CKD-EPI 2021: 99 ML/MIN/1.73M*2
EOSINOPHIL # BLD AUTO: 0.1 10*3/UL
EOSINOPHIL NFR BLD AUTO: 2.3 % (ref 0–3)
ERYTHROCYTE [DISTWIDTH] IN BLOOD BY AUTOMATED COUNT: 13.2 % (ref 11.5–15)
GLUCOSE SERPL-MCNC: 92 MG/DL (ref 70–105)
HCT VFR BLD AUTO: 40.6 % (ref 38–50)
HGB BLD-MCNC: 14 G/DL (ref 13.2–17.2)
IGM SERPL-MCNC: 2070 MG/DL (ref 40–230)
LDH SERPL L TO P-CCNC: 150 U/L (ref 87–246)
LYMPHOCYTES # BLD AUTO: 0.9 10*3/UL
LYMPHOCYTES NFR BLD AUTO: 15.4 % (ref 15–47)
MACROCYTOSIS PRESENCE IN BLOOD, ANALYZER: ABNORMAL
MCH RBC QN AUTO: 35.6 PG (ref 29–34)
MCHC RBC AUTO-ENTMCNC: 34.4 G/DL (ref 32–36)
MCV RBC AUTO: 103.6 FL (ref 82–97)
MONOCYTES # BLD AUTO: 0.6 10*3/UL
MONOCYTES NFR BLD AUTO: 10.2 % (ref 5–13)
NEUTROPHILS # BLD AUTO: 4.3 10*3/UL
NEUTROPHILS NFR BLD AUTO: 71.5 % (ref 46–70)
PLATELET # BLD AUTO: 258 10*3/UL (ref 130–350)
PMV BLD AUTO: 7.8 FL (ref 6.9–10.8)
POTASSIUM SERPL-SCNC: 3.7 MMOL/L (ref 3.5–5.1)
PROT SERPL-MCNC: 7.4 G/DL (ref 6–8.3)
RBC # BLD AUTO: 3.92 10*6/ΜL (ref 4.1–5.8)
SODIUM SERPL-SCNC: 139 MMOL/L (ref 135–145)
WBC # BLD AUTO: 6 10*3/UL (ref 3.7–9.6)

## 2024-05-28 PROCEDURE — 82784 ASSAY IGA/IGD/IGG/IGM EACH: CPT

## 2024-05-28 PROCEDURE — 83615 LACTATE (LD) (LDH) ENZYME: CPT

## 2024-05-28 PROCEDURE — G0463 HOSPITAL OUTPT CLINIC VISIT: HCPCS | Performed by: NURSE PRACTITIONER

## 2024-05-28 PROCEDURE — 36415 COLL VENOUS BLD VENIPUNCTURE: CPT

## 2024-05-28 PROCEDURE — 80053 COMPREHEN METABOLIC PANEL: CPT

## 2024-05-28 PROCEDURE — 85025 COMPLETE CBC W/AUTO DIFF WBC: CPT

## 2024-05-28 PROCEDURE — 99213 OFFICE O/P EST LOW 20 MIN: CPT | Performed by: NURSE PRACTITIONER

## 2024-05-28 PROCEDURE — 83521 IG LIGHT CHAINS FREE EACH: CPT

## 2024-05-28 RX ORDER — NYSTATIN AND TRIAMCINOLONE ACETONIDE 100000; 1 [USP'U]/G; MG/G
1 OINTMENT TOPICAL 2 TIMES DAILY
Qty: 20 G | Refills: 0 | Status: SHIPPED | OUTPATIENT
Start: 2024-05-28 | End: 2024-06-07 | Stop reason: ALTCHOICE

## 2024-05-28 RX ORDER — NYSTATIN 100000 [USP'U]/G
1 POWDER TOPICAL 3 TIMES DAILY
Qty: 90 G | Refills: 0 | Status: SHIPPED | OUTPATIENT
Start: 2024-05-28 | End: 2024-06-17 | Stop reason: ALTCHOICE

## 2024-05-28 ASSESSMENT — ENCOUNTER SYMPTOMS
CHEST TIGHTNESS: 0
ACTIVITY CHANGE: 0
CHOKING: 0
COUGH: 0
CHILLS: 0
FEVER: 0
SHORTNESS OF BREATH: 0

## 2024-05-28 NOTE — TELEPHONE ENCOUNTER
No care due was identified.  [facility] Embedded Care Due Messages. Reference number: [id number]. 5/28/2024 1:27:21 PM MDT

## 2024-05-28 NOTE — PROGRESS NOTES
Subjective      Juan Diego Rondon is a 69 y.o. male who presents for 2 problems.  #1 his mouth pain.  #2  rash in the groin area.  Has been using Magic mouthwash with Maalox, lidocaine and diphenhydramine.  He says it has not been effective for his mouth pain..  Mouth pain has been ongoing for greater than a month.    The groin rash has been present for about 2 weeks.  He thinks the 2 symptoms may be related but he is unsure.  He has completed his Rituxan.     Lymph plasmatic lymphoma he continues to follow-up with oncology.  He will visit with them again tomorrow.  At that time he may change his treatment plan from where he is currently receiving care to another provider [facility].  HPI    The following have been reviewed and updated as appropriate in this visit:          No Known Allergies  Current Outpatient Medications   Medication Sig Dispense Refill    hydrocortisone 2.5 % cream Apply topically 2 (two) times a day as needed for rash 30 g 0    aspirin 81 mg EC tablet Take 1 tablet (81 mg total) by mouth      rosuvastatin (CRESTOR) 10 mg tablet Take 1 tablet by mouth daily 90 tablet 4    folic acid 1 mg tablet Take 1 tablet (1 mg total) by mouth daily 30 tablet 5    valsartan (DIOVAN) 80 mg tablet Take 1 tablet (80 mg total) by mouth daily 90 tablet 4    FLUoxetine (PROzac) 40 mg capsule Take 1 capsule (40 mg total) by mouth daily 90 capsule 4    omeprazole (PriLOSEC) 40 mg capsule Take 1 capsule (40 mg total) by mouth every morning 90 capsule 4    fenofibrate (TRICOR) 48 mg tablet Take 1 tablet (48 mg total) by mouth daily 90 tablet 4    famotidine (PEPCID) 20 mg tablet Take 1 tablet (20 mg total) by mouth 2 (two) times a day      acetaminophen (TYLENOL) 500 mg tablet Take 1 tablet (500 mg total) by mouth every 6 (six) hours as needed for pain scale 1-3/10      ursodioL (ACTIGALL) 500 mg tablet Take 1 tablet (500 mg total) by mouth 2 times daily Every other day      ursodioL (AMANDEEP) 250 mg tablet Take  3 tablets (750 mg total) by mouth In am and 500 mg in pm every other day-alternate with the 500 mg bid      cyanocobalamin 1,000 mcg/mL injection Inject 1 mL (1,000 mcg total) into the shoulder, thigh, or buttocks every 30 (thirty) days      nystatin (MYCOSTATIN) powder Apply 1 Application topically 3 (three) times a day Apply to affected area 90 g 0    nystatin-triamcinolone (MYCOLOG II) ointment Apply 1 Application topically 2 (two) times a day for 10 days Apply to affected area, groin 20 g 0    magic mouthwash (maalox/lidocaine/diphenhydramine/prednisolone/water) Swish and spit 10 mL every 6 (six) hours as needed (Mouth pain) for up to 3 days 120 mL 0    magic mouthwash (maalox/lidocaine/diphenhydramine/prednisolone/water) Swish and spit 10 mL every 4 (four) hours as needed (Mouth pain) for up to 3 days 120 mL 0    HYDROcodone-acetaminophen (LORCET HD)  mg per tablet Take 1 tablet by mouth every 6 (six) hours as needed for pain scale 8-10/10. Max Daily Amount: 4 tablets (Patient not taking: Reported on 5/28/2024) 30 tablet 0    predniSONE 1 mg tablet Take 1 tablet (1 mg total) by mouth daily      HYDROcodone-acetaminophen (VICODIN)  mg per tablet Take 1 tablet by mouth every 6 (six) hours as needed for pain scale 8-10/10 Max Daily Amount: 4 tablets (Patient not taking: Reported on 5/28/2024) 30 tablet 0    maalox/lidocaine/diphenhydramine (1:1:1) oral solution Swish and spit 10 mL every 6 (six) hours as needed (oral pain) (Patient not taking: Reported on 5/28/2024) 120 mL 0    gabapentin (NEURONTIN) 100 mg capsule Take 1 capsule (100 mg total) by mouth nightly      hydrOXYzine (ATARAX) 25 mg tablet Take 1-2 tablets by mouth nightly as needed for anxiety (Patient not taking: Reported on 5/28/2024) 60 tablet 0     No current facility-administered medications for this visit.     Past Medical History:   Diagnosis Date    Allergic eosinophilic esophagitis     Arthritis     Autoimmune hepatitis (CMS/HCC)      Blood disorder     pernicious anemia    Depression 08/08/2022    Gastrointestinal disease     IBS    Gout     Hyperlipidemia     Hypertension     Lymphoplasmacytic leukemia (CMS/HCC)     Melanoma (CMS/HCC)     Neurologic disorder     Pneumothorax 08/07/2022    Primary biliary cholangitis (CMS/HCC)     autoimmune hepatitis    Psychiatric illness     anxiety     Past Surgical History:   Procedure Laterality Date    APPENDECTOMY      COLONOSCOPY W/ BIOPSIES AND POLYPECTOMY  02/09/2009    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    CT BIOPSY LIVER FUNCTION  12/2/2021    CT BIOPSY LIVER FUNCTION 12/2/2021 [facility] MIS CT SCAN    ESOPHAGOGASTRODUODENOSCOPY N/A 5/16/2022    Procedure: ESOPHAGOGASTRODUODENOSCOPY with Biopsies;  Surgeon: Bradley Cook MD;  Location: [facility] Endoscopy;  Service: Endoscopy;  Laterality: N/A;    EYE SURGERY Bilateral     cataract    KNEE SURGERY Bilateral     3 surgeries on left and 2 on right knees - prior open medial meniscectomies bilateral knees in the 1970s    SKIN BIOPSY      SKIN CANCER EXCISION Left 10/15/2020    Melanoma removed from left forearm    TONSILLECTOMY      TOTAL HIP ARTHROPLASTY Left 4/21/2021    Procedure: LEFT TOTAL HIP ARTHROPLASTY;  Surgeon: Sj Reyes MD;  Location: [facility] OR;  Service: Orthopedics;  Laterality: Left;    TOTAL HIP ARTHROPLASTY Right 3/22/2022    Procedure: RIGHT TOTAL HIP ARTHROPLASTY POSTERIOR;  Surgeon: Sj Reyes MD;  Location: [facility] OR;  Service: Orthopedics;  Laterality: Right;     Family History   Problem Relation Age of Onset    Diabetes Mother         Type 2    Hypertension Mother     Alzheimer's disease Mother     Lung cancer Father     Hypertension Father     Prostate cancer Brother     Heart failure Brother     Heart attack Maternal Grandmother     Hypertension Maternal Grandfather     Prostate cancer Maternal Grandfather      Social History     Socioeconomic History    Marital status:    Tobacco Use    Smoking status: Never     Smokeless tobacco: Never   Vaping Use    Vaping status: Never Used   Substance and Sexual Activity    Alcohol use: Yes     Alcohol/week: 1.0 standard drink of alcohol     Types: 1 Glasses of wine per week     Comment: occ    Drug use: Never    Sexual activity: Defer     Social Determinants of Health     Tobacco Use: Low Risk  (4/29/2024)    Patient History     Smoking Tobacco Use: Never     Smokeless Tobacco Use: Never   Alcohol Use: Unknown (9/22/2020)    Received from [facility]    AUDIT-C     Frequency of Alcohol Consumption: 4 or more times a week     Average Number of Drinks: 1 or 2       Review of Systems   Constitutional:  Negative for activity change, chills and fever.   HENT:          He affirms mouth pain.  Not mouth sores.   Respiratory:  Negative for cough, choking, chest tightness and shortness of breath.    Cardiovascular:  Negative for chest pain.   Skin:  Positive for rash.       Objective   /78 (BP Location: Left arm, Patient Position: Sitting)   Pulse 68   Temp 36.2 °C (97.2 °F) (Temporal)   Resp 16   Wt 82.6 kg (182 lb)   SpO2 94%   BMI 23.69 kg/m²     Physical Exam  Constitutional:       General: He is not in acute distress.     Appearance: Normal appearance.   HENT:      Head: Normocephalic.      Mouth/Throat:      Lips: No lesions.      Mouth: Mucous membranes are moist. No injury, lacerations, oral lesions or angioedema.      Dentition: No gingival swelling or gum lesions.      Tongue: No lesions. Tongue does not deviate from midline.      Palate: No mass and lesions.      Pharynx: Uvula midline. No pharyngeal swelling, oropharyngeal exudate, posterior oropharyngeal erythema or uvula swelling.      Tonsils: No tonsillar exudate or tonsillar abscesses.   Cardiovascular:      Rate and Rhythm: Normal rate and regular rhythm.      Heart sounds: Normal heart sounds. No murmur heard.  Pulmonary:      Effort: Pulmonary effort is normal. No respiratory distress.      Breath sounds:  Normal breath sounds.   Musculoskeletal:      Cervical back: Normal range of motion and neck supple. No rigidity.   Skin:     General: Skin is warm and dry.      Comments: Groin, rash: Erythematous, blanches, slightly raised.  Satellite lesions.   Neurological:      General: No focal deficit present.      Mental Status: He is alert.   Psychiatric:         Mood and Affect: Mood normal.         Behavior: Behavior normal.         No results found for this or any previous visit (from the past 24 hour(s)).    Assessment/Plan   Diagnoses and all orders for this visit:    Pain in oral cavity  -     magic mouthwash (maalox/lidocaine/diphenhydramine/prednisolone/water); Swish and spit 10 mL every 6 (six) hours as needed (Mouth pain) for up to 3 days  -     magic mouthwash (maalox/lidocaine/diphenhydramine/prednisolone/water); Swish and spit 10 mL every 4 (four) hours as needed (Mouth pain) for up to 3 days    Candida infection  -     nystatin (MYCOSTATIN) powder; Apply 1 Application topically 3 (three) times a day Apply to affected area  -     nystatin-triamcinolone (MYCOLOG II) ointment; Apply 1 Application topically 2 (two) times a day for 10 days Apply to affected area, groin    Emmett has had mouth pain ongoing for about a months.  The formulation of the Magic mouthwash that he had been provided in the past was not palliative for his mouth pain.  He does not have any thrush.  No lesions noted in his oral cavity, throat.   I believe his mouth pain is secondary to the chemotherapy that he has been receiving.  We will try a different formulation of Magic mouthwash and this went to include prednisone.  Also he is with a Candida rash to the groin.  For this we provided him nystatin ointment, triamcinolone and nystatin powder.  Provided directions for its use.   Advise continue to follow-up with his primary care provider and his cancer care team.  There are no Patient Instructions on file for this visit.    Marbella Garza,  CNP

## 2024-05-29 ENCOUNTER — OFFICE VISIT (OUTPATIENT)
Dept: ONCOLOGY | Facility: CLINIC | Age: 69
End: 2024-05-29
Payer: MEDICARE

## 2024-05-29 VITALS
OXYGEN SATURATION: 93 % | WEIGHT: 189.8 LBS | BODY MASS INDEX: 24.36 KG/M2 | HEART RATE: 68 BPM | DIASTOLIC BLOOD PRESSURE: 71 MMHG | SYSTOLIC BLOOD PRESSURE: 105 MMHG | TEMPERATURE: 97.4 F | HEIGHT: 74 IN

## 2024-05-29 DIAGNOSIS — C83.00 LYMPHOPLASMACYTIC LYMPHOMA (CMS/HCC): ICD-10-CM

## 2024-05-29 LAB
KAPPA LC FREE SER-MCNC: 5.93 MG/DL (ref 0.33–1.94)
KAPPA LC FREE/LAMBDA FREE SER: 4.21 {RATIO} (ref 0.26–1.65)
LAMBDA LC FREE SERPL-MCNC: 1.41 MG/DL (ref 0.57–2.63)

## 2024-05-29 PROCEDURE — 99214 OFFICE O/P EST MOD 30 MIN: CPT | Performed by: INTERNAL MEDICINE

## 2024-05-29 PROCEDURE — G2211 COMPLEX E/M VISIT ADD ON: HCPCS | Performed by: INTERNAL MEDICINE

## 2024-05-29 PROCEDURE — G0463 HOSPITAL OUTPT CLINIC VISIT: HCPCS

## 2024-05-29 ASSESSMENT — PAIN SCALES - GENERAL: PAINLEVEL: 5

## 2024-05-29 NOTE — TELEPHONE ENCOUNTER
Medication refill request:  Medication(s):  hydrocodone-acet not filled due to Non-delegated medication - provider fill only

## 2024-05-29 NOTE — PROGRESS NOTES
Hematology/Oncology Clinic Note    REASON FOR REFERRAL: Monoclonal gammopathy    HISTORY OF PRESENT ILLNESS:  Juan Diego Rondon is a 69 y.o.-old gentleman referred for further evaluation and treatment of monoclonal gammopathy.  Patient recently underwent laboratory testing in January 2024 including an SPEP which showed a monoclonal protein measuring 1.4 g/dL.  Further workup showed kappa free light chains elevated at 6.41 with a kappa/lambda ratio of 3.98.  Quantitative immunoglobulins showed elevated IgM of 2256, IgG 698, IgA 114.  LDH was normal at 2 109 and beta-2 microglobulin 2.65.  Gastric panel showed creatinine of 0.79 calcium 9.9, corrected calcium 10.0.  PET scan was done which showed no specific uptake with no osseous lesions noted.    He has been having some issues with arthralgias primarily in his hips and wrists.  He has an occasional night sweat.  He denies any lymphadenopathy.    He completed a course of rituximab.  His IgM is a bit lower and his symptoms might be improved slightly but no dramatic change.    REVIEW OF SYSTEMS:   A 12 point review of systems was completed.  Pertinents noted in HPI; otherwise negative.    Past Medical History  Past Medical History:   Diagnosis Date    Allergic eosinophilic esophagitis     Arthritis     Autoimmune hepatitis (CMS/HCC)     Blood disorder     pernicious anemia    Depression 08/08/2022    Gastrointestinal disease     IBS    Gout     Hyperlipidemia     Hypertension     Lymphoplasmacytic leukemia (CMS/HCC)     Melanoma (CMS/HCC)     Neurologic disorder     Pneumothorax 08/07/2022    Primary biliary cholangitis (CMS/HCC)     autoimmune hepatitis    Psychiatric illness     anxiety       Past Surgical History  Past Surgical History:   Procedure Laterality Date    APPENDECTOMY      COLONOSCOPY W/ BIOPSIES AND POLYPECTOMY  02/09/2009    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    CT BIOPSY LIVER FUNCTION  12/2/2021    CT BIOPSY LIVER FUNCTION 12/2/2021 [facility]  MIS CT SCAN    ESOPHAGOGASTRODUODENOSCOPY N/A 5/16/2022    Procedure: ESOPHAGOGASTRODUODENOSCOPY with Biopsies;  Surgeon: Bradley Cook MD;  Location: [facility];  Service: Endoscopy;  Laterality: N/A;    EYE SURGERY Bilateral     cataract    KNEE SURGERY Bilateral     3 surgeries on left and 2 on right knees - prior open medial meniscectomies bilateral knees in the 1970s    SKIN BIOPSY      SKIN CANCER EXCISION Left 10/15/2020    Melanoma removed from left forearm    TONSILLECTOMY      TOTAL HIP ARTHROPLASTY Left 4/21/2021    Procedure: LEFT TOTAL HIP ARTHROPLASTY;  Surgeon: Sj Reyes MD;  Location: [facility] OR;  Service: Orthopedics;  Laterality: Left;    TOTAL HIP ARTHROPLASTY Right 3/22/2022    Procedure: RIGHT TOTAL HIP ARTHROPLASTY POSTERIOR;  Surgeon: Sj Reyes MD;  Location: [facility] OR;  Service: Orthopedics;  Laterality: Right;       MEDICATIONS:    Current Outpatient Medications   Medication Sig Dispense Refill    nystatin (MYCOSTATIN) powder Apply 1 Application topically 3 (three) times a day Apply to affected area 90 g 0    magic mouthwash (maalox/lidocaine/diphenhydramine/prednisolone/water) Swish and spit 10 mL every 6 (six) hours as needed (Mouth pain) for up to 3 days 120 mL 0    hydrocortisone 2.5 % cream Apply topically 2 (two) times a day as needed for rash 30 g 0    aspirin 81 mg EC tablet Take 1 tablet (81 mg total) by mouth      rosuvastatin (CRESTOR) 10 mg tablet Take 1 tablet by mouth daily 90 tablet 4    folic acid 1 mg tablet Take 1 tablet (1 mg total) by mouth daily 30 tablet 5    valsartan (DIOVAN) 80 mg tablet Take 1 tablet (80 mg total) by mouth daily 90 tablet 4    FLUoxetine (PROzac) 40 mg capsule Take 1 capsule (40 mg total) by mouth daily 90 capsule 4    omeprazole (PriLOSEC) 40 mg capsule Take 1 capsule (40 mg total) by mouth every morning 90 capsule 4    fenofibrate (TRICOR) 48 mg tablet Take 1 tablet (48 mg total) by mouth daily 90 tablet 4    famotidine (PEPCID) 20 mg  tablet Take 1 tablet (20 mg total) by mouth 2 (two) times a day      acetaminophen (TYLENOL) 500 mg tablet Take 1 tablet (500 mg total) by mouth every 6 (six) hours as needed for pain scale 1-3/10      ursodioL (ACTIGALL) 500 mg tablet Take 1 tablet (500 mg total) by mouth 2 times daily Every other day      ursodioL (AMANDEEP) 250 mg tablet Take 3 tablets (750 mg total) by mouth In am and 500 mg in pm every other day-alternate with the 500 mg bid      cyanocobalamin 1,000 mcg/mL injection Inject 1 mL (1,000 mcg total) into the shoulder, thigh, or buttocks every 30 (thirty) days      nystatin-triamcinolone (MYCOLOG II) ointment Apply 1 Application topically 2 (two) times a day for 10 days Apply to affected area, groin (Patient not taking: Reported on 5/29/2024) 20 g 0    magic mouthwash (maalox/lidocaine/diphenhydramine/prednisolone/water) Swish and spit 10 mL every 4 (four) hours as needed (Mouth pain) for up to 3 days (Patient not taking: Reported on 5/29/2024) 120 mL 0    HYDROcodone-acetaminophen (LORCET HD)  mg per tablet Take 1 tablet by mouth every 6 (six) hours as needed for pain scale 8-10/10. Max Daily Amount: 4 tablets (Patient not taking: Reported on 5/28/2024) 30 tablet 0    predniSONE 1 mg tablet Take 1 tablet (1 mg total) by mouth daily      HYDROcodone-acetaminophen (VICODIN)  mg per tablet Take 1 tablet by mouth every 6 (six) hours as needed for pain scale 8-10/10 Max Daily Amount: 4 tablets (Patient not taking: Reported on 5/28/2024) 30 tablet 0    maalox/lidocaine/diphenhydramine (1:1:1) oral solution Swish and spit 10 mL every 6 (six) hours as needed (oral pain) (Patient not taking: Reported on 5/28/2024) 120 mL 0    gabapentin (NEURONTIN) 100 mg capsule Take 1 capsule (100 mg total) by mouth nightly      hydrOXYzine (ATARAX) 25 mg tablet Take 1-2 tablets by mouth nightly as needed for anxiety (Patient not taking: Reported on 5/28/2024) 60 tablet 0     No current facility-administered  "medications for this visit.       Allergies  Allergies have been reviewed.  Juan Diego has No Known Allergies.    Social History  Social History     Socioeconomic History    Marital status:      Spouse name: Not on file    Number of children: Not on file    Years of education: Not on file    Highest education level: Not on file   Occupational History    Not on file   Tobacco Use    Smoking status: Never    Smokeless tobacco: Never   Vaping Use    Vaping status: Never Used   Substance and Sexual Activity    Alcohol use: Yes     Alcohol/week: 1.0 standard drink of alcohol     Types: 1 Glasses of wine per week     Comment: occ    Drug use: Never    Sexual activity: Defer   Other Topics Concern    Not on file   Social History Narrative    Not on file     Social Determinants of Health     Financial Resource Strain: Not on file   Food Insecurity: Not on file   Transportation Needs: Not on file   Physical Activity: Not on file   Stress: Not on file   Social Connections: Not on file   Intimate Partner Violence: Not on file   Housing Stability: Not on file       Family History  Family History   Problem Relation Age of Onset    Diabetes Mother         Type 2    Hypertension Mother     Alzheimer's disease Mother     Lung cancer Father     Hypertension Father     Prostate cancer Brother     Heart failure Brother     Heart attack Maternal Grandmother     Hypertension Maternal Grandfather     Prostate cancer Maternal Grandfather        PHYSICAL EXAMINATION:  Vitals:    05/29/24 1351   BP: 105/71   Temp: 36.3 °C (97.4 °F)   TempSrc: Temporal   Pulse: 68   SpO2: 93%   Height: 1.867 m (6' 1.5\")   Weight: 86.1 kg (189 lb 12.8 oz)   PainSc:   5         General: Pleasant,in no acute distress  Head: Normocephalic, atraumatic  Eyes: No scleral icterus  Heart: Regular rate and rhythm without murmur  Extremities: No cyanosis or edema noted  Skin: No obvious rashes or lesion  Psych: Normal affect, no obvious signs of " anxiety/depression    ECOG PS:1    RESULTS REVIEWED:   Results for orders placed or performed in visit on 05/28/24   CBC w/auto differential Blood, Venous   Result Value Ref Range    WBC 6.0 3.7 - 9.6 10*3/uL    RBC 3.92 (L) 4.10 - 5.80 10*6/µL    Hemoglobin 14.0 13.2 - 17.2 g/dL    Hematocrit 40.6 38.0 - 50.0 %    .6 (H) 82.0 - 97.0 fL    MCH 35.6 (H) 29.0 - 34.0 pg    MCHC 34.4 32.0 - 36.0 g/dL    RDW 13.2 11.5 - 15.0 %    Platelets 258 130 - 350 10*3/uL    MPV 7.8 6.9 - 10.8 fL    Neutrophils% 71.5 (H) 46.0 - 70.0 %    Lymphocytes% 15.4 15.0 - 47.0 %    Monocytes% 10.2 5.0 - 13.0 %    Eosinophils% 2.3 0.0 - 3.0 %    Basophils% 0.6 0.0 - 2.0 %    ANC (auto diff) 4.30 10*3/UL    Lymphocytes Absolute 0.90 10*3/uL    Monocytes Absolute 0.60 10*3/uL    Eosinophils Absolute 0.10 10*3/uL    Basophils Absolute 0.00 10*3/uL    Macrocytosis 1+    Comprehensive metabolic panel Blood, Venous   Result Value Ref Range    Sodium 139 135 - 145 mmol/L    Potassium 3.7 3.5 - 5.1 MMOL/L    Chloride 104 98 - 107 mmol/L    CO2 22 21 - 32 mmol/L    Anion Gap 13 (H) 3 - 11 mmol/L    BUN 6 (L) 7 - 25 mg/dL    Creatinine 0.71 0.70 - 1.30 mg/dL    Glucose 92 70 - 105 mg/dL    Calcium 9.3 8.6 - 10.3 mg/dL    AST 20 <40 U/L    ALT (SGPT) 12 7 - 52 U/L    Alkaline Phosphatase 88 45 - 115 U/L    Total Protein 7.4 6.0 - 8.3 g/dL    Albumin 3.6 3.5 - 5.3 g/dL    Total Bilirubin 0.41 0.20 - 1.40 mg/dL    Corrected Calcium 9.6 8.6 - 10.3 mg/dL    eGFR 99 >60 mL/min/1.73m*2   LDH (Lactate dehydrogenase) Blood, Venous   Result Value Ref Range     87 - 246 U/L   Immunoglobulin free LT chains blood Blood, Venous   Result Value Ref Range    Kappa Free Light Chain, S 5.93 (H) 0.3300 - 1.94 mg/dL    Lambda Free Light Chain, S 1.41 0.5700 - 2.63 mg/dL    Kappa/Lambda FLC Ratio 4.21 (H) 0.2600 - 1.65   IgM Blood, Venous   Result Value Ref Range    IgM 2,070 (H) 40 - 230 mg/dL        ASSESSMENT/PLAN    # Lymphoplasmacytic lymphoma.  He  underwent extensive workup for arthralgias.  Included in most pain was SPEP which showed a monoclonal protein measuring 1.4 g/dL.  Kappa free light chain was elevated at 6.41 with a kappa/lambda ratio of 3.98.  IgM level was elevated at 2256.  PET scan showed no metabolic uptake or osseous lesions.  I reviewed results of his bone marrow biopsy today which were can assistant with lymphoplasmacytic lymphoma involving approximately 10% of bone marrow cellularity.  MYD88 L265P was positive.  We started him on weekly rituximab due to fatigue, night sweats, arthralgias.  He completed 4 treatments with very slight improvement of his IgM.  His symptoms have remained relatively stable with mild improvement.  I am still not entirely convinced that his symptoms are related to his lymphoma and I would recommend proceeding with observation at this time.  He will return in 3 months with repeat labs.    -Follow-up 3 months with repeat labs    NCCN guidelines were consulted in order to help in the management of the patient     Luis Ruffin MD, PhD  Hematology and Oncology

## 2024-05-30 LAB
IGA SERPL-MCNC: 108 MG/DL (ref 61–356)
IGG SERPL-MCNC: 734 MG/DL (ref 767–1590)
IGM SERPL-MCNC: 1860 MG/DL (ref 37–286)
IMMUNOLOGIST REVIEW: ABNORMAL
Lab: YES
M PROTEIN SERPL QL: POSITIVE
M-PROTEIN MK(REF): 1.42 G/DL
TYPE OF THERAP AB ADMINISTERED: ABNORMAL

## 2024-05-30 RX ORDER — HYDROCODONE BITARTRATE AND ACETAMINOPHEN 10; 325 MG/1; MG/1
1 TABLET ORAL EVERY 6 HOURS PRN
Qty: 30 TABLET | Refills: 0 | Status: SHIPPED | OUTPATIENT
Start: 2024-05-30 | End: 2024-06-17 | Stop reason: ENTERED-IN-ERROR

## 2024-06-07 ENCOUNTER — OFFICE VISIT (OUTPATIENT)
Dept: URGENT CARE | Facility: CLINIC | Age: 69
End: 2024-06-07
Payer: MEDICARE

## 2024-06-07 VITALS
HEART RATE: 75 BPM | DIASTOLIC BLOOD PRESSURE: 68 MMHG | TEMPERATURE: 97.4 F | SYSTOLIC BLOOD PRESSURE: 104 MMHG | BODY MASS INDEX: 24.21 KG/M2 | OXYGEN SATURATION: 94 % | WEIGHT: 186 LBS | RESPIRATION RATE: 16 BRPM

## 2024-06-07 DIAGNOSIS — T45.1X5A MOUTH SORE SECONDARY TO CHEMOTHERAPY: Primary | ICD-10-CM

## 2024-06-07 DIAGNOSIS — K13.79 MOUTH SORE SECONDARY TO CHEMOTHERAPY: Primary | ICD-10-CM

## 2024-06-07 PROCEDURE — G0463 HOSPITAL OUTPT CLINIC VISIT: HCPCS | Performed by: PHYSICIAN ASSISTANT

## 2024-06-07 PROCEDURE — 99213 OFFICE O/P EST LOW 20 MIN: CPT | Performed by: PHYSICIAN ASSISTANT

## 2024-06-07 RX ORDER — DEXAMETHASONE 2 MG/1
10 TABLET ORAL
Qty: 10 TABLET | Refills: 0 | Status: SHIPPED | OUTPATIENT
Start: 2024-06-07 | End: 2024-06-17 | Stop reason: ALTCHOICE

## 2024-06-07 RX ORDER — DEXAMETHASONE 0.5 MG/5ML
0.5 SOLUTION ORAL 3 TIMES DAILY PRN
Qty: 240 ML | Refills: 1 | Status: SHIPPED | OUTPATIENT
Start: 2024-06-07 | End: 2024-10-10 | Stop reason: ALTCHOICE

## 2024-06-07 ASSESSMENT — ENCOUNTER SYMPTOMS
FEVER: 0
COLOR CHANGE: 0
EYE PAIN: 0
SHORTNESS OF BREATH: 0
ABDOMINAL PAIN: 0
CHILLS: 0
COUGH: 0
SEIZURES: 0
DYSURIA: 0
HEMATURIA: 0
ARTHRALGIAS: 0
VOMITING: 0
PALPITATIONS: 0
BACK PAIN: 0
SORE THROAT: 0

## 2024-06-07 ASSESSMENT — PAIN SCALES - GENERAL: PAINLEVEL: 8

## 2024-06-07 NOTE — PROGRESS NOTES
Subjective      Juan Diego Rondon is a 69 y.o. male who presents for mouth pain    History of Present Illness         Patient is getting monoclonal antibody for his lymphoma.  It has been little over a month.  Ever since that has been having a sore month.  The mouth is becoming more more sore.  Is difficult to eat or drink.  He does not actually see any mouth sores.  He has been using Magic mouthwash but is not improving      The following have been reviewed and updated as appropriate in this visit:   Tobacco  Allergies  Meds  Problems  Med Hx  Surg Hx  Fam Hx         No Known Allergies  Current Outpatient Medications   Medication Sig Dispense Refill    al mag oxide-diphenhydramine-nystatin (MAGIC MOUTHWASH) suspension       HYDROcodone-acetaminophen (LORCET HD)  mg per tablet Take 1 tablet by mouth every 6 (six) hours as needed for pain scale 8-10/10. Max Daily Amount: 4 tablets 30 tablet 0    nystatin (MYCOSTATIN) powder Apply 1 Application topically 3 (three) times a day Apply to affected area 90 g 0    hydrocortisone 2.5 % cream Apply topically 2 (two) times a day as needed for rash 30 g 0    aspirin 81 mg EC tablet Take 1 tablet (81 mg total) by mouth      HYDROcodone-acetaminophen (VICODIN)  mg per tablet Take 1 tablet by mouth every 6 (six) hours as needed for pain scale 8-10/10 Max Daily Amount: 4 tablets 30 tablet 0    maalox/lidocaine/diphenhydramine (1:1:1) oral solution Swish and spit 10 mL every 6 (six) hours as needed (oral pain) 120 mL 0    rosuvastatin (CRESTOR) 10 mg tablet Take 1 tablet by mouth daily 90 tablet 4    folic acid 1 mg tablet Take 1 tablet (1 mg total) by mouth daily 30 tablet 5    hydrOXYzine (ATARAX) 25 mg tablet Take 1-2 tablets by mouth nightly as needed for anxiety 60 tablet 0    valsartan (DIOVAN) 80 mg tablet Take 1 tablet (80 mg total) by mouth daily 90 tablet 4    FLUoxetine (PROzac) 40 mg capsule Take 1 capsule (40 mg total) by mouth daily 90 capsule 4     omeprazole (PriLOSEC) 40 mg capsule Take 1 capsule (40 mg total) by mouth every morning 90 capsule 4    fenofibrate (TRICOR) 48 mg tablet Take 1 tablet (48 mg total) by mouth daily 90 tablet 4    famotidine (PEPCID) 20 mg tablet Take 1 tablet (20 mg total) by mouth 2 (two) times a day      acetaminophen (TYLENOL) 500 mg tablet Take 1 tablet (500 mg total) by mouth every 6 (six) hours as needed for pain scale 1-3/10      ursodioL (ACTIGALL) 500 mg tablet Take 1 tablet (500 mg total) by mouth 2 times daily Every other day      ursodioL (AMANDEEP) 250 mg tablet Take 3 tablets (750 mg total) by mouth In am and 500 mg in pm every other day-alternate with the 500 mg bid      cyanocobalamin 1,000 mcg/mL injection Inject 1 mL (1,000 mcg total) into the shoulder, thigh, or buttocks every 30 (thirty) days      dexAMETHasone 0.5 mg/5 mL solution Take 5 mL (0.5 mg total) by mouth 3 (three) times a day as needed (pain) for up to 10 days 240 mL 1    dexAMETHasone (DECADRON) 2 mg tablet Take 5 tablets (10 mg total) by mouth daily with breakfast for 2 days 10 tablet 0     No current facility-administered medications for this visit.     Past Medical History:   Diagnosis Date    Allergic eosinophilic esophagitis     Arthritis     Autoimmune hepatitis (CMS/HCC)     Blood disorder     pernicious anemia    Depression 08/08/2022    Gastrointestinal disease     IBS    Gout     Hyperlipidemia     Hypertension     Lymphoplasmacytic leukemia (CMS/HCC)     Melanoma (CMS/HCC)     Neurologic disorder     Pneumothorax 08/07/2022    Primary biliary cholangitis (CMS/HCC)     autoimmune hepatitis    Psychiatric illness     anxiety     Past Surgical History:   Procedure Laterality Date    APPENDECTOMY      COLONOSCOPY W/ BIOPSIES AND POLYPECTOMY  02/09/2009    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    CT BIOPSY LIVER FUNCTION  12/2/2021    CT BIOPSY LIVER FUNCTION 12/2/2021 [facility] MIS CT SCAN    ESOPHAGOGASTRODUODENOSCOPY N/A 5/16/2022     Procedure: ESOPHAGOGASTRODUODENOSCOPY with Biopsies;  Surgeon: Bradley Cook MD;  Location: [facility];  Service: Endoscopy;  Laterality: N/A;    EYE SURGERY Bilateral     cataract    KNEE SURGERY Bilateral     3 surgeries on left and 2 on right knees - prior open medial meniscectomies bilateral knees in the 1970s    SKIN BIOPSY      SKIN CANCER EXCISION Left 10/15/2020    Melanoma removed from left forearm    TONSILLECTOMY      TOTAL HIP ARTHROPLASTY Left 4/21/2021    Procedure: LEFT TOTAL HIP ARTHROPLASTY;  Surgeon: Sj Reyes MD;  Location: [facility] OR;  Service: Orthopedics;  Laterality: Left;    TOTAL HIP ARTHROPLASTY Right 3/22/2022    Procedure: RIGHT TOTAL HIP ARTHROPLASTY POSTERIOR;  Surgeon: Sj Reyes MD;  Location: [facility] OR;  Service: Orthopedics;  Laterality: Right;     Family History   Problem Relation Age of Onset    Diabetes Mother         Type 2    Hypertension Mother     Alzheimer's disease Mother     Lung cancer Father     Hypertension Father     Prostate cancer Brother     Heart failure Brother     Heart attack Maternal Grandmother     Hypertension Maternal Grandfather     Prostate cancer Maternal Grandfather      Social History     Socioeconomic History    Marital status:    Tobacco Use    Smoking status: Never    Smokeless tobacco: Never   Vaping Use    Vaping status: Never Used   Substance and Sexual Activity    Alcohol use: Yes     Alcohol/week: 1.0 standard drink of alcohol     Types: 1 Glasses of wine per week     Comment: occ    Drug use: Never    Sexual activity: Defer     Social Determinants of Health     Tobacco Use: Low Risk  (6/7/2024)    Patient History     Smoking Tobacco Use: Never     Smokeless Tobacco Use: Never   Alcohol Use: Unknown (9/22/2020)    Received from [facility]    AUDIT-C     Frequency of Alcohol Consumption: 4 or more times a week     Average Number of Drinks: 1 or 2       Review of Systems   Constitutional:  Negative for chills and fever.    HENT:  Negative for ear pain and sore throat.         Tenderness entire mouth   Eyes:  Negative for pain and visual disturbance.   Respiratory:  Negative for cough and shortness of breath.    Cardiovascular:  Negative for chest pain and palpitations.   Gastrointestinal:  Negative for abdominal pain and vomiting.   Genitourinary:  Negative for dysuria and hematuria.   Musculoskeletal:  Negative for arthralgias and back pain.   Skin:  Negative for color change and rash.   Neurological:  Negative for seizures and syncope.   All other systems reviewed and are negative.      Objective   /68   Pulse 75   Temp 36.3 °C (97.4 °F) (Temporal)   Resp 16   Wt 84.4 kg (186 lb)   SpO2 94%   BMI 24.21 kg/m²     Physical Exam  Vitals reviewed.   Constitutional:       Appearance: Normal appearance.   HENT:      Head: Normocephalic.      Mouth/Throat:      Mouth: Mucous membranes are moist.      Comments: Entire mouth with erythema and swelling.  NO mouth sores.    Eyes:      Extraocular Movements: Extraocular movements intact.      Pupils: Pupils are equal, round, and reactive to light.   Cardiovascular:      Rate and Rhythm: Normal rate and regular rhythm.      Heart sounds: Normal heart sounds.   Pulmonary:      Breath sounds: Normal breath sounds.   Neurological:      Mental Status: He is alert.         No results found for this or any previous visit (from the past 24 hour(s)).    Assessment/Plan   Assessment/Plan          Discussed with patient that we will try some dexamethasone solution to swish and spit.  Up to 3 times a day.  We also give him 2 doses of Decadron orally to see if this decreases the swelling and helps him.  He will follow-up with his PCP in the next couple of weeks.  He will return if he cannot keep any fluids down     Diagnosis Plan   1. Mouth sore secondary to chemotherapy  dexAMETHasone 0.5 mg/5 mL solution    dexAMETHasone (DECADRON) 2 mg tablet        Patient Instructions    declined    STEPHANIE Lewis

## 2024-06-17 ENCOUNTER — TELEPHONE (OUTPATIENT)
Dept: FAMILY MEDICINE | Facility: CLINIC | Age: 69
End: 2024-06-17

## 2024-06-17 ENCOUNTER — OFFICE VISIT (OUTPATIENT)
Dept: FAMILY MEDICINE | Facility: CLINIC | Age: 69
End: 2024-06-17
Payer: MEDICARE

## 2024-06-17 VITALS
DIASTOLIC BLOOD PRESSURE: 80 MMHG | OXYGEN SATURATION: 92 % | SYSTOLIC BLOOD PRESSURE: 122 MMHG | BODY MASS INDEX: 24.12 KG/M2 | HEART RATE: 60 BPM | TEMPERATURE: 98 F | WEIGHT: 185.3 LBS

## 2024-06-17 DIAGNOSIS — M25.50 MULTIPLE JOINT PAIN: ICD-10-CM

## 2024-06-17 DIAGNOSIS — K13.79 MOUTH SORE SECONDARY TO CHEMOTHERAPY: ICD-10-CM

## 2024-06-17 DIAGNOSIS — M87.051 AVASCULAR NECROSIS OF BONE OF RIGHT HIP (CMS/HCC): Primary | ICD-10-CM

## 2024-06-17 DIAGNOSIS — E53.8 VITAMIN B12 DEFICIENCY: ICD-10-CM

## 2024-06-17 DIAGNOSIS — T45.1X5A MOUTH SORE SECONDARY TO CHEMOTHERAPY: ICD-10-CM

## 2024-06-17 DIAGNOSIS — C83.00 LYMPHOPLASMACYTIC LYMPHOMA (CMS/HCC): ICD-10-CM

## 2024-06-17 PROCEDURE — G2211 COMPLEX E/M VISIT ADD ON: HCPCS | Performed by: FAMILY MEDICINE

## 2024-06-17 PROCEDURE — 99214 OFFICE O/P EST MOD 30 MIN: CPT | Performed by: FAMILY MEDICINE

## 2024-06-17 PROCEDURE — G0463 HOSPITAL OUTPT CLINIC VISIT: HCPCS | Performed by: FAMILY MEDICINE

## 2024-06-17 RX ORDER — DEXAMETHASONE 4 MG/1
4 TABLET ORAL 2 TIMES DAILY WITH MEALS
Qty: 4 TABLET | Refills: 0 | Status: SHIPPED | OUTPATIENT
Start: 2024-06-17 | End: 2024-10-10 | Stop reason: ALTCHOICE

## 2024-06-17 RX ORDER — HYDROCODONE BITARTRATE AND ACETAMINOPHEN 10; 325 MG/1; MG/1
1 TABLET ORAL EVERY 6 HOURS PRN
Qty: 120 TABLET | Refills: 0 | Status: SHIPPED | OUTPATIENT
Start: 2024-06-17 | End: 2024-07-17

## 2024-06-17 RX ORDER — HYDROCODONE BITARTRATE AND ACETAMINOPHEN 10; 300 MG/1; MG/1
1 TABLET ORAL EVERY 6 HOURS PRN
Qty: 120 TABLET | Refills: 0 | Status: SHIPPED | OUTPATIENT
Start: 2024-08-14 | End: 2024-09-11

## 2024-06-17 RX ORDER — HYDROCODONE BITARTRATE AND ACETAMINOPHEN 10; 300 MG/1; MG/1
1 TABLET ORAL EVERY 6 HOURS PRN
Qty: 120 TABLET | Refills: 0 | Status: SHIPPED | OUTPATIENT
Start: 2024-06-17 | End: 2024-07-15

## 2024-06-17 RX ORDER — HYDROCODONE BITARTRATE AND ACETAMINOPHEN 5; 325 MG/1; MG/1
1 TABLET ORAL EVERY 6 HOURS PRN
Qty: 120 TABLET | Refills: 0 | Status: SHIPPED | OUTPATIENT
Start: 2024-06-17 | End: 2024-06-17 | Stop reason: ENTERED-IN-ERROR

## 2024-06-17 RX ORDER — HYDROCODONE BITARTRATE AND ACETAMINOPHEN 10; 300 MG/1; MG/1
1 TABLET ORAL EVERY 6 HOURS PRN
Qty: 120 TABLET | Refills: 0 | Status: SHIPPED | OUTPATIENT
Start: 2024-07-16 | End: 2024-08-13

## 2024-06-17 RX ORDER — CYANOCOBALAMIN 1000 UG/ML
1000 INJECTION, SOLUTION INTRAMUSCULAR; SUBCUTANEOUS
Qty: 1 ML | Refills: 11 | Status: SHIPPED | OUTPATIENT
Start: 2024-06-17

## 2024-06-17 NOTE — TELEPHONE ENCOUNTER
[facility] does not have the Hydrocodone  but the do have Hydrocodone  mg  May a new prescription be sent for the ?    Pended the Hydrocodone .

## 2024-06-17 NOTE — TELEPHONE ENCOUNTER
Alyssa from [facility] calling re:    Pt's hydrocodone-acetaminophen     Recent it as a 5-325 mg? Or 10-325mg?  Please call back to discuss with Alyssa.

## 2024-06-17 NOTE — PROGRESS NOTES
Subjective      Juan Diego Rondon is a 69 y.o. male who presents for Pain (Pt is here for pain management. Pt states the hasn't changed. Night sweats, aches, mouth pain. )  .    Patient presents with his wife for a follow up regarding medications and hx of avascular necrosis of bone of right hip and lymphplasmatic lymphoma. He is no longer on chemotherapy. He continues taking hydrocodone-acetaminophen 10-300mg 1-2 tablets daily and Tylenol as needed for pain control. He continues to have soreness and burning sensation in mouth, was last treated with course of oral dexamethasone on 6/7/24 and this was very beneficial but pain has returned. He reports pill course of dexamethasone provided nearly immediate improvement of pain but worsened again once he completed it. He continues using MAALOX/LIDOCAINE/DIPHENHYDRAMINE mouth wash as directed.       The following have been reviewed and updated as appropriate in this visit:   Tobacco  Allergies  Meds  Problems  Med Hx  Surg Hx  Fam Hx         No Known Allergies  Current Outpatient Medications   Medication Sig Dispense Refill    dexAMETHasone 0.5 mg/5 mL solution Take 5 mL (0.5 mg total) by mouth 3 (three) times a day as needed for pain for up to 10 days 240 mL 1    HYDROcodone-acetaminophen (LORCET HD)  mg per tablet Take 1 tablet by mouth every 6 (six) hours as needed for pain scale 8-10/10. Max Daily Amount: 4 tablets 30 tablet 0    hydrocortisone 2.5 % cream Apply topically 2 (two) times a day as needed for rash 30 g 0    aspirin 81 mg EC tablet Take 1 tablet (81 mg total) by mouth      rosuvastatin (CRESTOR) 10 mg tablet Take 1 tablet by mouth daily 90 tablet 4    folic acid 1 mg tablet Take 1 tablet (1 mg total) by mouth daily 30 tablet 5    valsartan (DIOVAN) 80 mg tablet Take 1 tablet (80 mg total) by mouth daily 90 tablet 4    FLUoxetine (PROzac) 40 mg capsule Take 1 capsule (40 mg total) by mouth daily 90 capsule 4    omeprazole (PriLOSEC) 40 mg  capsule Take 1 capsule (40 mg total) by mouth every morning 90 capsule 4    fenofibrate (TRICOR) 48 mg tablet Take 1 tablet (48 mg total) by mouth daily 90 tablet 4    famotidine (PEPCID) 20 mg tablet Take 1 tablet (20 mg total) by mouth 2 (two) times a day      acetaminophen (TYLENOL) 500 mg tablet Take 1 tablet (500 mg total) by mouth every 6 (six) hours as needed for pain scale 1-3/10      ursodioL (ACTIGALL) 500 mg tablet Take 1 tablet (500 mg total) by mouth 2 times daily Every other day      ursodioL (AMANDEEP) 250 mg tablet Take 3 tablets (750 mg total) by mouth In am and 500 mg in pm every other day-alternate with the 500 mg bid      cyanocobalamin 1,000 mcg/mL injection Inject 1 mL (1,000 mcg total) into the shoulder, thigh, or buttocks every 30 (thirty) days 1 mL 11    HYDROcodone-acetaminophen (VICODIN)  mg per tablet Take 1 tablet by mouth every 6 (six) hours as needed for pain scale 8-10/10 for up to 28 days Max Daily Amount: 4 tablets 120 tablet 0    dexAMETHasone (DECADRON) 4 mg tablet Take 1 tablet (4 mg total) by mouth 2 (two) times a day with meals for 2 days 4 tablet 0    [START ON 7/16/2024] HYDROcodone-acetaminophen (VICODIN)  mg per tablet Take 1 tablet by mouth every 6 (six) hours as needed for pain scale 8-10/10 for up to 28 days Max Daily Amount: 4 tablets 120 tablet 0    [START ON 8/14/2024] HYDROcodone-acetaminophen (VICODIN)  mg per tablet Take 1 tablet by mouth every 6 (six) hours as needed for pain scale 8-10/10 for up to 28 days Max Daily Amount: 4 tablets 120 tablet 0     No current facility-administered medications for this visit.     Past Medical History:   Diagnosis Date    Allergic eosinophilic esophagitis     Arthritis     Autoimmune hepatitis (CMS/HCC)     Blood disorder     pernicious anemia    Depression 08/08/2022    Gastrointestinal disease     IBS    Gout     Hyperlipidemia     Hypertension     Lymphoplasmacytic leukemia (CMS/HCC)     Melanoma (CMS/HCC)      Neurologic disorder     Pneumothorax 08/07/2022    Primary biliary cholangitis (CMS/HCC)     autoimmune hepatitis    Psychiatric illness     anxiety     Past Surgical History:   Procedure Laterality Date    APPENDECTOMY      COLONOSCOPY W/ BIOPSIES AND POLYPECTOMY  02/09/2009    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    CT BIOPSY LIVER FUNCTION  12/2/2021    CT BIOPSY LIVER FUNCTION 12/2/2021 [facility] MIS CT SCAN    ESOPHAGOGASTRODUODENOSCOPY N/A 5/16/2022    Procedure: ESOPHAGOGASTRODUODENOSCOPY with Biopsies;  Surgeon: Bradley Cook MD;  Location: [facility] Endoscopy;  Service: Endoscopy;  Laterality: N/A;    EYE SURGERY Bilateral     cataract    KNEE SURGERY Bilateral     3 surgeries on left and 2 on right knees - prior open medial meniscectomies bilateral knees in the 1970s    SKIN BIOPSY      SKIN CANCER EXCISION Left 10/15/2020    Melanoma removed from left forearm    TONSILLECTOMY      TOTAL HIP ARTHROPLASTY Left 4/21/2021    Procedure: LEFT TOTAL HIP ARTHROPLASTY;  Surgeon: Sj Reyes MD;  Location: [facility] OR;  Service: Orthopedics;  Laterality: Left;    TOTAL HIP ARTHROPLASTY Right 3/22/2022    Procedure: RIGHT TOTAL HIP ARTHROPLASTY POSTERIOR;  Surgeon: Sj Reyes MD;  Location: [facility] OR;  Service: Orthopedics;  Laterality: Right;     Family History   Problem Relation Age of Onset    Diabetes Mother         Type 2    Hypertension Mother     Alzheimer's disease Mother     Lung cancer Father     Hypertension Father     Prostate cancer Brother     Heart failure Brother     Heart attack Maternal Grandmother     Hypertension Maternal Grandfather     Prostate cancer Maternal Grandfather      Social History     Socioeconomic History    Marital status:    Tobacco Use    Smoking status: Never    Smokeless tobacco: Never   Vaping Use    Vaping status: Never Used   Substance and Sexual Activity    Alcohol use: Yes     Alcohol/week: 1.0 standard drink of alcohol     Types: 1 Glasses of wine per week      Comment: occ    Drug use: Never    Sexual activity: Defer     Social Determinants of Health     Tobacco Use: Low Risk  (6/17/2024)    Patient History     Smoking Tobacco Use: Never     Smokeless Tobacco Use: Never   Alcohol Use: Unknown (9/22/2020)    Received from [facility]    AUDIT-C     Frequency of Alcohol Consumption: 4 or more times a week     Average Number of Drinks: 1 or 2       Review of Systems    Objective     Vitals  /80 (BP Location: Left arm, Patient Position: Sitting)   Pulse 60   Temp 36.7 °C (98 °F) (Temporal)   Wt 84.1 kg (185 lb 4.8 oz)   SpO2 92%   BMI 24.12 kg/m²     Physical Exam  Vitals and nursing note reviewed.   Constitutional:       General: He is not in acute distress.     Appearance: Normal appearance. He is well-developed. He is not ill-appearing, toxic-appearing or diaphoretic.   HENT:      Head: Normocephalic and atraumatic.      Mouth/Throat:      Pharynx: Posterior oropharyngeal erythema present. No oropharyngeal exudate.   Neck:      Thyroid: No thyromegaly.      Vascular: No carotid bruit.      Trachea: No tracheal deviation.   Cardiovascular:      Rate and Rhythm: Normal rate and regular rhythm.      Pulses: Normal pulses.      Heart sounds: Normal heart sounds. No murmur heard.     No friction rub. No gallop.   Pulmonary:      Effort: Pulmonary effort is normal. No respiratory distress.      Breath sounds: Normal breath sounds. No stridor. No wheezing, rhonchi or rales.   Chest:      Chest wall: No tenderness.   Abdominal:      General: Bowel sounds are normal. There is no distension.      Palpations: Abdomen is soft. There is no mass.      Tenderness: There is no abdominal tenderness. There is no guarding or rebound.      Hernia: No hernia is present.   Musculoskeletal:         General: No tenderness or signs of injury.      Cervical back: Neck supple. No tenderness.      Right lower leg: No edema.      Left lower leg: No edema.   Lymphadenopathy:       Cervical: No cervical adenopathy.   Skin:     General: Skin is warm and dry.      Coloration: Skin is not pale.      Findings: No erythema or rash.   Neurological:      Mental Status: He is alert and oriented to person, place, and time.      Gait: Gait normal.      Deep Tendon Reflexes: Reflexes are normal and symmetric. Reflexes normal.   Psychiatric:         Mood and Affect: Mood normal.         Procedures    Assessment/Plan   Diagnoses and all orders for this visit:    Avascular necrosis of bone of right hip (CMS/HCC)  Comments:  Continue current medications.  Follow-up 3 months.  Orders:  -     HYDROcodone-acetaminophen (VICODIN)  mg per tablet; Take 1 tablet by mouth every 6 (six) hours as needed for pain scale 8-10/10 for up to 28 days Max Daily Amount: 4 tablets  -     HYDROcodone-acetaminophen (VICODIN)  mg per tablet; Take 1 tablet by mouth every 6 (six) hours as needed for pain scale 8-10/10 for up to 28 days Max Daily Amount: 4 tablets  -     HYDROcodone-acetaminophen (VICODIN)  mg per tablet; Take 1 tablet by mouth every 6 (six) hours as needed for pain scale 8-10/10 for up to 28 days Max Daily Amount: 4 tablets    Lymphoplasmacytic lymphoma (CMS/HCC)  Comments:  Will continue to monitor lab work every 3 months.    Mouth sore secondary to chemotherapy  Comments:  Start additional course of dexamethasone. F/u by phone or MyChart regarding symptoms.  Orders:  -     dexAMETHasone (DECADRON) 4 mg tablet; Take 1 tablet (4 mg total) by mouth 2 (two) times a day with meals for 2 days    Vitamin B12 deficiency  Comments:  Medication refilled.  Orders:  -     cyanocobalamin 1,000 mcg/mL injection; Inject 1 mL (1,000 mcg total) into the shoulder, thigh, or buttocks every 30 (thirty) days         Return in about 1 week (around 6/24/2024) for Follow Up, Via phone.    Cuba Haley MD    Portions of this note were documented by Kate Peters as I performed the exam and collected the information  from Juan Diego Rondon. I attest that I have reviewed the information as documented, verified the accuracy of the documentation and added additional information as needed.

## 2024-06-21 ENCOUNTER — TELEPHONE (OUTPATIENT)
Dept: FAMILY MEDICINE | Facility: CLINIC | Age: 69
End: 2024-06-21
Payer: MEDICARE

## 2024-06-21 DIAGNOSIS — R11.0 NAUSEA: Primary | ICD-10-CM

## 2024-06-21 NOTE — TELEPHONE ENCOUNTER
"  Medication refill request:  Medication(s):  ondanesteron not filled due to Medication not on active list or medication is discontinued note of \"therapy complete\"    "

## 2024-06-24 RX ORDER — ONDANSETRON 4 MG/1
4 TABLET, ORALLY DISINTEGRATING ORAL EVERY 8 HOURS PRN
Qty: 20 TABLET | Refills: 2 | Status: SHIPPED | OUTPATIENT
Start: 2024-06-24 | End: 2024-08-03 | Stop reason: ALTCHOICE

## 2024-07-30 ENCOUNTER — TELEPHONE (OUTPATIENT)
Dept: FAMILY MEDICINE | Facility: CLINIC | Age: 69
End: 2024-07-30
Payer: MEDICARE

## 2024-07-30 NOTE — TELEPHONE ENCOUNTER
Emmett calling to ask if he needs to be treated for Covid.  Symptoms started July 28th in the evening.  He is achy and congested.  No fever.  He tested positive for Covid today with a home test.  He stated that the line was there but very faint.  He is concerned due to low immunity for himself and wife.

## 2024-07-30 NOTE — TELEPHONE ENCOUNTER
Yes I think it would be reasonable for him to trial Paxlovid.  He should hold his rosuvastatin for the next 10 days.  This will be normal dosing twice a day for the next 5 days.  Thanks

## 2024-07-30 NOTE — TELEPHONE ENCOUNTER
Spoke with patient he states he took another home test and it was negative. He does not want Paxlovid at this time. He states he will call back if he feels like he needs this within the first 5 days of symptom onset. No other questions or concerns.

## 2024-08-03 ENCOUNTER — APPOINTMENT (OUTPATIENT)
Dept: CT IMAGING | Facility: HOSPITAL | Age: 69
End: 2024-08-03
Payer: MEDICARE

## 2024-08-03 ENCOUNTER — HOSPITAL ENCOUNTER (EMERGENCY)
Facility: HOSPITAL | Age: 69
Discharge: HOME | End: 2024-08-03
Payer: MEDICARE

## 2024-08-03 VITALS
HEIGHT: 74 IN | RESPIRATION RATE: 16 BRPM | BODY MASS INDEX: 24.38 KG/M2 | OXYGEN SATURATION: 91 % | TEMPERATURE: 97.9 F | WEIGHT: 190 LBS | SYSTOLIC BLOOD PRESSURE: 94 MMHG | HEART RATE: 75 BPM | DIASTOLIC BLOOD PRESSURE: 67 MMHG

## 2024-08-03 DIAGNOSIS — S32.020A COMPRESSION FRACTURE OF L2 VERTEBRA, INITIAL ENCOUNTER (CMS/HCC): Primary | ICD-10-CM

## 2024-08-03 PROCEDURE — 99282 EMERGENCY DEPT VISIT SF MDM: CPT

## 2024-08-03 PROCEDURE — 72131 CT LUMBAR SPINE W/O DYE: CPT

## 2024-08-03 PROCEDURE — 99284 EMERGENCY DEPT VISIT MOD MDM: CPT | Performed by: EMERGENCY MEDICINE

## 2024-08-03 RX ORDER — OXYCODONE HYDROCHLORIDE 5 MG/1
5 TABLET ORAL EVERY 4 HOURS PRN
Qty: 10 TABLET | Refills: 0 | Status: SHIPPED | OUTPATIENT
Start: 2024-08-03 | End: 2024-08-14 | Stop reason: ALTCHOICE

## 2024-08-03 RX ORDER — ONDANSETRON 4 MG/1
4 TABLET, ORALLY DISINTEGRATING ORAL EVERY 8 HOURS PRN
Qty: 20 TABLET | Refills: 0 | Status: SHIPPED | OUTPATIENT
Start: 2024-08-03 | End: 2024-08-10

## 2024-08-03 ASSESSMENT — ENCOUNTER SYMPTOMS
BACK PAIN: 1
LEG PAIN: 0
DIZZINESS: 0
ABDOMINAL PAIN: 0
FATIGUE: 1
WEAKNESS: 0
FEVER: 0
RESPIRATORY NEGATIVE: 1
HEADACHES: 0
TINGLING: 0
EYES NEGATIVE: 1
BOWEL INCONTINENCE: 0
NUMBNESS: 0

## 2024-08-03 NOTE — DISCHARGE INSTRUCTIONS
You are seen in the emergency department today for back pain.  You have a compression fracture of lumbar vertebrae with 20% compression and no impingement on your spinal cord.    When you return home I want you to follow-up with your primary care provider, in the meantime I will give you pain medication, and nausea medication for the side effects from the pain medicine.  Go to your closest emergency department if you have severe pain, bowel or bladder incontinence, or numbness or tingling into your legs.

## 2024-08-03 NOTE — ED PROVIDER NOTES
HPI:  Chief Complaint   Patient presents with    Back Pain     Lumbar back pain post GLF in their home.  Landed on buttocks.  Concerned about compression fractures.  C/O pain wrapping around to the front to groin bilaterally.       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 He had a fall last night while leaving the supper table. He lost his balance and landed hard on his butt. He had immediate pain to his lumbar spine that has not improved. He is traveling to [place] for his wife's radiation tomorrow and is concerned about the long trip with this pain. He denies numbness, tingling, cervical or sacral pain, or dizziness and headache.      Back Pain  This is a new problem. The current episode started yesterday. The problem occurs constantly. The problem has been gradually worsening since onset. The pain is present in the lumbar spine. The quality of the pain is described as shooting. The pain does not radiate. The pain is severe. The symptoms are aggravated by bending and position. Pertinent negatives include no abdominal pain, bladder incontinence, bowel incontinence, chest pain, fever, headaches, leg pain, numbness, tingling or weakness. Risk factors include history of cancer. He has tried analgesics, heat, ice and bed rest for the symptoms. The treatment provided no relief.       HISTORY:  Past Medical History:   Diagnosis Date    Allergic eosinophilic esophagitis     Arthritis     Autoimmune hepatitis (CMS/HCC)     Blood disorder     pernicious anemia    Depression 08/08/2022    Gastrointestinal disease     IBS    Gout     Hyperlipidemia     Hypertension     Lymphoplasmacytic leukemia (CMS/HCC)     Melanoma (CMS/HCC)     Neurologic disorder     Pneumothorax 08/07/2022    Primary biliary cholangitis (CMS/HCC)     autoimmune hepatitis    Psychiatric illness     anxiety       Past Surgical History:   Procedure Laterality Date     APPENDECTOMY      COLONOSCOPY W/ BIOPSIES AND POLYPECTOMY  02/09/2009    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    CT BIOPSY LIVER FUNCTION  12/2/2021    CT BIOPSY LIVER FUNCTION 12/2/2021 [facility] MIS CT SCAN    ESOPHAGOGASTRODUODENOSCOPY N/A 5/16/2022    Procedure: ESOPHAGOGASTRODUODENOSCOPY with Biopsies;  Surgeon: Bradley Cook MD;  Location: [facility] Endoscopy;  Service: Endoscopy;  Laterality: N/A;    EYE SURGERY Bilateral     cataract    KNEE SURGERY Bilateral     3 surgeries on left and 2 on right knees - prior open medial meniscectomies bilateral knees in the 1970s    SKIN BIOPSY      SKIN CANCER EXCISION Left 10/15/2020    Melanoma removed from left forearm    TONSILLECTOMY      TOTAL HIP ARTHROPLASTY Left 4/21/2021    Procedure: LEFT TOTAL HIP ARTHROPLASTY;  Surgeon: Sj Reyes MD;  Location: [facility] OR;  Service: Orthopedics;  Laterality: Left;    TOTAL HIP ARTHROPLASTY Right 3/22/2022    Procedure: RIGHT TOTAL HIP ARTHROPLASTY POSTERIOR;  Surgeon: Sj Reyes MD;  Location: [facility] OR;  Service: Orthopedics;  Laterality: Right;       Family History   Problem Relation Age of Onset    Diabetes Mother         Type 2    Hypertension Mother     Alzheimer's disease Mother     Lung cancer Father     Hypertension Father     Prostate cancer Brother     Heart failure Brother     Heart attack Maternal Grandmother     Hypertension Maternal Grandfather     Prostate cancer Maternal Grandfather        Social History     Tobacco Use    Smoking status: Never    Smokeless tobacco: Never   Vaping Use    Vaping status: Never Used   Substance Use Topics    Alcohol use: Yes     Alcohol/week: 1.0 standard drink of alcohol     Types: 1 Glasses of wine per week     Comment: occ    Drug use: Never         ROS:  Review of Systems   Constitutional:  Positive for fatigue. Negative for fever.   HENT: Negative.     Eyes: Negative.    Respiratory: Negative.     Cardiovascular:  Negative for chest pain.   Gastrointestinal:  Negative for  abdominal pain and bowel incontinence.   Genitourinary: Negative.  Negative for bladder incontinence.   Musculoskeletal:  Positive for back pain and gait problem.   Skin: Negative.    Neurological:  Negative for dizziness, tingling, weakness, numbness and headaches.       PE:  ED Triage Vitals   Temp Heart Rate Resp BP SpO2   08/03/24 1113 08/03/24 1113 08/03/24 1113 08/03/24 1113 08/03/24 1113   36.6 °C (97.9 °F) 80 16 106/67 90 %      Mean BP (mmHg) Temp Source Heart Rate Source Patient Position BP Location   08/03/24 1200 08/03/24 1113 -- -- --   76 Temporal         FiO2 (%)       --                  Physical Exam  Vitals and nursing note reviewed.   Constitutional:       Appearance: He is ill-appearing.   HENT:      Head: Normocephalic and atraumatic.   Cardiovascular:      Rate and Rhythm: Normal rate and regular rhythm.      Pulses: Normal pulses.      Heart sounds: Normal heart sounds.   Pulmonary:      Effort: Pulmonary effort is normal.      Breath sounds: Normal breath sounds.   Abdominal:      General: Abdomen is flat. Bowel sounds are normal.   Musculoskeletal:         General: Tenderness and signs of injury present. No deformity.      Cervical back: No swelling, edema, rigidity, tenderness or crepitus.      Thoracic back: No swelling, edema or tenderness.      Lumbar back: Signs of trauma, spasms and tenderness present. No swelling or edema. Decreased range of motion.        Back:       Left lower leg: No edema.      Comments: Pain with palpation and with position change   Lymphadenopathy:      Cervical: No cervical adenopathy.   Skin:     General: Skin is warm and dry.      Capillary Refill: Capillary refill takes less than 2 seconds.   Neurological:      General: No focal deficit present.      Mental Status: He is alert and oriented to person, place, and time.      Sensory: Sensation is intact.      Motor: No weakness.      Coordination: Coordination abnormal.      Gait: Gait abnormal.   Psychiatric:          Mood and Affect: Mood normal.         Behavior: Behavior normal.         Thought Content: Thought content normal.         Judgment: Judgment normal.         ED LABS:  Labs Reviewed - No data to display      ED IMAGES:  CT lumbar spine without contrast    (Results Pending)       ED PROCEDURES:  Procedures    ED COURSE:     CT Scan was completed which showed acute compression fracture along the superior endplate body of L2 with 20% compression. There is degenerative disc disease at L4-L5 and L5-S1.     He was given Zofran and Oxycodone and will follow up with his PCP after he returns from [place]. He was encouraged to go to the ED if he has any bowel/bladder changes, weakness or numbness/tingling in his legs.     Sepsis Quality Bundle           MDM:  Medical Decision Making      Final diagnoses:   [S32.020A] Compression fracture of L2 vertebra, initial encounter (CMS/[facility])     8/3/2024 12:17 PM                                        Stacia Garnica, CNP  08/03/24 1536

## 2024-08-04 PROCEDURE — 72131 CT LUMBAR SPINE W/O DYE: CPT | Mod: 26 | Performed by: INTERNAL MEDICINE

## 2024-08-14 ENCOUNTER — OFFICE VISIT (OUTPATIENT)
Dept: FAMILY MEDICINE | Facility: CLINIC | Age: 69
End: 2024-08-14
Payer: MEDICARE

## 2024-08-14 VITALS
OXYGEN SATURATION: 94 % | SYSTOLIC BLOOD PRESSURE: 128 MMHG | BODY MASS INDEX: 22.97 KG/M2 | TEMPERATURE: 98.1 F | WEIGHT: 176.5 LBS | RESPIRATION RATE: 16 BRPM | DIASTOLIC BLOOD PRESSURE: 62 MMHG | HEART RATE: 89 BPM

## 2024-08-14 DIAGNOSIS — T45.1X5A MOUTH SORE SECONDARY TO CHEMOTHERAPY: ICD-10-CM

## 2024-08-14 DIAGNOSIS — C83.00 LYMPHOPLASMACYTIC LYMPHOMA (CMS/HCC): ICD-10-CM

## 2024-08-14 DIAGNOSIS — R53.83 OTHER FATIGUE: ICD-10-CM

## 2024-08-14 DIAGNOSIS — K13.79 MOUTH SORE SECONDARY TO CHEMOTHERAPY: ICD-10-CM

## 2024-08-14 DIAGNOSIS — S32.020G COMPRESSION FRACTURE OF L2 VERTEBRA WITH DELAYED HEALING, SUBSEQUENT ENCOUNTER: Primary | ICD-10-CM

## 2024-08-14 PROCEDURE — 99214 OFFICE O/P EST MOD 30 MIN: CPT | Performed by: FAMILY MEDICINE

## 2024-08-14 PROCEDURE — G2211 COMPLEX E/M VISIT ADD ON: HCPCS | Performed by: FAMILY MEDICINE

## 2024-08-14 PROCEDURE — G0463 HOSPITAL OUTPT CLINIC VISIT: HCPCS | Performed by: FAMILY MEDICINE

## 2024-08-14 RX ORDER — CLOTRIMAZOLE 10 MG/1
10 LOZENGE ORAL
Qty: 50 TABLET | Refills: 1 | Status: SHIPPED | OUTPATIENT
Start: 2024-08-14 | End: 2024-08-24

## 2024-08-14 RX ORDER — HYDROCODONE BITARTRATE AND ACETAMINOPHEN 5; 325 MG/1; MG/1
1 TABLET ORAL EVERY 6 HOURS PRN
Qty: 120 TABLET | Refills: 0 | Status: SHIPPED | OUTPATIENT
Start: 2024-08-14 | End: 2024-09-13

## 2024-08-14 NOTE — PROGRESS NOTES
Subjective      Juan Diego Rondon is a 69 y.o. male who presents for Follow-up (ER Follow Up , fell getting out of chair and went to er, L2 is compressed, Only gave a few pain pills and not feeling well )  .    Patient presents for a follow up regarding ER visit on 8/3/24 due to fall resulting in compression fracture of L2. He states he was getting up from a chair and lost his balance and fell backwards. He continues having quite a bit of pain. He is agreeable to being referred to [facility] for further evaluation.     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 He also reports he has lost 20lbs recently due to not wanting to eat and mouth pain. Mouth pain has been present since February 2024. He does have some nausea within the last 2-3 days to the point he has needed to take Zofran. He also feels quite chilled and just overall not feeling well. He is scheduled to follow up with oncology on 8/29/24. Patient would like to pursue getting a 2nd opinion from [facility] in [place] in addition to seeing oncology here.       The following have been reviewed and updated as appropriate in this visit:   Tobacco  Allergies  Meds  Problems  Med Hx  Surg Hx  Fam Hx         No Known Allergies  Current Outpatient Medications   Medication Sig Dispense Refill    cyanocobalamin 1,000 mcg/mL injection Inject 1 mL (1,000 mcg total) into the shoulder, thigh, or buttocks every 30 (thirty) days 1 mL 11    HYDROcodone-acetaminophen (VICODIN)  mg per tablet Take 1 tablet by mouth every 6 (six) hours as needed for pain scale 8-10/10 for up to 28 days Max Daily Amount: 4 tablets 120 tablet 0    hydrocortisone 2.5 % cream Apply topically 2 (two) times a day as needed for rash 30 g 0    aspirin 81 mg EC tablet Take 1 tablet (81 mg total) by mouth      rosuvastatin (CRESTOR) 10 mg tablet Take 1 tablet by mouth daily 90 tablet 4     folic acid 1 mg tablet Take 1 tablet (1 mg total) by mouth daily 30 tablet 5    famotidine (PEPCID) 20 mg tablet Take 1 tablet (20 mg total) by mouth 2 (two) times a day      acetaminophen (TYLENOL) 500 mg tablet Take 1 tablet (500 mg total) by mouth every 6 (six) hours as needed for pain scale 1-3/10      ursodioL (ACTIGALL) 500 mg tablet Take 1 tablet (500 mg total) by mouth 2 times daily Every other day      ursodioL (AMANDEEP) 250 mg tablet Take 3 tablets (750 mg total) by mouth In am and 500 mg in pm every other day-alternate with the 500 mg bid      clotrimazole (MYCELEX) 10 mg otto Take 1 tablet (10 mg total) by mouth 5 (five) times a day for 10 days 35 tablet 1    HYDROcodone-acetaminophen (NORCO) 5-325 mg per tablet Take 1 tablet by mouth every 6 (six) hours as needed for pain scale 4-7/10 Max Daily Amount: 4 tablets 120 tablet 0    dexAMETHasone (DECADRON) 4 mg tablet Take 1 tablet (4 mg total) by mouth 2 (two) times a day with meals for 2 days 4 tablet 0    dexAMETHasone 0.5 mg/5 mL solution Take 5 mL (0.5 mg total) by mouth 3 (three) times a day as needed for pain for up to 10 days 240 mL 1    valsartan (DIOVAN) 80 mg tablet Take 1 tablet (80 mg total) by mouth daily 90 tablet 4    fenofibrate (TRICOR) 48 mg tablet Take 1 tablet (48 mg total) by mouth daily 90 tablet 4     No current facility-administered medications for this visit.     Past Medical History:   Diagnosis Date    Allergic eosinophilic esophagitis     Arthritis     Autoimmune hepatitis (CMS/HCC)     Blood disorder     pernicious anemia    Depression 08/08/2022    Gastrointestinal disease     IBS    Gout     Hyperlipidemia     Hypertension     Lymphoplasmacytic leukemia (CMS/HCC)     Melanoma (CMS/HCC)     Neurologic disorder     Pneumothorax 08/07/2022    Primary biliary cholangitis (CMS/HCC)     autoimmune hepatitis    Psychiatric illness     anxiety     Past Surgical History:   Procedure Laterality Date    APPENDECTOMY      COLONOSCOPY  W/ BIOPSIES AND POLYPECTOMY  02/09/2009    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    CT BIOPSY LIVER FUNCTION  12/2/2021    CT BIOPSY LIVER FUNCTION 12/2/2021 [facility] MIS CT SCAN    ESOPHAGOGASTRODUODENOSCOPY N/A 5/16/2022    Procedure: ESOPHAGOGASTRODUODENOSCOPY with Biopsies;  Surgeon: Bradley Cook MD;  Location: [facility] Endoscopy;  Service: Endoscopy;  Laterality: N/A;    EYE SURGERY Bilateral     cataract    KNEE SURGERY Bilateral     3 surgeries on left and 2 on right knees - prior open medial meniscectomies bilateral knees in the 1970s    SKIN BIOPSY      SKIN CANCER EXCISION Left 10/15/2020    Melanoma removed from left forearm    TONSILLECTOMY      TOTAL HIP ARTHROPLASTY Left 4/21/2021    Procedure: LEFT TOTAL HIP ARTHROPLASTY;  Surgeon: Sj Reyes MD;  Location: [facility] OR;  Service: Orthopedics;  Laterality: Left;    TOTAL HIP ARTHROPLASTY Right 3/22/2022    Procedure: RIGHT TOTAL HIP ARTHROPLASTY POSTERIOR;  Surgeon: Sj Reyes MD;  Location: [facility] OR;  Service: Orthopedics;  Laterality: Right;     Family History   Problem Relation Age of Onset    Diabetes Mother         Type 2    Hypertension Mother     Alzheimer's disease Mother     Lung cancer Father     Hypertension Father     Prostate cancer Brother     Heart failure Brother     Heart attack Maternal Grandmother     Hypertension Maternal Grandfather     Prostate cancer Maternal Grandfather      Social History     Socioeconomic History    Marital status:    Tobacco Use    Smoking status: Never    Smokeless tobacco: Never   Vaping Use    Vaping status: Never Used   Substance and Sexual Activity    Alcohol use: Yes     Alcohol/week: 1.0 standard drink of alcohol     Types: 1 Glasses of wine per week     Comment: occ    Drug use: Never    Sexual activity: Defer     Social Determinants of Health     Tobacco Use: Low Risk  (8/14/2024)    Patient History     Smoking Tobacco Use: Never     Smokeless Tobacco Use: Never   Alcohol Use: Unknown  (9/22/2020)    Received from [facility]    AUDIT-C     Frequency of Alcohol Consumption: 4 or more times a week     Average Number of Drinks: 1 or 2       Review of Systems    Objective     Vitals  /62 (BP Location: Left arm, Patient Position: Sitting, Cuff Size: Regular Adult)   Pulse 89   Temp 36.7 °C (98.1 °F) (Temporal)   Resp 16   Wt 80.1 kg (176 lb 8 oz)   SpO2 94%   BMI 22.97 kg/m²     Physical Exam  Vitals and nursing note reviewed.   Constitutional:       General: He is not in acute distress.     Appearance: Normal appearance. He is normal weight. He is not ill-appearing, toxic-appearing or diaphoretic.   HENT:      Mouth/Throat:      Pharynx: Posterior oropharyngeal erythema present.      Comments: Diffusely erythematous oropharynx with intermittent whitish plaques and probable aphthous ulceration  Cardiovascular:      Rate and Rhythm: Normal rate and regular rhythm.      Pulses: Normal pulses.      Heart sounds: Normal heart sounds. No murmur heard.     No friction rub. No gallop.   Musculoskeletal:         General: Tenderness (Midline lumbar spine tenderness to palpation.) present. No signs of injury.      Right lower leg: No edema.      Left lower leg: No edema.   Skin:     General: Skin is warm and dry.      Coloration: Skin is not pale.      Findings: No erythema or rash.   Neurological:      Mental Status: He is alert and oriented to person, place, and time.           Procedures    Assessment/Plan   Diagnoses and all orders for this visit:    Compression fracture of L2 vertebra with delayed healing, subsequent encounter  Comments:  Referral sent to [facility].Continue hydrocodone for now.  Follow-up with me as needed.  Orders:  -     Ambulatory referral to Orthopedic Surgery; Future    Mouth sore secondary to chemotherapy  Comments:  Start trial of clotrimazole atrocious.  Return precautions given.  Follow-up as needed.  Orders:  -     clotrimazole (MYCELEX) 10 mg  otto; Take 1 tablet (10 mg total) by mouth 5 (five) times a day for 10 days    Lymphoplasmacytic lymphoma (CMS/HCC)  Comments:  Referral sent to [facility] for 2nd opinion. F/u [facility] oncology on 8/29/24.  Orders:  -     Ambulatory referral to Medical Oncology; Future  -     HYDROcodone-acetaminophen (NORCO) 5-325 mg per tablet; Take 1 tablet by mouth every 6 (six) hours as needed for pain scale 4-7/10 Max Daily Amount: 4 tablets    Other fatigue  Comments:  Likely multifactorial.  May need to consider evaluation for neurologic condition such as Parkinson's.  Will get another opinion from [facility] oncology  Orders:  -     Thyroid Stimulating Hormone, Ultrasensitive Blood, Venous; Future         Return if symptoms worsen or fail to improve.    Cuba Haley MD    Portions of this note were documented by Kate Peters as I performed the exam and collected the information from Juan Diego Rondon. I attest that I have reviewed the information as documented, verified the accuracy of the documentation and added additional information as needed.

## 2024-08-19 ENCOUNTER — TELEPHONE (OUTPATIENT)
Dept: FAMILY MEDICINE | Facility: CLINIC | Age: 69
End: 2024-08-19

## 2024-08-19 NOTE — TELEPHONE ENCOUNTER
Pt is wondering if Dr. Haley with put in a referral to [facility] for his back fracture. Please call and advise.

## 2024-08-26 ENCOUNTER — LAB (OUTPATIENT)
Dept: LAB | Facility: HOSPITAL | Age: 69
End: 2024-08-26
Payer: MEDICARE

## 2024-08-26 DIAGNOSIS — C83.00 LYMPHOPLASMACYTIC LYMPHOMA (CMS/HCC): ICD-10-CM

## 2024-08-26 DIAGNOSIS — R53.83 OTHER FATIGUE: ICD-10-CM

## 2024-08-26 LAB
ALBUMIN SERPL-MCNC: 4 G/DL (ref 3.5–5.3)
ALP SERPL-CCNC: 162 U/L (ref 45–115)
ALT SERPL-CCNC: 10 U/L (ref 7–52)
ANION GAP SERPL CALC-SCNC: 10 MMOL/L (ref 3–11)
AST SERPL-CCNC: 18 U/L
BASOPHILS # BLD AUTO: 0 10*3/UL
BASOPHILS NFR BLD AUTO: 0.2 % (ref 0–2)
BILIRUB SERPL-MCNC: 0.54 MG/DL (ref 0.2–1.4)
BUN SERPL-MCNC: 6 MG/DL (ref 7–25)
CALCIUM ALBUM COR SERPL-MCNC: 9.8 MG/DL (ref 8.6–10.3)
CALCIUM SERPL-MCNC: 9.8 MG/DL (ref 8.6–10.3)
CHLORIDE SERPL-SCNC: 98 MMOL/L (ref 98–107)
CO2 SERPL-SCNC: 28 MMOL/L (ref 21–32)
CREAT SERPL-MCNC: 0.64 MG/DL (ref 0.7–1.3)
EGFRCR SERPLBLD CKD-EPI 2021: 102 ML/MIN/1.73M*2
EOSINOPHIL # BLD AUTO: 0.1 10*3/UL
EOSINOPHIL NFR BLD AUTO: 0.8 % (ref 0–3)
ERYTHROCYTE [DISTWIDTH] IN BLOOD BY AUTOMATED COUNT: 13.6 % (ref 11.5–15)
GLUCOSE SERPL-MCNC: 95 MG/DL (ref 70–105)
HCT VFR BLD AUTO: 45.1 % (ref 38–50)
HGB BLD-MCNC: 15.4 G/DL (ref 13.2–17.2)
IGM SERPL-MCNC: 1465 MG/DL (ref 40–230)
LDH SERPL L TO P-CCNC: 144 U/L (ref 87–246)
LYMPHOCYTES # BLD AUTO: 1 10*3/UL
LYMPHOCYTES NFR BLD AUTO: 9.4 % (ref 15–47)
MACROCYTOSIS PRESENCE IN BLOOD, ANALYZER: ABNORMAL
MCH RBC QN AUTO: 35.3 PG (ref 29–34)
MCHC RBC AUTO-ENTMCNC: 34.2 G/DL (ref 32–36)
MCV RBC AUTO: 103.2 FL (ref 82–97)
MONOCYTES # BLD AUTO: 0.9 10*3/UL
MONOCYTES NFR BLD AUTO: 8 % (ref 5–13)
NEUTROPHILS # BLD AUTO: 8.8 10*3/UL
NEUTROPHILS NFR BLD AUTO: 81.6 % (ref 46–70)
PLATELET # BLD AUTO: 366 10*3/UL (ref 130–350)
PMV BLD AUTO: 7.3 FL (ref 6.9–10.8)
POTASSIUM SERPL-SCNC: 4.2 MMOL/L (ref 3.5–5.1)
PROT SERPL-MCNC: 7.6 G/DL (ref 6–8.3)
RBC # BLD AUTO: 4.37 10*6/UL (ref 4.1–5.8)
SODIUM SERPL-SCNC: 136 MMOL/L (ref 135–145)
TSH SERPL DL<=0.05 MIU/L-ACNC: 1.03 UIU/ML (ref 0.34–4.82)
WBC # BLD AUTO: 10.8 10*3/UL (ref 3.7–9.6)

## 2024-08-26 PROCEDURE — 36415 COLL VENOUS BLD VENIPUNCTURE: CPT

## 2024-08-26 PROCEDURE — 82570 ASSAY OF URINE CREATININE: CPT

## 2024-08-26 PROCEDURE — 83615 LACTATE (LD) (LDH) ENZYME: CPT

## 2024-08-26 PROCEDURE — 80053 COMPREHEN METABOLIC PANEL: CPT

## 2024-08-26 PROCEDURE — 85025 COMPLETE CBC W/AUTO DIFF WBC: CPT

## 2024-08-26 PROCEDURE — 82784 ASSAY IGA/IGD/IGG/IGM EACH: CPT

## 2024-08-26 PROCEDURE — 83521 IG LIGHT CHAINS FREE EACH: CPT

## 2024-08-26 PROCEDURE — 84443 ASSAY THYROID STIM HORMONE: CPT

## 2024-08-27 LAB
KAPPA LC FREE SER-MCNC: 3.62 MG/DL (ref 0.33–1.94)
KAPPA LC FREE/LAMBDA FREE SER: 2.97 {RATIO} (ref 0.26–1.65)
LAMBDA LC FREE SERPL-MCNC: 1.22 MG/DL (ref 0.57–2.63)

## 2024-08-29 ENCOUNTER — OFFICE VISIT (OUTPATIENT)
Dept: ONCOLOGY | Facility: CLINIC | Age: 69
End: 2024-08-29
Payer: MEDICARE

## 2024-08-29 VITALS
DIASTOLIC BLOOD PRESSURE: 81 MMHG | SYSTOLIC BLOOD PRESSURE: 123 MMHG | TEMPERATURE: 97.1 F | HEART RATE: 70 BPM | WEIGHT: 173.1 LBS | HEIGHT: 74 IN | OXYGEN SATURATION: 95 % | BODY MASS INDEX: 22.22 KG/M2

## 2024-08-29 DIAGNOSIS — C83.00 LYMPHOPLASMACYTIC LYMPHOMA (CMS/HCC): ICD-10-CM

## 2024-08-29 PROCEDURE — 99214 OFFICE O/P EST MOD 30 MIN: CPT | Performed by: INTERNAL MEDICINE

## 2024-08-29 PROCEDURE — G0463 HOSPITAL OUTPT CLINIC VISIT: HCPCS

## 2024-08-29 RX ORDER — PREDNISONE 20 MG/1
TABLET ORAL
Qty: 20 TABLET | Refills: 0 | Status: SHIPPED | OUTPATIENT
Start: 2024-08-29 | End: 2024-09-14

## 2024-08-29 ASSESSMENT — PAIN SCALES - GENERAL: PAINLEVEL: 9

## 2024-08-29 NOTE — PROGRESS NOTES
Hematology/Oncology Clinic Note    REASON FOR REFERRAL: Monoclonal gammopathy    HISTORY OF PRESENT ILLNESS:  Juan Diego Rondon is a 69 y.o.-old gentleman referred for further evaluation and treatment of monoclonal gammopathy.  Patient recently underwent laboratory testing in January 2024 including an SPEP which showed a monoclonal protein measuring 1.4 g/dL.  Further workup showed kappa free light chains elevated at 6.41 with a kappa/lambda ratio of 3.98.  Quantitative immunoglobulins showed elevated IgM of 2256, IgG 698, IgA 114.  LDH was normal at 2 109 and beta-2 microglobulin 2.65.  Gastric panel showed creatinine of 0.79 calcium 9.9, corrected calcium 10.0.  PET scan was done which showed no specific uptake with no osseous lesions noted.    He has been having some issues with arthralgias primarily in his hips and wrists.  He has an occasional night sweat.  He denies any lymphadenopathy.    Nightsweats have improved. He has been having mouth pain since completing rituxan.  Arthralgias have been stable.      Unfortunately he had a fall which caused  L2 compression fracture in the beginning of the month.      Wt Readings from Last 3 Encounters:   08/29/24 78.5 kg (173 lb 1.6 oz)   08/14/24 80.1 kg (176 lb 8 oz)   08/03/24 86.2 kg (190 lb)        REVIEW OF SYSTEMS:   A 12 point review of systems was completed.  Pertinents noted in HPI; otherwise negative.    Past Medical History  Past Medical History:   Diagnosis Date    Allergic eosinophilic esophagitis     Arthritis     Autoimmune hepatitis (CMS/HCC)     Blood disorder     pernicious anemia    Depression 08/08/2022    Gastrointestinal disease     IBS    Gout     Hyperlipidemia     Hypertension     Lymphoplasmacytic leukemia (CMS/HCC)     Melanoma (CMS/HCC)     Neurologic disorder     Pneumothorax 08/07/2022    Primary biliary cholangitis (CMS/HCC)     autoimmune hepatitis    Psychiatric illness     anxiety       Past Surgical History  Past Surgical History:    Procedure Laterality Date    APPENDECTOMY      COLONOSCOPY W/ BIOPSIES AND POLYPECTOMY  02/09/2009    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    CT BIOPSY LIVER FUNCTION  12/2/2021    CT BIOPSY LIVER FUNCTION 12/2/2021 [facility] MIS CT SCAN    ESOPHAGOGASTRODUODENOSCOPY N/A 5/16/2022    Procedure: ESOPHAGOGASTRODUODENOSCOPY with Biopsies;  Surgeon: Bradley Cook MD;  Location: [facility] Endoscopy;  Service: Endoscopy;  Laterality: N/A;    EYE SURGERY Bilateral     cataract    KNEE SURGERY Bilateral     3 surgeries on left and 2 on right knees - prior open medial meniscectomies bilateral knees in the 1970s    SKIN BIOPSY      SKIN CANCER EXCISION Left 10/15/2020    Melanoma removed from left forearm    TONSILLECTOMY      TOTAL HIP ARTHROPLASTY Left 4/21/2021    Procedure: LEFT TOTAL HIP ARTHROPLASTY;  Surgeon: Sj Reyes MD;  Location: [facility] OR;  Service: Orthopedics;  Laterality: Left;    TOTAL HIP ARTHROPLASTY Right 3/22/2022    Procedure: RIGHT TOTAL HIP ARTHROPLASTY POSTERIOR;  Surgeon: Sj Reyes MD;  Location: [facility] OR;  Service: Orthopedics;  Laterality: Right;       MEDICATIONS:    Current Outpatient Medications   Medication Sig Dispense Refill    HYDROcodone-acetaminophen (NORCO) 5-325 mg per tablet Take 1 tablet by mouth every 6 (six) hours as needed for pain scale 4-7/10. Max Daily Amount: 4 tablets 120 tablet 0    cyanocobalamin 1,000 mcg/mL injection Inject 1 mL (1,000 mcg total) into the shoulder, thigh, or buttocks every 30 (thirty) days 1 mL 11    dexAMETHasone (DECADRON) 4 mg tablet Take 1 tablet (4 mg total) by mouth 2 (two) times a day with meals for 2 days 4 tablet 0    HYDROcodone-acetaminophen (VICODIN)  mg per tablet Take 1 tablet by mouth every 6 (six) hours as needed for pain scale 8-10/10 for up to 28 days Max Daily Amount: 4 tablets 120 tablet 0    dexAMETHasone 0.5 mg/5 mL solution Take 5 mL (0.5 mg total) by mouth 3 (three) times a day as needed for pain for up to 10 days 240 mL 1     hydrocortisone 2.5 % cream Apply topically 2 (two) times a day as needed for rash 30 g 0    aspirin 81 mg EC tablet Take 1 tablet (81 mg total) by mouth      rosuvastatin (CRESTOR) 10 mg tablet Take 1 tablet by mouth daily 90 tablet 4    folic acid 1 mg tablet Take 1 tablet (1 mg total) by mouth daily 30 tablet 5    valsartan (DIOVAN) 80 mg tablet Take 1 tablet (80 mg total) by mouth daily 90 tablet 4    fenofibrate (TRICOR) 48 mg tablet Take 1 tablet (48 mg total) by mouth daily 90 tablet 4    famotidine (PEPCID) 20 mg tablet Take 1 tablet (20 mg total) by mouth 2 (two) times a day      acetaminophen (TYLENOL) 500 mg tablet Take 1 tablet (500 mg total) by mouth every 6 (six) hours as needed for pain scale 1-3/10      ursodioL (ACTIGALL) 500 mg tablet Take 1 tablet (500 mg total) by mouth 2 times daily Every other day      ursodioL (AMANDEEP) 250 mg tablet Take 3 tablets (750 mg total) by mouth In am and 500 mg in pm every other day-alternate with the 500 mg bid       No current facility-administered medications for this visit.       Allergies  Allergies have been reviewed.  Juan Diego has No Known Allergies.    Social History  Social History     Socioeconomic History    Marital status:      Spouse name: Not on file    Number of children: Not on file    Years of education: Not on file    Highest education level: Not on file   Occupational History    Not on file   Tobacco Use    Smoking status: Never    Smokeless tobacco: Never   Vaping Use    Vaping status: Never Used   Substance and Sexual Activity    Alcohol use: Yes     Alcohol/week: 1.0 standard drink of alcohol     Types: 1 Glasses of wine per week     Comment: occ    Drug use: Never    Sexual activity: Defer   Other Topics Concern    Not on file   Social History Narrative    Not on file     Social Determinants of Health     Financial Resource Strain: Not on file   Food Insecurity: Not on file   Transportation Needs: Not on file   Physical Activity: Not on  file   Stress: Not on file   Social Connections: Not on file   Intimate Partner Violence: Not on file   Housing Stability: Not on file       Family History  Family History   Problem Relation Age of Onset    Diabetes Mother         Type 2    Hypertension Mother     Alzheimer's disease Mother     Lung cancer Father     Hypertension Father     Prostate cancer Brother     Heart failure Brother     Heart attack Maternal Grandmother     Hypertension Maternal Grandfather     Prostate cancer Maternal Grandfather        PHYSICAL EXAMINATION:  There were no vitals filed for this visit.        General: Pleasant,in no acute distress  Head: Normocephalic, atraumatic  Eyes: No scleral icterus  Heart: Regular rate and rhythm without murmur  Extremities: No cyanosis or edema noted  Skin: No obvious rashes or lesion  Psych: Normal affect, no obvious signs of anxiety/depression    ECOG PS:1    RESULTS REVIEWED:   Results for orders placed or performed in visit on 08/26/24   Immunoglobulin free LT chains blood Blood, Venous   Result Value Ref Range    Kappa Free Light Chain, S 3.62 (H) 0.3300 - 1.94 mg/dL    Lambda Free Light Chain, S 1.22 0.5700 - 2.63 mg/dL    Kappa/Lambda FLC Ratio 2.97 (H) 0.2600 - 1.65   LDH (Lactate dehydrogenase) Blood, Venous   Result Value Ref Range     87 - 246 U/L   CBC w/auto differential Blood, Venous   Result Value Ref Range    WBC 10.8 (H) 3.7 - 9.6 10*3/uL    RBC 4.37 4.10 - 5.80 10*6/uL    Hemoglobin 15.4 13.2 - 17.2 g/dL    Hematocrit 45.1 38.0 - 50.0 %    .2 (H) 82.0 - 97.0 fL    MCH 35.3 (H) 29.0 - 34.0 pg    MCHC 34.2 32.0 - 36.0 g/dL    RDW 13.6 11.5 - 15.0 %    Platelets 366 (H) 130 - 350 10*3/uL    MPV 7.3 6.9 - 10.8 fL    Neutrophils% 81.6 (H) 46.0 - 70.0 %    Lymphocytes% 9.4 (L) 15.0 - 47.0 %    Monocytes% 8.0 5.0 - 13.0 %    Eosinophils% 0.8 0.0 - 3.0 %    Basophils% 0.2 0.0 - 2.0 %    ANC (auto diff) 8.80 10*3/uL    Lymphocytes Absolute 1.00 10*3/uL    Monocytes Absolute 0.90  10*3/uL    Eosinophils Absolute 0.10 10*3/uL    Basophils Absolute 0.00 10*3/uL    Macrocytosis 1+    Comprehensive metabolic panel Blood, Venous   Result Value Ref Range    Sodium 136 135 - 145 mmol/L    Potassium 4.2 3.5 - 5.1 MMOL/L    Chloride 98 98 - 107 mmol/L    CO2 28 21 - 32 mmol/L    Anion Gap 10 3 - 11 mmol/L    BUN 6 (L) 7 - 25 mg/dL    Creatinine 0.64 (L) 0.70 - 1.30 mg/dL    Glucose 95 70 - 105 mg/dL    Calcium 9.8 8.6 - 10.3 mg/dL    AST 18 <40 U/L    ALT (SGPT) 10 7 - 52 U/L    Alkaline Phosphatase 162 (H) 45 - 115 U/L    Total Protein 7.6 6.0 - 8.3 g/dL    Albumin 4.0 3.5 - 5.3 g/dL    Total Bilirubin 0.54 0.20 - 1.40 mg/dL    Corrected Calcium 9.8 8.6 - 10.3 mg/dL    eGFR 102 >60 mL/min/1.73m*2   IgM Blood, Venous   Result Value Ref Range    IgM 1,465 (H) 40 - 230 mg/dL   Thyroid Stimulating Hormone, Ultrasensitive Blood, Venous   Result Value Ref Range    TSH 1.033 0.340 - 4.820 uIU/ml        ASSESSMENT/PLAN    # Lymphoplasmacytic lymphoma.  He underwent extensive workup for arthralgias.  Included in most pain was SPEP which showed a monoclonal protein measuring 1.4 g/dL.  Kappa free light chain was elevated at 6.41 with a kappa/lambda ratio of 3.98.  IgM level was elevated at 2256.  PET scan showed no metabolic uptake or osseous lesions.  Bone marrow biopsy was consistent withlymphoplasmacytic lymphoma involving approximately 10% of bone marrow cellularity.  MYD88 L265P was positive.  We started him on weekly rituximab due to fatigue, night sweats, arthralgias.  He completed 4 treatments with slight improvement of his IgM.  His symptoms have remained relatively stable with mild improvement.  I reviewed labs with him today which show no evidence of progressive disease.    -Follow-up 3 months with repeat labs  -He has appointment at [facility] next month.  He will call if they recommend any other treatments    #Mucositis.  This has been present since completing Rituxan.  He has tried multiple  treatments and the only one that has helped has  been oral corticosteroids.  Will proceed with another steroid taper.      NCCN guidelines were consulted in order to help in the management of the patient     Luis Ruffin MD, PhD  Hematology and Oncology

## 2024-08-30 LAB
ALBUMIN UR ELPH-MCNC: 3.2 MG/DL
ALBUMIN/GLOB UR ELPH: 0.27 {RATIO}
ALPHA1 GLOB UR ELPH-MCNC: 0.8 MG/DL
ALPHA2 GLOB UR ELPH-MCNC: 3.3 MG/DL
B-GLOBULIN UR ELPH-MCNC: 3.6 MG/DL
CREAT UR-MCNC: 185 MG/DL (ref 16–326)
FLAG M-PROTEIN ISOTYPE MS, RANDOM, U(REF): POSITIVE
GAMMA GLOB UR ELPH-MCNC: 4.1 MG/DL
M-PROTEIN ISOTYPE MS, RANDOM, U(REF): ABNORMAL
PROT PATTERN UR ELPH-IMP: ABNORMAL
PROT UR-MCNC: 15 MG/DL
PROT/CREAT UR: 0.08 MG/MG

## 2024-09-11 DIAGNOSIS — E53.8 FOLATE DEFICIENCY: ICD-10-CM

## 2024-09-11 RX ORDER — FOLIC ACID 1 MG/1
1 TABLET ORAL DAILY
Qty: 90 TABLET | Refills: 3 | Status: SHIPPED | OUTPATIENT
Start: 2024-09-11 | End: 2025-09-06

## 2024-09-20 DIAGNOSIS — F32.A DEPRESSION, UNSPECIFIED DEPRESSION TYPE: ICD-10-CM

## 2024-09-20 NOTE — TELEPHONE ENCOUNTER
Care Due:                  Date            Visit Type   Department     Provider  --------------------------------------------------------------------------------                              ESTABLISHED   [facility]  Last Visit: 08-      PATIENT      MEDICINE       IVÁN RIVAS  Next Visit: None Scheduled  None         None Found                                                            Last  Test          Frequency    Reason                     Performed    Due Date  --------------------------------------------------------------------------------  Lipid Panel.  12 months..  fenofibrate, rosuvastatin  Not Found    Overdue    [employer] Embedded Care Due Messages. Reference number: [id number]. 9/20/2024 2:41:04 PM SARAH

## 2024-09-21 NOTE — TELEPHONE ENCOUNTER
Medication refill request:  Medication(s):  Prozac not filled due to Request has not been filled by current PCP listed, please review

## 2024-09-23 RX ORDER — FLUOXETINE HYDROCHLORIDE 40 MG/1
40 CAPSULE ORAL DAILY
Qty: 90 CAPSULE | Refills: 3 | Status: SHIPPED | OUTPATIENT
Start: 2024-09-23 | End: 2025-09-23

## 2024-09-24 ENCOUNTER — HOSPITAL ENCOUNTER (OUTPATIENT)
Dept: MRI IMAGING | Facility: HOSPITAL | Age: 69
Discharge: HOME | End: 2024-09-24
Payer: MEDICARE

## 2024-09-24 DIAGNOSIS — S32.020A WEDGE COMPRESSION FRACTURE OF SECOND LUMBAR VERTEBRA, INITIAL ENCOUNTER FOR CLOSED FRACTURE (CMS/HCC): ICD-10-CM

## 2024-09-24 PROCEDURE — 72148 MRI LUMBAR SPINE W/O DYE: CPT

## 2024-09-24 PROCEDURE — 72148 MRI LUMBAR SPINE W/O DYE: CPT | Mod: 26 | Performed by: INTERNAL MEDICINE

## 2024-09-26 ENCOUNTER — IMMUNIZATION (OUTPATIENT)
Dept: FAMILY MEDICINE | Facility: CLINIC | Age: 69
End: 2024-09-26
Payer: MEDICARE

## 2024-09-26 DIAGNOSIS — Z23 ENCOUNTER FOR VACCINATION: Primary | ICD-10-CM

## 2024-09-26 PROCEDURE — 91322 SARSCOV2 VAC 50 MCG/0.5ML IM: CPT | Performed by: FAMILY MEDICINE

## 2024-09-26 PROCEDURE — 90471 IMMUNIZATION ADMIN: CPT

## 2024-09-26 PROCEDURE — 90471 IMMUNIZATION ADMIN: CPT | Performed by: FAMILY MEDICINE

## 2024-09-27 DIAGNOSIS — I10 PRIMARY HYPERTENSION: ICD-10-CM

## 2024-09-27 NOTE — TELEPHONE ENCOUNTER
No care due was identified.  [facility] Embedded Care Due Messages. Reference number: [id number]. 9/27/2024 3:57:29 PM MDT

## 2024-09-29 NOTE — TELEPHONE ENCOUNTER
Medication refill request:  Medication(s):  diovan not filled due to Request has not been filled by current PCP listed, please review

## 2024-09-30 RX ORDER — VALSARTAN 80 MG/1
80 TABLET ORAL DAILY
Qty: 90 TABLET | Refills: 4 | Status: SHIPPED | OUTPATIENT
Start: 2024-09-30 | End: 2025-09-30

## 2024-10-02 DIAGNOSIS — K74.3 PRIMARY BILIARY CIRRHOSIS (CMS/HCC): ICD-10-CM

## 2024-10-03 DIAGNOSIS — Z01.818 PREOP EXAMINATION: Primary | ICD-10-CM

## 2024-10-10 ENCOUNTER — OFFICE VISIT (OUTPATIENT)
Dept: FAMILY MEDICINE | Facility: CLINIC | Age: 69
End: 2024-10-10
Payer: MEDICARE

## 2024-10-10 ENCOUNTER — LAB (OUTPATIENT)
Dept: LAB | Facility: CLINIC | Age: 69
End: 2024-10-10
Payer: MEDICARE

## 2024-10-10 ENCOUNTER — ANCILLARY PROCEDURE (OUTPATIENT)
Dept: CARDIOLOGY | Facility: CLINIC | Age: 69
End: 2024-10-10
Payer: MEDICARE

## 2024-10-10 VITALS
OXYGEN SATURATION: 96 % | RESPIRATION RATE: 14 BRPM | HEART RATE: 88 BPM | SYSTOLIC BLOOD PRESSURE: 122 MMHG | BODY MASS INDEX: 22.78 KG/M2 | WEIGHT: 175 LBS | TEMPERATURE: 98 F | DIASTOLIC BLOOD PRESSURE: 70 MMHG

## 2024-10-10 DIAGNOSIS — K75.4 AUTOIMMUNE HEPATITIS (CMS/HCC): ICD-10-CM

## 2024-10-10 DIAGNOSIS — K12.30 MUCOSITIS: ICD-10-CM

## 2024-10-10 DIAGNOSIS — Z01.818 PREOP EXAMINATION: ICD-10-CM

## 2024-10-10 DIAGNOSIS — Z82.49 FAMILY HISTORY OF HEART FAILURE: ICD-10-CM

## 2024-10-10 DIAGNOSIS — Z82.49 FAMILY HISTORY OF CORONARY ARTERY DISEASE IN FATHER: ICD-10-CM

## 2024-10-10 DIAGNOSIS — Z82.49 FAMILY HISTORY OF CORONARY ARTERY DISEASE IN BROTHER: ICD-10-CM

## 2024-10-10 DIAGNOSIS — R13.12 OROPHARYNGEAL DYSPHAGIA: ICD-10-CM

## 2024-10-10 DIAGNOSIS — R68.89 EXERCISE INTOLERANCE: Primary | ICD-10-CM

## 2024-10-10 DIAGNOSIS — C88.00 WALDENSTROM'S MACROGLOBULINEMIA: ICD-10-CM

## 2024-10-10 LAB
ALBUMIN SERPL-MCNC: 3.5 G/DL (ref 3.5–5.3)
ALP SERPL-CCNC: 101 U/L (ref 45–115)
ALT SERPL-CCNC: 12 U/L (ref 7–52)
ANION GAP SERPL CALC-SCNC: 12 MMOL/L (ref 3–11)
AST SERPL-CCNC: 16 U/L
BASOPHILS # BLD AUTO: 0.1 10*3/UL
BASOPHILS NFR BLD AUTO: 1 % (ref 0–2)
BILIRUB SERPL-MCNC: 0.42 MG/DL (ref 0.2–1.4)
BUN SERPL-MCNC: 8 MG/DL (ref 7–25)
CALCIUM ALBUM COR SERPL-MCNC: 10.1 MG/DL (ref 8.6–10.3)
CALCIUM SERPL-MCNC: 9.7 MG/DL (ref 8.6–10.3)
CHLORIDE SERPL-SCNC: 104 MMOL/L (ref 98–107)
CO2 SERPL-SCNC: 23 MMOL/L (ref 21–32)
CREAT SERPL-MCNC: 0.67 MG/DL (ref 0.7–1.3)
EGFRCR SERPLBLD CKD-EPI 2021: 100 ML/MIN/1.73M*2
EOSINOPHIL # BLD AUTO: 0.2 10*3/UL
EOSINOPHIL NFR BLD AUTO: 2.8 % (ref 0–3)
ERYTHROCYTE [DISTWIDTH] IN BLOOD BY AUTOMATED COUNT: 14 % (ref 11.5–15)
GLUCOSE SERPL-MCNC: 88 MG/DL (ref 70–105)
HCT VFR BLD AUTO: 43.1 % (ref 38–50)
HGB BLD-MCNC: 14.6 G/DL (ref 13.2–17.2)
LYMPHOCYTES # BLD AUTO: 1.5 10*3/UL
LYMPHOCYTES NFR BLD AUTO: 28.1 % (ref 15–47)
MACROCYTOSIS PRESENCE IN BLOOD, ANALYZER: ABNORMAL
MCH RBC QN AUTO: 35.1 PG (ref 29–34)
MCHC RBC AUTO-ENTMCNC: 33.8 G/DL (ref 32–36)
MCV RBC AUTO: 103.9 FL (ref 82–97)
MONOCYTES # BLD AUTO: 0.7 10*3/UL
MONOCYTES NFR BLD AUTO: 13.5 % (ref 5–13)
NEUTROPHILS # BLD AUTO: 3 10*3/UL
NEUTROPHILS NFR BLD AUTO: 54.6 % (ref 46–70)
PLATELET # BLD AUTO: 276 10*3/UL (ref 130–350)
PMV BLD AUTO: 7.6 FL (ref 6.9–10.8)
POTASSIUM SERPL-SCNC: 3.7 MMOL/L (ref 3.5–5.1)
PROT SERPL-MCNC: 6.6 G/DL (ref 6–8.3)
RBC # BLD AUTO: 4.15 10*6/UL (ref 4.1–5.8)
SODIUM SERPL-SCNC: 139 MMOL/L (ref 135–145)
WBC # BLD AUTO: 5.5 10*3/UL (ref 3.7–9.6)

## 2024-10-10 PROCEDURE — G2211 COMPLEX E/M VISIT ADD ON: HCPCS | Performed by: FAMILY MEDICINE

## 2024-10-10 PROCEDURE — G0463 HOSPITAL OUTPT CLINIC VISIT: HCPCS | Performed by: FAMILY MEDICINE

## 2024-10-10 PROCEDURE — 99215 OFFICE O/P EST HI 40 MIN: CPT | Performed by: FAMILY MEDICINE

## 2024-10-10 PROCEDURE — 36415 COLL VENOUS BLD VENIPUNCTURE: CPT

## 2024-10-10 PROCEDURE — 80053 COMPREHEN METABOLIC PANEL: CPT

## 2024-10-10 PROCEDURE — 85025 COMPLETE CBC W/AUTO DIFF WBC: CPT

## 2024-10-10 PROCEDURE — 93005 ELECTROCARDIOGRAM TRACING: CPT | Performed by: FAMILY MEDICINE

## 2024-10-10 RX ORDER — ESOMEPRAZOLE MAGNESIUM 40 MG/1
40 CAPSULE, DELAYED RELEASE ORAL 2 TIMES DAILY
Qty: 60 CAPSULE | Refills: 11 | Status: SHIPPED | OUTPATIENT
Start: 2024-10-10 | End: 2024-10-31 | Stop reason: ENTERED-IN-ERROR

## 2024-10-10 RX ORDER — HYDROCODONE BITARTRATE AND ACETAMINOPHEN 7.5; 325 MG/1; MG/1
1 TABLET ORAL 3 TIMES DAILY
COMMUNITY
End: 2025-01-13 | Stop reason: SDUPTHER

## 2024-10-10 RX ORDER — PREDNISONE 20 MG/1
TABLET ORAL
Qty: 21 TABLET | Refills: 0 | Status: SHIPPED | OUTPATIENT
Start: 2024-10-10 | End: 2024-12-02 | Stop reason: ALTCHOICE

## 2024-10-10 NOTE — LETTER
10/10/24      Former[facility]  [address] [place] [address]  [phone]  Dept: [phone]      Dear Juan Diego,    I prematurely gave you a copy of my assessment and plan which stated you are cleared for surgery.  I do think you will be cleared for surgery but lets do our cardiac stratification first including the Lexiscan stress test and the echocardiogram.  Once restratification is favorable I will clear you for surgery as soon as possible.    Below is a copy of today's assessment and plan.  If you have questions please call Leilani at [phone] or you may send us [employer] messages through [employer].    ASSESSMENT  AND PLAN:    Surgical condition/surgery planned: Cleared for kyphoplasty  The patient's condition is NOT optimized for surgery (pending Lexiscan stress test and echocardiogram for strong family history and new exercise intolerance)  Revised cardiac index score:  2 points, Class III Risk, 10.1 %, 30-day risk of death, MI, or cardiac arrest  EKG today: looks great  CBC today: Acceptable for surgery  CMP today: Acceptable for surgery  Hold  NSAIDS (aspirin, Aleve, Motrin, Advil, ibuprofen and Excedrin) 7 days prior to surgery  In the meantime, you may take Tylenol (acetaminophen) 1000 mg p.o. 3 times daily  Hold all medications the morning of surgery;   you may take your morning medications following surgery when you are able to eat/drink.    Pertinent medical problems:  Pression fracture,   kyphoplasty pending  Depression,   continue fluoxetine 40 mg daily  Hypercholesterolemia (triglycerides/LDL)   continue rosuvastatin and Tricor  Primary hypertension   continue valsartan 80 mg daily  Macrocytosis/Vitamin B12 deficiency   continue vitamin B12 injections  GERD with liquid dysphagia pharyngeal phase  Using as needed Zofran   continue famotidine twice daily  Continue Nexium but increase to 40 mg twice daily  Consult with your gastroenterologist about future studies including  "Bravo manometry EGD biopsy etc.  Right avoid eating and drinking 3 hours prior to lying flat  Lymphoplasmacytic lymphoma, AKA Waldenström's macroglobulinemia, mild kappa free light chain,   PET scan reveals no metabolic uptake/osseous lesions, following with oncology; follow-up after 11/29/2024  Following with Dr. Ruffin and Dr. Ruff  Exercise-induced fatigue with a strong family history of coronary artery disease (father and 2 brothers have had several MIs all before age 50)  Lexiscan Cardiolite stress test and echocardiogram stratification prior to surgery  Autoimmune hepatitis and primary biliary cholangitis,   normal CMP today    Diagnoses and all orders for this visit:    Exercise intolerance  -     NM Lexiscan myocardial perfusion imaging complete; Future  -     US Echo complete; Future    Family history of coronary artery disease in brother  -     NM Lexiscan myocardial perfusion imaging complete; Future  -     US Echo complete; Future    Family history of coronary artery disease in father  -     NM Lexiscan myocardial perfusion imaging complete; Future  -     US Echo complete; Future    Family history of heart failure  -     NM Lexiscan myocardial perfusion imaging complete; Future  -     US Echo complete; Future    Encounter for follow-up examination after completed treatment for malignant neoplasm  -     NM Lexiscan myocardial perfusion imaging complete; Future    Oropharyngeal dysphagia  -     esomeprazole (NexIUM) 40 mg capsule; Take 1 capsule (40 mg total) by mouth 2 (two) times a day \"If dysphagia does not improve in 3 months proceed with EGD/Bravo/manometry per gastroenterology\"    Mucositis  -     predniSONE (DELTASONE) 20 mg tablet; Take three tabs daily for three days then two daily for three days then 1 daily for three days then 1/2 daily for three days then stop        Artificial Intelligence generated Assessment/Plan       Lymphoplasmacytic Lymphoma (Waldenström macroglobulinemia)  Stable " disease with no current need for treatment. Macrocytosis consistent with lymphoma.  -Continue monitoring labs as per oncology.    Vitamin B12 Deficiency  On B12 supplementation for over a year. Macrocytosis may improve with continued B12 supplementation.  -Continue B12 supplementation.    Gastroesophageal Reflux Disease (GERD)  On famotidine and Nexium 40mg daily. New onset dysphagia with liquids, high in the throat.  -Increase Nexium to 40mg twice daily.  -Consider EGD if symptoms do not improve with increased Nexium dose.    Depression  On Fluoxetine.  -Continue Fluoxetine.    Hypercholesterolemia  On Rosuvastatin and Tricor.  -Continue Rosuvastatin and Tricor.    Primary Hypertension  On Valsartan.  -Continue Valsartan.    Preoperative Evaluation  Lumbar surgery scheduled in November. Labs and EKG normal. Reduced exercise tolerance due to fatigue, not chest pain. Family history of heart disease.  -Order Lexiscan Cardiolite stress test and echocardiogram to rule out cardiac cause of fatigue before surgery.  -Pending results cardiac clearance will be granted    Mucositis  Worsening symptoms, previously improved with prednisone.  -Start Prednisone 60mg taper over 10-12 days.    Osteoporosis Risk  History of compression fracture. Thin build and Waldenström macroglobulinemia increase risk.  -Ensure DEXA scan is performed annually.  Please talk to [place] about this at your next physical    Peripheral Neuropathy  Likely due to B12 deficiency.  -Continue B12 supplementation and monitor for improvement.    General Health Maintenance / Followup Plans  -Follow up with rheumatologist for potential cell-mediated rheumatological process.  -Follow up with gastroenterologist for GERD management and potential EGD.  -Check labs as per oncology for lymphoma monitoring.  -Follow up post-surgery for lumbar kyphoplasty.          This document was completed using a Dragon voice recognition device; sometimes words are not transcribed  exactly as they were spoken    WAN HICKS MD  10/10/24

## 2024-10-10 NOTE — PROGRESS NOTES
HPI:    Juan Diego Rondon is a 69 y.o. male who presents for their     PRESURGICAL EVALUATION    Addendum, 11/1/2024:    Dobutamine stress echo, yesterday:  1.  Heart rate of 131 bpm was achieved [87% of peak heart rate] at 40 mcg/kg/min infusion of dobutamine with an additional 0.2 mg of atropine.  2.  No evidence of ischemia on the dobutamine stress EKG.  3.  No evidence of inducible myocardial ischemia on stress echocardiography.    -Acceptable for surgery    Echocardiogram, yesterday:      Normal left ventricular cavity size.    Left ventricle wall thickness increased.    Biplane ejection fraction is 67%.    Normal left ventricular systolic function.    Right ventricle is normal in size and systolic function.    The left atrium is normal in size.    The right atrium is normal in size.    Normal central venous pressure (0-5 mmHg).    No pericardial effusion.    Inadequate TR spectral Doppler to accurately assess right ventricular systolic pressure.    -Acceptable for surgery     Dear Juan Diego Rondon    Below is a copy of today's assessment and plan.  If you have questions please call Leilani at [phone] or you may send us [employer] messages through [employer].    ASSESSMENT  AND PLAN:    Surgical condition/surgery planned: Cleared for kyphoplasty  Addendum: 11/1/2024  The patient's condition is  optimized for surgery;  dobutamine stress test and echocardiogram noted above are favorable  Revised cardiac index score:  2 points, Class III Risk, 10.1 %, 30-day risk of death, MI, or cardiac arrest  EKG today: looks great  CBC today: Acceptable for surgery  CMP today: Acceptable for surgery  Hold  NSAIDS (aspirin, Aleve, Motrin, Advil, ibuprofen and Excedrin) 7 days prior to surgery  In the meantime, you may take Tylenol (acetaminophen) 1000 mg p.o. 3 times daily  Hold all medications the morning of surgery;   you may take your morning medications following surgery when you are able to eat/drink.    Pertinent  "medical problems:  Compression fracture,   kyphoplasty pending  Depression,   continue fluoxetine 40 mg daily  Hypercholesterolemia (triglycerides/LDL)   continue rosuvastatin and Tricor  Primary hypertension   continue valsartan 80 mg daily  Macrocytosis/Vitamin B12 deficiency   continue vitamin B12 injections  GERD with liquid dysphagia pharyngeal phase  Using as needed Zofran   continue famotidine twice daily  Continue Nexium but increase to 40 mg twice daily  Consult with your gastroenterologist about future studies including Bravo manometry EGD biopsy etc.  avoid eating and drinking 3 hours prior to lying flat  Lymphoplasmacytic lymphoma, AKA Waldenström's macroglobulinemia, mild kappa free light chain,   PET scan reveals no metabolic uptake/osseous lesions, following with oncology; follow-up after 11/29/2024  Following with Dr. Ruffin and Dr. Ruff  Exercise-induced fatigue with a strong family history of coronary artery disease (father and 2 brothers have had several MIs all before age 50)  Dobutamine stress echo and echocardiogram stratification prior to surgery  Dobutamine stress echo favorable 11/1/2024  Autoimmune hepatitis and primary biliary cholangitis,   normal CMP today    Diagnoses and all orders for this visit:    Exercise intolerance  -     NM Lexiscan myocardial perfusion imaging complete; Future  -     US Echo complete; Future    Family history of coronary artery disease in brother  -     NM Lexiscan myocardial perfusion imaging complete; Future  -     US Echo complete; Future    Family history of coronary artery disease in father  -     NM Lexiscan myocardial perfusion imaging complete; Future  -     US Echo complete; Future    Family history of heart failure  -     NM Lexiscan myocardial perfusion imaging complete; Future  -     US Echo complete; Future    Oropharyngeal dysphagia  -     esomeprazole (NexIUM) 40 mg capsule; Take 1 capsule (40 mg total) by mouth 2 (two) times a day \"If dysphagia " "does not improve in 3 months proceed with EGD/Bravo/manometry per gastroenterology\"    Mucositis  -     predniSONE (DELTASONE) 20 mg tablet; Take three tabs daily for three days then two daily for three days then 1 daily for three days then 1/2 daily for three days then stop    Waldenstrom's macroglobulinemia  -     NM Lexiscan myocardial perfusion imaging complete; Future  -     US Echo complete; Future    Autoimmune hepatitis (CMS/HCC)  -     NM Lexiscan myocardial perfusion imaging complete; Future  -     US Echo complete; Future        Artificial Intelligence generated Assessment/Plan       Lymphoplasmacytic Lymphoma (Waldenström macroglobulinemia)  Stable disease with no current need for treatment. Macrocytosis consistent with lymphoma.  -Continue monitoring labs as per oncology.    Vitamin B12 Deficiency  On B12 supplementation for over a year. Macrocytosis may improve with continued B12 supplementation.  -Continue B12 supplementation.    Gastroesophageal Reflux Disease (GERD)  On famotidine and Nexium 40mg daily. New onset dysphagia with liquids, high in the throat.  -Increase Nexium to 40mg twice daily.  -Consider EGD if symptoms do not improve with increased Nexium dose.    Depression  On Fluoxetine.  -Continue Fluoxetine.    Hypercholesterolemia  On Rosuvastatin and Tricor.  -Continue Rosuvastatin and Tricor.    Primary Hypertension  On Valsartan.  -Continue Valsartan.    Preoperative Evaluation  Lumbar surgery scheduled in November. Labs and EKG normal. Reduced exercise tolerance due to fatigue, not chest pain. Family history of heart disease.  -Order Lexiscan Cardiolite stress test and echocardiogram to rule out cardiac cause of fatigue before surgery.  -Pending results cardiac clearance will be granted    Mucositis  Worsening symptoms, previously improved with prednisone.  -Start Prednisone 60mg taper over 10-12 days.    Osteoporosis Risk  History of compression fracture. Thin build and Waldenström " macroglobulinemia increase risk.  -Ensure DEXA scan is performed annually.  Please talk to [place] about this at your next physical    Peripheral Neuropathy  Likely due to B12 deficiency.  -Continue B12 supplementation and monitor for improvement.    General Health Maintenance / Followup Plans  -Follow up with rheumatologist for potential cell-mediated rheumatological process.  -Follow up with gastroenterologist for GERD management and potential EGD.  -Check labs as per oncology for lymphoma monitoring.  -Follow up post-surgery for lumbar kyphoplasty.          This document was completed using a Dragon voice recognition device; sometimes words are not transcribed exactly as they were spoken    WAN HICKS MD  10/10/24        --------------------------------------------------    History of Present Illness    The patient, a 69-year-old individual with a history of depression, hypercholesterolemia, primary hypertension, vitamin B12 deficiency, GERD, and kappa free light chain lymphoplasmocytic lymphoma, presents today for a preoperative physical. The patient is scheduled for lumbar surgery due to persistent low back pain.    The patient has been experiencing episodes of dysphagia, occurring 2-3 times a week, primarily with liquids and located high in the throat. This is accompanied by frequent nausea, managed with Zofran approximately three times a week.    The patient also reports fatigue, particularly noticeable during physical activity. He can walk half a mile with the aid of a cane before feeling spent, attributing this to fatigue rather than chest pain or breathlessness. The patient has a family history of heart disease, with multiple relatives experiencing heart attacks before the age of 50.    The patient has been diagnosed with Waldenström macroglobulinemia, also known as lymphoplasmacytic lymphoma, and is under the care of an oncologist. The disease is considered low risk and is currently being monitored through  lab tests. The patient has also been diagnosed with autoimmune hepatitis and primary biliary cholangitis, the connection between these conditions is unclear.    The patient has been experiencing neuropathy, which he attributes to his vitamin B12 deficiency. He has been on B12 supplements for over a year. The patient also reports a history of eczema.    The patient has been experiencing oral ulcerations, which he believes were caused by rituximab treatment. He has lost approximately 25 pounds and has been dealing with night sweats, which he attributes to the Waldenström macroglobulinemia.    The patient had a fall on August 3rd, landing on his buttocks, and has been experiencing back pain since. He is scheduled for a kyphoplasty procedure in November to address a compression fracture. The patient has been attending physical therapy for this issue.    The patient has a history of oral ulcerations, which have been diagnosed as mucositis. He has been treated with a course of prednisone, which improved the condition, but it has been waxing and waning. The patient is considering another course of prednisone to manage this.    The patient's lab results from this morning are generally good, with normal electrolytes, creatinine, LFTs, calcium, white count, H and H, RDW, and platelet count. The patient does have macrocytosis, consistent with lymphoplasmacytic lymphoma, and slightly elevated monocytes.                Revised cardiac index questionnaire:    High-Risk Surgery   no  Intraperitoneal   no  Intrathoracic   no  Suprainguinal vascular   no      History of ischemic heart disease   no  History of MI   no  History of positive exercise test   no  Current chest pain considered due to myocardial ischemia   no, but significant new exercise intolerance and very strong family history of coronary artery disease and heart failure  Use of nitrate therapy   no  ECG with pathological Q waves   no    History of congestive heart  failure   no, but strong family history of congestive heart failure  Pulmonary edema, bilateral rales or S3 gallop   no  Paroxysmal nocturnal dyspnea   no  CXR showing pulmonary vascular redistribution   no      History of cerebrovascular disease   no  Prior TIA or stroke   no  Pre-operative treatment with insulin    no  Pre-operative creatinine >2 mg/dL / 176.8 µmol/L    no     METS: >4    Juan Diego Rondon's Revised Cardiac Index score:      2 points, Class III Risk, 10.1 %, 30-day risk of death, MI, or cardiac arrest            The following have been reviewed and updated as appropriate in this visit:   Tobacco  Allergies  Meds  Problems  Med Hx  Surg Hx  Fam Hx       No Known Allergies  Current Outpatient Medications   Medication Sig Dispense Refill    HYDROcodone-acetaminophen (NORCO) 7.5-325 mg per tablet Take 1 tablet by mouth 3 (three) times a day      valsartan (DIOVAN) 80 mg tablet Take 1 tablet (80 mg total) by mouth daily 90 tablet 4    FLUoxetine (PROzac) 40 mg capsule Take 1 capsule (40 mg total) by mouth daily 90 capsule 3    folic acid 1 mg tablet Take 1 tablet (1 mg total) by mouth daily 90 tablet 3    cyanocobalamin 1,000 mcg/mL injection Inject 1 mL (1,000 mcg total) into the shoulder, thigh, or buttocks every 30 (thirty) days 1 mL 11    hydrocortisone 2.5 % cream Apply topically 2 (two) times a day as needed for rash 30 g 0    rosuvastatin (CRESTOR) 10 mg tablet Take 1 tablet by mouth daily 90 tablet 4    fenofibrate (TRICOR) 48 mg tablet Take 1 tablet (48 mg total) by mouth daily 90 tablet 4    famotidine (PEPCID) 20 mg tablet Take 1 tablet (20 mg total) by mouth 2 (two) times a day      acetaminophen (TYLENOL) 500 mg tablet Take 1 tablet (500 mg total) by mouth every 6 (six) hours as needed for pain scale 1-3/10      ursodioL (ACTIGALL) 500 mg tablet Take 1 tablet (500 mg total) by mouth 2 times daily Every other day      ursodioL (AMANDEEP) 250 mg tablet Take 3 tablets (750 mg total) by  "mouth In am and 500 mg in pm every other day-alternate with the 500 mg bid      esomeprazole (NexIUM) 40 mg capsule Take 1 capsule (40 mg total) by mouth 2 (two) times a day \"If dysphagia does not improve in 3 months proceed with EGD/Bravo/manometry per gastroenterology\" 60 capsule 11    predniSONE (DELTASONE) 20 mg tablet Take three tabs daily for three days then two daily for three days then 1 daily for three days then 1/2 daily for three days then stop 21 tablet 0    aspirin 81 mg EC tablet Take 1 tablet (81 mg total) by mouth       No current facility-administered medications for this visit.     Past Medical History:   Diagnosis Date    Allergic eosinophilic esophagitis     Allergic rhinitis 1990    Anemia 2019    Arthritis     Autoimmune hepatitis (CMS/HCC)     Blood disorder     pernicious anemia    Cataract 2001    Depression 08/08/2022    Gastrointestinal disease     IBS    Gout     Hyperlipidemia     Hypertension     Lymphoplasmacytic leukemia (CMS/HCC)     Melanoma (CMS/HCC)     Neurologic disorder     Pneumothorax 08/07/2022    Primary biliary cholangitis (CMS/HCC)     autoimmune hepatitis    Psychiatric illness     anxiety    Skin abnormalities 2021     Past Surgical History:   Procedure Laterality Date    APPENDECTOMY      COLONOSCOPY W/ BIOPSIES AND POLYPECTOMY  02/09/2009    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    CT BIOPSY LIVER FUNCTION  12/02/2021    CT BIOPSY LIVER FUNCTION 12/2/2021 [facility] MIS CT SCAN    ESOPHAGOGASTRODUODENOSCOPY N/A 05/16/2022    Procedure: ESOPHAGOGASTRODUODENOSCOPY with Biopsies;  Surgeon: Bradley Cook MD;  Location: [facility] Endoscopy;  Service: Endoscopy;  Laterality: N/A;    EYE SURGERY Bilateral     cataract    KNEE SURGERY Bilateral     3 surgeries on left and 2 on right knees - prior open medial meniscectomies bilateral knees in the 1970s    ORTHOPEDIC SURGERY  1972    SKIN BIOPSY      SKIN CANCER EXCISION Left 10/15/2020    Melanoma removed from left forearm    " TONSILLECTOMY      TOTAL HIP ARTHROPLASTY Left 04/21/2021    Procedure: LEFT TOTAL HIP ARTHROPLASTY;  Surgeon: Sj Reyes MD;  Location: [facility] OR;  Service: Orthopedics;  Laterality: Left;    TOTAL HIP ARTHROPLASTY Right 03/22/2022    Procedure: RIGHT TOTAL HIP ARTHROPLASTY POSTERIOR;  Surgeon: Sj Reyes MD;  Location: [facility] OR;  Service: Orthopedics;  Laterality: Right;    VASECTOMY  1986     Family History   Problem Relation Age of Onset    Diabetes Mother         Type 2    Hypertension Mother     Alzheimer's disease Mother     Lung cancer Father     Hypertension Father     Cancer Father     Prostate cancer Brother     Heart failure Brother     Heart attack Maternal Grandmother     Hypertension Maternal Grandfather     Prostate cancer Maternal Grandfather      Social History     Socioeconomic History    Marital status:    Tobacco Use    Smoking status: Never    Smokeless tobacco: Never   Vaping Use    Vaping status: Never Used   Substance and Sexual Activity    Alcohol use: Yes     Alcohol/week: 3.0 standard drinks of alcohol     Types: 3 Glasses of wine per week     Comment: occ    Drug use: Never    Sexual activity: Defer     Social Determinants of Health     Tobacco Use: Low Risk  (10/10/2024)    Patient History     Smoking Tobacco Use: Never     Smokeless Tobacco Use: Never   Alcohol Use: Unknown (9/22/2020)    Received from [facility]    AUDIT-C     Frequency of Alcohol Consumption: 4 or more times a week     Average Number of Drinks: 1 or 2   Depression: Not at risk (9/11/2024)    Received from [facility]    PHQ-2     PHQ-2 SCORE: 2       Review of Systems    Physical Exam:  /70   Pulse 88   Temp 36.7 °C (98 °F)   Resp 14   Wt 79.4 kg (175 lb)   SpO2 96%   BMI 22.78 kg/m²   Physical Exam  Vitals and nursing note reviewed.   Constitutional:       General: He is not in acute distress.     Appearance: He is well-developed. He is not diaphoretic.   HENT:      Head:  Normocephalic and atraumatic.      Nose: Nose normal.      Mouth/Throat:      Pharynx: No oropharyngeal exudate.   Eyes:      General: No scleral icterus.        Right eye: No discharge.         Left eye: No discharge.      Conjunctiva/sclera: Conjunctivae normal.      Pupils: Pupils are equal, round, and reactive to light.   Neck:      Thyroid: No thyromegaly.      Vascular: No JVD.      Trachea: No tracheal deviation.   Cardiovascular:      Rate and Rhythm: Normal rate and regular rhythm.      Heart sounds: Normal heart sounds. No murmur heard.     No friction rub. No gallop.   Pulmonary:      Effort: Pulmonary effort is normal. No respiratory distress.      Breath sounds: Normal breath sounds. No stridor. No wheezing or rales.   Abdominal:      General: Bowel sounds are normal. There is no distension.      Palpations: Abdomen is soft.      Tenderness: There is no abdominal tenderness.   Musculoskeletal:         General: No deformity. Normal range of motion.      Cervical back: Neck supple.   Skin:     General: Skin is warm and dry.      Findings: No rash.   Neurological:      Mental Status: He is alert and oriented to person, place, and time.   Psychiatric:         Behavior: Behavior normal.         Thought Content: Thought content normal.

## 2024-10-10 NOTE — LETTER
10/10/24      Pending sale to [facility]  [address] [place] [address]  [phone]  Dept: [phone]    Dear Emmett,     Below is a copy of today's assessment and plan.  If you have questions please call Leilani at [phone] or you may send us nonemergent messages through [employer].    ASSESSMENT  AND PLAN:    Surgical condition/surgery planned: Cleared for kyphoplasty  The patient's condition is optimized for surgery    Revised cardiac index score:  0 points, Class I Risk, 3.9 % 30-day risk of death, MI, or cardiac arrest  EKG today:  CBC today: Acceptable for surgery  CMP today: Acceptable for surgery  Hold  NSAIDS (aspirin, Aleve, Motrin, Advil, ibuprofen and Excedrin) 7 days prior to surgery  In the meantime, you may take Tylenol (acetaminophen) 1000 mg p.o. 3 times daily  Hold all medications the morning of surgery;   you may take your morning medications following surgery when you are able to eat/drink.    Pertinent medical problems:  Pression fracture,   kyphoplasty pending  Depression,   continue fluoxetine 40 mg daily  Hypercholesterolemia (triglycerides/LDL)   continue rosuvastatin and Tricor  Primary hypertension   continue valsartan 80 mg daily  Macrocytosis/Vitamin B12 deficiency   continue vitamin B12 injections  GERD with liquid dysphagia pharyngeal phase  Using as needed Zofran   continue famotidine twice daily  Continue Nexium but increase to 40 mg twice daily  Consult with your gastroenterologist about future studies including Bravo manometry EGD biopsy etc.  Right avoid eating and drinking 3 hours prior to lying flat  Lymphoplasmacytic lymphoma, AKA Waldenström's macroglobulinemia, mild kappa free light chain,   PET scan reveals no metabolic uptake/osseous lesions, following with oncology; follow-up after 11/29/2024  Following with Dr. Ruffin and Dr. Ruff  Exercise-induced fatigue with a strong family history of coronary artery disease (father and 2 brothers have had several MIs  "all before age 50)  Lexiscan Cardiolite stress test and echocardiogram stratification prior to surgery  Autoimmune hepatitis and primary biliary cholangitis,   normal CMP today    Diagnoses and all orders for this visit:    Exercise intolerance  -     NM Lexiscan myocardial perfusion imaging complete; Future  -     US Echo complete; Future    Family history of coronary artery disease in brother  -     NM Lexiscan myocardial perfusion imaging complete; Future  -     US Echo complete; Future    Family history of coronary artery disease in father  -     NM Lexiscan myocardial perfusion imaging complete; Future  -     US Echo complete; Future    Family history of heart failure  -     NM Lexiscan myocardial perfusion imaging complete; Future  -     US Echo complete; Future    Encounter for follow-up examination after completed treatment for malignant neoplasm  -     NM Lexiscan myocardial perfusion imaging complete; Future    Oropharyngeal dysphagia  -     esomeprazole (NexIUM) 40 mg capsule; Take 1 capsule (40 mg total) by mouth 2 (two) times a day \"If dysphagia does not improve in 3 months proceed with EGD/Bravo/manometry per gastroenterology\"    Mucositis  -     predniSONE (DELTASONE) 20 mg tablet; Take three tabs daily for three days then two daily for three days then 1 daily for three days then 1/2 daily for three days then stop        Artificial Intelligence generated Assessment/Plan       Lymphoplasmacytic Lymphoma (Waldenström macroglobulinemia)  Stable disease with no current need for treatment. Macrocytosis consistent with lymphoma.  -Continue monitoring labs as per oncology.    Vitamin B12 Deficiency  On B12 supplementation for over a year. Macrocytosis may improve with continued B12 supplementation.  -Continue B12 supplementation.    Gastroesophageal Reflux Disease (GERD)  On famotidine and Nexium 40mg daily. New onset dysphagia with liquids, high in the throat.  -Increase Nexium to 40mg twice " daily.  -Consider EGD if symptoms do not improve with increased Nexium dose.    Depression  On Fluoxetine.  -Continue Fluoxetine.    Hypercholesterolemia  On Rosuvastatin and Tricor.  -Continue Rosuvastatin and Tricor.    Primary Hypertension  On Valsartan.  -Continue Valsartan.    Preoperative Evaluation  Lumbar surgery scheduled in November. Labs and EKG normal. Reduced exercise tolerance due to fatigue, not chest pain. Family history of heart disease.  -Order Lexiscan Cardiolite stress test and echocardiogram to rule out cardiac cause of fatigue before surgery.  -Pending results cardiac clearance will be granted    Mucositis  Worsening symptoms, previously improved with prednisone.  -Start Prednisone 60mg taper over 10-12 days.    Osteoporosis Risk  History of compression fracture. Thin build and Waldenström macroglobulinemia increase risk.  -Ensure DEXA scan is performed annually.  Please talk to [place] about this at your next physical    Peripheral Neuropathy  Likely due to B12 deficiency.  -Continue B12 supplementation and monitor for improvement.    General Health Maintenance / Followup Plans  -Follow up with rheumatologist for potential cell-mediated rheumatological process.  -Follow up with gastroenterologist for GERD management and potential EGD.  -Check labs as per oncology for lymphoma monitoring.  -Follow up post-surgery for lumbar kyphoplasty.          This document was completed using a Dragon voice recognition device; sometimes words are not transcribed exactly as they were spoken    WAN HICKS MD  10/10/24

## 2024-10-11 PROCEDURE — 93010 ELECTROCARDIOGRAM REPORT: CPT | Performed by: INTERNAL MEDICINE

## 2024-10-15 ENCOUNTER — TELEPHONE (OUTPATIENT)
Dept: FAMILY MEDICINE | Facility: CLINIC | Age: 69
End: 2024-10-15
Payer: MEDICARE

## 2024-10-15 NOTE — TELEPHONE ENCOUNTER
Patient states he want to wait on doing to stress test until after his back is fixed. He asks that Dr Ghotra clear him for surgery and schedule the stress test after surgery.

## 2024-10-16 DIAGNOSIS — Z01.810 ENCOUNTER FOR PRE-OPERATIVE CARDIOVASCULAR CLEARANCE: Primary | ICD-10-CM

## 2024-10-16 NOTE — TELEPHONE ENCOUNTER
Sorry, I cannot clear him for surgery until he has the stress test.  If he would like a second opinion he could ask Dr. Haley who knows him well.

## 2024-10-17 DIAGNOSIS — Z01.810 ENCOUNTER FOR PRE-OPERATIVE CARDIOVASCULAR CLEARANCE: Primary | ICD-10-CM

## 2024-10-30 ENCOUNTER — OFFICE VISIT (OUTPATIENT)
Dept: URGENT CARE | Facility: CLINIC | Age: 69
End: 2024-10-30
Payer: MEDICARE

## 2024-10-30 ENCOUNTER — HOSPITAL ENCOUNTER (OUTPATIENT)
Dept: ULTRASOUND IMAGING | Facility: HOSPITAL | Age: 69
Discharge: HOME | End: 2024-10-30
Payer: MEDICARE

## 2024-10-30 VITALS
OXYGEN SATURATION: 95 % | DIASTOLIC BLOOD PRESSURE: 78 MMHG | HEART RATE: 88 BPM | TEMPERATURE: 98.1 F | BODY MASS INDEX: 22.12 KG/M2 | RESPIRATION RATE: 16 BRPM | SYSTOLIC BLOOD PRESSURE: 120 MMHG | WEIGHT: 170 LBS

## 2024-10-30 DIAGNOSIS — K13.70 ORAL LESION: Primary | ICD-10-CM

## 2024-10-30 DIAGNOSIS — K74.3 PRIMARY BILIARY CHOLANGITIS (CMS/HCC): ICD-10-CM

## 2024-10-30 LAB
HSV1 DNA # SPEC NAA+PROBE: NEGATIVE {COPIES}/ML
HSV2 DNA SPEC QL NAA+PROBE: NEGATIVE

## 2024-10-30 PROCEDURE — 99213 OFFICE O/P EST LOW 20 MIN: CPT | Performed by: NURSE PRACTITIONER

## 2024-10-30 PROCEDURE — G0463 HOSPITAL OUTPT CLINIC VISIT: HCPCS | Performed by: NURSE PRACTITIONER

## 2024-10-30 PROCEDURE — 87077 CULTURE AEROBIC IDENTIFY: CPT | Performed by: NURSE PRACTITIONER

## 2024-10-30 PROCEDURE — 76700 US EXAM ABDOM COMPLETE: CPT | Mod: 26 | Performed by: RADIOLOGY

## 2024-10-30 PROCEDURE — 76700 US EXAM ABDOM COMPLETE: CPT

## 2024-10-30 PROCEDURE — 87529 HSV DNA AMP PROBE: CPT | Performed by: NURSE PRACTITIONER

## 2024-10-30 ASSESSMENT — ENCOUNTER SYMPTOMS
UNUSUAL HAIR DISTRIBUTION: 0
HEMOPTYSIS: 0
MEMORY LOSS: 0
MYALGIAS: 0
VOMITING: 0
FATIGUE: 0
UNUSUAL HAIR DISTRIBUTION: 0
DYSURIA: 0
ODYNOPHAGIA: 0
POLYDIPSIA: 0
FALLS: 1
JOINT SWELLING: 0
VISUAL CHANGE: 0
FALLS: 1
HEMATOCHEZIA: 0
SENSORY CHANGE: 0
HEADACHES: 0
WEAKNESS: 0
DECREASED APPETITE: 1
IRREGULAR HEARTBEAT: 0
LOSS OF BALANCE: 1
CHILLS: 0
IRREGULAR HEARTBEAT: 0
JOINT SWELLING: 0
DIZZINESS: 0
DEPRESSION: 0
FEVER: 0
MYALGIAS: 0
VOMITING: 0
SEIZURES: 0
CLAUDICATION: 0
SPUTUM PRODUCTION: 0
WEAKNESS: 0
HEMATURIA: 0
SHORTNESS OF BREATH: 0
NUMBNESS: 0
DECREASED APPETITE: 1
TROUBLE SWALLOWING: 0
LIGHT-HEADEDNESS: 0
ALTERED MENTAL STATUS: 0
HEMATURIA: 0
CLAUDICATION: 0
VISUAL HALOS: 0
ORTHOPNEA: 0
BLACKOUTS: 0
TREMORS: 0
TREMORS: 0
BRUISES/BLEEDS EASILY: 1
HALLUCINATIONS: 0
NAUSEA: 0
DIAPHORESIS: 0
PERSISTENT INFECTIONS: 0
DIARRHEA: 0
COLOR CHANGE: 0
CHEST TIGHTNESS: 0
SORE THROAT: 0
SNORING: 0
BRUISES/BLEEDS EASILY: 1
DECREASED LIBIDO: 0
LOSS OF BALANCE: 1
ORTHOPNEA: 0
DIZZINESS: 0
POLYDIPSIA: 0
COLOR CHANGE: 0
DIARRHEA: 0
POOR WOUND HEALING: 0
SHORTNESS OF BREATH: 0
HEMOPTYSIS: 0
SUSPICIOUS LESIONS: 0
TROUBLE SWALLOWING: 0
PND: 0
COUGH: 0
BACK PAIN: 1
NIGHT SWEATS: 0
BACK PAIN: 1
CHOKING: 0
APPETITE CHANGE: 1
SYNCOPE: 0
HEMATOCHEZIA: 0
WEIGHT LOSS: 1
NAUSEA: 0
LIGHT-HEADEDNESS: 0
WEIGHT LOSS: 1
SINUS PAIN: 0
FOCAL WEAKNESS: 0
DOUBLE VISION: 0
SPUTUM PRODUCTION: 0
SINUS PRESSURE: 0
DIARRHEA: 0
BLACKOUTS: 0
DECREASED LIBIDO: 0
NAUSEA: 0
TROUBLE SWALLOWING: 0
SLEEP DISTURBANCES DUE TO BREATHING: 0
SUSPICIOUS LESIONS: 0
PERSISTENT INFECTIONS: 0
ABDOMINAL PAIN: 0
NEAR-SYNCOPE: 0
CHILLS: 0
VISUAL CHANGE: 0
SENSORY CHANGE: 0
NECK STIFFNESS: 0
WEIGHT GAIN: 0
POOR WOUND HEALING: 0
NECK PAIN: 0
SYNCOPE: 0
NERVOUS/ANXIOUS: 0
PND: 0
NUMBNESS: 0
PALPITATIONS: 0
DEPRESSION: 0
DYSURIA: 0
SORE THROAT: 0
MEMORY LOSS: 0
SHORTNESS OF BREATH: 0
PALPITATIONS: 0
SNORING: 0
VOMITING: 0
SLEEP DISTURBANCES DUE TO BREATHING: 0
ACTIVITY CHANGE: 0
ALTERED MENTAL STATUS: 0
FOCAL WEAKNESS: 0
FEVER: 0
DOUBLE VISION: 0
VISUAL HALOS: 0
NERVOUS/ANXIOUS: 0
HEADACHES: 0
HALLUCINATIONS: 0
NEAR-SYNCOPE: 0

## 2024-10-30 NOTE — PROGRESS NOTES
"Subjective      Juan Diego Rondon is a 69 y.o. male who presents for sores in his mouth.  Have been present on and off for about a year.  He has been prescribed a steroid mouthwash he says he has also had a round of tapered prednisone which both have been effective for pain related to the sores in his mouth.  He denies lesions to his groin or genital area.  He states that his sexual partners do not have lesions to the genital area or mouth lesions.  He has visited with periodontist for this as well.  He was diagnosed with gingivitis.      HPI    The following have been reviewed and updated as appropriate in this visit:   Allergies         No Known Allergies  Current Outpatient Medications   Medication Sig Dispense Refill    HYDROcodone-acetaminophen (NORCO) 7.5-325 mg per tablet Take 1 tablet by mouth 3 (three) times a day      esomeprazole (NexIUM) 40 mg capsule Take 1 capsule (40 mg total) by mouth 2 (two) times a day \"If dysphagia does not improve in 3 months proceed with EGD/Bravo/manometry per gastroenterology\" 60 capsule 11    valsartan (DIOVAN) 80 mg tablet Take 1 tablet (80 mg total) by mouth daily 90 tablet 4    FLUoxetine (PROzac) 40 mg capsule Take 1 capsule (40 mg total) by mouth daily 90 capsule 3    folic acid 1 mg tablet Take 1 tablet (1 mg total) by mouth daily 90 tablet 3    cyanocobalamin 1,000 mcg/mL injection Inject 1 mL (1,000 mcg total) into the shoulder, thigh, or buttocks every 30 (thirty) days 1 mL 11    hydrocortisone 2.5 % cream Apply topically 2 (two) times a day as needed for rash 30 g 0    aspirin 81 mg EC tablet Take 1 tablet (81 mg total) by mouth      rosuvastatin (CRESTOR) 10 mg tablet Take 1 tablet by mouth daily 90 tablet 4    famotidine (PEPCID) 20 mg tablet Take 1 tablet (20 mg total) by mouth 2 (two) times a day      acetaminophen (TYLENOL) 500 mg tablet Take 1 tablet (500 mg total) by mouth every 6 (six) hours as needed for pain scale 1-3/10      ursodioL (ACTIGALL) 500 mg " tablet Take 1 tablet (500 mg total) by mouth 2 times daily Every other day      ursodioL (AMANDEEP) 250 mg tablet Take 3 tablets (750 mg total) by mouth In am and 500 mg in pm every other day-alternate with the 500 mg bid      magic mouthwash (maalox/lidocaine/diphenhydramine/prednisolone/water) Swish and spit 5 mL every 4 (four) hours as needed for mouth pain 100 mL 0    predniSONE (DELTASONE) 20 mg tablet Take three tabs daily for three days then two daily for three days then 1 daily for three days then 1/2 daily for three days then stop (Patient not taking: Reported on 10/30/2024) 21 tablet 0    fenofibrate (TRICOR) 48 mg tablet Take 1 tablet (48 mg total) by mouth daily 90 tablet 4     No current facility-administered medications for this visit.     Past Medical History:   Diagnosis Date    Allergic eosinophilic esophagitis     Allergic rhinitis 1990    Anemia 2019    Arthritis     Autoimmune hepatitis (CMS/HCC)     Blood disorder     pernicious anemia    Cataract 2001    Depression 08/08/2022    Gastrointestinal disease     IBS    Gout     Hyperlipidemia     Hypertension     Lymphoplasmacytic leukemia (CMS/HCC)     Melanoma (CMS/HCC)     Neurologic disorder     Pneumothorax 08/07/2022    Primary biliary cholangitis (CMS/HCC)     autoimmune hepatitis    Psychiatric illness     anxiety    Skin abnormalities 2021     Past Surgical History:   Procedure Laterality Date    APPENDECTOMY      COLONOSCOPY W/ BIOPSIES AND POLYPECTOMY  02/09/2009    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    CT BIOPSY LIVER FUNCTION  12/02/2021    CT BIOPSY LIVER FUNCTION 12/2/2021 [facility] MIS CT SCAN    ESOPHAGOGASTRODUODENOSCOPY N/A 05/16/2022    Procedure: ESOPHAGOGASTRODUODENOSCOPY with Biopsies;  Surgeon: Bradley Cook MD;  Location: [facility] Endoscopy;  Service: Endoscopy;  Laterality: N/A;    EYE SURGERY Bilateral     cataract    KNEE SURGERY Bilateral     3 surgeries on left and 2 on right knees - prior open medial meniscectomies  bilateral knees in the 1970s    ORTHOPEDIC SURGERY  1972    SKIN BIOPSY      SKIN CANCER EXCISION Left 10/15/2020    Melanoma removed from left forearm    TONSILLECTOMY      TOTAL HIP ARTHROPLASTY Left 04/21/2021    Procedure: LEFT TOTAL HIP ARTHROPLASTY;  Surgeon: Sj Reyes MD;  Location: [facility] OR;  Service: Orthopedics;  Laterality: Left;    TOTAL HIP ARTHROPLASTY Right 03/22/2022    Procedure: RIGHT TOTAL HIP ARTHROPLASTY POSTERIOR;  Surgeon: Sj Reyes MD;  Location: [facility] OR;  Service: Orthopedics;  Laterality: Right;    VASECTOMY  1986     Family History   Problem Relation Age of Onset    Diabetes Mother         Type 2    Hypertension Mother     Alzheimer's disease Mother     Lung cancer Father     Hypertension Father     Cancer Father     Prostate cancer Brother     Heart failure Brother     Heart attack Maternal Grandmother     Hypertension Maternal Grandfather     Prostate cancer Maternal Grandfather      Social History     Socioeconomic History    Marital status:    Tobacco Use    Smoking status: Never    Smokeless tobacco: Never   Vaping Use    Vaping status: Never Used   Substance and Sexual Activity    Alcohol use: Yes     Alcohol/week: 3.0 standard drinks of alcohol     Types: 3 Glasses of wine per week     Comment: occ    Drug use: Never    Sexual activity: Defer     Social Determinants of Health     Tobacco Use: Low Risk  (10/10/2024)    Patient History     Smoking Tobacco Use: Never     Smokeless Tobacco Use: Never   Alcohol Use: Unknown (9/22/2020)    Received from [facility]    AUDIT-C     Frequency of Alcohol Consumption: 4 or more times a week     Average Number of Drinks: 1 or 2   Depression: Not at risk (9/11/2024)    Received from [facility]    PHQ-2     PHQ-2 SCORE: 2       Review of Systems   Constitutional:  Positive for appetite change. Negative for activity change, chills, fatigue and fever.        Appetite change secondary to mouth pain   HENT:   Positive for mouth sores. Negative for congestion, ear pain, sinus pressure, sinus pain, sore throat and trouble swallowing.    Respiratory:  Negative for cough, choking, chest tightness and shortness of breath.    Cardiovascular:  Negative for chest pain.   Gastrointestinal:  Negative for abdominal pain, diarrhea, nausea and vomiting.   Genitourinary:  Negative for genital sores.   Musculoskeletal:  Negative for neck pain and neck stiffness.       Objective   /78 (BP Location: Left arm, Patient Position: Sitting)   Pulse 88   Temp 36.7 °C (98.1 °F) (Temporal)   Resp 16   Wt 77.1 kg (170 lb)   SpO2 95%   BMI 22.12 kg/m²     Physical Exam  Constitutional:       General: He is not in acute distress.     Appearance: Normal appearance. He is not ill-appearing or toxic-appearing.   HENT:      Head: Normocephalic.      Mouth/Throat:      Mouth: Mucous membranes are moist.      Pharynx: No oropharyngeal exudate or posterior oropharyngeal erythema.      Comments: Oral cavity: Right buccal area 2 lesion lesions within close proximity.  Round.  Approximately 2 mm in diameter.  Slightly raised.  Erythematous.  Ulcerated.  No surrounding erythema.  Informed consent was obtained.  Viral PCR swab taken.  Bacterial culture swab taken  Tongue: Several lesions along the outer margin of the tongue.  Raised, round, erythematous, few with white centers.  Approximately 2 mm in diameter.    Left buccal area: Few scattered lesions.  Round.  2 mm in diameter.  Slightly raised.  Erythematous.  Cardiovascular:      Rate and Rhythm: Normal rate and regular rhythm.      Heart sounds: Normal heart sounds. No murmur heard.  Pulmonary:      Effort: Pulmonary effort is normal. No respiratory distress.      Breath sounds: Normal breath sounds.   Musculoskeletal:      Cervical back: Normal range of motion and neck supple. No rigidity.   Skin:     General: Skin is warm and dry.   Neurological:      General: No focal deficit present.       Mental Status: He is alert.   Psychiatric:         Mood and Affect: Mood normal.         No results found for this or any previous visit (from the past 24 hour(s)).    Assessment/Plan   Diagnoses and all orders for this visit:    Oral lesion  -     magic mouthwash (maalox/lidocaine/diphenhydramine/prednisolone/water); Swish and spit 5 mL every 4 (four) hours as needed for mouth pain  -     Ambulatory referral to Dermatology; Future  -     Wound culture w/stain  -     Herpes Simplex Virus (HSV), Molecular Detection, PCR Swab      Patient presents since for mouth lesions that have been waxing waning over the course of a year.  Magic mouthwash has been effective in the past for him.  His last taper dose of prednisone was about 1 month ago.  Discussed with him that we should hold off on this for now.  We did provide him with Magic mouthwash and directions for its use.  Provided him with a referral to dermatology for further assessment and treatment as indicated.  Offered and he declined gabapentin for pain relief.  He says that he does not like the way it makes him feel.  Discussed with him valacyclovir as well.  He says he would wait on the culture results first.  Patient acknowledged understanding and acceptance for this care plan.  There are no Patient Instructions on file for this visit.    Marbella Garza, CNP

## 2024-10-31 ENCOUNTER — ANCILLARY PROCEDURE (OUTPATIENT)
Dept: CARDIOLOGY | Facility: CLINIC | Age: 69
End: 2024-10-31
Payer: MEDICARE

## 2024-10-31 VITALS — HEIGHT: 73 IN | WEIGHT: 166.45 LBS | BODY MASS INDEX: 22.06 KG/M2

## 2024-10-31 DIAGNOSIS — K75.4 AUTOIMMUNE HEPATITIS (CMS/HCC): ICD-10-CM

## 2024-10-31 DIAGNOSIS — Z01.810 ENCOUNTER FOR PRE-OPERATIVE CARDIOVASCULAR CLEARANCE: ICD-10-CM

## 2024-10-31 DIAGNOSIS — R68.89 EXERCISE INTOLERANCE: ICD-10-CM

## 2024-10-31 DIAGNOSIS — C88.00 WALDENSTROM'S MACROGLOBULINEMIA: ICD-10-CM

## 2024-10-31 DIAGNOSIS — Z82.49 FAMILY HISTORY OF CORONARY ARTERY DISEASE IN FATHER: ICD-10-CM

## 2024-10-31 DIAGNOSIS — Z82.49 FAMILY HISTORY OF CORONARY ARTERY DISEASE IN BROTHER: ICD-10-CM

## 2024-10-31 DIAGNOSIS — Z82.49 FAMILY HISTORY OF HEART FAILURE: ICD-10-CM

## 2024-10-31 LAB
ASCENDING AORTA: 3.23 CM
AV LVOT PEAK GRADIENT: 5.3 MMHG
AV MEAN GRADIENT: 3.78 MMHG
AV PEAK GRADIENT: 6.97 MMHG
BSA FOR ECHO PROCEDURE: 2.02 M2
DOP CALC AO PEAK VEL: 1.32 M/S
DOP CALC AO VTI: 25.3 CM
DOP CALC LVOT DIAMETER: 2.38 CM
DOP CALC LVOT STROKE VOLUME: 107 CM3
DOP CALC MV VTI: 20.98 CM
DOP CALC RVOT PEAK VEL: 0.5 M/S
DOP CALCLVOT PEAK VEL VTI: 24 CM
E/A RATIO: 1
E/E' RATIO (AVERAGE): 10.5
E/E' RATIO: 13.1
EJECTION FRACTION: 67 %
INTERVENTRICULAR SEPTUM: 1 CM (ref 0.6–1.1)
IVC PROX: 1.71 CM
LA AREA A4C SYSTOLE: 41.6 CM3
LEFT ATRIUM SIZE: 4.1 CM
LEFT ATRIUM VOLUME INDEX: 22 ML/M2
LEFT ATRIUM VOLUME: 44.9 CM3
LEFT INTERNAL DIMENSION IN SYSTOLE: 2.5 CM (ref 2.1–4)
LEFT VENTRICLE DIASTOLIC VOLUME: 103 CM3
LEFT VENTRICLE SYSTOLIC VOLUME: 34 CM3
LEFT VENTRICULAR INTERNAL DIMENSION IN DIASTOLE: 4.6 CM (ref 3.5–6)
LVAD-AP2: 31.4 CM2
ML OF DILUTED DEFINITY INJECTED: 8 ML
MV DEC SLOPE: 1900 MM/S2
MV DT: 224 MS
MV MEAN GRADIENT: 1.2 MMHG
MV PEAK A VEL: 81.7 CM/S
MV PEAK E VEL: 78.4 CM/S
MV PEAK GRADIENT: 4 MMHG
MV STENOSIS PRESSURE HALF TIME: 108 MS
MV VALVE AREA P 1/2 METHOD: 2.04 CM2
MV VMAX: 94 CM/S
MVA (VTI): 5.09 CM2
POSTERIOR WALL: 1.1 CM (ref 0.6–1.1)
PULM VEIN S/D RATIO: 1.7
PV PEAK D VEL: 26 CM/S
PV PEAK GRADIENT: 3.31 MMHG
PV PEAK S VEL: 44 CM/S
PV VMAX: 0.91 M/S
RA AREA: 11.2 CM2
RH CV ECHO AV VALVE AREA VEL: 3.9 CM2
RH CV ECHO AV VALVE AREA VTI: 4.2 CM2
RH LVOT PEAK VELOCITY REST: 1.2 M/S
RIGHT VENTRICULAR INTERNAL DIMENSION IN DIASTOLE: 3.3 CM
RV AP4 BASE: 2.1 CM
STRESS BASELINE BP: NORMAL MMHG
STRESS BASELINE HR: 78 BPM
STRESS O2 SAT REST: 98 %
STRESS PERCENT HR: 87 %
STRESS POST O2 SAT PEAK: 95 %
STRESS POST PEAK BP: NORMAL MMHG
STRESS POST PEAK HR: 131 BPM
STRESS TARGET HR: 128 BPM
TDI: 6 CM/S
TDILATERAL: 10.1 CM/S
TRICUSPID ANNULAR PLANE SYSTOLIC EXCURSION: 1.8 CM

## 2024-10-31 PROCEDURE — 93352 ADMIN ECG CONTRAST AGENT: CPT | Performed by: INTERNAL MEDICINE

## 2024-10-31 PROCEDURE — 6360000200 HC RX 636 W HCPCS (ALT 250 FOR IP): Performed by: FAMILY MEDICINE

## 2024-10-31 PROCEDURE — 93306 TTE W/DOPPLER COMPLETE: CPT | Mod: 59

## 2024-10-31 PROCEDURE — C8930 TTE W OR W/O CONTR, CONT ECG: HCPCS

## 2024-10-31 PROCEDURE — 93351 STRESS TTE COMPLETE: CPT

## 2024-10-31 PROCEDURE — 93351 STRESS TTE COMPLETE: CPT | Mod: 26 | Performed by: INTERNAL MEDICINE

## 2024-10-31 PROCEDURE — 2550000100 HC RX 255: Performed by: FAMILY MEDICINE

## 2024-10-31 RX ORDER — OMEPRAZOLE 40 MG/1
1 CAPSULE, DELAYED RELEASE ORAL DAILY
COMMUNITY
Start: 2024-06-18

## 2024-10-31 RX ORDER — DOBUTAMINE HYDROCHLORIDE 200 MG/100ML
2.5-4 INJECTION INTRAVENOUS
Status: SHIPPED | OUTPATIENT
Start: 2024-10-31

## 2024-10-31 RX ORDER — ONDANSETRON 4 MG/1
4 TABLET, ORALLY DISINTEGRATING ORAL EVERY 8 HOURS PRN
COMMUNITY
End: 2024-12-02 | Stop reason: SDUPTHER

## 2024-10-31 RX ORDER — ATROPINE SULFATE 0.1 MG/ML
0.2 INJECTION INTRAVENOUS ONCE
Status: COMPLETED | OUTPATIENT
Start: 2024-10-31 | End: 2024-10-31

## 2024-10-31 RX ORDER — CLOTRIMAZOLE 10 MG/1
10 LOZENGE ORAL 3 TIMES DAILY
COMMUNITY
Start: 2024-08-14 | End: 2024-12-02 | Stop reason: ALTCHOICE

## 2024-10-31 RX ADMIN — PERFLUTREN 6 ML: 6.52 INJECTION, SUSPENSION INTRAVENOUS at 09:06

## 2024-10-31 RX ADMIN — ATROPINE SULFATE 0.02 MG: 0.1 INJECTION INTRAVENOUS at 09:12

## 2024-10-31 RX ADMIN — DOBUTAMINE HYDROCHLORIDE 40 MCG/KG/MIN: 200 INJECTION INTRAVENOUS at 09:00

## 2024-10-31 NOTE — LETTER
Stress Echocardiogram Test Patient Prep    Juan Diego your provider has scheduled you for a Dobutamine Stress Echocardiogram at [facility] on Thursday, October 31  at 8:30 AM.      When you arrive please enter the [facility] [place] through the [address] Entrance of the [facility], then go to the 4th floor to check in.    This test will only be accurate and safe if you follow these instructions. If you do not follow these instructions, your test may be cancelled and have to be rescheduled.    1. Do NOT Eat or Drink anything but water for 4 hours before your appointment.     2. Do NOT have any Caffeine or Chocolate 12 hours before your appointment. This includes regular and decaffeinated coffee or tea, soda, energy drinks or energy bars, chocolate drinks or candy, or medications with caffeine (for example, Excedrin).     3. Medication:     Hold the following medication(s) the day of the test: None  Hold the following medication(s) 24 hours before the test: None  Hold the following medication(s) 48 hours before the test: None    Please bring a current medication list (prescription and non-prescription medicines, herbals, or supplements).    We encourage you to take your other medications as usual prior to your test.     If you take medications for diabetes, we recommend the following to avoid a low blood sugar from fasting:     Hold your oral diabetes medication(s) the morning of the test.     Hold your fast acting insulin or take only ½ of your dose if you are allowed breakfast.     If you take long-acting insulin in the morning, take only 1/2 of your dose the morning of the test.     Please bring a dose of the medications you are to hold so you can take them when you are done with your test.     4. The Stress Test / Echocardiogram:     You will be connected to our EKG monitor and your blood pressure will be checked.  The Echocardiogram tech will perform an  echocardiogram (ultrasound of your heart).  For your test, one of the following applies:    [x]    Your provider would like us to give you medication through your IV to increase blood flow to your heart and increase your heart rate.  You will not be walking on the treadmill. Please call [phone] to confirm appointment, medications, allergies, instructions, location and have an illness screening done. Thank you.

## 2024-10-31 NOTE — RESULT ENCOUNTER NOTE
"Phoned Pt.  He states \"he received the message from Marbella and wanted her to know that he really appreciated her help\".  He will await the remainder of his test results.  He does have an appt with Derm scheduled for Jan 2025.   "

## 2024-11-01 ENCOUNTER — TELEPHONE (OUTPATIENT)
Dept: FAMILY MEDICINE | Facility: CLINIC | Age: 69
End: 2024-11-01
Payer: MEDICARE

## 2024-11-01 LAB
AO SV INDEX: 53 ML/MIN/M2
ASCENDING AORTA: 3.2 CM
AV MEAN GRADIENT: 4 MMHG
AV PEAK GRADIENT: 7 MMHG
BACTERIA WND CULT: ABNORMAL
BSA FOR ECHO PROCEDURE: 2.02 M2
DOP CALC AO PEAK VEL: 1.3 M/S
DOP CALC AO VTI: 25.3 CM
DOP CALC LVOT AREA: 4.5 CM2
DOP CALC LVOT DIAMETER: 2.4 CM
DOP CALC LVOT STROKE VOLUME: 107 CM3
DOP CALC MV VTI: 21 CM
DOP CALC RVOT PEAK VEL: 0.5 M/S
DOP CALCLVOT PEAK VEL VTI: 24 CM
E/A RATIO: 1
ECHO EF ESTIMATED: 65 %
EJECTION FRACTION: 67 %
GRAM STN SPEC: ABNORMAL
GRAM STN SPEC: ABNORMAL
INTERVENTRICULAR SEPTUM: 1 CM (ref 0.6–1.1)
IVC PROX: 1.7 CM
LEFT ATRIUM SIZE: 4.1 CM
LEFT ATRIUM VOLUME INDEX: 22 ML/M2
LEFT ATRIUM VOLUME: 42 CM3
LEFT INTERNAL DIMENSION IN SYSTOLE: 2.5 CM (ref 2.1–4)
LEFT VENTRICLE DIASTOLIC VOLUME: 100 ML
LEFT VENTRICLE SYSTOLIC VOLUME: 33 ML
LEFT VENTRICULAR INTERNAL DIMENSION IN DIASTOLE: 4.6 CM (ref 3.5–6)
MV DT: 224 MS
MV MEAN GRADIENT: 1.2 MMHG
MV PEAK A VEL: 81.7 M/S
MV PEAK E VEL: 78.4 M/S
MV STENOSIS PRESSURE HALF TIME: 108 MS
MV VALVE AREA P 1/2 METHOD: 2 CM2
POSTERIOR WALL: 1.1 CM (ref 0.6–1.1)
PULM VEIN S/D RATIO: 1.7
PV PEAK GRADIENT: 1 MMHG
RA AREA: 11.2 CM2
RH CV ECHO AV VALVE AREA VEL: 3.9 CM2
RH CV ECHO AV VALVE AREA VTI: 4.2 CM2
RH LVOT PEAK VELOCITY REST: 1.2 M/S
RIGHT VENTRICULAR INTERNAL DIMENSION IN DIASTOLE: 3.3 CM
RV AP4 BASE: 2.1 CM
S': 13.1 CM/S
TDI: 5.6 M/S
TDILATERAL: 10
TRICUSPID ANNULAR PLANE SYSTOLIC EXCURSION: 1.8 CM

## 2024-11-01 PROCEDURE — 93306 TTE W/DOPPLER COMPLETE: CPT | Mod: 26,59 | Performed by: INTERNAL MEDICINE

## 2024-11-01 NOTE — TELEPHONE ENCOUNTER
Patient is calling to have a call back in regards to his bacteria culture on his visit In [facility] on 10/30/24. He said he had seen the results and had some questions in regards to it.

## 2024-11-02 DIAGNOSIS — K13.70 LESION OF MOUTH: Primary | ICD-10-CM

## 2024-11-02 LAB
BACTERIA ISLT CULT: ABNORMAL
BACTERIA ISLT CULT: ABNORMAL

## 2024-11-02 RX ORDER — DOXYCYCLINE 100 MG/1
100 CAPSULE ORAL 2 TIMES DAILY
Qty: 20 CAPSULE | Refills: 0 | Status: SHIPPED | OUTPATIENT
Start: 2024-11-02 | End: 2024-11-12

## 2024-11-04 NOTE — RESULT ENCOUNTER NOTE
I believe you may have called Emmett with his results which are reassuring.  I made changes to his preoperative examination on Friday

## 2024-11-15 ENCOUNTER — TELEPHONE (OUTPATIENT)
Dept: ONCOLOGY | Facility: CLINIC | Age: 69
End: 2024-11-15
Payer: MEDICARE

## 2024-11-15 ENCOUNTER — LAB (OUTPATIENT)
Dept: LAB | Facility: HOSPITAL | Age: 69
End: 2024-11-15
Payer: MEDICARE

## 2024-11-15 DIAGNOSIS — D64.9 ANEMIA, UNSPECIFIED TYPE: ICD-10-CM

## 2024-11-15 DIAGNOSIS — K74.3 PRIMARY BILIARY CIRRHOSIS (CMS/HCC): ICD-10-CM

## 2024-11-15 LAB
AFP SERPL-MCNC: 3 NG/ML
ALBUMIN SERPL-MCNC: 3.8 G/DL (ref 3.5–5.3)
ALP SERPL-CCNC: 96 U/L (ref 45–115)
ALT SERPL-CCNC: 10 U/L (ref 7–52)
AST SERPL-CCNC: 17 U/L
BASOPHILS # BLD AUTO: 0 10*3/UL
BASOPHILS NFR BLD AUTO: 0.7 % (ref 0–2)
BILIRUB DIRECT SERPL-MCNC: 0.16 MG/DL
BILIRUB SERPL-MCNC: 0.63 MG/DL (ref 0.2–1.4)
EOSINOPHIL # BLD AUTO: 0 10*3/UL
EOSINOPHIL NFR BLD AUTO: 0.8 % (ref 0–3)
ERYTHROCYTE [DISTWIDTH] IN BLOOD BY AUTOMATED COUNT: 14.2 % (ref 11.5–15)
GGT SERPL-CCNC: 84 U/L (ref 9–64)
HCT VFR BLD AUTO: 41.2 % (ref 38–50)
HGB BLD-MCNC: 13.9 G/DL (ref 13.2–17.2)
INR BLD: 1
LYMPHOCYTES # BLD AUTO: 1.3 10*3/UL
LYMPHOCYTES NFR BLD AUTO: 22.9 % (ref 15–47)
MACROCYTOSIS PRESENCE IN BLOOD, ANALYZER: ABNORMAL
MCH RBC QN AUTO: 34.6 PG (ref 29–34)
MCHC RBC AUTO-ENTMCNC: 33.7 G/DL (ref 32–36)
MCV RBC AUTO: 102.6 FL (ref 82–97)
MONOCYTES # BLD AUTO: 0.6 10*3/UL
MONOCYTES NFR BLD AUTO: 10.3 % (ref 5–13)
NEUTROPHILS # BLD AUTO: 3.6 10*3/UL
NEUTROPHILS NFR BLD AUTO: 65.3 % (ref 46–70)
PLATELET # BLD AUTO: 245 10*3/UL (ref 130–350)
PMV BLD AUTO: 7.2 FL (ref 6.9–10.8)
PROT SERPL-MCNC: 6.6 G/DL (ref 6–8.3)
PROTHROMBIN TIME: 10.9 SECONDS (ref 9.4–12.5)
RBC # BLD AUTO: 4.02 10*6/UL (ref 4.1–5.8)
WBC # BLD AUTO: 5.5 10*3/UL (ref 3.7–9.6)

## 2024-11-15 PROCEDURE — 82977 ASSAY OF GGT: CPT

## 2024-11-15 PROCEDURE — 82105 ALPHA-FETOPROTEIN SERUM: CPT | Mod: GZ

## 2024-11-15 PROCEDURE — 85025 COMPLETE CBC W/AUTO DIFF WBC: CPT

## 2024-11-15 PROCEDURE — 85610 PROTHROMBIN TIME: CPT

## 2024-11-15 PROCEDURE — 80076 HEPATIC FUNCTION PANEL: CPT

## 2024-11-15 PROCEDURE — 36415 COLL VENOUS BLD VENIPUNCTURE: CPT

## 2024-11-16 ENCOUNTER — LAB (OUTPATIENT)
Dept: LAB | Facility: HOSPITAL | Age: 69
End: 2024-11-16
Payer: MEDICARE

## 2024-11-16 DIAGNOSIS — C83.00 LYMPHOPLASMACYTIC LYMPHOMA (CMS/HCC): ICD-10-CM

## 2024-11-16 LAB
ALBUMIN SERPL-MCNC: 3.9 G/DL (ref 3.5–5.3)
ALP SERPL-CCNC: 94 U/L (ref 45–115)
ALT SERPL-CCNC: 10 U/L (ref 7–52)
ANION GAP SERPL CALC-SCNC: 12 MMOL/L (ref 3–11)
AST SERPL-CCNC: 16 U/L
BILIRUB SERPL-MCNC: 0.81 MG/DL (ref 0.2–1.4)
BUN SERPL-MCNC: 6 MG/DL (ref 7–25)
CALCIUM ALBUM COR SERPL-MCNC: 9.4 MG/DL (ref 8.6–10.3)
CALCIUM SERPL-MCNC: 9.3 MG/DL (ref 8.6–10.3)
CHLORIDE SERPL-SCNC: 105 MMOL/L (ref 98–107)
CO2 SERPL-SCNC: 24 MMOL/L (ref 21–32)
CREAT SERPL-MCNC: 0.59 MG/DL (ref 0.7–1.3)
EGFRCR SERPLBLD CKD-EPI 2021: 104 ML/MIN/1.73M*2
GLUCOSE SERPL-MCNC: 89 MG/DL (ref 70–105)
IGM SERPL-MCNC: 1123 MG/DL (ref 40–230)
POTASSIUM SERPL-SCNC: 3.5 MMOL/L (ref 3.5–5.1)
PROT SERPL-MCNC: 6.7 G/DL (ref 6–8.3)
SODIUM SERPL-SCNC: 141 MMOL/L (ref 135–145)

## 2024-11-16 PROCEDURE — 80053 COMPREHEN METABOLIC PANEL: CPT

## 2024-11-16 PROCEDURE — 82784 ASSAY IGA/IGD/IGG/IGM EACH: CPT

## 2024-11-16 PROCEDURE — 36415 COLL VENOUS BLD VENIPUNCTURE: CPT

## 2024-11-16 PROCEDURE — 83521 IG LIGHT CHAINS FREE EACH: CPT

## 2024-11-19 LAB
KAPPA LC FREE SERPL-MCNC: 2.46 MG/DL (ref 0.33–1.94)
KAPPA LC FREE/LAMBDA FREE SERPL: 2.62 {RATIO} (ref 0.26–1.65)
LAMBDA LC FREE SERPL-MCNC: 0.94 MG/DL (ref 0.57–2.63)

## 2024-11-21 ENCOUNTER — OFFICE VISIT (OUTPATIENT)
Dept: ONCOLOGY | Facility: CLINIC | Age: 69
End: 2024-11-21
Payer: MEDICARE

## 2024-11-21 VITALS
DIASTOLIC BLOOD PRESSURE: 86 MMHG | OXYGEN SATURATION: 96 % | HEIGHT: 74 IN | BODY MASS INDEX: 22.09 KG/M2 | TEMPERATURE: 97.4 F | SYSTOLIC BLOOD PRESSURE: 137 MMHG | WEIGHT: 172.1 LBS | HEART RATE: 75 BPM

## 2024-11-21 DIAGNOSIS — C83.00 LYMPHOPLASMACYTIC LYMPHOMA (CMS/HCC): ICD-10-CM

## 2024-11-21 DIAGNOSIS — K12.30 MUCOSITIS: Primary | ICD-10-CM

## 2024-11-21 LAB
IGA SERPL-MCNC: 86 MG/DL (ref 61–356)
IGG SERPL-MCNC: 530 MG/DL (ref 767–1590)
IGM SERPL-MCNC: 1110 MG/DL (ref 37–286)
IMMUNOLOGIST REVIEW: ABNORMAL
M PROTEIN SERPL QL: POSITIVE
M-PROTEIN MK(REF): 0.97 G/DL
PROTEIN GLYCOSYLATED SERPL QL: YES
TYPE OF THERAP AB ADMINISTERED: NO

## 2024-11-21 PROCEDURE — 99214 OFFICE O/P EST MOD 30 MIN: CPT | Performed by: INTERNAL MEDICINE

## 2024-11-21 PROCEDURE — G2211 COMPLEX E/M VISIT ADD ON: HCPCS | Performed by: INTERNAL MEDICINE

## 2024-11-21 PROCEDURE — G0463 HOSPITAL OUTPT CLINIC VISIT: HCPCS

## 2024-11-21 RX ORDER — DEXAMETHASONE 0.5 MG/5ML
SOLUTION ORAL
COMMUNITY
Start: 2024-11-14

## 2024-11-21 ASSESSMENT — PAIN SCALES - GENERAL: PAINLEVEL_OUTOF10: 3

## 2024-11-21 NOTE — PROGRESS NOTES
Hematology/Oncology Clinic Note    REASON FOR REFERRAL: Monoclonal gammopathy    HISTORY OF PRESENT ILLNESS:  Juan Diego Rondon is a 69 y.o.-old gentleman referred for further evaluation and treatment of monoclonal gammopathy.  Patient recently underwent laboratory testing in January 2024 including an SPEP which showed a monoclonal protein measuring 1.4 g/dL.  Further workup showed kappa free light chains elevated at 6.41 with a kappa/lambda ratio of 3.98.  Quantitative immunoglobulins showed elevated IgM of 2256, IgG 698, IgA 114.  LDH was normal at 2 109 and beta-2 microglobulin 2.65.  Gastric panel showed creatinine of 0.79 calcium 9.9, corrected calcium 10.0.  PET scan was done which showed no specific uptake with no osseous lesions noted.    He has been having some issues with arthralgias primarily in his hips and wrists.  He has an occasional night sweat.  He denies any lymphadenopathy.    Nightsweats have improved. He has been having mouth pain since completing rituxan.  Arthralgias have been stable.        Wt Readings from Last 3 Encounters:   11/21/24 78.1 kg (172 lb 1.6 oz)   10/31/24 75.5 kg (166 lb 7.2 oz)   10/30/24 77.1 kg (170 lb)        REVIEW OF SYSTEMS:   A 12 point review of systems was completed.  Pertinents noted in HPI; otherwise negative.    Past Medical History  Past Medical History:   Diagnosis Date    Allergic eosinophilic esophagitis     Allergic rhinitis 1990    Anemia 2019    Arthritis     Autoimmune hepatitis (CMS/HCC)     Blood disorder     pernicious anemia    Cataract 2001    Depression 08/08/2022    Gastrointestinal disease     IBS    Gout     Hyperlipidemia     Hypertension     Lymphoplasmacytic leukemia (CMS/HCC)     Melanoma (CMS/HCC)     Neurologic disorder     Pneumothorax 08/07/2022    Primary biliary cholangitis (CMS/HCC)     autoimmune hepatitis    Psychiatric illness     anxiety    Skin abnormalities 2021       Past Surgical History  Past Surgical History:   Procedure  Laterality Date    APPENDECTOMY      COLONOSCOPY W/ BIOPSIES AND POLYPECTOMY  02/09/2009    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    CT BIOPSY LIVER FUNCTION  12/02/2021    CT BIOPSY LIVER FUNCTION 12/2/2021 [facility] MIS CT SCAN    ESOPHAGOGASTRODUODENOSCOPY N/A 05/16/2022    Procedure: ESOPHAGOGASTRODUODENOSCOPY with Biopsies;  Surgeon: Bradley Cook MD;  Location: [facility] Endoscopy;  Service: Endoscopy;  Laterality: N/A;    EYE SURGERY Bilateral     cataract    KNEE SURGERY Bilateral     3 surgeries on left and 2 on right knees - prior open medial meniscectomies bilateral knees in the 1970s    KYPHOSIS SURGERY N/A 11/08/2024    ORTHOPEDIC SURGERY  1972    SKIN BIOPSY      SKIN CANCER EXCISION Left 10/15/2020    Melanoma removed from left forearm    TONSILLECTOMY      TOTAL HIP ARTHROPLASTY Left 04/21/2021    Procedure: LEFT TOTAL HIP ARTHROPLASTY;  Surgeon: Sj Reyes MD;  Location: [facility] OR;  Service: Orthopedics;  Laterality: Left;    TOTAL HIP ARTHROPLASTY Right 03/22/2022    Procedure: RIGHT TOTAL HIP ARTHROPLASTY POSTERIOR;  Surgeon: Sj Reyes MD;  Location: [facility] OR;  Service: Orthopedics;  Laterality: Right;    VASECTOMY  1986       MEDICATIONS:    Current Outpatient Medications   Medication Sig Dispense Refill    dexAMETHasone 0.5 mg/5 mL solution SMARTSIG:15 Milliliter(s) By Mouth 4-5 Times Daily PRN      omeprazole (PriLOSEC) 40 mg capsule Take 1 capsule (40 mg total) by mouth daily      ondansetron ODT (ZOFRAN-ODT) 4 mg disintegrating tablet Take 1 tablet (4 mg total) by mouth every 8 (eight) hours as needed      HYDROcodone-acetaminophen (NORCO) 7.5-325 mg per tablet Take 1 tablet by mouth 3 (three) times a day      valsartan (DIOVAN) 80 mg tablet Take 1 tablet (80 mg total) by mouth daily 90 tablet 4    FLUoxetine (PROzac) 40 mg capsule Take 1 capsule (40 mg total) by mouth daily 90 capsule 3    folic acid 1 mg tablet Take 1 tablet (1 mg total) by mouth daily 90 tablet 3    cyanocobalamin 1,000  mcg/mL injection Inject 1 mL (1,000 mcg total) into the shoulder, thigh, or buttocks every 30 (thirty) days 1 mL 11    hydrocortisone 2.5 % cream Apply topically 2 (two) times a day as needed for rash 30 g 0    rosuvastatin (CRESTOR) 10 mg tablet Take 1 tablet by mouth daily 90 tablet 4    fenofibrate (TRICOR) 48 mg tablet Take 1 tablet (48 mg total) by mouth daily 90 tablet 4    famotidine (PEPCID) 20 mg tablet Take 1 tablet (20 mg total) by mouth 2 (two) times a day      ursodioL (ACTIGALL) 500 mg tablet Take 1 tablet (500 mg total) by mouth 2 times daily Every other day      ursodioL (AMANDEEP) 250 mg tablet Take 3 tablets (750 mg total) by mouth In am and 500 mg in pm every other day-alternate with the 500 mg bid      clotrimazole (MYCELEX) 10 mg otto Take 1 tablet (10 mg total) by mouth 3 (three) times a day      predniSONE (DELTASONE) 20 mg tablet Take three tabs daily for three days then two daily for three days then 1 daily for three days then 1/2 daily for three days then stop (Patient not taking: Reported on 10/30/2024) 21 tablet 0    aspirin 81 mg EC tablet Take 1 tablet (81 mg total) by mouth      acetaminophen (TYLENOL) 500 mg tablet Take 1 tablet (500 mg total) by mouth every 6 (six) hours as needed for pain scale 1-3/10       No current facility-administered medications for this visit.     Facility-Administered Medications Ordered in Other Visits   Medication Dose Route Frequency Provider Last Rate Last Admin    DOBUTamine 500 mg in D5W 250 mL infusion (premix)  2.5-40 mcg/kg/min intravenous Titrated Trevor Ghotra MD 95.3 mL/hr at 10/31/24 0900 40 mcg/kg/min at 10/31/24 0900       Allergies  Allergies have been reviewed.  Juan Diego is allergic to morphine.    Social History  Social History     Socioeconomic History    Marital status:      Spouse name: Not on file    Number of children: Not on file    Years of education: Not on file    Highest education level: Not on file   Occupational History    Not  "on file   Tobacco Use    Smoking status: Never    Smokeless tobacco: Never   Vaping Use    Vaping status: Never Used   Substance and Sexual Activity    Alcohol use: Yes     Alcohol/week: 3.0 standard drinks of alcohol     Types: 3 Glasses of wine per week     Comment: occ    Drug use: Never    Sexual activity: Defer   Other Topics Concern    Not on file   Social History Narrative    Not on file     Social Drivers of Health     Financial Resource Strain: Not on file   Food Insecurity: Not on file   Transportation Needs: Not on file   Physical Activity: Not on file   Stress: Not on file   Social Connections: Not on file   Intimate Partner Violence: Not on file   Housing Stability: Not on file       Family History  Family History   Problem Relation Age of Onset    Diabetes Mother         Type 2    Hypertension Mother     Alzheimer's disease Mother     Lung cancer Father     Hypertension Father     Cancer Father     Prostate cancer Brother     Heart failure Brother     Heart attack Maternal Grandmother     Hypertension Maternal Grandfather     Prostate cancer Maternal Grandfather        PHYSICAL EXAMINATION:  Vitals:    11/21/24 1023   BP: 137/86   Temp: 36.3 °C (97.4 °F)   TempSrc: Temporal   Pulse: 75   SpO2: 96%   Height: 1.867 m (6' 1.5\")   Weight: 78.1 kg (172 lb 1.6 oz)   PainSc:   3   Patient Position: Sitting           General: Pleasant,in no acute distress  Head: Normocephalic, atraumatic  Eyes: No scleral icterus  Heart: Regular rate and rhythm without murmur  Extremities: No cyanosis or edema noted  Skin: No obvious rashes or lesion  Psych: Normal affect, no obvious signs of anxiety/depression    ECOG PS:1    RESULTS REVIEWED:   Results for orders placed or performed in visit on 11/16/24   Comprehensive metabolic panel Blood, Venous    Collection Time: 11/16/24  9:23 AM   Result Value Ref Range    Sodium 141 135 - 145 mmol/L    Potassium 3.5 3.5 - 5.1 MMOL/L    Chloride 105 98 - 107 mmol/L    CO2 24 21 - 32 " mmol/L    Anion Gap 12 (H) 3 - 11 mmol/L    BUN 6 (L) 7 - 25 mg/dL    Creatinine 0.59 (L) 0.70 - 1.30 mg/dL    Glucose 89 70 - 105 mg/dL    Calcium 9.3 8.6 - 10.3 mg/dL    AST 16 <40 U/L    ALT (SGPT) 10 7 - 52 U/L    Alkaline Phosphatase 94 45 - 115 U/L    Total Protein 6.7 6.0 - 8.3 g/dL    Albumin 3.9 3.5 - 5.3 g/dL    Total Bilirubin 0.81 0.20 - 1.40 mg/dL    Corrected Calcium 9.4 8.6 - 10.3 mg/dL    eGFR 104 >60 mL/min/1.73m*2   IgM Blood, Venous    Collection Time: 11/16/24  9:23 AM   Result Value Ref Range    IgM 1,123 (H) 40 - 230 MG/DL   Immunoglobulin free LT chains blood Blood, Venous    Collection Time: 11/16/24  9:23 AM   Result Value Ref Range    Kappa Free Light Chain, S 2.46 (H) 0.3300 - 1.94 mg/dL    Lambda Free Light Chain, S 0.9400 0.5700 - 2.63 mg/dL    Kappa/Lambda FLC Ratio 2.62 (H) 0.2600 - 1.65   Monoclonal Protein Study, Quantitative, Serum Blood, Venous    Collection Time: 11/16/24  9:23 AM   Result Value Ref Range    M-protein MK 0.974 (H) g/dL    Glycosylation Yes (A)     Flag, M-protein Isotype Positive (A) Negative    QMPTS Interpretation See Comment     Immunoglobulin A (IgA), S 86 61 - 356 mg/dL    Immunoglobulin M (IgM), S 1110 (H) 37 - 286 mg/dL    Immunoglobulin G (IgG), S 530 (L) 767 - 1590 mg/dL    Therapeutic Antibody Administered? No         ASSESSMENT/PLAN    # Lymphoplasmacytic lymphoma.  He underwent extensive workup for arthralgias.  Included in most pain was SPEP which showed a monoclonal protein measuring 1.4 g/dL.  Kappa free light chain was elevated at 6.41 with a kappa/lambda ratio of 3.98.  IgM level was elevated at 2256.  PET scan showed no metabolic uptake or osseous lesions.  Bone marrow biopsy was consistent withlymphoplasmacytic lymphoma involving approximately 10% of bone marrow cellularity.  MYD88 L265P was positive.  We started him on weekly rituximab due to fatigue, night sweats, arthralgias.  He completed 4 treatments with slight improvement of his IgM.  His  symptoms have remained relatively stable with mild improvement.  I reviewed labs with him today which show no evidence of progressive disease.    -Follow-up 3 months with repeat labs    #Mucositis.  This has been present since completing Rituxan.  Etiology is unclear.  [facility] was concerned about possible Behcet's disease.  Will refer the patient for further workup to rheumatology.    NCCN guidelines were consulted in order to help in the management of the patient     Luis Ruffin MD, PhD  Hematology and Oncology

## 2024-12-02 ENCOUNTER — OFFICE VISIT (OUTPATIENT)
Dept: FAMILY MEDICINE | Facility: CLINIC | Age: 69
End: 2024-12-02
Payer: MEDICARE

## 2024-12-02 VITALS
DIASTOLIC BLOOD PRESSURE: 82 MMHG | SYSTOLIC BLOOD PRESSURE: 132 MMHG | WEIGHT: 174.3 LBS | BODY MASS INDEX: 22.68 KG/M2 | HEART RATE: 61 BPM | TEMPERATURE: 97.6 F | OXYGEN SATURATION: 93 % | RESPIRATION RATE: 17 BRPM

## 2024-12-02 DIAGNOSIS — R11.0 NAUSEA: ICD-10-CM

## 2024-12-02 DIAGNOSIS — K13.79 MOUTH SORE SECONDARY TO CHEMOTHERAPY: Primary | ICD-10-CM

## 2024-12-02 DIAGNOSIS — T45.1X5A MOUTH SORE SECONDARY TO CHEMOTHERAPY: Primary | ICD-10-CM

## 2024-12-02 PROBLEM — G89.29 CHRONIC PAIN OF RIGHT KNEE: Status: RESOLVED | Noted: 2022-01-18 | Resolved: 2024-12-02

## 2024-12-02 PROBLEM — M16.11 OSTEOARTHRITIS OF RIGHT HIP: Status: RESOLVED | Noted: 2022-03-22 | Resolved: 2024-12-02

## 2024-12-02 PROBLEM — M17.11 OSTEOARTHRITIS OF RIGHT KNEE: Status: RESOLVED | Noted: 2022-01-18 | Resolved: 2024-12-02

## 2024-12-02 PROBLEM — S27.0XXA TRAUMATIC FRACTURE OF RIBS WITH PNEUMOTHORAX: Status: RESOLVED | Noted: 2022-08-08 | Resolved: 2024-12-02

## 2024-12-02 PROBLEM — U07.1 COVID-19: Status: RESOLVED | Noted: 2022-07-13 | Resolved: 2024-12-02

## 2024-12-02 PROBLEM — S22.49XA TRAUMATIC FRACTURE OF RIBS WITH PNEUMOTHORAX: Status: RESOLVED | Noted: 2022-08-08 | Resolved: 2024-12-02

## 2024-12-02 PROBLEM — M25.561 CHRONIC PAIN OF RIGHT KNEE: Status: RESOLVED | Noted: 2022-01-18 | Resolved: 2024-12-02

## 2024-12-02 PROCEDURE — G0463 HOSPITAL OUTPT CLINIC VISIT: HCPCS | Performed by: FAMILY MEDICINE

## 2024-12-02 PROCEDURE — 99214 OFFICE O/P EST MOD 30 MIN: CPT | Performed by: FAMILY MEDICINE

## 2024-12-02 PROCEDURE — G2211 COMPLEX E/M VISIT ADD ON: HCPCS | Performed by: FAMILY MEDICINE

## 2024-12-02 RX ORDER — ONDANSETRON 4 MG/1
4 TABLET, ORALLY DISINTEGRATING ORAL EVERY 8 HOURS PRN
Qty: 30 TABLET | Refills: 1 | Status: SHIPPED | OUTPATIENT
Start: 2024-12-02

## 2024-12-02 NOTE — PROGRESS NOTES
Subjective      Juan Diego Rondon is a 69 y.o. male who presents for Follow-up (Mouth sore revaluated and talk about a referral )  .    Patient presents for a follow up regarding recurrent mouth sores secondary to chemotherapy. Mouth pain has been present since February 2024. [facility] expressed concern regarding possible Bechet's. He denies fevers or chills but has had an increase in night sweats. He expresses interest in receiving a 2nd opinion. He would like to be referred to rheumatology at [facility]. Patient is also requesting a refill of Zofran.    The patient, with a history of lymphocytic leukemia , recently diagnosed as Waldenström's, has been under the care of oncology at [facility]. He has been experiencing mouth sores, which raised concerns about possible Behcet's disease. The patient has been using dexamethasone washes, which have provided some relief.    In addition to the mouth sores, the patient has reported worsening night sweats. However, a recent follow-up with oncology did not raise any concerns. Last year, the patient experienced eye problems. The patient has expressed interest in a referral to [facility] in [place] for further evaluation. The use of clotrimazole troches did not provide significant relief for the patient.           The following have been reviewed and updated as appropriate in this visit:   Tobacco  Allergies  Meds  Problems  Med Hx  Surg Hx  Fam Hx         Allergies   Allergen Reactions    Morphine Sulfate Hives and Rash     Current Outpatient Medications   Medication Sig Dispense Refill    dexAMETHasone 0.5 mg/5 mL solution SMARTSIG:15 Milliliter(s) By Mouth 4-5 Times Daily PRN      omeprazole (PriLOSEC) 40 mg capsule Take 1 capsule (40 mg total) by mouth daily      valsartan (DIOVAN) 80 mg tablet Take 1 tablet (80 mg total) by mouth daily 90 tablet 4    FLUoxetine (PROzac) 40 mg capsule Take 1 capsule (40 mg total) by mouth daily 90 capsule 3    folic acid 1 mg  tablet Take 1 tablet (1 mg total) by mouth daily 90 tablet 3    cyanocobalamin 1,000 mcg/mL injection Inject 1 mL (1,000 mcg total) into the shoulder, thigh, or buttocks every 30 (thirty) days 1 mL 11    hydrocortisone 2.5 % cream Apply topically 2 (two) times a day as needed for rash 30 g 0    rosuvastatin (CRESTOR) 10 mg tablet Take 1 tablet by mouth daily 90 tablet 4    fenofibrate (TRICOR) 48 mg tablet Take 1 tablet (48 mg total) by mouth daily 90 tablet 4    famotidine (PEPCID) 20 mg tablet Take 1 tablet (20 mg total) by mouth 2 (two) times a day      ursodioL (ACTIGALL) 500 mg tablet Take 1 tablet (500 mg total) by mouth 2 times daily Every other day      ursodioL (AMANDEEP) 250 mg tablet Take 3 tablets (750 mg total) by mouth In am and 500 mg in pm every other day-alternate with the 500 mg bid      ondansetron ODT (ZOFRAN-ODT) 4 mg disintegrating tablet Take 1 tablet (4 mg total) by mouth every 8 (eight) hours as needed for nausea or vomiting 30 tablet 1    HYDROcodone-acetaminophen (NORCO) 7.5-325 mg per tablet Take 1 tablet by mouth 3 (three) times a day      aspirin 81 mg EC tablet Take 1 tablet (81 mg total) by mouth      acetaminophen (TYLENOL) 500 mg tablet Take 1 tablet (500 mg total) by mouth every 6 (six) hours as needed for pain scale 1-3/10       No current facility-administered medications for this visit.     Facility-Administered Medications Ordered in Other Visits   Medication Dose Route Frequency Provider Last Rate Last Admin    DOBUTamine 500 mg in D5W 250 mL infusion (premix)  2.5-40 mcg/kg/min intravenous Titrated Trevor Ghotra MD 95.3 mL/hr at 10/31/24 0900 40 mcg/kg/min at 10/31/24 0900     Past Medical History:   Diagnosis Date    Allergic eosinophilic esophagitis     Allergic rhinitis 1990    Anemia 2019    Arthritis     Autoimmune hepatitis (CMS/HCC)     Blood disorder     pernicious anemia    Cataract 2001    Depression 08/08/2022    Gastrointestinal disease     IBS    Gout      Hyperlipidemia     Hypertension     Lymphoplasmacytic leukemia (CMS/HCC)     Melanoma (CMS/HCC)     Neurologic disorder     Pneumothorax 08/07/2022    Primary biliary cholangitis (CMS/HCC)     autoimmune hepatitis    Psychiatric illness     anxiety    Skin abnormalities 2021     Past Surgical History:   Procedure Laterality Date    APPENDECTOMY      COLONOSCOPY W/ BIOPSIES AND POLYPECTOMY  02/09/2009    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    CT BIOPSY LIVER FUNCTION  12/02/2021    CT BIOPSY LIVER FUNCTION 12/2/2021 [facility] MIS CT SCAN    ESOPHAGOGASTRODUODENOSCOPY N/A 05/16/2022    Procedure: ESOPHAGOGASTRODUODENOSCOPY with Biopsies;  Surgeon: Bradley Cook MD;  Location: [facility] Endoscopy;  Service: Endoscopy;  Laterality: N/A;    EYE SURGERY Bilateral     cataract    KNEE SURGERY Bilateral     3 surgeries on left and 2 on right knees - prior open medial meniscectomies bilateral knees in the 1970s    KYPHOSIS SURGERY N/A 11/08/2024    ORTHOPEDIC SURGERY  1972    SKIN BIOPSY      SKIN CANCER EXCISION Left 10/15/2020    Melanoma removed from left forearm    TONSILLECTOMY      TOTAL HIP ARTHROPLASTY Left 04/21/2021    Procedure: LEFT TOTAL HIP ARTHROPLASTY;  Surgeon: Sj Reyes MD;  Location: [facility] OR;  Service: Orthopedics;  Laterality: Left;    TOTAL HIP ARTHROPLASTY Right 03/22/2022    Procedure: RIGHT TOTAL HIP ARTHROPLASTY POSTERIOR;  Surgeon: Sj Reyes MD;  Location: [facility] OR;  Service: Orthopedics;  Laterality: Right;    VASECTOMY  1986     Family History   Problem Relation Age of Onset    Diabetes Mother         Type 2    Hypertension Mother     Alzheimer's disease Mother     Lung cancer Father     Hypertension Father     Cancer Father     Prostate cancer Brother     Heart failure Brother     Heart attack Maternal Grandmother     Hypertension Maternal Grandfather     Prostate cancer Maternal Grandfather      Social History     Socioeconomic History    Marital status:    Tobacco Use    Smoking  status: Never    Smokeless tobacco: Never   Vaping Use    Vaping status: Never Used   Substance and Sexual Activity    Alcohol use: Yes     Alcohol/week: 3.0 standard drinks of alcohol     Types: 3 Glasses of wine per week     Comment: occ    Drug use: Never    Sexual activity: Defer     Social Drivers of Health     Tobacco Use: Low Risk  (12/2/2024)    Patient History     Smoking Tobacco Use: Never     Smokeless Tobacco Use: Never   Alcohol Use: Unknown (9/22/2020)    Received from [facility]    AUDIT-C     Frequency of Alcohol Consumption: 4 or more times a week     Average Number of Drinks: 1 or 2   Depression: Not at risk (9/11/2024)    Received from [facility]    PHQ-2     PHQ-2 SCORE: 2       Review of Systems    Objective     Vitals  /82 (BP Location: Left arm, Patient Position: Sitting, Cuff Size: Regular Adult)   Pulse 61   Temp 36.4 °C (97.6 °F) (Temporal)   Resp 17   Wt 79.1 kg (174 lb 4.8 oz)   SpO2 93%   BMI 22.68 kg/m²     Physical Exam  Vitals and nursing note reviewed.   Constitutional:       General: He is not in acute distress.     Appearance: Normal appearance. He is normal weight. He is not ill-appearing, toxic-appearing or diaphoretic.   HENT:      Mouth/Throat:      Pharynx: Posterior oropharyngeal erythema present.      Comments: Diffusely erythematous oropharynx with intermittent whitish plaques and probable aphthous ulceration  Cardiovascular:      Rate and Rhythm: Normal rate and regular rhythm.      Pulses: Normal pulses.      Heart sounds: Normal heart sounds. No murmur heard.     No friction rub. No gallop.   Musculoskeletal:         General: No signs of injury.      Right lower leg: No edema.      Left lower leg: No edema.   Skin:     General: Skin is warm and dry.      Coloration: Skin is not pale.      Findings: No erythema or rash.   Neurological:      Mental Status: He is alert.           Procedures    Assessment/Plan   Diagnoses and all orders for  this visit:    Mouth sore secondary to chemotherapy  -     Ambulatory referral to Rheumatology; Future    Nausea  -     ondansetron ODT (ZOFRAN-ODT) 4 mg disintegrating tablet; Take 1 tablet (4 mg total) by mouth every 8 (eight) hours as needed for nausea or vomiting    Possible Behcet's Disease  Persistent mouth sores despite treatment with clotrimazole troches. Dexamethasone mouthwash has provided some relief.  -Refer to [facility] Rheumatology for further evaluation and management.    Waldenström's Macroglobulinemia  Stable with recent follow-up with oncology. No new concerns raised.  -Continue current management with oncology.    Night Sweats  Increased frequency but recently evaluated by oncology with no new concerns.  -Continue to monitor.     Return if symptoms worsen or fail to improve.    Cuba Haley MD    Portions of this note were documented by Kate Peters as I performed the exam and collected the information from Juan Diego Rondon. I attest that I have reviewed the information as documented, verified the accuracy of the documentation and added additional information as needed.

## 2024-12-04 DIAGNOSIS — K75.4 AUTOIMMUNE HEPATITIS (CMS/HCC): ICD-10-CM

## 2024-12-11 ENCOUNTER — OFFICE VISIT (OUTPATIENT)
Dept: URGENT CARE | Facility: CLINIC | Age: 69
End: 2024-12-11
Payer: MEDICARE

## 2024-12-11 VITALS
RESPIRATION RATE: 18 BRPM | DIASTOLIC BLOOD PRESSURE: 88 MMHG | OXYGEN SATURATION: 95 % | BODY MASS INDEX: 22.12 KG/M2 | TEMPERATURE: 97 F | WEIGHT: 170 LBS | SYSTOLIC BLOOD PRESSURE: 132 MMHG | HEART RATE: 79 BPM

## 2024-12-11 DIAGNOSIS — J06.9 URI, ACUTE: ICD-10-CM

## 2024-12-11 DIAGNOSIS — R11.0 NAUSEA: ICD-10-CM

## 2024-12-11 DIAGNOSIS — J02.9 SORE THROAT: Primary | ICD-10-CM

## 2024-12-11 LAB
FLUAV RNA NPH QL NAA+NON-PROBE: NEGATIVE
FLUBV RNA NPH QL NAA+NON-PROBE: NEGATIVE
RSV RNA NPH QL NAA+NON-PROBE: NEGATIVE
S PYO DNA BLD POS QL NAA+NON-PROBE: NEGATIVE
SARS-COV-2 RNA RESP QL NAA+PROBE: NEGATIVE

## 2024-12-11 PROCEDURE — 87637 SARSCOV2&INF A&B&RSV AMP PRB: CPT | Performed by: NURSE PRACTITIONER

## 2024-12-11 PROCEDURE — 87651 STREP A DNA AMP PROBE: CPT | Performed by: NURSE PRACTITIONER

## 2024-12-11 PROCEDURE — G0463 HOSPITAL OUTPT CLINIC VISIT: HCPCS | Performed by: NURSE PRACTITIONER

## 2024-12-11 PROCEDURE — 99213 OFFICE O/P EST LOW 20 MIN: CPT | Performed by: NURSE PRACTITIONER

## 2024-12-11 RX ORDER — ONDANSETRON 4 MG/1
4 TABLET, FILM COATED ORAL ONCE
Status: COMPLETED | OUTPATIENT
Start: 2024-12-11 | End: 2024-12-11

## 2024-12-11 RX ADMIN — ONDANSETRON 4 MG: 4 TABLET, FILM COATED ORAL at 09:51

## 2024-12-11 ASSESSMENT — ENCOUNTER SYMPTOMS
NECK STIFFNESS: 0
SINUS PAIN: 0
VOMITING: 1
TROUBLE SWALLOWING: 0
CONSTIPATION: 0
NAUSEA: 1
ARTHRALGIAS: 1
SHORTNESS OF BREATH: 1
ACTIVITY CHANGE: 1
ABDOMINAL PAIN: 0
BLOOD IN STOOL: 0
FEVER: 0
COUGH: 1
CHILLS: 0
SINUS PRESSURE: 0
CHOKING: 0
MYALGIAS: 1
NECK PAIN: 0
CHEST TIGHTNESS: 0
SORE THROAT: 1

## 2024-12-11 NOTE — PROGRESS NOTES
Subjective   Chief Complaint   Patient presents with    Cough    Sore Throat    Nasal Congestion    Vomiting     C/o vomiting, diarrhea, SOB, nasal congestion, sore throat, diarrhea, and generally feeling unwell. Reports that he woke up with the sx.         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 They report a flare of their autoimmune condition, characterized by congestion and vomiting. They also note the development of painful spots on their hands and arms, which they describe as bruising.   The patient's condition worsened overnight, with difficulty breathing, coughing, sore throat, body aches, and congestion. They also experienced vomiting and diarrhea, which they describe as 'real, real sticky.' Stool is not black or tarry looking.   They deny any blood in their vomit or stool. The patient also reports a sore throat and voice changes, but denies the symptoms are consistent with previous strep throat experiences. They are currently experiencing nausea. They have been managing mouth sores with a dexamethasone swish, which has been helpful. They are scheduled for a consultation at [facility] for further evaluation of their condition.     The following have been reviewed and updated as appropriate in this visit:    Allergies   Allergen Reactions    Morphine Sulfate Hives and Rash     Current Outpatient Medications   Medication Sig Dispense Refill    ondansetron ODT (ZOFRAN-ODT) 4 mg disintegrating tablet Take 1 tablet (4 mg total) by mouth every 8 (eight) hours as needed for nausea or vomiting 30 tablet 1    dexAMETHasone 0.5 mg/5 mL solution SMARTSIG:15 Milliliter(s) By Mouth 4-5 Times Daily PRN      omeprazole (PriLOSEC) 40 mg capsule Take 1 capsule (40 mg total) by mouth daily      HYDROcodone-acetaminophen (NORCO) 7.5-325 mg per tablet Take 1 tablet by mouth 3  (three) times a day (Patient taking differently: Take 1 tablet by mouth every 8 (eight) hours as needed for pain scale 8-10/10)      valsartan (DIOVAN) 80 mg tablet Take 1 tablet (80 mg total) by mouth daily 90 tablet 4    FLUoxetine (PROzac) 40 mg capsule Take 1 capsule (40 mg total) by mouth daily 90 capsule 3    folic acid 1 mg tablet Take 1 tablet (1 mg total) by mouth daily 90 tablet 3    cyanocobalamin 1,000 mcg/mL injection Inject 1 mL (1,000 mcg total) into the shoulder, thigh, or buttocks every 30 (thirty) days 1 mL 11    hydrocortisone 2.5 % cream Apply topically 2 (two) times a day as needed for rash 30 g 0    rosuvastatin (CRESTOR) 10 mg tablet Take 1 tablet by mouth daily 90 tablet 4    fenofibrate (TRICOR) 48 mg tablet Take 1 tablet (48 mg total) by mouth daily 90 tablet 4    famotidine (PEPCID) 20 mg tablet Take 1 tablet (20 mg total) by mouth 2 (two) times a day      acetaminophen (TYLENOL) 500 mg tablet Take 1 tablet (500 mg total) by mouth every 6 (six) hours as needed for pain scale 1-3/10      ursodioL (ACTIGALL) 500 mg tablet Take 1 tablet (500 mg total) by mouth 2 times daily Every other day      ursodioL (AMANDEEP) 250 mg tablet Take 3 tablets (750 mg total) by mouth In am and 500 mg in pm every other day-alternate with the 500 mg bid      aspirin 81 mg EC tablet Take 1 tablet (81 mg total) by mouth (Patient not taking: Reported on 12/11/2024)       No current facility-administered medications for this visit.     Facility-Administered Medications Ordered in Other Visits   Medication Dose Route Frequency Provider Last Rate Last Admin    DOBUTamine 500 mg in D5W 250 mL infusion (premix)  2.5-40 mcg/kg/min intravenous Titrated Trevor Ghotra MD 95.3 mL/hr at 10/31/24 0900 40 mcg/kg/min at 10/31/24 0900     Past Medical History:   Diagnosis Date    Allergic eosinophilic esophagitis     Allergic rhinitis 1990    Anemia 2019    Arthritis     Autoimmune hepatitis (CMS/HCC)     Blood disorder     pernicious  anemia    Cataract 2001    Depression 08/08/2022    Gastrointestinal disease     IBS    Gout     Hyperlipidemia     Hypertension     Lymphoplasmacytic leukemia (CMS/HCC)     Melanoma (CMS/HCC)     Neurologic disorder     Pneumothorax 08/07/2022    Primary biliary cholangitis (CMS/HCC)     autoimmune hepatitis    Psychiatric illness     anxiety    Skin abnormalities 2021       Review of Systems   Constitutional:  Positive for activity change. Negative for chills and fever.   HENT:  Positive for congestion and sore throat. Negative for ear pain, postnasal drip, sinus pressure, sinus pain and trouble swallowing.    Respiratory:  Positive for cough and shortness of breath. Negative for choking and chest tightness.    Cardiovascular:  Negative for chest pain.   Gastrointestinal:  Positive for nausea and vomiting. Negative for abdominal pain, blood in stool and constipation.   Musculoskeletal:  Positive for arthralgias and myalgias. Negative for neck pain and neck stiffness.       Objective   /88 (BP Location: Left arm, Patient Position: Sitting)   Pulse 79   Temp 36.1 °C (97 °F) (Temporal)   Resp 18   Wt 77.1 kg (170 lb)   SpO2 95%   BMI 22.12 kg/m²     Physical Exam  Constitutional:       General: He is not in acute distress.     Appearance: Normal appearance. He is not ill-appearing or toxic-appearing.   HENT:      Right Ear: Ear canal and external ear normal. No middle ear effusion. There is no impacted cerumen. Tympanic membrane is retracted and bulging. Tympanic membrane is not erythematous.      Left Ear: Ear canal and external ear normal.  No middle ear effusion. There is no impacted cerumen. Tympanic membrane is retracted and bulging. Tympanic membrane is not erythematous.      Nose: Congestion present. No rhinorrhea.      Mouth/Throat:      Pharynx: Posterior oropharyngeal erythema present. No oropharyngeal exudate.   Cardiovascular:      Rate and Rhythm: Normal rate and regular rhythm.      Heart  sounds: Normal heart sounds. No murmur heard.  Pulmonary:      Effort: Pulmonary effort is normal. No respiratory distress.      Breath sounds: Normal breath sounds.   Musculoskeletal:      Cervical back: Normal range of motion and neck supple. No rigidity or tenderness.   Lymphadenopathy:      Cervical: No cervical adenopathy.   Skin:     General: Skin is warm and dry.   Neurological:      General: No focal deficit present.      Mental Status: He is alert.   Psychiatric:         Mood and Affect: Mood normal.       No results found for this or any previous visit (from the past 24 hours).    Assessment/Plan   There are no diagnoses linked to this encounter.  Assessment/Plan Upper Respiratory Infection Presents with sore throat, cough, congestion, and difficulty breathing. He says somewhat short of breath.   No fever reported. Oxygen saturation at 95%. Eardrum bulging and retracting. No erythema or amena media     -Perform COVID-19 and strep tests. -Administer Zofran for nausea. Gastrointestinal Symptoms Reports of nausea, vomiting, and diarrhea. No blood in vomit or stool. -Consider potential GI bug, monitor symptoms. Pernicious Anemia Chronic condition, no acute issues discussed today. Possible Behcet's Disease Patient reports being referred to [facility] for further evaluation. No acute issues discussed today. 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 Follow-up Continue regular follow-ups with primary care provider.  Advised that if symptoms worsen to either present to the urgent care or to the emergency department if the urgent care is closed.  Provided his results of strep negative and COVID, RSV, influenza A and B-.  He acknowledged his understanding.  Advised continuing to push fluids and if not eating well and electrolyte supplement.  Emmett acknowledged understanding and acceptance for the care plan.    There are no Patient Instructions  on file for this visit.    Marbella Garza, CNP

## 2024-12-13 ENCOUNTER — TELEPHONE (OUTPATIENT)
Dept: FAMILY MEDICINE | Facility: CLINIC | Age: 69
End: 2024-12-13

## 2024-12-13 NOTE — TELEPHONE ENCOUNTER
Patient states that Dr Haley was going to put in a referral to   [facility] but when he called [place], they state they have not received the referral. Please resend.

## 2024-12-15 ENCOUNTER — TELEPHONE (OUTPATIENT)
Dept: RHEUMATOLOGY | Facility: CLINIC | Age: 69
End: 2024-12-15
Payer: MEDICARE

## 2024-12-16 ENCOUNTER — OFFICE VISIT (OUTPATIENT)
Dept: FAMILY MEDICINE | Facility: CLINIC | Age: 69
End: 2024-12-16
Payer: MEDICARE

## 2024-12-16 VITALS
SYSTOLIC BLOOD PRESSURE: 120 MMHG | TEMPERATURE: 97.9 F | DIASTOLIC BLOOD PRESSURE: 74 MMHG | WEIGHT: 171 LBS | HEART RATE: 84 BPM | OXYGEN SATURATION: 95 % | BODY MASS INDEX: 22.25 KG/M2

## 2024-12-16 DIAGNOSIS — R05.1 ACUTE COUGH: Primary | ICD-10-CM

## 2024-12-16 DIAGNOSIS — J01.90 ACUTE BACTERIAL SINUSITIS: ICD-10-CM

## 2024-12-16 DIAGNOSIS — B96.89 ACUTE BACTERIAL SINUSITIS: ICD-10-CM

## 2024-12-16 PROCEDURE — G0463 HOSPITAL OUTPT CLINIC VISIT: HCPCS | Performed by: FAMILY MEDICINE

## 2024-12-16 PROCEDURE — 99213 OFFICE O/P EST LOW 20 MIN: CPT | Performed by: FAMILY MEDICINE

## 2024-12-16 RX ORDER — DOXYCYCLINE 100 MG/1
100 CAPSULE ORAL 2 TIMES DAILY
Qty: 14 CAPSULE | Refills: 0 | Status: SHIPPED | OUTPATIENT
Start: 2024-12-16 | End: 2024-12-23

## 2024-12-16 RX ORDER — BENZONATATE 200 MG/1
200 CAPSULE ORAL 3 TIMES DAILY PRN
Qty: 20 CAPSULE | Refills: 0 | Status: SHIPPED | OUTPATIENT
Start: 2024-12-16 | End: 2024-12-23

## 2024-12-16 NOTE — PATIENT INSTRUCTIONS
Can use the nasal steroid-such as Flonase or Nasonex (generic okay) in each nostril up to 2 times a day  -Be sure to aim the tip towards the corner of your eye/ear on the same side  -Biggest side effect is dryness which can be nosebleeds    Can also use nasal saline rinses  -Be sure to use room temperature distilled water  -Use nasal steroid 30 minutes after a nasal saline rinse    Can use N-AC (acetylcholine) to help break up phlegm as well

## 2024-12-16 NOTE — PROGRESS NOTES
Subjective      Juan Diego Rondon is a 69 y.o. male who presents for   Chief Complaint   Patient presents with    Cough     Cough   Pt states he was seen last week and now his cough is getting worse/ also doesn't feel well. Achy, chills,etc      History of Present Illness    The patient, with a history of Waldenstrom's macroglobulinemia, presents with a persistent cough of two weeks duration. The cough has become productive with white phlegm, and is worse at night. He also reports occasional chills, but no fever. He denies hemoptysis. He has tried Mucinex, Zyrtec, and Tessalon (100mg) with minimal relief. He also reports an episode of lightheadedness the previous night, not associated with a coughing spell. He denies any associated nausea, vomiting, or diarrhea. He also reports loss of hearing aids a week ago.           No Known Allergies    Current Outpatient Medications   Medication Sig Dispense Refill    ondansetron ODT (ZOFRAN-ODT) 4 mg disintegrating tablet Take 1 tablet (4 mg total) by mouth every 8 (eight) hours as needed for nausea or vomiting 30 tablet 1    dexAMETHasone 0.5 mg/5 mL solution SMARTSIG:15 Milliliter(s) By Mouth 4-5 Times Daily PRN      omeprazole (PriLOSEC) 40 mg capsule Take 1 capsule (40 mg total) by mouth daily      HYDROcodone-acetaminophen (NORCO) 7.5-325 mg per tablet Take 1 tablet by mouth 3 (three) times a day (Patient taking differently: Take 1 tablet by mouth every 8 (eight) hours as needed for pain scale 8-10/10)      valsartan (DIOVAN) 80 mg tablet Take 1 tablet (80 mg total) by mouth daily 90 tablet 4    FLUoxetine (PROzac) 40 mg capsule Take 1 capsule (40 mg total) by mouth daily 90 capsule 3    folic acid 1 mg tablet Take 1 tablet (1 mg total) by mouth daily 90 tablet 3    cyanocobalamin 1,000 mcg/mL injection Inject 1 mL (1,000 mcg total) into the shoulder, thigh, or buttocks every 30 (thirty) days 1 mL 11    hydrocortisone 2.5 % cream Apply topically 2 (two) times a day as  needed for rash 30 g 0    rosuvastatin (CRESTOR) 10 mg tablet Take 1 tablet by mouth daily 90 tablet 4    fenofibrate (TRICOR) 48 mg tablet Take 1 tablet (48 mg total) by mouth daily 90 tablet 4    famotidine (PEPCID) 20 mg tablet Take 1 tablet (20 mg total) by mouth 2 (two) times a day      acetaminophen (TYLENOL) 500 mg tablet Take 1 tablet (500 mg total) by mouth every 6 (six) hours as needed for pain scale 1-3/10      ursodioL (ACTIGALL) 500 mg tablet Take 1 tablet (500 mg total) by mouth 2 times daily Every other day      ursodioL (AMANDEEP) 250 mg tablet Take 3 tablets (750 mg total) by mouth In am and 500 mg in pm every other day-alternate with the 500 mg bid      benzonatate (TESSALON) 200 mg capsule Take 1 capsule (200 mg total) by mouth 3 (three) times a day as needed for cough for up to 7 days 20 capsule 0    doxycycline (MONODOX) 100 mg capsule Take 1 capsule (100 mg total) by mouth 2 (two) times a day for 7 days 14 capsule 0     No current facility-administered medications for this visit.     Facility-Administered Medications Ordered in Other Visits   Medication Dose Route Frequency Provider Last Rate Last Admin    DOBUTamine 500 mg in D5W 250 mL infusion (premix)  2.5-40 mcg/kg/min intravenous Titrated Trevor Ghotra MD 95.3 mL/hr at 10/31/24 0900 40 mcg/kg/min at 10/31/24 0900     Past Medical History:   Diagnosis Date    Allergic eosinophilic esophagitis     Allergic rhinitis 1990    Anemia 2019    Arthritis     Autoimmune hepatitis (CMS/HCC)     Blood disorder     pernicious anemia    Cataract 2001    Depression 08/08/2022    Gastrointestinal disease     IBS    Gout     Hyperlipidemia     Hypertension     Lymphoplasmacytic leukemia (CMS/HCC)     Melanoma (CMS/HCC)     Neurologic disorder     Pneumothorax 08/07/2022    Primary biliary cholangitis (CMS/HCC)     autoimmune hepatitis    Psychiatric illness     anxiety    Skin abnormalities 2021         Objective   /74 (BP Location: Right arm, Patient  Position: Sitting, Cuff Size: Regular Adult)   Pulse 84   Temp 36.6 °C (97.9 °F) (Temporal)   Wt 77.6 kg (171 lb)   SpO2 95%   BMI 22.25 kg/m²     Physical Exam  Vitals and nursing note reviewed.   Constitutional:       General: He is not in acute distress.     Appearance: Normal appearance. He is normal weight. He is not ill-appearing or toxic-appearing.   HENT:      Head: Normocephalic and atraumatic.      Right Ear: Tympanic membrane and ear canal normal.      Left Ear: Tympanic membrane and ear canal normal.      Ears:      Comments: Moderate clear effusion bilaterally without any erythema or bulging of the TM     Nose: Congestion and rhinorrhea present.      Mouth/Throat:      Mouth: Mucous membranes are moist.      Pharynx: Oropharynx is clear. No oropharyngeal exudate or posterior oropharyngeal erythema.   Eyes:      General:         Right eye: No discharge.         Left eye: No discharge.      Conjunctiva/sclera: Conjunctivae normal.   Cardiovascular:      Rate and Rhythm: Normal rate and regular rhythm.      Heart sounds: No murmur heard.  Pulmonary:      Effort: Pulmonary effort is normal. No respiratory distress.      Breath sounds: Normal breath sounds.   Musculoskeletal:      Cervical back: Neck supple.   Lymphadenopathy:      Cervical: No cervical adenopathy.   Skin:     General: Skin is warm and dry.      Capillary Refill: Capillary refill takes less than 2 seconds.   Neurological:      Mental Status: He is alert.   Psychiatric:         Mood and Affect: Mood normal.         Behavior: Behavior normal.       Assessment/Plan     Emmett was seen today for cough.    Diagnoses and all orders for this visit:    Acute cough  -     benzonatate; Take 1 capsule (200 mg total) by mouth 3 (three) times a day as needed for cough for up to 7 days    Acute bacterial sinusitis  -     benzonatate; Take 1 capsule (200 mg total) by mouth 3 (three) times a day as needed for cough for up to 7 days  -     doxycycline  monohydrate; Take 1 capsule (100 mg total) by mouth 2 (two) times a day for 7 days         Assessment/Plan     Upper Respiratory Infection  Persistent cough for two weeks, now productive with white phlegm. No hemoptysis. Cough worse at night. No significant relief with Mucinex, Zyrtec, or Tessalon 100mg. No associated fever, nausea, vomiting, or diarrhea. Lightheadedness reported without clear association to cough. Physical exam reveals clear lungs and no signs of infection in the throat or ears.  -Continue Flonase as currently using.  -Start Tessalon Perles 200mg as needed for cough.  -Start nasal saline rinses.  -If no improvement in 24 hours, start Doxycycline 100mg twice daily for 7 days (prescription sent to pharmacy but advised to hold unless symptoms persist or worsen).  -Complete full course of Doxycycline if started.      Follow up PRN    Patient stated understanding and their questions were answered to the best of my abilities.      Penny Barber MD      A voice recognition program may have been used to aid in documentation. Words are not always transcribed exactly as spoken. Errors may be present despite efforts to correct and should be taken within the context of the discussion. Please contact the provider if you need assistance interpreting this documentation

## 2025-01-13 ENCOUNTER — TELEPHONE (OUTPATIENT)
Dept: ONCOLOGY | Facility: CLINIC | Age: 70
End: 2025-01-13
Payer: MEDICARE

## 2025-01-13 ENCOUNTER — LAB (OUTPATIENT)
Dept: LAB | Facility: CLINIC | Age: 70
End: 2025-01-13
Payer: MEDICARE

## 2025-01-13 ENCOUNTER — TELEPHONE (OUTPATIENT)
Dept: FAMILY MEDICINE | Facility: CLINIC | Age: 70
End: 2025-01-13

## 2025-01-13 ENCOUNTER — OFFICE VISIT (OUTPATIENT)
Dept: FAMILY MEDICINE | Facility: CLINIC | Age: 70
End: 2025-01-13
Payer: MEDICARE

## 2025-01-13 VITALS
DIASTOLIC BLOOD PRESSURE: 78 MMHG | RESPIRATION RATE: 16 BRPM | SYSTOLIC BLOOD PRESSURE: 132 MMHG | WEIGHT: 174 LBS | BODY MASS INDEX: 22.65 KG/M2 | TEMPERATURE: 98.2 F | HEART RATE: 76 BPM | OXYGEN SATURATION: 98 %

## 2025-01-13 DIAGNOSIS — D47.2 MONOCLONAL PARAPROTEINEMIA: Primary | ICD-10-CM

## 2025-01-13 DIAGNOSIS — M48.56XD NON-TRAUMATIC COMPRESSION FRACTURE OF L5 LUMBAR VERTEBRA WITH ROUTINE HEALING, SUBSEQUENT ENCOUNTER: ICD-10-CM

## 2025-01-13 DIAGNOSIS — C83.00 LYMPHOPLASMACYTIC LYMPHOMA (CMS/HCC): ICD-10-CM

## 2025-01-13 LAB
ALBUMIN SERPL-MCNC: 3.6 G/DL (ref 3.5–5.3)
ALP SERPL-CCNC: 89 U/L (ref 45–115)
ALT SERPL-CCNC: 10 U/L (ref 7–52)
ANION GAP SERPL CALC-SCNC: 12 MMOL/L (ref 3–11)
AST SERPL-CCNC: 11 U/L
BASOPHILS # BLD AUTO: 0 10*3/UL
BASOPHILS NFR BLD AUTO: 0.2 % (ref 0–2)
BILIRUB SERPL-MCNC: 0.74 MG/DL (ref 0.2–1.4)
BUN SERPL-MCNC: 11 MG/DL (ref 7–25)
CALCIUM ALBUM COR SERPL-MCNC: 8.9 MG/DL (ref 8.6–10.3)
CALCIUM SERPL-MCNC: 8.6 MG/DL (ref 8.6–10.3)
CHLORIDE SERPL-SCNC: 100 MMOL/L (ref 98–107)
CO2 SERPL-SCNC: 24 MMOL/L (ref 21–32)
CREAT SERPL-MCNC: 0.7 MG/DL (ref 0.7–1.3)
CRP SERPL-MCNC: <1 MG/L
EGFRCR SERPLBLD CKD-EPI 2021: 99 ML/MIN/1.73M*2
EOSINOPHIL # BLD AUTO: 0.1 10*3/UL
EOSINOPHIL NFR BLD AUTO: 0.5 % (ref 0–3)
ERYTHROCYTE [DISTWIDTH] IN BLOOD BY AUTOMATED COUNT: 13.6 % (ref 11.5–15)
ERYTHROCYTE [SEDIMENTATION RATE] IN BLOOD: 21 MM/HR
GLUCOSE SERPL-MCNC: 110 MG/DL (ref 70–105)
HCT VFR BLD AUTO: 44.2 % (ref 38–50)
HGB BLD-MCNC: 15.1 G/DL (ref 13.2–17.2)
IGM SERPL-MCNC: 1032 MG/DL (ref 40–230)
LYMPHOCYTES # BLD AUTO: 1.1 10*3/UL
LYMPHOCYTES NFR BLD AUTO: 9.4 % (ref 15–47)
MACROCYTOSIS PRESENCE IN BLOOD, ANALYZER: ABNORMAL
MCH RBC QN AUTO: 35.6 PG (ref 29–34)
MCHC RBC AUTO-ENTMCNC: 34.2 G/DL (ref 32–36)
MCV RBC AUTO: 104.2 FL (ref 82–97)
MONOCYTES # BLD AUTO: 0.6 10*3/UL
MONOCYTES NFR BLD AUTO: 5.4 % (ref 5–13)
NEUTROPHILS # BLD AUTO: 10 10*3/UL
NEUTROPHILS NFR BLD AUTO: 84.5 % (ref 46–70)
PLATELET # BLD AUTO: 275 10*3/UL (ref 130–350)
PMV BLD AUTO: 6.8 FL (ref 6.9–10.8)
POTASSIUM SERPL-SCNC: 3.4 MMOL/L (ref 3.5–5.1)
PROT SERPL-MCNC: 6.6 G/DL (ref 6–8.3)
RBC # BLD AUTO: 4.24 10*6/UL (ref 4.1–5.8)
SODIUM SERPL-SCNC: 136 MMOL/L (ref 135–145)
WBC # BLD AUTO: 11.9 10*3/UL (ref 3.7–9.6)

## 2025-01-13 PROCEDURE — 85652 RBC SED RATE AUTOMATED: CPT | Performed by: FAMILY MEDICINE

## 2025-01-13 PROCEDURE — 82784 ASSAY IGA/IGD/IGG/IGM EACH: CPT | Performed by: FAMILY MEDICINE

## 2025-01-13 PROCEDURE — 99214 OFFICE O/P EST MOD 30 MIN: CPT | Performed by: FAMILY MEDICINE

## 2025-01-13 PROCEDURE — 85025 COMPLETE CBC W/AUTO DIFF WBC: CPT | Performed by: INTERNAL MEDICINE

## 2025-01-13 PROCEDURE — G2211 COMPLEX E/M VISIT ADD ON: HCPCS | Performed by: FAMILY MEDICINE

## 2025-01-13 PROCEDURE — 80053 COMPREHEN METABOLIC PANEL: CPT | Performed by: INTERNAL MEDICINE

## 2025-01-13 PROCEDURE — 83521 IG LIGHT CHAINS FREE EACH: CPT | Performed by: INTERNAL MEDICINE

## 2025-01-13 PROCEDURE — 86140 C-REACTIVE PROTEIN: CPT | Performed by: FAMILY MEDICINE

## 2025-01-13 PROCEDURE — 0077U IG PARAPROTEIN QUAL BLD/UR: CPT | Performed by: INTERNAL MEDICINE

## 2025-01-13 PROCEDURE — 36415 COLL VENOUS BLD VENIPUNCTURE: CPT | Performed by: FAMILY MEDICINE

## 2025-01-13 PROCEDURE — G0463 HOSPITAL OUTPT CLINIC VISIT: HCPCS | Performed by: FAMILY MEDICINE

## 2025-01-13 RX ORDER — HYDROCODONE BITARTRATE AND ACETAMINOPHEN 7.5; 325 MG/1; MG/1
1 TABLET ORAL EVERY 8 HOURS PRN
Qty: 30 TABLET | Refills: 0 | Status: SHIPPED | OUTPATIENT
Start: 2025-01-13 | End: 2025-01-27 | Stop reason: SDUPTHER

## 2025-01-13 ASSESSMENT — PAIN SCALES - GENERAL: PAINLEVEL_OUTOF10: 8

## 2025-01-13 NOTE — PROGRESS NOTES
Subjective      Juan Diego Rondon is a 69 y.o. male who presents for Follow-up (Flare up of cancer related issues - back and bilateral hip pain, night sweats, rash/spots to arms. Fatigued and mild shortness of breath. )  .    HPI  History of Present Illness    The patient, diagnosed with lymphoplasmic lymphoma and monoclonal paraproteinemia, presents with a recent flare-up of cancer symptoms. Approximately a week ago, he underwent a block procedure for a suspected compression fracture at the L5 level of his spine. The procedure provided significant relief, rendering him virtually pain-free for a few days, which he described as the best he had felt in a long time.    However, about three nights ago, he began experiencing severe hip and back pain accompanied by night sweats. Concurrently, he noticed the appearance of non-tender macules on his arms, one of which is tender upon certain contact. The patient associates the emergence of these skin lesions with a worsening of his overall condition.    In terms of pain management, he has been using hydrocodone but expresses reluctance to rely on it consistently, believing it merely masks the pain. He also reports a history of mouth sores, for which a referral was made but denied. However, he notes an improvement in this condition.          The following have been reviewed and updated as appropriate in this visit:   Tobacco  Allergies  Meds  Problems  Med Hx  Surg Hx  Fam Hx         No Known Allergies  Current Outpatient Medications   Medication Sig Dispense Refill    ondansetron ODT (ZOFRAN-ODT) 4 mg disintegrating tablet Take 1 tablet (4 mg total) by mouth every 8 (eight) hours as needed for nausea or vomiting 30 tablet 1    dexAMETHasone 0.5 mg/5 mL solution SMARTSIG:15 Milliliter(s) By Mouth 4-5 Times Daily PRN      omeprazole (PriLOSEC) 40 mg capsule Take 1 capsule (40 mg total) by mouth daily      valsartan (DIOVAN) 80 mg tablet Take 1 tablet (80 mg total) by  mouth daily 90 tablet 4    FLUoxetine (PROzac) 40 mg capsule Take 1 capsule (40 mg total) by mouth daily 90 capsule 3    folic acid 1 mg tablet Take 1 tablet (1 mg total) by mouth daily 90 tablet 3    cyanocobalamin 1,000 mcg/mL injection Inject 1 mL (1,000 mcg total) into the shoulder, thigh, or buttocks every 30 (thirty) days 1 mL 11    hydrocortisone 2.5 % cream Apply topically 2 (two) times a day as needed for rash 30 g 0    rosuvastatin (CRESTOR) 10 mg tablet Take 1 tablet by mouth daily 90 tablet 4    famotidine (PEPCID) 20 mg tablet Take 1 tablet (20 mg total) by mouth 2 (two) times a day      acetaminophen (TYLENOL) 500 mg tablet Take 1 tablet (500 mg total) by mouth every 6 (six) hours as needed for pain scale 1-3/10      ursodioL (ACTIGALL) 500 mg tablet Take 1 tablet (500 mg total) by mouth 2 times daily Every other day      ursodioL (AMANDEEP) 250 mg tablet Take 3 tablets (750 mg total) by mouth In am and 500 mg in pm every other day-alternate with the 500 mg bid      HYDROcodone-acetaminophen (NORCO) 7.5-325 mg per tablet Take 1 tablet by mouth every 8 (eight) hours as needed for pain scale 8-10/10 Max Daily Amount: 3 tablets 30 tablet 0    fenofibrate (TRICOR) 48 mg tablet Take 1 tablet (48 mg total) by mouth daily 90 tablet 4     No current facility-administered medications for this visit.     Facility-Administered Medications Ordered in Other Visits   Medication Dose Route Frequency Provider Last Rate Last Admin    DOBUTamine 500 mg in D5W 250 mL infusion (premix)  2.5-40 mcg/kg/min intravenous Titrated Trevor Ghotra MD 95.3 mL/hr at 10/31/24 0900 40 mcg/kg/min at 10/31/24 0900     Past Medical History:   Diagnosis Date    Allergic eosinophilic esophagitis     Allergic rhinitis 1990    Anemia 2019    Arthritis     Autoimmune hepatitis (CMS/HCC)     Blood disorder     pernicious anemia    Cataract 2001    Depression 08/08/2022    Gastrointestinal disease     IBS    Gout     Hyperlipidemia     Hypertension      Lymphoplasmacytic leukemia (CMS/HCC)     Melanoma (CMS/HCC)     Neurologic disorder     Pneumothorax 08/07/2022    Primary biliary cholangitis (CMS/HCC)     autoimmune hepatitis    Psychiatric illness     anxiety    Skin abnormalities 2021     Past Surgical History:   Procedure Laterality Date    APPENDECTOMY      COLONOSCOPY W/ BIOPSIES AND POLYPECTOMY  02/09/2009    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    CT BIOPSY LIVER FUNCTION  12/02/2021    CT BIOPSY LIVER FUNCTION 12/2/2021 [facility] MIS CT SCAN    ESOPHAGOGASTRODUODENOSCOPY N/A 05/16/2022    Procedure: ESOPHAGOGASTRODUODENOSCOPY with Biopsies;  Surgeon: Bradley Cook MD;  Location: [facility] Endoscopy;  Service: Endoscopy;  Laterality: N/A;    EYE SURGERY Bilateral     cataract    KNEE SURGERY Bilateral     3 surgeries on left and 2 on right knees - prior open medial meniscectomies bilateral knees in the 1970s    KYPHOSIS SURGERY N/A 11/08/2024    ORTHOPEDIC SURGERY  1972    SKIN BIOPSY      SKIN CANCER EXCISION Left 10/15/2020    Melanoma removed from left forearm    TONSILLECTOMY      TOTAL HIP ARTHROPLASTY Left 04/21/2021    Procedure: LEFT TOTAL HIP ARTHROPLASTY;  Surgeon: Sj Reyes MD;  Location: [facility] OR;  Service: Orthopedics;  Laterality: Left;    TOTAL HIP ARTHROPLASTY Right 03/22/2022    Procedure: RIGHT TOTAL HIP ARTHROPLASTY POSTERIOR;  Surgeon: Sj Reyes MD;  Location: [facility] OR;  Service: Orthopedics;  Laterality: Right;    VASECTOMY  1986     Family History   Problem Relation Age of Onset    Diabetes Mother         Type 2    Hypertension Mother     Alzheimer's disease Mother     Lung cancer Father     Hypertension Father     Cancer Father     Prostate cancer Brother     Heart failure Brother     Heart attack Maternal Grandmother     Hypertension Maternal Grandfather     Prostate cancer Maternal Grandfather      Social History     Socioeconomic History    Marital status:    Tobacco Use    Smoking status: Never    Smokeless  tobacco: Never   Vaping Use    Vaping status: Never Used   Substance and Sexual Activity    Alcohol use: Yes     Alcohol/week: 3.0 standard drinks of alcohol     Types: 3 Glasses of wine per week     Comment: occ    Drug use: Never    Sexual activity: Defer     Social Drivers of Health     Tobacco Use: Low Risk  (1/13/2025)    Patient History     Smoking Tobacco Use: Never     Smokeless Tobacco Use: Never   Alcohol Use: Unknown (9/22/2020)    Received from [facility]    AUDIT-C     Frequency of Alcohol Consumption: 4 or more times a week     Average Number of Drinks: 1 or 2   Depression: Not at risk (9/11/2024)    Received from [facility]    PHQ-2     PHQ-2 SCORE: 2       Review of Systems    Objective     Vitals  /78 (BP Location: Left arm, Patient Position: Sitting, Cuff Size: Regular Adult)   Pulse 76   Temp 36.8 °C (98.2 °F) (Temporal)   Resp 16   Wt 78.9 kg (174 lb)   SpO2 98%   BMI 22.65 kg/m²     Physical Exam  Vitals and nursing note reviewed.   Constitutional:       General: He is not in acute distress.     Appearance: Normal appearance. He is not ill-appearing, toxic-appearing or diaphoretic.   Neck:      Vascular: No carotid bruit.   Cardiovascular:      Rate and Rhythm: Normal rate.      Heart sounds: Normal heart sounds. No murmur heard.     No friction rub. No gallop.   Pulmonary:      Effort: Pulmonary effort is normal. No respiratory distress.      Breath sounds: Normal breath sounds. No stridor. No wheezing, rhonchi or rales.   Musculoskeletal:      Cervical back: Neck supple.      Right lower leg: No edema.      Left lower leg: No edema.   Lymphadenopathy:      Cervical: No cervical adenopathy.   Skin:     General: Skin is warm and dry.      Capillary Refill: Capillary refill takes less than 2 seconds.      Findings: No rash.      Comments: Bilateral upper extremity purpura noted.   Neurological:      Mental Status: He is alert.       Physical Exam               Procedures    Assessment/Plan   Diagnoses and all orders for this visit:    Monoclonal paraproteinemia    Lymphoplasmacytic lymphoma (CMS/HCC)  -     C-reactive protein (Inflammation) Blood, Venous  -     Sedimentation rate, automated Blood, Venous  -     CBC w/auto differential Blood, Venous  -     Comprehensive metabolic panel Blood, Venous  -     IgM Blood, Venous  -     Monoclonal Protein Study, Quantitative, Serum Blood, Venous  -     Immunoglobulin free LT chains blood Blood, Venous    Non-traumatic compression fracture of L5 lumbar vertebra with routine healing, subsequent encounter  -     HYDROcodone-acetaminophen (NORCO) 7.5-325 mg per tablet; Take 1 tablet by mouth every 8 (eight) hours as needed for pain scale 8-10/10 Max Daily Amount: 3 tablets      Assessment/Plan     Lymphoplasmic Lymphoma/Monoclonal Paraproteinemia  Flare up of symptoms including hip pain, back pain, night sweats, and skin macules. Recent L5 block provided temporary relief.  -Order labs today to assess current levels.  -Consider prednisone burst for symptom management.  -Continue with oncology appointment on 1/21/2025.    Pain Management  Current regimen of hydrocodone provides some relief, but patient is hesitant to use consistently due to concerns of masking pain.  -Continue current regimen as needed for pain control.    Oral Ulcers  Referral to [facility] was denied, but patient reports improvement in symptoms.  -No further action required at this time.        Return if symptoms worsen or fail to improve.    Cuba Haley MD

## 2025-01-13 NOTE — TELEPHONE ENCOUNTER
Pt stating that he is having a lot of pain flare up and night sweats. Wondering if he could get in sooner than Feb. [phone]

## 2025-01-13 NOTE — TELEPHONE ENCOUNTER
Pt states he is having a flare up of his cancer and he was wondering if he can be seen or talk to Dr. Haley or what he would like Pt to do. Please call to advise.

## 2025-01-16 LAB
KAPPA LC FREE SERPL-MCNC: 1.75 MG/DL (ref 0.33–1.94)
KAPPA LC FREE/LAMBDA FREE SERPL: 1.88 {RATIO} (ref 0.26–1.65)
LAMBDA LC FREE SERPL-MCNC: 0.93 MG/DL (ref 0.57–2.63)

## 2025-01-17 LAB
IGA SERPL-MCNC: 92 MG/DL (ref 61–356)
IGG SERPL-MCNC: 485 MG/DL (ref 767–1590)
IGM SERPL-MCNC: 993 MG/DL (ref 37–286)
IMMUNOLOGIST REVIEW: ABNORMAL
M PROTEIN SERPL QL: POSITIVE
M-PROTEIN MK(REF): 0.85 G/DL
PROTEIN GLYCOSYLATED SERPL QL: YES
TYPE OF THERAP AB ADMINISTERED: ABNORMAL

## 2025-01-21 ENCOUNTER — OFFICE VISIT (OUTPATIENT)
Dept: ONCOLOGY | Facility: CLINIC | Age: 70
End: 2025-01-21
Payer: MEDICARE

## 2025-01-21 VITALS
TEMPERATURE: 97.8 F | OXYGEN SATURATION: 95 % | SYSTOLIC BLOOD PRESSURE: 134 MMHG | HEIGHT: 74 IN | HEART RATE: 75 BPM | BODY MASS INDEX: 22.19 KG/M2 | WEIGHT: 172.9 LBS | DIASTOLIC BLOOD PRESSURE: 84 MMHG

## 2025-01-21 DIAGNOSIS — C83.00 LYMPHOPLASMACYTIC LYMPHOMA (CMS/HCC): ICD-10-CM

## 2025-01-21 PROCEDURE — 99214 OFFICE O/P EST MOD 30 MIN: CPT | Performed by: INTERNAL MEDICINE

## 2025-01-21 PROCEDURE — G2211 COMPLEX E/M VISIT ADD ON: HCPCS | Performed by: INTERNAL MEDICINE

## 2025-01-21 PROCEDURE — G0463 HOSPITAL OUTPT CLINIC VISIT: HCPCS

## 2025-01-21 ASSESSMENT — PAIN SCALES - GENERAL: PAINLEVEL_OUTOF10: 6

## 2025-01-21 NOTE — PROGRESS NOTES
Hematology/Oncology Clinic Note    REASON FOR REFERRAL: Monoclonal gammopathy    HISTORY OF PRESENT ILLNESS:  Juan Diego Rondon is a 69 y.o.-old gentleman referred for further evaluation and treatment of monoclonal gammopathy.  Patient recently underwent laboratory testing in January 2024 including an SPEP which showed a monoclonal protein measuring 1.4 g/dL.  Further workup showed kappa free light chains elevated at 6.41 with a kappa/lambda ratio of 3.98.  Quantitative immunoglobulins showed elevated IgM of 2256, IgG 698, IgA 114.  LDH was normal at 2 109 and beta-2 microglobulin 2.65.  Gastric panel showed creatinine of 0.79 calcium 9.9, corrected calcium 10.0.  PET scan was done which showed no specific uptake with no osseous lesions noted.    Last week he had some low-grade fevers and drenching night sweats for about 3 days.  He continues to have issues with mucositis which has been relatively stable.  He denies any new lymphadenopathy.    Wt Readings from Last 3 Encounters:   01/21/25 78.4 kg (172 lb 14.4 oz)   01/13/25 78.9 kg (174 lb)   12/16/24 77.6 kg (171 lb)        REVIEW OF SYSTEMS:   A 12 point review of systems was completed.  Pertinents noted in HPI; otherwise negative.    Past Medical History  Past Medical History:   Diagnosis Date    Allergic eosinophilic esophagitis     Allergic rhinitis 1990    Anemia 2019    Arthritis     Autoimmune hepatitis (CMS/HCC)     Blood disorder     pernicious anemia    Cataract 2001    Depression 08/08/2022    Gastrointestinal disease     IBS    Gout     Hyperlipidemia     Hypertension     Lymphoplasmacytic leukemia (CMS/HCC)     Melanoma (CMS/HCC)     Neurologic disorder     Pneumothorax 08/07/2022    Primary biliary cholangitis (CMS/HCC)     autoimmune hepatitis    Psychiatric illness     anxiety    Skin abnormalities 2021       Past Surgical History  Past Surgical History:   Procedure Laterality Date    APPENDECTOMY      COLONOSCOPY W/ BIOPSIES AND POLYPECTOMY   02/09/2009    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    CT BIOPSY LIVER FUNCTION  12/02/2021    CT BIOPSY LIVER FUNCTION 12/2/2021 [facility] MIS CT SCAN    ESOPHAGOGASTRODUODENOSCOPY N/A 05/16/2022    Procedure: ESOPHAGOGASTRODUODENOSCOPY with Biopsies;  Surgeon: Bradley Cook MD;  Location: [facility] Endoscopy;  Service: Endoscopy;  Laterality: N/A;    EYE SURGERY Bilateral     cataract    KNEE SURGERY Bilateral     3 surgeries on left and 2 on right knees - prior open medial meniscectomies bilateral knees in the 1970s    KYPHOSIS SURGERY N/A 11/08/2024    ORTHOPEDIC SURGERY  1972    SKIN BIOPSY      SKIN CANCER EXCISION Left 10/15/2020    Melanoma removed from left forearm    TONSILLECTOMY      TOTAL HIP ARTHROPLASTY Left 04/21/2021    Procedure: LEFT TOTAL HIP ARTHROPLASTY;  Surgeon: Sj Reyes MD;  Location: [facility] OR;  Service: Orthopedics;  Laterality: Left;    TOTAL HIP ARTHROPLASTY Right 03/22/2022    Procedure: RIGHT TOTAL HIP ARTHROPLASTY POSTERIOR;  Surgeon: Sj Reyes MD;  Location: [facility] OR;  Service: Orthopedics;  Laterality: Right;    VASECTOMY  1986       MEDICATIONS:    Current Outpatient Medications   Medication Sig Dispense Refill    HYDROcodone-acetaminophen (NORCO) 7.5-325 mg per tablet Take 1 tablet by mouth every 8 (eight) hours as needed for pain scale 8-10/10 Max Daily Amount: 3 tablets 30 tablet 0    ondansetron ODT (ZOFRAN-ODT) 4 mg disintegrating tablet Take 1 tablet (4 mg total) by mouth every 8 (eight) hours as needed for nausea or vomiting 30 tablet 1    dexAMETHasone 0.5 mg/5 mL solution SMARTSIG:15 Milliliter(s) By Mouth 4-5 Times Daily PRN      omeprazole (PriLOSEC) 40 mg capsule Take 1 capsule (40 mg total) by mouth daily      valsartan (DIOVAN) 80 mg tablet Take 1 tablet (80 mg total) by mouth daily 90 tablet 4    FLUoxetine (PROzac) 40 mg capsule Take 1 capsule (40 mg total) by mouth daily 90 capsule 3    folic acid 1 mg tablet Take 1 tablet (1 mg total) by mouth daily 90 tablet  3    cyanocobalamin 1,000 mcg/mL injection Inject 1 mL (1,000 mcg total) into the shoulder, thigh, or buttocks every 30 (thirty) days 1 mL 11    hydrocortisone 2.5 % cream Apply topically 2 (two) times a day as needed for rash 30 g 0    rosuvastatin (CRESTOR) 10 mg tablet Take 1 tablet by mouth daily 90 tablet 4    fenofibrate (TRICOR) 48 mg tablet Take 1 tablet (48 mg total) by mouth daily 90 tablet 4    famotidine (PEPCID) 20 mg tablet Take 1 tablet (20 mg total) by mouth 2 (two) times a day      acetaminophen (TYLENOL) 500 mg tablet Take 1 tablet (500 mg total) by mouth every 6 (six) hours as needed for pain scale 1-3/10      ursodioL (ACTIGALL) 500 mg tablet Take 1 tablet (500 mg total) by mouth 2 times daily Every other day      ursodioL (AMANDEEP) 250 mg tablet Take 3 tablets (750 mg total) by mouth In am and 500 mg in pm every other day-alternate with the 500 mg bid       No current facility-administered medications for this visit.     Facility-Administered Medications Ordered in Other Visits   Medication Dose Route Frequency Provider Last Rate Last Admin    DOBUTamine 500 mg in D5W 250 mL infusion (premix)  2.5-40 mcg/kg/min intravenous Titrated Trevor Ghotra MD 95.3 mL/hr at 10/31/24 0900 40 mcg/kg/min at 10/31/24 0900       Allergies  Allergies have been reviewed.  Juan Diego has No Known Allergies.    Social History  Social History     Socioeconomic History    Marital status:      Spouse name: Not on file    Number of children: Not on file    Years of education: Not on file    Highest education level: Not on file   Occupational History    Not on file   Tobacco Use    Smoking status: Never    Smokeless tobacco: Never   Vaping Use    Vaping status: Never Used   Substance and Sexual Activity    Alcohol use: Yes     Alcohol/week: 3.0 standard drinks of alcohol     Types: 3 Glasses of wine per week     Comment: occ    Drug use: Never    Sexual activity: Defer   Other Topics Concern    Not on file   Social  "History Narrative    Not on file     Social Drivers of Health     Financial Resource Strain: Not on file   Food Insecurity: Not on file   Transportation Needs: Not on file   Physical Activity: Not on file   Stress: Not on file   Social Connections: Not on file   Intimate Partner Violence: Not on file   Housing Stability: Not on file       Family History  Family History   Problem Relation Age of Onset    Diabetes Mother         Type 2    Hypertension Mother     Alzheimer's disease Mother     Lung cancer Father     Hypertension Father     Cancer Father     Prostate cancer Brother     Heart failure Brother     Heart attack Maternal Grandmother     Hypertension Maternal Grandfather     Prostate cancer Maternal Grandfather        PHYSICAL EXAMINATION:  Vitals:    01/21/25 1247   Height: 1.867 m (6' 1.5\")   Weight: 78.4 kg (172 lb 14.4 oz)   PainSc:   6     General: Pleasant,in no acute distress  Head: Normocephalic, atraumatic  Eyes: No scleral icterus  Heart: Regular rate and rhythm without murmur  Extremities: No cyanosis or edema noted  Skin: No obvious rashes or lesion  Psych: Normal affect, no obvious signs of anxiety/depression    ECOG PS:1    RESULTS REVIEWED:   Results for orders placed or performed in visit on 01/13/25   Sedimentation rate, automated Blood, Venous    Collection Time: 01/13/25  2:04 PM   Result Value Ref Range    Sed Rate 21 (H) <=20 mm/hr   C-reactive protein (Inflammation) Blood, Venous    Collection Time: 01/13/25  2:07 PM   Result Value Ref Range    CRP <1.0 <=10.0 MG/L   CBC w/auto differential Blood, Venous    Collection Time: 01/13/25  2:07 PM   Result Value Ref Range    WBC 11.9 (H) 3.7 - 9.6 10*3/uL    RBC 4.24 4.10 - 5.80 10*6/uL    Hemoglobin 15.1 13.2 - 17.2 g/dL    Hematocrit 44.2 38.0 - 50.0 %    .2 (H) 82.0 - 97.0 fL    MCH 35.6 (H) 29.0 - 34.0 pg    MCHC 34.2 32.0 - 36.0 g/dL    RDW 13.6 11.5 - 15.0 %    Platelets 275 130 - 350 10*3/uL    MPV 6.8 (L) 6.9 - 10.8 fL    " Neutrophils% 84.5 (H) 46.0 - 70.0 %    Lymphocytes% 9.4 (L) 15.0 - 47.0 %    Monocytes% 5.4 5.0 - 13.0 %    Eosinophils% 0.5 0.0 - 3.0 %    Basophils% 0.2 0.0 - 2.0 %    ANC (auto diff) 10.00 10*3/uL    Lymphocytes Absolute 1.10 10*3/uL    Monocytes Absolute 0.60 10*3/uL    Eosinophils Absolute 0.10 10*3/uL    Basophils Absolute 0.00 10*3/uL    Macrocytosis 1+    Comprehensive metabolic panel Blood, Venous    Collection Time: 01/13/25  2:07 PM   Result Value Ref Range    Sodium 136 135 - 145 mmol/L    Potassium 3.4 (L) 3.5 - 5.1 MMOL/L    Chloride 100 98 - 107 mmol/L    CO2 24 21 - 32 mmol/L    Anion Gap 12 (H) 3 - 11 mmol/L    BUN 11 7 - 25 mg/dL    Creatinine 0.70 0.70 - 1.30 mg/dL    Glucose 110 (H) 70 - 105 mg/dL    Calcium 8.6 8.6 - 10.3 mg/dL    AST 11 <40 U/L    ALT (SGPT) 10 7 - 52 U/L    Alkaline Phosphatase 89 45 - 115 U/L    Total Protein 6.6 6.0 - 8.3 g/dL    Albumin 3.6 3.5 - 5.3 g/dL    Total Bilirubin 0.74 0.20 - 1.40 mg/dL    Corrected Calcium 8.9 8.6 - 10.3 mg/dL    eGFR 99 >60 mL/min/1.73m*2   IgM Blood, Venous    Collection Time: 01/13/25  2:07 PM   Result Value Ref Range    IgM 1,032 (H) 40 - 230 MG/DL   Monoclonal Protein Study, Quantitative, Serum Blood, Venous    Collection Time: 01/13/25  2:07 PM   Result Value Ref Range    M-protein MK 0.852 (H) g/dL    Glycosylation Yes (A)     Flag, M-protein Isotype Positive (A) Negative    QMPTS Interpretation See Comment     Immunoglobulin A (IgA), S 92 61 - 356 mg/dL    Immunoglobulin M (IgM), S 993 (H) 37 - 286 mg/dL    Immunoglobulin G (IgG), S 485 (L) 767 - 1590 mg/dL    Therapeutic Antibody Administered? Unknown    Immunoglobulin free LT chains blood Blood, Venous    Collection Time: 01/13/25  2:07 PM   Result Value Ref Range    Kappa Free Light Chain, S 1.75 0.3300 - 1.94 mg/dL    Lambda Free Light Chain, S 0.9300 0.5700 - 2.63 mg/dL    Kappa/Lambda FLC Ratio 1.88 (H) 0.2600 - 1.65        ASSESSMENT/PLAN    # Lymphoplasmacytic lymphoma.  He  underwent extensive workup for arthralgias.  Included in most pain was SPEP which showed a monoclonal protein measuring 1.4 g/dL.  Kappa free light chain was elevated at 6.41 with a kappa/lambda ratio of 3.98.  IgM level was elevated at 2256.  PET scan showed no metabolic uptake or osseous lesions.  Bone marrow biopsy was consistent withlymphoplasmacytic lymphoma involving approximately 10% of bone marrow cellularity.  MYD88 L265P was positive.  We started him on weekly rituximab due to fatigue, night sweats, arthralgias.  He completed 4 treatments with slight improvement of his IgM.  His symptoms have remained relatively stable with mild improvement.  His IgM continues to decline suggestive of continued response.    -Follow-up 3 months with repeat labs    #Mucositis.  This has been present since completing Rituxan.  Etiology is unclear.  [facility] was concerned about possible Behcet's disease.  Will refer the patient for further workup to [place] rheumatology.    NCCN guidelines were consulted in order to help in the management of the patient     Luis Ruffin MD, PhD  Hematology and Oncology

## 2025-01-27 DIAGNOSIS — M48.56XD NON-TRAUMATIC COMPRESSION FRACTURE OF L5 LUMBAR VERTEBRA WITH ROUTINE HEALING, SUBSEQUENT ENCOUNTER: ICD-10-CM

## 2025-01-27 RX ORDER — HYDROCODONE BITARTRATE AND ACETAMINOPHEN 7.5; 325 MG/1; MG/1
1 TABLET ORAL EVERY 8 HOURS PRN
Qty: 30 TABLET | Refills: 0 | Status: SHIPPED | OUTPATIENT
Start: 2025-01-27 | End: 2025-02-09 | Stop reason: SDUPTHER

## 2025-01-27 NOTE — TELEPHONE ENCOUNTER
Care Due:                  Date            Visit Type   Department     Provider  --------------------------------------------------------------------------------                              ESTABLISHED   [facility]  Last Visit: 01-      PATIENT      MEDICINE       IVÁN RIVAS  Next Visit: None Scheduled  None         None Found                                                            Last  Test          Frequency    Reason                     Performed    Due Date  --------------------------------------------------------------------------------  Lipid Panel.  12 months..  rosuvastatin.............  Not Found    Overdue    [employer] Embedded Care Due Messages. Reference number: [id number]. 1/27/2025 10:06:46 AM MST

## 2025-01-28 ENCOUNTER — OFFICE VISIT (OUTPATIENT)
Dept: URGENT CARE | Facility: CLINIC | Age: 70
End: 2025-01-28
Payer: MEDICARE

## 2025-01-28 ENCOUNTER — HOSPITAL ENCOUNTER (OUTPATIENT)
Dept: RADIOLOGY | Facility: HOSPITAL | Age: 70
Discharge: HOME | End: 2025-01-28
Payer: MEDICARE

## 2025-01-28 VITALS
HEART RATE: 98 BPM | DIASTOLIC BLOOD PRESSURE: 76 MMHG | SYSTOLIC BLOOD PRESSURE: 124 MMHG | OXYGEN SATURATION: 93 % | RESPIRATION RATE: 18 BRPM | TEMPERATURE: 97 F

## 2025-01-28 DIAGNOSIS — M54.41 ACUTE RIGHT-SIDED LOW BACK PAIN WITH RIGHT-SIDED SCIATICA: Primary | ICD-10-CM

## 2025-01-28 PROCEDURE — 72100 X-RAY EXAM L-S SPINE 2/3 VWS: CPT

## 2025-01-28 PROCEDURE — G0463 HOSPITAL OUTPT CLINIC VISIT: HCPCS | Performed by: NURSE PRACTITIONER

## 2025-01-28 PROCEDURE — 72100 X-RAY EXAM L-S SPINE 2/3 VWS: CPT | Mod: 26 | Performed by: INTERNAL MEDICINE

## 2025-01-28 PROCEDURE — 99213 OFFICE O/P EST LOW 20 MIN: CPT | Performed by: NURSE PRACTITIONER

## 2025-01-28 RX ORDER — SODIUM CHLORIDE 5 %
2 OINTMENT (GRAM) OPHTHALMIC (EYE) AS NEEDED
COMMUNITY
Start: 2025-01-14

## 2025-01-28 RX ORDER — ESOMEPRAZOLE MAGNESIUM 40 MG/1
40 CAPSULE, DELAYED RELEASE ORAL 2 TIMES DAILY
COMMUNITY
Start: 2025-01-09

## 2025-01-28 ASSESSMENT — ENCOUNTER SYMPTOMS
ABDOMINAL PAIN: 0
VOMITING: 0
DIARRHEA: 0
NECK PAIN: 0
CHEST TIGHTNESS: 0
CHOKING: 0
NAUSEA: 0
CHILLS: 0
HEMATURIA: 0
NECK STIFFNESS: 0
SHORTNESS OF BREATH: 0
BACK PAIN: 1
FEVER: 0
COUGH: 0
DIFFICULTY URINATING: 0
FATIGUE: 0

## 2025-01-28 NOTE — PROGRESS NOTES
Subjective   Chief Complaint   Patient presents with    Back Pain     C/o back pain after a fall on Saturday. Reports that he had surgical repair of a compression fracture on Jan 8th.         History of Present Illness  The patient, with a history of kyphoplasty at L2 and nerve block at L5 for back pain, presents with new onset lower back pain.     They report a recent fall, tripping over their granddaughter's toys, which they believe exacerbated their back pain.   The pain is described as being located a 'little bit lower to the right' than the previous surgical site. They deny any numbness in the lower extremities but report pain radiating around to both sides and down to their knees and ankles.   This pain is different from their normal pain prior to the fall.     They deny any bowel or bladder incontinence.   Patient states that he did attempt to get in touch with his spine team and he was not able to get in touch with them today.     The following have been reviewed and updated as appropriate in this visit:        No Known Allergies  Current Outpatient Medications   Medication Sig Dispense Refill    esomeprazole (NexIUM) 40 mg capsule Take 1 capsule (40 mg total) by mouth 2 (two) times a day      sodium chloride (Orly 128) 5 % ophthalmic ointment Apply 2 drops to right eye as needed      HYDROcodone-acetaminophen (NORCO) 7.5-325 mg per tablet Take 1 tablet by mouth every 8 (eight) hours as needed for pain scale 8-10/10 Max Daily Amount: 3 tablets 30 tablet 0    ondansetron ODT (ZOFRAN-ODT) 4 mg disintegrating tablet Take 1 tablet (4 mg total) by mouth every 8 (eight) hours as needed for nausea or vomiting 30 tablet 1    dexAMETHasone 0.5 mg/5 mL solution SMARTSIG:15 Milliliter(s) By Mouth 4-5 Times Daily PRN      omeprazole (PriLOSEC) 40 mg capsule Take 1 capsule (40 mg total) by mouth daily      valsartan (DIOVAN) 80 mg tablet Take 1 tablet (80 mg total) by mouth daily 90 tablet 4    FLUoxetine (PROzac) 40 mg  capsule Take 1 capsule (40 mg total) by mouth daily 90 capsule 3    folic acid 1 mg tablet Take 1 tablet (1 mg total) by mouth daily 90 tablet 3    cyanocobalamin 1,000 mcg/mL injection Inject 1 mL (1,000 mcg total) into the shoulder, thigh, or buttocks every 30 (thirty) days 1 mL 11    hydrocortisone 2.5 % cream Apply topically 2 (two) times a day as needed for rash 30 g 0    rosuvastatin (CRESTOR) 10 mg tablet Take 1 tablet by mouth daily 90 tablet 4    fenofibrate (TRICOR) 48 mg tablet Take 1 tablet (48 mg total) by mouth daily 90 tablet 4    famotidine (PEPCID) 20 mg tablet Take 1 tablet (20 mg total) by mouth 2 (two) times a day      acetaminophen (TYLENOL) 500 mg tablet Take 1 tablet (500 mg total) by mouth every 6 (six) hours as needed for pain scale 1-3/10      ursodioL (ACTIGALL) 500 mg tablet Take 1 tablet (500 mg total) by mouth 2 times daily Every other day      ursodioL (AMANDEEP) 250 mg tablet Take 3 tablets (750 mg total) by mouth In am and 500 mg in pm every other day-alternate with the 500 mg bid       No current facility-administered medications for this visit.     Facility-Administered Medications Ordered in Other Visits   Medication Dose Route Frequency Provider Last Rate Last Admin    DOBUTamine 500 mg in D5W 250 mL infusion (premix)  2.5-40 mcg/kg/min intravenous Titrated Trevor Ghotra MD 95.3 mL/hr at 10/31/24 0900 40 mcg/kg/min at 10/31/24 0900     Past Medical History:   Diagnosis Date    Allergic eosinophilic esophagitis     Allergic rhinitis 1990    Anemia 2019    Arthritis     Autoimmune hepatitis (CMS/HCC)     Blood disorder     pernicious anemia    Cataract 2001    Depression 08/08/2022    Gastrointestinal disease     IBS    Gout     Hyperlipidemia     Hypertension     Lymphoplasmacytic leukemia (CMS/HCC)     Melanoma (CMS/HCC)     Neurologic disorder     Pneumothorax 08/07/2022    Primary biliary cholangitis (CMS/HCC)     autoimmune hepatitis    Psychiatric illness     anxiety    Skin  abnormalities 2021       Review of Systems   Constitutional:  Negative for chills, fatigue and fever.   Respiratory:  Negative for cough, choking, chest tightness and shortness of breath.    Cardiovascular:  Negative for chest pain.   Gastrointestinal:  Negative for abdominal pain, diarrhea, nausea and vomiting.   Genitourinary:  Negative for difficulty urinating, hematuria and testicular pain.   Musculoskeletal:  Positive for back pain. Negative for neck pain and neck stiffness.       Objective   /76 (BP Location: Right arm, Patient Position: Sitting)   Pulse 98   Temp 36.1 °C (97 °F) (Temporal)   Resp 18   SpO2 93%     Physical Exam  Constitutional:       General: He is not in acute distress.     Appearance: Normal appearance. He is not ill-appearing or toxic-appearing.   Cardiovascular:      Rate and Rhythm: Normal rate and regular rhythm.      Pulses: Normal pulses.      Heart sounds: Normal heart sounds. No murmur heard.  Pulmonary:      Effort: Pulmonary effort is normal. No respiratory distress.      Breath sounds: Normal breath sounds.   Musculoskeletal:         General: No swelling, tenderness, deformity or signs of injury. Normal range of motion.      Cervical back: Normal range of motion and neck supple. No swelling, deformity, signs of trauma, rigidity or tenderness.      Thoracic back: No swelling, deformity, spasms or tenderness. Normal range of motion.      Lumbar back: No swelling, edema, deformity, signs of trauma, lacerations, spasms, tenderness or bony tenderness. Normal range of motion. Negative right straight leg raise test and negative left straight leg raise test. No scoliosis.      Right lower leg: No edema.      Left lower leg: No edema.      Comments: Thoracic spine: No heat, redness, spasms.  Single bruise smaller than a fingerprint located left spine.    Lumbar spine: No heat, redness, tenderness with palpation of spinous processes, step-off.  There is tenderness with palpation  right side musculature.   Lymphadenopathy:      Cervical: No cervical adenopathy.   Neurological:      Mental Status: He is alert.       No results found for this or any previous visit (from the past 24 hours).    Results x-ray lumbar spine: Our x-ray machine is down in the clinic today not able to do L-spine.  Patient will present to the hospital and have this conducted and then return to clinic.  Radiology read: No acute osseous abnormalities, old L2 compression fracture status post vertebroplasty, mild to moderate multilevel degenerative lumbar spondylosis.  Patient provided this result.  He acknowledges understanding    Assessment/Plan   There are no diagnoses linked to this encounter.  Assessment/Plan   Lumbar Spine Pain Post Fall New onset of pain in the lumbar region, radiating to the knees and ankles, following a fall. No evidence of redness, swelling, or heat on examination.   No bowel or bladder incontinence.   History of kyphoplasty at L2 and nerve block at L5.   -Order imaging studies to assess for new injury.  See above result  Offered and patient declined medication muscle relaxer.  Advised regarding use of Tylenol.  Also provided information regarding taking MiraLAX once or twice per day it does appear there is a constipation  Advised follow-up with his surgical team for further assessment and treatment as indicated.  He will see them next week.  Patient acknowledged understanding and acceptance for the care plan             There are no Patient Instructions on file for this visit.    Marbella Garza, CNP

## 2025-02-09 DIAGNOSIS — M48.56XD NON-TRAUMATIC COMPRESSION FRACTURE OF L5 LUMBAR VERTEBRA WITH ROUTINE HEALING, SUBSEQUENT ENCOUNTER: ICD-10-CM

## 2025-02-09 NOTE — TELEPHONE ENCOUNTER
No care due was identified.  Health [facility] Embedded Care Due Messages. Reference number: [id number]. 2/09/2025 3:37:44 PM MST

## 2025-02-10 RX ORDER — HYDROCODONE BITARTRATE AND ACETAMINOPHEN 7.5; 325 MG/1; MG/1
1 TABLET ORAL EVERY 8 HOURS PRN
Qty: 30 TABLET | Refills: 0 | Status: SHIPPED | OUTPATIENT
Start: 2025-02-10 | End: 2025-02-21 | Stop reason: SDUPTHER

## 2025-02-21 DIAGNOSIS — M48.56XD NON-TRAUMATIC COMPRESSION FRACTURE OF L5 LUMBAR VERTEBRA WITH ROUTINE HEALING, SUBSEQUENT ENCOUNTER: ICD-10-CM

## 2025-02-21 NOTE — TELEPHONE ENCOUNTER
No care due was identified.  Health system Embedded Care Due Messages. Reference number: [id number]. 2/21/2025 11:59:05 AM MST

## 2025-02-24 DIAGNOSIS — M48.56XD NON-TRAUMATIC COMPRESSION FRACTURE OF L5 LUMBAR VERTEBRA WITH ROUTINE HEALING, SUBSEQUENT ENCOUNTER: ICD-10-CM

## 2025-02-24 RX ORDER — HYDROCODONE BITARTRATE AND ACETAMINOPHEN 7.5; 325 MG/1; MG/1
1 TABLET ORAL EVERY 8 HOURS PRN
Qty: 30 TABLET | Refills: 0 | Status: SHIPPED | OUTPATIENT
Start: 2025-02-24 | End: 2025-03-04 | Stop reason: SDUPTHER

## 2025-02-24 RX ORDER — HYDROCODONE BITARTRATE AND ACETAMINOPHEN 7.5; 325 MG/1; MG/1
1 TABLET ORAL EVERY 8 HOURS PRN
OUTPATIENT
Start: 2025-02-24

## 2025-02-24 NOTE — TELEPHONE ENCOUNTER
No care due was identified.  [facility] Embedded Care Due Messages. Reference number: [id number]. 2/24/2025 7:26:07 AM MST

## 2025-02-25 DIAGNOSIS — E78.00 HYPERCHOLESTEROLEMIA: ICD-10-CM

## 2025-02-25 RX ORDER — ROSUVASTATIN CALCIUM 10 MG/1
10 TABLET, COATED ORAL DAILY
Qty: 90 TABLET | Refills: 3 | Status: SHIPPED | OUTPATIENT
Start: 2025-02-25

## 2025-02-25 NOTE — TELEPHONE ENCOUNTER
Medication refill request:  Medication(s):  rosuvastatin not filled due to Lab(s) abnormal or delinquent > 90 days

## 2025-02-25 NOTE — TELEPHONE ENCOUNTER
No care due was identified.  Health [facility] Embedded Care Due Messages. Reference number: [id number]. 2/25/2025 10:13:36 AM MST

## 2025-03-04 ENCOUNTER — OFFICE VISIT (OUTPATIENT)
Dept: URGENT CARE | Facility: CLINIC | Age: 70
End: 2025-03-04
Payer: MEDICARE

## 2025-03-04 VITALS
DIASTOLIC BLOOD PRESSURE: 79 MMHG | SYSTOLIC BLOOD PRESSURE: 138 MMHG | TEMPERATURE: 97 F | RESPIRATION RATE: 20 BRPM | HEART RATE: 84 BPM | OXYGEN SATURATION: 96 %

## 2025-03-04 DIAGNOSIS — M48.56XD NON-TRAUMATIC COMPRESSION FRACTURE OF L5 LUMBAR VERTEBRA WITH ROUTINE HEALING, SUBSEQUENT ENCOUNTER: ICD-10-CM

## 2025-03-04 DIAGNOSIS — R05.1 ACUTE COUGH: Primary | ICD-10-CM

## 2025-03-04 DIAGNOSIS — J06.9 URI, ACUTE: ICD-10-CM

## 2025-03-04 LAB
FLUAV RNA NPH QL NAA+NON-PROBE: NEGATIVE
FLUBV RNA NPH QL NAA+NON-PROBE: NEGATIVE
RSV RNA NPH QL NAA+NON-PROBE: NEGATIVE
SARS-COV-2 RNA RESP QL NAA+PROBE: NEGATIVE

## 2025-03-04 PROCEDURE — G0463 HOSPITAL OUTPT CLINIC VISIT: HCPCS | Performed by: NURSE PRACTITIONER

## 2025-03-04 PROCEDURE — 99213 OFFICE O/P EST LOW 20 MIN: CPT | Performed by: NURSE PRACTITIONER

## 2025-03-04 PROCEDURE — 87637 SARSCOV2&INF A&B&RSV AMP PRB: CPT | Performed by: NURSE PRACTITIONER

## 2025-03-04 RX ORDER — BENZONATATE 100 MG/1
100 CAPSULE ORAL 3 TIMES DAILY PRN
Qty: 20 CAPSULE | Refills: 0 | Status: SHIPPED | OUTPATIENT
Start: 2025-03-04 | End: 2025-03-11

## 2025-03-04 ASSESSMENT — ENCOUNTER SYMPTOMS
SHORTNESS OF BREATH: 0
ARTHRALGIAS: 1
FEVER: 1
COUGH: 1
NECK PAIN: 0
CHILLS: 1
APPETITE CHANGE: 1
SORE THROAT: 0
STRIDOR: 0
CHEST TIGHTNESS: 0
MYALGIAS: 1
SINUS PRESSURE: 0
NECK STIFFNESS: 0
CHOKING: 0
ACTIVITY CHANGE: 1
RHINORRHEA: 0

## 2025-03-04 ASSESSMENT — PAIN SCALES - GENERAL: PAINLEVEL_OUTOF10: 7

## 2025-03-04 NOTE — PROGRESS NOTES
Subjective   Chief Complaint   Patient presents with    Cough     This is the second day of this.     Fever    Chills    Headache        History of Present Illness  The patient, with a history of upper respiratory infections, presents with a two-day history of flu-like symptoms. He reports body aches, headache, and a fever of 102 degrees Fahrenheit the previous night. He has been taking Tylenol for symptom management. He denies sore throat. The patient's significant other reports that the patient has been very weak, to the point of needing assistance with mobility. The patient has been struggling with adequate fluid and food intake due to the illness.              The following have been reviewed and updated as appropriate in this visit:          No Known Allergies  Current Outpatient Medications   Medication Sig Dispense Refill    rosuvastatin (CRESTOR) 10 mg tablet Take 1 tablet by mouth daily 90 tablet 3    HYDROcodone-acetaminophen (NORCO) 7.5-325 mg per tablet Take 1 tablet by mouth every 8 (eight) hours as needed for pain scale 8-10/10 Max Daily Amount: 3 tablets 30 tablet 0    esomeprazole (NexIUM) 40 mg capsule Take 1 capsule (40 mg total) by mouth 2 (two) times a day      sodium chloride (Orly 128) 5 % ophthalmic ointment Apply 2 drops to right eye as needed      ondansetron ODT (ZOFRAN-ODT) 4 mg disintegrating tablet Take 1 tablet (4 mg total) by mouth every 8 (eight) hours as needed for nausea or vomiting 30 tablet 1    dexAMETHasone 0.5 mg/5 mL solution SMARTSIG:15 Milliliter(s) By Mouth 4-5 Times Daily PRN      omeprazole (PriLOSEC) 40 mg capsule Take 1 capsule (40 mg total) by mouth daily      valsartan (DIOVAN) 80 mg tablet Take 1 tablet (80 mg total) by mouth daily 90 tablet 4    FLUoxetine (PROzac) 40 mg capsule Take 1 capsule (40 mg total) by mouth daily 90 capsule 3    folic acid 1 mg tablet Take 1 tablet (1 mg total) by mouth daily 90 tablet 3    cyanocobalamin 1,000 mcg/mL injection Inject 1 mL  (1,000 mcg total) into the shoulder, thigh, or buttocks every 30 (thirty) days 1 mL 11    hydrocortisone 2.5 % cream Apply topically 2 (two) times a day as needed for rash 30 g 0    fenofibrate (TRICOR) 48 mg tablet Take 1 tablet (48 mg total) by mouth daily 90 tablet 4    famotidine (PEPCID) 20 mg tablet Take 1 tablet (20 mg total) by mouth 2 (two) times a day      acetaminophen (TYLENOL) 500 mg tablet Take 1 tablet (500 mg total) by mouth every 6 (six) hours as needed for pain scale 1-3/10      ursodioL (ACTIGALL) 500 mg tablet Take 1 tablet (500 mg total) by mouth 2 times daily Every other day      ursodioL (AMANDEEP) 250 mg tablet Take 3 tablets (750 mg total) by mouth In am and 500 mg in pm every other day-alternate with the 500 mg bid      benzonatate (TESSALON) 100 mg capsule Take 1 capsule (100 mg total) by mouth 3 (three) times a day as needed for cough for up to 7 days 20 capsule 0     No current facility-administered medications for this visit.     Facility-Administered Medications Ordered in Other Visits   Medication Dose Route Frequency Provider Last Rate Last Admin    DOBUTamine 500 mg in D5W 250 mL infusion (premix)  2.5-40 mcg/kg/min intravenous Titrated Trevor Ghotra MD 95.3 mL/hr at 10/31/24 0900 40 mcg/kg/min at 10/31/24 0900     Past Medical History:   Diagnosis Date    Allergic eosinophilic esophagitis     Allergic rhinitis 1990    Anemia 2019    Arthritis     Autoimmune hepatitis (CMS/HCC)     Blood disorder     pernicious anemia    Cataract 2001    Depression 08/08/2022    Gastrointestinal disease     IBS    Gout     Hyperlipidemia     Hypertension     Lymphoplasmacytic leukemia (CMS/HCC)     Melanoma (CMS/HCC)     Neurologic disorder     Pneumothorax 08/07/2022    Primary biliary cholangitis (CMS/HCC)     autoimmune hepatitis    Psychiatric illness     anxiety    Skin abnormalities 2021       Review of Systems   Constitutional:  Positive for activity change, appetite change, chills and fever.    HENT:  Positive for congestion. Negative for ear pain, rhinorrhea, sinus pressure and sore throat.    Respiratory:  Positive for cough. Negative for choking, chest tightness, shortness of breath and stridor.    Cardiovascular:  Negative for chest pain.   Musculoskeletal:  Positive for arthralgias and myalgias. Negative for neck pain and neck stiffness.       Objective   /79   Pulse 84   Temp 36.1 °C (97 °F)   Resp 20   SpO2 96%     Physical Exam  Constitutional:       General: He is not in acute distress.     Appearance: Normal appearance. He is not ill-appearing or toxic-appearing.   HENT:      Right Ear: Tympanic membrane, ear canal and external ear normal. There is no impacted cerumen.      Left Ear: Tympanic membrane, ear canal and external ear normal. There is no impacted cerumen.      Mouth/Throat:      Pharynx: No oropharyngeal exudate or posterior oropharyngeal erythema.   Cardiovascular:      Rate and Rhythm: Normal rate and regular rhythm.      Heart sounds: Normal heart sounds. No murmur heard.  Pulmonary:      Effort: Pulmonary effort is normal. No respiratory distress.      Breath sounds: Normal breath sounds. No stridor. No wheezing, rhonchi or rales.   Musculoskeletal:      Cervical back: Normal range of motion and neck supple. No rigidity or tenderness.   Lymphadenopathy:      Cervical: No cervical adenopathy.   Skin:     General: Skin is warm and dry.   Neurological:      General: No focal deficit present.      Mental Status: He is alert.   Psychiatric:         Mood and Affect: Mood normal.         Behavior: Behavior normal.         Recent Results (from the past 24 hours)   SARS/INFLUENZA/RSV QUADRUPLEX PCR    Collection Time: 03/04/25  2:42 PM    Specimen: Nasopharynx; Swab   Result Value Ref Range    Influenza A Screen by PCR Negative Negative    Influenza B Screen by PCR Negative Negative    COVID-19 PCR Negative Negative    Respiratory Syncytial Virus Screen by PCR Negative Negative        Results Quad: Negative.  Provided patient this result via telephone call.  He acknowledged understanding.             Assessment/Plan   Diagnoses and all orders for this visit:    Acute cough  -     SARS/INFLUENZA/RSV QUADRUPLEX PCR  -     benzonatate (TESSALON) 100 mg capsule; Take 1 capsule (100 mg total) by mouth 3 (three) times a day as needed for cough for up to 7 days    URI, acute      Assessment/Plan   acute upper respiratory infection, viral with fever, headache, myalgia, and cough.   Weakness and fatigue due to infection and decreased intake.   Lungs clear, no pneumonia but risk     Viral infections typically last 7-10 days, cough may have post viral cough that will persist up to 3 weeks.   Emphasized hydration and nutrition to prevent hospitalization.   - Prescribe Tessalon Perles for cough.   - Advise increased fluid intake with electrolytes.   - Recommend acetaminophen for analgesia.   - Encourage rest.   - Consider Ensure if poor intake continues.   - Instruct to return for chest x-ray if symptoms worsen. -   Advise to call , return to the clinic or present to the emergency department if condition deteriorates  Patient acknowledged understanding and acceptance for this care plan.             There are no Patient Instructions on file for this visit.    Marbella Garza, CNP

## 2025-03-04 NOTE — TELEPHONE ENCOUNTER
No care due was identified.  [facility] Embedded Care Due Messages. Reference number: [id number]. 3/04/2025 4:59:04 PM MST

## 2025-03-05 RX ORDER — HYDROCODONE BITARTRATE AND ACETAMINOPHEN 7.5; 325 MG/1; MG/1
1 TABLET ORAL EVERY 8 HOURS PRN
Qty: 30 TABLET | Refills: 0 | Status: SHIPPED | OUTPATIENT
Start: 2025-03-05

## 2025-03-15 DIAGNOSIS — M48.56XD NON-TRAUMATIC COMPRESSION FRACTURE OF L5 LUMBAR VERTEBRA WITH ROUTINE HEALING, SUBSEQUENT ENCOUNTER: ICD-10-CM

## 2025-03-15 NOTE — TELEPHONE ENCOUNTER
Care Due:                  Date            Visit Type   Department     Provider  --------------------------------------------------------------------------------                              ESTABLISHED   [facility]  Last Visit: 01-      PATIENT      MEDICINE       IVÁN RIVAS  Next Visit: None Scheduled  None         None Found                                                            Last  Test          Frequency    Reason                     Performed    Due Date  --------------------------------------------------------------------------------  Office Visit  3 months...  HYDROcodone-acetaminophen  01- 04-    [facility] Embedded Care Due Messages. Reference number: [id number]. 3/15/2025 2:27:20 PM SARAH

## 2025-03-17 DIAGNOSIS — M48.56XD NON-TRAUMATIC COMPRESSION FRACTURE OF L5 LUMBAR VERTEBRA WITH ROUTINE HEALING, SUBSEQUENT ENCOUNTER: ICD-10-CM

## 2025-03-17 RX ORDER — HYDROCODONE BITARTRATE AND ACETAMINOPHEN 7.5; 325 MG/1; MG/1
1 TABLET ORAL EVERY 8 HOURS PRN
OUTPATIENT
Start: 2025-03-17

## 2025-03-17 RX ORDER — HYDROCODONE BITARTRATE AND ACETAMINOPHEN 7.5; 325 MG/1; MG/1
1 TABLET ORAL EVERY 8 HOURS PRN
Qty: 30 TABLET | Refills: 0 | Status: SHIPPED | OUTPATIENT
Start: 2025-03-17

## 2025-03-17 NOTE — TELEPHONE ENCOUNTER
No care due was identified.  Health [facility] Embedded Care Due Messages. Reference number: [id number]. 3/17/2025 8:36:05 AM SARAH

## 2025-03-28 DIAGNOSIS — M48.56XD NON-TRAUMATIC COMPRESSION FRACTURE OF L5 LUMBAR VERTEBRA WITH ROUTINE HEALING, SUBSEQUENT ENCOUNTER: ICD-10-CM

## 2025-03-28 RX ORDER — HYDROCODONE BITARTRATE AND ACETAMINOPHEN 7.5; 325 MG/1; MG/1
1 TABLET ORAL EVERY 8 HOURS PRN
Qty: 30 TABLET | Refills: 0 | Status: SHIPPED | OUTPATIENT
Start: 2025-03-28

## 2025-03-28 NOTE — TELEPHONE ENCOUNTER
Care Due:                  Date            Visit Type   Department     Provider  --------------------------------------------------------------------------------                              ESTABLISHED   [facility]  Last Visit: 01-      PATIENT      MEDICINE       [address]  Next Visit: 04-      PRE OP       MEDICINE       [facility]                                                            Last  Test          Frequency    Reason                     Performed    Due Date  --------------------------------------------------------------------------------  Lipid Panel.  12 months..  rosuvastatin.............  Not Found    Overdue    [facility] [employer] Embedded Care Due Messages. Reference number: [id number]. 3/28/2025 1:50:11 PM SARAH

## 2025-04-05 DIAGNOSIS — M48.56XD NON-TRAUMATIC COMPRESSION FRACTURE OF L5 LUMBAR VERTEBRA WITH ROUTINE HEALING, SUBSEQUENT ENCOUNTER: ICD-10-CM

## 2025-04-05 NOTE — TELEPHONE ENCOUNTER
No care due was identified.  [facility] Embedded Care Due Messages. Reference number: [id number]. 4/05/2025 12:43:00 AM SARAH

## 2025-04-07 DIAGNOSIS — M48.56XD NON-TRAUMATIC COMPRESSION FRACTURE OF L5 LUMBAR VERTEBRA WITH ROUTINE HEALING, SUBSEQUENT ENCOUNTER: ICD-10-CM

## 2025-04-07 RX ORDER — HYDROCODONE BITARTRATE AND ACETAMINOPHEN 7.5; 325 MG/1; MG/1
1 TABLET ORAL EVERY 8 HOURS PRN
Qty: 30 TABLET | Refills: 0 | Status: SHIPPED | OUTPATIENT
Start: 2025-04-07 | End: 2025-04-13 | Stop reason: ALTCHOICE

## 2025-04-07 RX ORDER — HYDROCODONE BITARTRATE AND ACETAMINOPHEN 7.5; 325 MG/1; MG/1
1 TABLET ORAL EVERY 8 HOURS PRN
OUTPATIENT
Start: 2025-04-07

## 2025-04-07 NOTE — TELEPHONE ENCOUNTER
No care due was identified.  Health [facility] Embedded Care Due Messages. Reference number: [id number]. 4/07/2025 11:10:18 AM SARAH

## 2025-04-08 ENCOUNTER — OFFICE VISIT (OUTPATIENT)
Dept: ORTHOPEDIC SURGERY | Facility: CLINIC | Age: 70
End: 2025-04-08
Payer: MEDICARE

## 2025-04-08 ENCOUNTER — ANCILLARY PROCEDURE (OUTPATIENT)
Dept: RADIOLOGY | Facility: CLINIC | Age: 70
End: 2025-04-08
Payer: MEDICARE

## 2025-04-08 ENCOUNTER — ANCILLARY PROCEDURE (OUTPATIENT)
Dept: ULTRASOUND IMAGING | Facility: CLINIC | Age: 70
End: 2025-04-08
Payer: MEDICARE

## 2025-04-08 VITALS — WEIGHT: 169.9 LBS | BODY MASS INDEX: 22.11 KG/M2 | SYSTOLIC BLOOD PRESSURE: 118 MMHG | DIASTOLIC BLOOD PRESSURE: 72 MMHG

## 2025-04-08 DIAGNOSIS — S32.020S CLOSED COMPRESSION FRACTURE OF L2 VERTEBRA, SEQUELA: ICD-10-CM

## 2025-04-08 DIAGNOSIS — G89.29 CHRONIC PAIN OF BOTH HIPS: Primary | ICD-10-CM

## 2025-04-08 DIAGNOSIS — M70.62 TROCHANTERIC BURSITIS OF BOTH HIPS: ICD-10-CM

## 2025-04-08 DIAGNOSIS — R29.898 WEAKNESS OF RIGHT LEG: ICD-10-CM

## 2025-04-08 DIAGNOSIS — M25.551 CHRONIC PAIN OF BOTH HIPS: Primary | ICD-10-CM

## 2025-04-08 DIAGNOSIS — M48.07 NEUROFORAMINAL STENOSIS OF LUMBOSACRAL SPINE: ICD-10-CM

## 2025-04-08 DIAGNOSIS — M25.552 CHRONIC PAIN OF BOTH HIPS: Primary | ICD-10-CM

## 2025-04-08 DIAGNOSIS — R29.6 FALLS FREQUENTLY: ICD-10-CM

## 2025-04-08 DIAGNOSIS — Z96.641 HISTORY OF TOTAL RIGHT HIP ARTHROPLASTY: ICD-10-CM

## 2025-04-08 DIAGNOSIS — G89.29 CHRONIC RIGHT-SIDED LOW BACK PAIN WITH RIGHT-SIDED SCIATICA: ICD-10-CM

## 2025-04-08 DIAGNOSIS — M54.41 CHRONIC RIGHT-SIDED LOW BACK PAIN WITH RIGHT-SIDED SCIATICA: ICD-10-CM

## 2025-04-08 DIAGNOSIS — M70.61 TROCHANTERIC BURSITIS OF BOTH HIPS: ICD-10-CM

## 2025-04-08 DIAGNOSIS — Z96.642 HISTORY OF TOTAL LEFT HIP ARTHROPLASTY: ICD-10-CM

## 2025-04-08 PROBLEM — M87.051 AVASCULAR NECROSIS OF BONE OF RIGHT HIP (CMS/HCC): Status: RESOLVED | Noted: 2022-03-22 | Resolved: 2025-04-08

## 2025-04-08 PROBLEM — S32.020A CLOSED COMPRESSION FRACTURE OF SECOND LUMBAR VERTEBRA (CMS/HCC): Status: ACTIVE | Noted: 2025-04-08

## 2025-04-08 PROCEDURE — 73502 X-RAY EXAM HIP UNI 2-3 VIEWS: CPT | Mod: 26,RT | Performed by: INTERNAL MEDICINE

## 2025-04-08 PROCEDURE — G0463 HOSPITAL OUTPT CLINIC VISIT: HCPCS | Mod: 50,PO | Performed by: PHYSICIAN ASSISTANT

## 2025-04-08 PROCEDURE — 73502 X-RAY EXAM HIP UNI 2-3 VIEWS: CPT | Mod: 26,LT | Performed by: INTERNAL MEDICINE

## 2025-04-08 PROCEDURE — 99214 OFFICE O/P EST MOD 30 MIN: CPT | Mod: 25 | Performed by: PHYSICIAN ASSISTANT

## 2025-04-08 PROCEDURE — 73502 X-RAY EXAM HIP UNI 2-3 VIEWS: CPT | Mod: RT,PO

## 2025-04-08 PROCEDURE — 73502 X-RAY EXAM HIP UNI 2-3 VIEWS: CPT | Mod: LT,PO

## 2025-04-08 PROCEDURE — 20611 DRAIN/INJ JOINT/BURSA W/US: CPT | Mod: 50,PO | Performed by: PHYSICIAN ASSISTANT

## 2025-04-08 PROCEDURE — 6360000200 HC RX 636 W HCPCS (ALT 250 FOR IP): Mod: PO

## 2025-04-08 RX ORDER — ROPIVACAINE HYDROCHLORIDE 5 MG/ML
4 INJECTION, SOLUTION EPIDURAL; INFILTRATION; PERINEURAL
Status: COMPLETED | OUTPATIENT
Start: 2025-04-08 | End: 2025-04-08

## 2025-04-08 RX ORDER — METHYLPREDNISOLONE ACETATE 80 MG/ML
80 INJECTION, SUSPENSION INTRA-ARTICULAR; INTRALESIONAL; INTRAMUSCULAR; SOFT TISSUE
Status: COMPLETED | OUTPATIENT
Start: 2025-04-08 | End: 2025-04-08

## 2025-04-08 RX ORDER — APREMILAST 30 MG/1
30 TABLET, FILM COATED ORAL 2 TIMES DAILY
COMMUNITY

## 2025-04-08 RX ORDER — HYDROXYZINE HYDROCHLORIDE 25 MG/1
25 TABLET, FILM COATED ORAL NIGHTLY
COMMUNITY
Start: 2025-04-04

## 2025-04-08 RX ADMIN — METHYLPREDNISOLONE ACETATE 80 MG: 80 INJECTION, SUSPENSION INTRA-ARTICULAR; INTRALESIONAL; INTRAMUSCULAR; SOFT TISSUE at 15:00

## 2025-04-08 RX ADMIN — ROPIVACAINE HYDROCHLORIDE 4 ML: 5 INJECTION, SOLUTION EPIDURAL; INFILTRATION; PERINEURAL at 15:00

## 2025-04-08 ASSESSMENT — ENCOUNTER SYMPTOMS
JOINT SWELLING: 0
TREMORS: 0
BACK PAIN: 1
COUGH: 0
PHOTOPHOBIA: 0
NUMBNESS: 1
COLOR CHANGE: 0
SLEEP DISTURBANCE: 1
ACTIVITY CHANGE: 1
DIAPHORESIS: 0
WEAKNESS: 1
DECREASED CONCENTRATION: 0
NECK STIFFNESS: 0
HEADACHES: 0
SEIZURES: 0
EYE PAIN: 0
PALPITATIONS: 0
BRUISES/BLEEDS EASILY: 0
MYALGIAS: 1
CHEST TIGHTNESS: 0
FEVER: 0
FATIGUE: 0
SHORTNESS OF BREATH: 0
LIGHT-HEADEDNESS: 0
DYSPHORIC MOOD: 0
WHEEZING: 0
CHILLS: 0
HYPERACTIVE: 0
APPETITE CHANGE: 0
ABDOMINAL PAIN: 0
ARTHRALGIAS: 1
NERVOUS/ANXIOUS: 0
NECK PAIN: 0
DIZZINESS: 0

## 2025-04-08 ASSESSMENT — PAIN - FUNCTIONAL ASSESSMENT: PAIN_FUNCTIONAL_ASSESSMENT: 0-10

## 2025-04-08 ASSESSMENT — PAIN SCALES - GENERAL: PAINLEVEL_OUTOF10: 7

## 2025-04-08 NOTE — PROGRESS NOTES
Bilateral hip greater trochanter steroid injections  Procedure was performed bilaterally. Performed by: Henry Melendez PA       Consent    Verbal consent was obtained from the patient. Written consent was not obtained from the patient. Risks, benefits, and alternatives were discussed. Consent given by patient. The patient states understanding of the procedure being performed. Patient ID confirmed verbally with the patient.       Wound 1 Procedure Details  An injection was given to Juan Diego in his hip. The injection site was the Bilateral greater trochanteric bursa.    Procedure Details   Patient was prepped and draped in the usual sterile fashion. Ethyl chloride spray local anesthesia was used.   A 22 gauge needle was inserted into the  using a superior approach under ultrasound guidance, physician.   An injection was given in the  Bilateral greater trochanteric bursa site(s). The left site was injected with 80 mg methylPREDNISolone acetate 80 mg/mL, 4 mL ROPivacaine 5 mg/mL (0.5 %).  The right site was injected with 80 mg methylPREDNISolone acetate 80 mg/mL, 4 mL ROPivacaine 5 mg/mL (0.5 %), .   Needle removed without complication.     Post-Procedure  Patient tolerated the procedure well with no immediate complications. A standard bandage was applied. Advised patient to avoid strenous activity for 24-48 hours. Advised patient to use ice, NSAIDs or acetaminophen for pain as needed.     Procedure Comments  80 mg depo-medrol and 4 ml of 0.5% ropivacaine

## 2025-04-08 NOTE — LETTER
[facility]  [address]  [place] [address]  [phone]  Dept: [phone]  04/08/25      No referring provider defined for this encounter.        To: No ref. provider found      Thank you for referring your patient, Juan Diego Rondon, to receive care in my office. I have enclosed a copy of the note for Juan Diego from 04/08/25.    Please contact me with any questions you may have regarding the visit.    Sincerely,         STEPHANIE HEREDIA  [address]  [place] [address]  [phone]    CC: Cuba Haley MD

## 2025-04-08 NOTE — PROGRESS NOTES
"    ORTHOPEDICS & SPORTS MEDICINE      SUBJECTIVE:  Name: Juan Diego Rondon \"Emmett\"  : 1955  AGE: 69 y.o.    Date of Service: 2025     MRN: [id number]    PCP: Cuba Haley MD     Chief Complaint   Patient presents with    Right Hip - Injections, Pain     Pt presents for R hip pain: ambulates with cane Discuss Injection. Prev inj 23: (R NAZIA 22 Amy) Pain level: 08/10 Pain relief: tylenol/ice/hydro as needed    Left Hip - Pain     Pain level: 07/10: pain relief: tylenol ice, hydro       HPI:  Juan Diego Rondon  \"Emmett\" is a 69 y.o. man who comes in today for chronic bilateral hip pain and right leg sciatica with weakness.    Emmett was last seen here in the office by Luis Boyd CNP for bilateral trochanteric bursitis on 2023.    Date of onset/injury: 2024  Mechanism of injury: Emmett fell and landed on his buttocks causing an L2 compression fracture.  Aggravating factors: Laying on either hip at night, ascending/descending stairs, getting up out of a seated position  Alleviating factors: Oral pain medications, ice, outpatient PT  DME: Cane  Hand dominance: Right  Nicotine use: None     Work status: Retired pediatric CNP back in 2019    Past Surgical Hip History:  1.  2021 - Left NAZIA through posterior approach with Dr. Sj Reyes  2.  3/22/2022 - Right NAZIA through posterior approach with Dr. Sj Reyes    Physical Therapy:   Provider: Prisca Ch DPT  Location: [facility] in [place], [place]  Visit #: Unknown  Frequency: BIW for lower back pain and bilateral hip pain    Hip Injections:   1.  2022 - steroid injection in the trochanteric bursa of the right hip under US  2.  2023 - steroid injection of the trochanteric bursa of both hips under US  3.  2025 - steroid injection of the trochanteric bursa of both hips under US    Notes:  Currently, Emmett is rating his pain in both lateral hips at a 8/10.  He is taking oral OTC Tylenol 500 mg 1 " tablet every 6 hours as needed pain and Norco 7.5/325 mg 1 tablet every 8 hours as needed pain.    Emmett Rondon is a 69-year-old man, with a history of AVN of both hips with elective bilateral NAZIA, who presents with recurrent chronic bilateral trochanteric bursitis and worsening right leg right leg sciatica with weakness of the right foot and ankle causing frequent tripping/falls.    Emmett experiences severe pain localized to the greater trochanter of both hips, exacerbated by direct pressure, stair climbing, and rising from a seated position. Flat level walking does not typically aggravate the pain. He previously received injections in both trochanteric bursae in 2023, which provided relief at the time.    In August 2024, he experienced a fall resulting in an L2 compression fracture, leading to a kyphoplasty procedure with Iz[facility] in [place] on 11/8/2024. Post-procedure, he developed severe joint pain and was referred to a rheumatologist. Subsequent lab work revealed abnormal light chains, leading to a referral to an oncologist, where a diagnosis of Waldenstrom's macroglobulinemia was considered. He underwent a 4-week treatment regimen, which resulted in mouth sores and significant weight loss of approximately 30 pounds. He was later evaluated for Bechet's disease, which was ruled out, but treatment with Otezla improved his mouth sores.    Emmett reports ongoing issues with joint pain, occasional flare-ups that prevent standing, and vision problems necessitating a corneal transplant of the O.D. He also experiences neuropathy-like symptoms, including sharp pain and tingling, particularly in the right foot, which he attributes to his severe neuroforaminal stenosis at L5-S1. No significant groin pain, but occasional groin discomfort, right-sided SI joint pain, and some weakness in the right foot, leading to tripping.  Emmett has intermittent right leg sciatic pain, with right buttock pain and tingling down the  leg following the L5 dermatome.    Currently, Emmett is on Otezla for his mouth sores and has experienced significant improvement, although he still avoids certain foods due to sensitivity. He has not been on any other immunotherapy recently. He uses a cane since his fall in August 2024 due to balance issues and has been undergoing outpatient physical therapy at [facility] twice weekly.  Emmett is voicing no other complaints today.    Emmett underwent a L2 kyphoplasty by Dr. Magana at [facility] on 11/8/2024.    Juan Diego has had 2 separate transforaminal epidural steroid injections  with Dr. Forrest Chi in January 2025 and 2/26/2025.  He developed mouth sores after each injection.    Past Medical History:   Diagnosis Date    Allergic eosinophilic esophagitis     Allergic rhinitis 1990    Anemia 2019    Anxiety     Arthritis     Autoimmune hepatitis (CMS/HCC)     Back pain 8/3/24    Blood disorder     pernicious anemia    Cataract 2001    Depression 08/08/2022    Gastrointestinal disease     IBS    Gout     Hyperlipidemia     Hypertension     Lymphoplasmacytic leukemia (CMS/HCC)     Melanoma (CMS/HCC)     Neurologic disorder     Pneumothorax 08/07/2022    Primary biliary cholangitis (CMS/HCC)     autoimmune hepatitis    Psychiatric illness     anxiety    Skin abnormalities 2021       Past Surgical History:   Procedure Laterality Date    APPENDECTOMY      COLONOSCOPY W/ BIOPSIES AND POLYPECTOMY  02/09/2009    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    CT BIOPSY LIVER FUNCTION  12/02/2021    CT BIOPSY LIVER FUNCTION 12/2/2021 [facility] MIS CT SCAN    ESOPHAGOGASTRODUODENOSCOPY N/A 05/16/2022    Procedure: ESOPHAGOGASTRODUODENOSCOPY with Biopsies;  Surgeon: Bradley Cook MD;  Location: [facility];  Service: Endoscopy;  Laterality: N/A;    EYE SURGERY Bilateral     cataract    KNEE SURGERY Bilateral     3 surgeries on left and 2 on right knees - prior open medial  meniscectomies bilateral knees in the 1970s    KYPHOSIS SURGERY N/A 11/08/2024    ORTHOPEDIC SURGERY  1972    SKIN BIOPSY      SKIN CANCER EXCISION Left 10/15/2020    Melanoma removed from left forearm    TONSILLECTOMY      TOTAL HIP ARTHROPLASTY Left 04/21/2021    Procedure: LEFT TOTAL HIP ARTHROPLASTY;  Surgeon: Sj Reyes MD;  Location: [facility] OR;  Service: Orthopedics;  Laterality: Left;    TOTAL HIP ARTHROPLASTY Right 03/22/2022    Procedure: RIGHT TOTAL HIP ARTHROPLASTY POSTERIOR;  Surgeon: Sj Reyes MD;  Location: [facility] OR;  Service: Orthopedics;  Laterality: Right;    VASECTOMY  1986         Current Outpatient Medications:     hydrOXYzine (ATARAX) 25 mg tablet, Take 1 tablet (25 mg total) by mouth nightly, Disp: , Rfl:     apremilast (Otezla) 30 mg tablet, Take 30 mg by mouth 2 (two) times a day, Disp: , Rfl:     HYDROcodone-acetaminophen (NORCO) 7.5-325 mg per tablet, Take 1 tablet by mouth every 8 (eight) hours as needed for pain scale 8-10/10 Max Daily Amount: 3 tablets, Disp: 30 tablet, Rfl: 0    rosuvastatin (CRESTOR) 10 mg tablet, Take 1 tablet by mouth daily, Disp: 90 tablet, Rfl: 3    esomeprazole (NexIUM) 40 mg capsule, Take 1 capsule (40 mg total) by mouth 2 (two) times a day, Disp: , Rfl:     sodium chloride (Orly 128) 5 % ophthalmic ointment, Apply 2 drops to right eye as needed, Disp: , Rfl:     ondansetron ODT (ZOFRAN-ODT) 4 mg disintegrating tablet, Take 1 tablet (4 mg total) by mouth every 8 (eight) hours as needed for nausea or vomiting, Disp: 30 tablet, Rfl: 1    dexAMETHasone 0.5 mg/5 mL solution, SMARTSIG:15 Milliliter(s) By Mouth 4-5 Times Daily PRN, Disp: , Rfl:     omeprazole (PriLOSEC) 40 mg capsule, Take 1 capsule (40 mg total) by mouth daily, Disp: , Rfl:     valsartan (DIOVAN) 80 mg tablet, Take 1 tablet (80 mg total) by mouth daily, Disp: 90 tablet, Rfl: 4    FLUoxetine (PROzac) 40 mg capsule, Take 1 capsule (40 mg total) by mouth daily, Disp: 90 capsule, Rfl: 3    folic  acid 1 mg tablet, Take 1 tablet (1 mg total) by mouth daily, Disp: 90 tablet, Rfl: 3    cyanocobalamin 1,000 mcg/mL injection, Inject 1 mL (1,000 mcg total) into the shoulder, thigh, or buttocks every 30 (thirty) days, Disp: 1 mL, Rfl: 11    hydrocortisone 2.5 % cream, Apply topically 2 (two) times a day as needed for rash, Disp: 30 g, Rfl: 0    fenofibrate (TRICOR) 48 mg tablet, Take 1 tablet (48 mg total) by mouth daily, Disp: 90 tablet, Rfl: 4    famotidine (PEPCID) 20 mg tablet, Take 1 tablet (20 mg total) by mouth 2 (two) times a day, Disp: , Rfl:     acetaminophen (TYLENOL) 500 mg tablet, Take 1 tablet (500 mg total) by mouth every 6 (six) hours as needed for pain scale 1-3/10, Disp: , Rfl:     ursodioL (ACTIGALL) 500 mg tablet, Take 1 tablet (500 mg total) by mouth 2 times daily Every other day, Disp: , Rfl:     ursodioL (AMANDEEP) 250 mg tablet, Take 3 tablets (750 mg total) by mouth In am and 500 mg in pm every other day-alternate with the 500 mg bid, Disp: , Rfl:     No Known Allergies     Social History     Socioeconomic History    Marital status:      Spouse name: Not on file    Number of children: Not on file    Years of education: Not on file    Highest education level: Not on file   Occupational History    Not on file   Tobacco Use    Smoking status: Never    Smokeless tobacco: Never   Vaping Use    Vaping status: Never Used   Substance and Sexual Activity    Alcohol use: Yes     Alcohol/week: 3.0 standard drinks of alcohol     Types: 3 Glasses of wine per week     Comment: occ    Drug use: Never    Sexual activity: Defer   Other Topics Concern    Not on file   Social History Narrative    Not on file     Social Drivers of Health     Financial Resource Strain: Not on file   Food Insecurity: Not on file   Transportation Needs: Not on file   Physical Activity: Not on file   Stress: Not on file   Social Connections: Not on file   Intimate Partner Violence: Not on file   Housing Stability: Not on file  "      Review of Systems   Constitutional:  Positive for activity change. Negative for appetite change, chills, diaphoresis, fatigue and fever.   HENT:  Positive for mouth sores.    Eyes:  Positive for visual disturbance. Negative for photophobia and pain.   Respiratory:  Negative for cough, chest tightness, shortness of breath and wheezing.    Cardiovascular:  Negative for chest pain, palpitations and leg swelling.   Gastrointestinal:  Negative for abdominal pain.   Musculoskeletal:  Positive for arthralgias, back pain, gait problem and myalgias. Negative for joint swelling, neck pain and neck stiffness.   Skin:  Negative for color change, pallor and rash.   Allergic/Immunologic: Negative for immunocompromised state.   Neurological:  Positive for weakness and numbness. Negative for dizziness, tremors, seizures, syncope, light-headedness and headaches.   Hematological:  Does not bruise/bleed easily.   Psychiatric/Behavioral:  Positive for sleep disturbance. Negative for decreased concentration and dysphoric mood. The patient is not nervous/anxious and is not hyperactive.      OBJECTIVE:  Weight: 77.1 kg (169 lb 14.4 oz)  Vitals:    04/08/25 1504   BP: 118/72   BP Location: Right arm   Patient Position: Sitting   Cuff Size: Regular Adult   Weight: 77.1 kg (169 lb 14.4 oz)      Juan Diego Rondon  \"Sherley" is a 69 y.o. male who looks his stated age.     General: Patient is alert and cooperative and in no distress secondary to pain. Patient is well nourished and has a thin body habitus. Patient is clean and appropriately dressed.  Affect is mood congruent and pleasant.  Patient is alert and oriented x3.    Gait is normal with the use of a cane for gait stability.    Skin is free of rash, ecchymosis, erythema, or abrasions.    Right Hip Exam     Tenderness   The patient is experiencing tenderness in the lateral and greater trochanter (Pain over the insertion of the gluteus tendons on the right and over the right sciatic " notch.).    Range of Motion   The patient has normal right hip ROM.  Abduction:  normal   Adduction:  normal   Extension:  normal   Flexion:  normal   External rotation:  normal   Internal rotation:  normal     Muscle Strength   The patient has normal right hip strength.  Abduction: 5/5   Adduction: 5/5   Flexion: 5/5     Tests   ADRIA: negative  Monica: negative    Other   Erythema: absent  Scars: present  Sensation: normal  Pulse: present      Left Hip Exam     Tenderness   The patient is experiencing tenderness in the lateral and greater trochanter.    Range of Motion   The patient has normal left hip ROM.  Abduction:  normal   Adduction:  normal   Extension:  normal   Flexion:  normal   External rotation:  normal   Internal rotation: normal     Muscle Strength   The patient has normal left hip strength.   Abduction: 5/5   Adduction: 5/5   Flexion: 5/5     Tests   ADRIA: negative  Monica: negative    Other   Erythema: absent  Scars: present  Sensation: normal  Pulse: present      Back Exam     Tenderness   The patient is experiencing tenderness in the lumbar and sacroiliac (Palpation over the L4-5 and L5-S1 facet joints on the right.  There is pain over the right SI joint.).    Range of Motion   The patient has normal back ROM.  Extension:  20 normal   Flexion:  70 normal   Lateral bend right:  normal   Lateral bend left:  normal   Rotation right:  normal   Rotation left:  normal     Muscle Strength   The patient has normal back strength.  Right Quadriceps:  5/5   Left Quadriceps:  5/5   Right Hamstrings:  5/5   Left Hamstrings:  5/5     Tests   Straight leg raise right: positive  Straight leg raise left: negative    Reflexes   Patellar:  2/4 normal  Achilles:  2/4 normal  Babinski's sign: normal     Other   Toe walk: normal  Heel walk: abnormal (Weakness of EHL and dorsi flexors on the right foot/ankle)  Sensation: decreased (L5 dermatome on the right)  Gait: abnormal   Erythema: no back redness  Scars:  absent    Comments:  Patient has decreased L5 dermatome sensation on the right.  Patient has normal dermatomal sensation in the left lower extremity.    Patient has a positive straight leg raise in a seated and supine on the right and negative on the left.    Patient has a positive slump test on the right, negative on the left left    Patient has 4/5 strength with EHL and 4+/5 strength with dorsi flexors on the right.  There is 5/5 strength with plantar flexors bilaterally, EHL on the left and dorsiflexors on the left.    Patient has a negative Trendelenburg for gluteus medius/minimus weakness bilaterally.    Patient has a negative Valsalva maneuver.           Imaging:  Reading Physician Reading Date Result Priority   Forrest Aguayo MD  [phone]     4/8/2025      Narrative & Impression  Exam:  DX HIP 2 OR 3 VW LEFT, DX HIP 2 OR 3 VW RIGHT from 04/08/2025      Clinical History:  Chronic pain of both hips; Chronic pain of both hips; Chronic pain of both hips; Pain     Procedure/Views:  2 views of the bilateral hips     Comparison/s:  May 2, 2022     Findings:  Redemonstrated are surgical changes with bilateral total hip arthroplasties. There is no evidence of hardware loosening or fracture. No acute fracture or other acute osseous abnormality. Prominent vascular calcifications are noted.        IMPRESSION:  Bilateral total hip arthroplasties without hardware complication.        Exam Ended: 04/08/25 15:29 Last Resulted: 04/08/25 15:41     Reading Physician Reading Date Result Priority   Forrest Aguayo MD  [phone]     1/28/2025      Narrative & Impression  Exam:  DX LUMBAR SPINE 2 OR 3 VW from 01/28/2025      Clinical History:  Acute right-sided low back pain with right-sided sciatica; low back pain and recent fall- repair of compression FX to L1 dariana 8     Procedure/Views:  3 views of the lumbar spine     Comparison/s:  Correlation with MRI dated September 24, 2024     Findings:  Redemonstrated  is an old L2 compression fractures status post vertebroplasty. There is mild 3 mm retrolisthesis of L2 on L3. There is no evidence of acute fracture or other acute osseous abnormalities. There is mild to moderate multilevel degenerative disc disease and facet arthropathy most notably involving L5-S1. Prominent vascular calcifications are noted. Soft tissues are otherwise unremarkable. Bilateral total hip arthroplasties are partially imaged.        IMPRESSION:  1.  No acute osseous abnormalities.  2.  Old L2 compression fracture status post vertebroplasty.  3.  Mild to moderate multilevel degenerative lumbar spondylosis.        Exam Ended: 01/28/25 09:28 Last Resulted: 01/28/25 09:55     Reading Physician Reading Date Result Priority   Forrest Aguayo MD  [phone]     9/24/2024      Narrative & Impression  Exam:  MR LUMBAR SPINE WO CONTRAST from 09/24/2024      Clinical History:  Wedge compression fracture of second lumbar vertebra  initial encounter for closed fracture (CMS/HCC); Wedge compression fracture of second lumbar vertebra  init     Comparison(s):  Correlation with CT dated August 3, 2024     Technique:  Multiplanar multisequence MRI through the lumbar spine without contrast.     Findings:  Redemonstrated is a compression fracture of the L2 vertebral body. There is increased vertebral body height loss measuring up to 75% centrally. There is no definitive fracture propagation through the posterior cortex and no evidence of retropulsion into the spinal canal. Minimal 2 mm anterolisthesis of L5 on S1 is noted. Type II Modic endplate changes are noted at the level of L5-S1. The conus medullaris terminates normally at the L1 level and the cauda equina is unremarkable. Paravertebral soft tissues appear normal.     L1-L2: Small concentric disc bulge. No significant spinal canal or neural foraminal narrowing.     L2-L3: Small concentric disc bulge. No significant spinal canal or neural foraminal  narrowing.     L3-L4: Small concentric disc bulge. No significant spinal canal or neural foraminal narrowing.     L4-L5: Small concentric disc bulge. Mild bilateral facet arthropathy, ligamentum flavum thickening, and epidural lipomatosis. Findings contribute to moderate spinal canal narrowing as well as mild bilateral neural foraminal narrowing.     L5-S1: Loss of disc signal and disc height with a small concentric disc bulge. Mild bilateral facet arthropathy and ligamentum flavum thickening. Findings contribute to moderate spinal canal narrowing as well as severe bilateral neural foraminal narrowing with possible impingement of the exiting L5 nerve roots.        IMPRESSION:  1.  Redemonstration of an L2 compression fracture with interval increased vertebral body height loss.  2.  Multilevel degenerative disc disease and facet arthropathy most notably involving L5-S1 where there is severe bilateral neural foraminal narrowing.              Exam Ended: 09/24/24 07:35 Last Resulted: 09/24/24 08:15     DIAGNOSIS:  Diagnoses and all orders for this visit:    Chronic pain of both hips  -     X-ray hip 2 or 3 views right  -     X-ray hip 2 or 3 views left    Trochanteric bursitis of both hips  Comments:  - Steroid injection over the trochanteric bursa of both hips under US on 4/8/2025  Orders:  -     US guidance (non diagnostic)  -     bilateral hip greater trochanter steroid injections    History of total left hip arthroplasty    History of total right hip arthroplasty    Chronic right-sided low back pain with right-sided sciatica  -     Ambulatory referral to Orthopedic Surgery; Future    Neuroforaminal stenosis of lumbosacral spine  Comments:  Bilateral severe neuroforaminal stenosis at L5-S1  Orders:  -     Ambulatory referral to Orthopedic Surgery; Future    Weakness of right leg  Comments:  4/5 strength with EHL and 4+/5 with dorsi flexors on right ankle  Orders:  -     Ambulatory referral to Orthopedic Surgery;  Future    Closed compression fracture of L2 vertebra, sequela  Comments:  Compression fracture from fall on 8/3/2024 with L2 kyphoplasty by Dr. Magana at [facility] on 11/8/2024  Orders:  -     Ambulatory referral to Orthopedic Surgery; Future    Falls frequently  -     Ambulatory referral to Orthopedic Surgery; Future        ASSESSMENT/PLAN:  I reviewed the x-rays of the right and left hips from 4/8/2025.  Patient has underwent prior right and left total hip arthroplasties.  There is no evidence of hardware failure, subsidence, or loosening of either hip.    Physical examination today shows pain to palpation over the trochanteric bursa of both hips.  Patient does have a positive straight leg raise on the right in a seated and supine position for right buttock and leg pain.  Patient has decreased sensation following the L5 dermatome over the right lateral calf compared to the contralateral left side.  There is weakness noted in the extensor hallucis longus and dorsiflexors on the right, negative on the left leg.    Chronic Bilateral Lateral Hip Pain/Trochanteric Bursitis/S/P Bilateral NAZIA:    -Recurrent bilateral trochanteric bursitis with pain exacerbated by pressure and movement. Previous injections provided relief. Bilateral NAZIA implants are well-positioned.  - Administer an intra-articular corticosteroid injections into the trochanteric bursa of both hips under ultrasound guidance for pain relief.  Patient gave me verbal consent to proceed.  He tolerated both injections well.  - Continue outpatient physical therapy at [facility] here in [place], [place] twice weekly focusing on stretching and strengthening of both hips and IT bands to prevent recurrence.    Bilateral L5-S1 Neuroforaminal Stenosis with L5 Radiculopathy of the right leg with weakness in the EHL and dorsiflexion:    -Severe bilateral foraminal stenosis at L5-S1 causing right-sided weakness and sciatica. Previous  epidural injections provided temporary relief but were complicated by Bechet's flares. Muscle weakness of right foot and ankle with frequent tripping and falls may indicate need for surgical intervention.  - Refer to Dr. Jorden Magana, orthopedic spine specialist, at [facility] for further evaluation and management.  - Monitor for worsening weakness or additional symptoms that may necessitate surgical intervention.      Bechet's Disease:    -Recurrent oral ulcers and systemic symptoms managed with Otezla. Previous treatments caused adverse reactions. Continued monitoring necessary.  - Continue Otezla for management of oral ulcers and systemic symptoms.         Emmett is in agreement with the above treatment plan.  No follow-up appointment will be scheduled.  All questions were answered today.    Total time spent with Emmett today was 35 minutes discussing his bilateral hip pain, right leg sciatica, frequent tripping, paresthesias of the right leg and foot, review of x-rays of both hips, lumbar spine and MRI scan of the lumbar spine, physical exam, discussion of conservative treatment options.    Dragon voice recognition program and [employer]bridge Voice recognition program was used to aid in this documentation which might generate inaccuracies despite efforts made to avoid them.  Please take it into context and contact the provider with any questions.

## 2025-04-09 NOTE — PATIENT INSTRUCTIONS
- Call the office in 1 week for an update on your current bilateral hip pain after steroid injections were placed into the trochanteric bursa of both hips under ultrasound guidance on 4/8/2025.  - Continue with outpatient physical therapy at [facility] with Prisca Ch DPT for continued range of motion, strengthening, core strengthening, balance exercises and IT band stretching twice weekly for 6 more weeks.  - Continue with your home exercise program that has been provided by outpatient physical therapy daily.  - You will be referred to Dr. Jorden Magana at [facility] to discuss further treatment for your right leg sciatica and weakness of your right ankle.  You will be contacted with an appointment time and date.

## 2025-04-11 ENCOUNTER — TELEPHONE (OUTPATIENT)
Dept: FAMILY MEDICINE | Facility: CLINIC | Age: 70
End: 2025-04-11
Payer: MEDICARE

## 2025-04-11 DIAGNOSIS — S32.030D COMPRESSION FRACTURE OF L3 VERTEBRA WITH ROUTINE HEALING, SUBSEQUENT ENCOUNTER: Primary | ICD-10-CM

## 2025-04-11 NOTE — TELEPHONE ENCOUNTER
Pt is going out of town next week starting on Tuesday and won't be back until after easter. He states that he will run out of his norco next Friday. He is wondering if he could refill early so he does not run out when he is out of town. Please call and advise.

## 2025-04-13 ENCOUNTER — HOSPITAL ENCOUNTER (EMERGENCY)
Facility: HOSPITAL | Age: 70
Discharge: HOME | End: 2025-04-13
Attending: EMERGENCY MEDICINE
Payer: MEDICARE

## 2025-04-13 ENCOUNTER — APPOINTMENT (OUTPATIENT)
Dept: CT IMAGING | Facility: HOSPITAL | Age: 70
End: 2025-04-13
Payer: MEDICARE

## 2025-04-13 VITALS
WEIGHT: 175 LBS | DIASTOLIC BLOOD PRESSURE: 73 MMHG | HEART RATE: 80 BPM | SYSTOLIC BLOOD PRESSURE: 112 MMHG | TEMPERATURE: 97 F | HEIGHT: 73 IN | RESPIRATION RATE: 16 BRPM | BODY MASS INDEX: 23.19 KG/M2 | OXYGEN SATURATION: 93 %

## 2025-04-13 DIAGNOSIS — S32.039A L3 VERTEBRAL FRACTURE (CMS/HCC): Primary | ICD-10-CM

## 2025-04-13 PROCEDURE — 96376 TX/PRO/DX INJ SAME DRUG ADON: CPT

## 2025-04-13 PROCEDURE — 99284 EMERGENCY DEPT VISIT MOD MDM: CPT | Performed by: EMERGENCY MEDICINE

## 2025-04-13 PROCEDURE — 96375 TX/PRO/DX INJ NEW DRUG ADDON: CPT

## 2025-04-13 PROCEDURE — 6360000200 HC RX 636 W HCPCS (ALT 250 FOR IP): Performed by: EMERGENCY MEDICINE

## 2025-04-13 PROCEDURE — 6360000200 HC RX 636 W HCPCS (ALT 250 FOR IP)

## 2025-04-13 PROCEDURE — 72131 CT LUMBAR SPINE W/O DYE: CPT

## 2025-04-13 PROCEDURE — 96374 THER/PROPH/DIAG INJ IV PUSH: CPT

## 2025-04-13 RX ORDER — MORPHINE SULFATE 4 MG/ML
INJECTION, SOLUTION INTRAMUSCULAR; INTRAVENOUS
Status: COMPLETED
Start: 2025-04-13 | End: 2025-04-13

## 2025-04-13 RX ORDER — MORPHINE SULFATE 4 MG/ML
4 INJECTION, SOLUTION INTRAMUSCULAR; INTRAVENOUS ONCE
Status: COMPLETED | OUTPATIENT
Start: 2025-04-13 | End: 2025-04-13

## 2025-04-13 RX ORDER — HYDROCODONE BITARTRATE AND ACETAMINOPHEN 5; 325 MG/1; MG/1
1 TABLET ORAL EVERY 6 HOURS PRN
Qty: 10 TABLET | Refills: 0 | Status: SHIPPED | OUTPATIENT
Start: 2025-04-13 | End: 2025-04-15 | Stop reason: ALTCHOICE

## 2025-04-13 RX ORDER — ONDANSETRON HYDROCHLORIDE 2 MG/ML
4 INJECTION, SOLUTION INTRAVENOUS ONCE
Status: COMPLETED | OUTPATIENT
Start: 2025-04-13 | End: 2025-04-13

## 2025-04-13 RX ADMIN — MORPHINE SULFATE 4 MG: 4 INJECTION, SOLUTION INTRAMUSCULAR; INTRAVENOUS at 13:31

## 2025-04-13 RX ADMIN — ONDANSETRON 4 MG: 2 INJECTION, SOLUTION INTRAMUSCULAR; INTRAVENOUS at 11:58

## 2025-04-13 RX ADMIN — MORPHINE SULFATE 4 MG: 4 INJECTION, SOLUTION INTRAMUSCULAR; INTRAVENOUS at 11:59

## 2025-04-13 NOTE — DISCHARGE INSTRUCTIONS
You were seen in the emergency department today with an acute worsening of your back pain after a fall.  Unfortunately you have now sustained a depressed transverse fracture of the L3 vertebral body.    I discussed this with the on-call neurosurgeon from [facility] and they recommended a TLSO brace.  You have a previous TLSO brace, and I think it is appropriate to wear that.  Follow-up with your spine surgeon at [facility] orthopedics.  Follow-up with your primary care doctor for ongoing pain management.    Return to the emergency department if you have severe worsening pain, numbness or weakness in your extremities, bowel or bladder control issues, or any other concerns.

## 2025-04-13 NOTE — ED PROVIDER NOTES
HPI:   Chief Complaint   Patient presents with    Fall     GLF last night at 2100.  Fall d/t poor balance issues.  C/O lower back pain.  Hx of L2 compression fx last August.  Pain in same area.       This is a 69-year-old man who comes to the emergency department today with an exacerbation of his chronic lower back pain.  The patient is a retired PA and provides a thorough and concise history of present illness.  He reports that he has had some chronic issues with toe drop on the right from his chronic back issues, he thinks that last night his toe got hung up on the janis causing him to fall onto his buttocks.  He denies any injury to his head, headache, or neck pain.  He reports that he has a worsening of the pain in his lower back at the L2 level.  He reports when he initially injured his back with a fall last August the pain seemed to radiate more on the left side, now it seems to radiate more on the right side.  He has tried some Tylenol which has not helped his symptoms.  He reports that the pain radiates around both hips and into the anterior thighs, similar to how it presented when he had his fall last August resulting in the T2 compression fracture.  Because of the symptoms he was concerned that he may have additionally compressed this spine.  He denies any lightheadedness or dizziness prior to falling.  He reports he has been in his usual state he denies any new numbness or tingling into the lower extremities.  He denies any bowel or bladder control issues.  He has not had any abdominal pain, nausea or vomiting.  He has not had any constipation, diarrhea, or urinary symptoms.        PE:   ED Triage Vitals [04/13/25 1143]   Temp Heart Rate Resp BP SpO2   36.1 °C (97 °F) 70 16 149/95 96 %      Mean BP (mmHg) Temp Source Heart Rate Source Patient Position BP Location   -- Temporal -- -- --      FiO2 (%)       --           Physical Exam  Vitals and nursing note reviewed.   Constitutional:       General: He  is not in acute distress.     Appearance: He is well-developed.   HENT:      Head: Normocephalic and atraumatic.   Eyes:      Conjunctiva/sclera: Conjunctivae normal.   Cardiovascular:      Rate and Rhythm: Normal rate and regular rhythm.      Heart sounds: No murmur heard.  Pulmonary:      Effort: Pulmonary effort is normal. No respiratory distress.      Breath sounds: Normal breath sounds.   Abdominal:      Palpations: Abdomen is soft.      Tenderness: There is no abdominal tenderness.   Musculoskeletal:         General: Tenderness present. No swelling.      Cervical back: Neck supple.        Back:       Comments: Mild midline tenderness to the lumbar spine.  No tenderness to the thoracic spine.  Patient reports the pain seems to be more off to the right of his midline spine.  He reports that he has some shooting pain coming around his hips to the anterior thighs bilaterally.  He reports no numbness in the lower extremities.  He reports no pain going further into the lower extremities than the anterior thighs.  He denies any bowel or bladder incontinence.  Pulse motor and sensory is intact in the lower extremities.   Skin:     General: Skin is warm and dry.      Capillary Refill: Capillary refill takes less than 2 seconds.   Neurological:      Mental Status: He is alert.   Psychiatric:         Mood and Affect: Mood normal.         ED procedures:   Procedures    ED Course:          Medical Decision Making      This is a 69-year-old male with back pain after fall.    I have reviewed notes from previous providers in the patient's chart, as well as recent lab results and imaging results.  This showed: History of hypertension, high cholesterol, pernicious anemia, primary biliary cholangitis and autoimmune hepatitis.  Lymph oh plasmacytic lymphoma, compression fracture of the second lumbar vertebrae.  According to orthopedics note from April 8 of this year patient had a L2 compression fracture after falling in August  2024, and subsequent to that has had bilateral hip pain chronically for which he receives injections.  Last hip injections were performed on the eighth.  At that time he had bilateral hip x-rays which showed total hip arthroplasties without hardware complication.  Last imaging of the lumbar spine was performed January 28 of this year and showed old L2 compression fracture status post vertebroplasty.   Last MRI of the spine was September 2024 and showed L2 compression fracture that at that time had had increased height loss.  Additionally he had multilevel degenerative disc disease and facet arthropathy with severe bilateral neural foramen narrowing at the level of L5-S1.  I have also reviewed his initial imaging which was August 3, 2024 and showed an acute L2 compression fracture with 30% vertebral body height loss.    Differential diagnosis includes: New injury, worsening of chronic L2 compression fracture, cord impingement, cauda equina syndrome, fracture, contusion, chronic pain.    Patient was placed on telemetry.  This was closely monitored by myself and reveals regular rate and rhythm.    Laboratory studies were not indicated on this visit.    Imaging was reviewed by myself.  This independent interpretation shows CT scan with an acute minimally depressed transverse fracture of the L3 vertebral body.  Patient has known chronic vaibhav compression of L2 with postoperative changes of vertebroplasty.  There is diffuse degenerative disc disease, and moderate to severe bilateral foraminal narrowing at L5-S1 caused by a bulging disc.  Mild to moderate central canal narrowing L4-L5 caused by bulging disc.  The only acute change is the transverse fracture of L3 compared to previous MRI.    I discussed these findings with the on-call neurosurgeon Dr. Elijah Rice from [facility].  He recommended TLSO brace and following up with his spine surgeon.  Patient has a previous TLSO from last August when he had the L3  compression fracture.  He reports that this is in good working order and he has not changed significantly in weight since then.  His wife will bring that at the time of discharge.    Discussion of management options was performed with patient, who understood and agreed with treatment plan.    I have given him a prescription for hydrocodone, he will follow-up with his primary care doctor for ongoing pain management until he can be seen by spine surgery.  At this time no evidence of spine surgical emergency.    Given   Medications   morphine injection 4 mg (4 mg intravenous Given 4/13/25 1159)   ondansetron (ZOFRAN) injection 4 mg (4 mg intravenous Given 4/13/25 1158)       At the time of discharge the patient was in good condition with stable vital signs.      Clinical Impression:    Final diagnoses:   [S32.039A] L3 vertebral fracture (CMS/HCC)     4/13/2025  1:16 PM     Carlie Barker MD  04/13/25 0954

## 2025-04-14 ENCOUNTER — OFFICE VISIT (OUTPATIENT)
Dept: FAMILY MEDICINE | Facility: CLINIC | Age: 70
End: 2025-04-14
Payer: MEDICARE

## 2025-04-14 VITALS
SYSTOLIC BLOOD PRESSURE: 122 MMHG | RESPIRATION RATE: 16 BRPM | WEIGHT: 175.8 LBS | OXYGEN SATURATION: 91 % | DIASTOLIC BLOOD PRESSURE: 72 MMHG | BODY MASS INDEX: 23.19 KG/M2 | HEART RATE: 63 BPM | TEMPERATURE: 97.4 F

## 2025-04-14 DIAGNOSIS — S32.030D COMPRESSION FRACTURE OF L3 VERTEBRA WITH ROUTINE HEALING, SUBSEQUENT ENCOUNTER: Primary | ICD-10-CM

## 2025-04-14 PROCEDURE — G0463 HOSPITAL OUTPT CLINIC VISIT: HCPCS | Performed by: FAMILY MEDICINE

## 2025-04-14 PROCEDURE — 99214 OFFICE O/P EST MOD 30 MIN: CPT | Performed by: FAMILY MEDICINE

## 2025-04-14 PROCEDURE — 72131 CT LUMBAR SPINE W/O DYE: CPT | Mod: 26 | Performed by: RADIOLOGY

## 2025-04-14 PROCEDURE — G2211 COMPLEX E/M VISIT ADD ON: HCPCS | Performed by: FAMILY MEDICINE

## 2025-04-14 NOTE — TELEPHONE ENCOUNTER
It looks like he was seen in the ER and given hydrocodone.  Please double check if he is needing additional medications.  Thanks

## 2025-04-14 NOTE — TELEPHONE ENCOUNTER
Pt states that he fell over the weekend and has a compression fracture. He states that they only gave him a few pain medications and he is needing to be seen to get more. He is wondering if Dr. Haley could fit him in this week. Please call and advise.

## 2025-04-14 NOTE — PROGRESS NOTES
Subjective      Juan Diego Rondon is a 69 y.o. male who presents for Follow-up (Follow up from ER.   Lower back Pain compression fracture  of the L 3 )  .    HPI  History of Present Illness  Emmett Rondon is a 69-year-old male who presents with a recent fall resulting in a T3 compression fracture.    He tripped over a rug, causing his foot to catch and resulting in a fall onto his buttocks, leading to a T3 compression fracture. He is currently using a TSLO brace and is considering the use of a walker for stability to prevent further falls. He has a history of kyphoplasty performed locally by Dr. Magana. He is uncertain if further intervention is needed for the current fracture and is awaiting consultation with a neurosurgeon. He has been experiencing pain and has been managing it with hydrocodone-acetaminophen, which he breaks in half and takes before physical therapy sessions. He recently received a prescription for 30 tablets and an additional 10 from the ER.    He has been experiencing a rash following a hip injection by a PA at an orthopedic clinic. He is also managing Behçet's disease with Otezla, which has significantly improved his oral symptoms, reducing them by 95%. He is currently taking one pill a day as per his rheumatologist's advice to wean off the medication.    He is scheduled for a corneal transplant due to Fuchs dystrophy, with concerns about lying still during the procedure due to his back condition. He is also in the process of applying for a handicap permit for his vehicle. He reports a change in travel plans due to his current condition, as he was unable to travel to [place] for [facility]. He is also experiencing vision loss due to Fuchs dystrophy.       The following have been reviewed and updated as appropriate in this visit:   Tobacco  Allergies  Meds  Problems  Med Hx  Surg Hx  Fam Hx         No Known Allergies  Current Outpatient Medications   Medication Sig Dispense Refill     HYDROcodone-acetaminophen (NORCO) 5-325 mg per tablet Take 1 tablet by mouth every 6 (six) hours as needed for pain scale 8-10/10 for up to 10 doses Max Daily Amount: 4 tablets 10 tablet 0    hydrOXYzine (ATARAX) 25 mg tablet Take 1 tablet (25 mg total) by mouth nightly (Patient taking differently: Take 1 tablet (25 mg total) by mouth nightly Take only when needed)      apremilast (Otezla) 30 mg tablet Take 30 mg by mouth 2 (two) times a day      rosuvastatin (CRESTOR) 10 mg tablet Take 1 tablet by mouth daily 90 tablet 3    esomeprazole (NexIUM) 40 mg capsule Take 1 capsule (40 mg total) by mouth 2 (two) times a day      sodium chloride (Orly 128) 5 % ophthalmic ointment Apply 2 drops to right eye as needed      ondansetron ODT (ZOFRAN-ODT) 4 mg disintegrating tablet Take 1 tablet (4 mg total) by mouth every 8 (eight) hours as needed for nausea or vomiting 30 tablet 1    dexAMETHasone 0.5 mg/5 mL solution SMARTSIG:15 Milliliter(s) By Mouth 4-5 Times Daily PRN      omeprazole (PriLOSEC) 40 mg capsule Take 1 capsule (40 mg total) by mouth daily      valsartan (DIOVAN) 80 mg tablet Take 1 tablet (80 mg total) by mouth daily 90 tablet 4    FLUoxetine (PROzac) 40 mg capsule Take 1 capsule (40 mg total) by mouth daily 90 capsule 3    folic acid 1 mg tablet Take 1 tablet (1 mg total) by mouth daily 90 tablet 3    cyanocobalamin 1,000 mcg/mL injection Inject 1 mL (1,000 mcg total) into the shoulder, thigh, or buttocks every 30 (thirty) days 1 mL 11    hydrocortisone 2.5 % cream Apply topically 2 (two) times a day as needed for rash 30 g 0    fenofibrate (TRICOR) 48 mg tablet Take 1 tablet (48 mg total) by mouth daily 90 tablet 4    famotidine (PEPCID) 20 mg tablet Take 1 tablet (20 mg total) by mouth 2 (two) times a day      acetaminophen (TYLENOL) 500 mg tablet Take 1 tablet (500 mg total) by mouth every 6 (six) hours as needed for pain scale 1-3/10      ursodioL (ACTIGALL) 500 mg tablet Take 1 tablet (500 mg total) by  mouth 2 times daily Every other day      ursodioL (AMANDEEP) 250 mg tablet Take 3 tablets (750 mg total) by mouth In am and 500 mg in pm every other day-alternate with the 500 mg bid       No current facility-administered medications for this visit.     Facility-Administered Medications Ordered in Other Visits   Medication Dose Route Frequency Provider Last Rate Last Admin    DOBUTamine 500 mg in D5W 250 mL infusion (premix)  2.5-40 mcg/kg/min intravenous Titrated Trevor Ghotra MD 95.3 mL/hr at 10/31/24 0900 40 mcg/kg/min at 10/31/24 0900     Past Medical History:   Diagnosis Date    Allergic eosinophilic esophagitis     Allergic rhinitis 1990    Anemia 2019    Anxiety     Arthritis     Autoimmune hepatitis (CMS/HCC)     Back pain 8/3/24    Blood disorder     pernicious anemia    Cataract 2001    Depression 08/08/2022    Gastrointestinal disease     IBS    Gout     Hyperlipidemia     Hypertension     Lymphoplasmacytic leukemia (CMS/HCC)     Melanoma (CMS/HCC)     Neurologic disorder     Pneumothorax 08/07/2022    Primary biliary cholangitis (CMS/HCC)     autoimmune hepatitis    Psychiatric illness     anxiety    Skin abnormalities 2021     Past Surgical History:   Procedure Laterality Date    APPENDECTOMY      COLONOSCOPY W/ BIOPSIES AND POLYPECTOMY  02/09/2009    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    CT BIOPSY LIVER FUNCTION  12/02/2021    CT BIOPSY LIVER FUNCTION 12/2/2021 [facility] MIS CT SCAN    ESOPHAGOGASTRODUODENOSCOPY N/A 05/16/2022    Procedure: ESOPHAGOGASTRODUODENOSCOPY with Biopsies;  Surgeon: Bradley Cook MD;  Location: [facility];  Service: Endoscopy;  Laterality: N/A;    EYE SURGERY Bilateral     cataract    KNEE SURGERY Bilateral     3 surgeries on left and 2 on right knees - prior open medial meniscectomies bilateral knees in the 1970s    KYPHOSIS SURGERY N/A 11/08/2024    ORTHOPEDIC SURGERY  1972    SKIN BIOPSY      SKIN CANCER EXCISION Left 10/15/2020    Melanoma removed from left forearm     TONSILLECTOMY      TOTAL HIP ARTHROPLASTY Left 04/21/2021    Procedure: LEFT TOTAL HIP ARTHROPLASTY;  Surgeon: Sj Reyes MD;  Location: [facility] OR;  Service: Orthopedics;  Laterality: Left;    TOTAL HIP ARTHROPLASTY Right 03/22/2022    Procedure: RIGHT TOTAL HIP ARTHROPLASTY POSTERIOR;  Surgeon: Sj Reyes MD;  Location: [facility] OR;  Service: Orthopedics;  Laterality: Right;    VASECTOMY  1986     Family History   Problem Relation Age of Onset    Diabetes Mother         Type 2    Hypertension Mother     Alzheimer's disease Mother     Lung cancer Father     Hypertension Father     Cancer Father     Prostate cancer Brother     Heart failure Brother     Heart attack Maternal Grandmother     Hypertension Maternal Grandfather     Prostate cancer Maternal Grandfather      Social History     Socioeconomic History    Marital status:    Tobacco Use    Smoking status: Never    Smokeless tobacco: Never   Vaping Use    Vaping status: Never Used   Substance and Sexual Activity    Alcohol use: Yes     Alcohol/week: 3.0 standard drinks of alcohol     Types: 3 Glasses of wine per week     Comment: occ    Drug use: Never    Sexual activity: Defer     Social Drivers of Health     Tobacco Use: Low Risk  (4/14/2025)    Patient History     Smoking Tobacco Use: Never     Smokeless Tobacco Use: Never   Alcohol Use: Unknown (9/22/2020)    Received from [facility]    AUDIT-C     Frequency of Alcohol Consumption: 4 or more times a week     Average Number of Drinks: 1 or 2   Depression: Not at risk (9/11/2024)    Received from [facility]    PHQ-2     PHQ-2 SCORE: 2       Review of Systems    Objective     Vitals  /72 (BP Location: Left arm, Patient Position: Sitting, Cuff Size: Regular Adult)   Pulse 63   Temp 36.3 °C (97.4 °F) (Temporal)   Resp 16   Wt 79.7 kg (175 lb 12.8 oz)   SpO2 91%   BMI 23.19 kg/m²     Physical Exam  Vitals and nursing note reviewed.   Constitutional:       General: He is not  in acute distress.     Appearance: Normal appearance. He is not ill-appearing, toxic-appearing or diaphoretic.      Comments: Using a walker, tslo brace in place   Neck:      Vascular: No carotid bruit.   Cardiovascular:      Rate and Rhythm: Normal rate.      Heart sounds: Normal heart sounds. No murmur heard.     No friction rub. No gallop.   Pulmonary:      Effort: Pulmonary effort is normal. No respiratory distress.      Breath sounds: Normal breath sounds. No stridor. No wheezing, rhonchi or rales.   Musculoskeletal:      Cervical back: Neck supple.      Right lower leg: No edema.      Left lower leg: No edema.   Lymphadenopathy:      Cervical: No cervical adenopathy.   Skin:     General: Skin is warm and dry.      Capillary Refill: Capillary refill takes less than 2 seconds.      Findings: No rash.      Comments: Bilateral upper extremity purpura noted.   Neurological:      Mental Status: He is alert.       Physical Exam        Procedures    Assessment/Plan   Diagnoses and all orders for this visit:    Compression fracture of L3 vertebra with routine healing, subsequent encounter  -     Ambulatory referral to Neurosurgery; Future      Assessment & Plan  Non-traumatic compression fracture of T3 vertebra  Awaiting neurosurgical evaluation for potential kyphoplasty. Advised use of walker for stability.  - Refer to neurosurgeon for evaluation of T3 compression fracture.  - Advise use of walker for stability.  - Remove loose rugs from home to prevent falls.  - Contact [facility] to expedite neurosurgery appointment.    Pain management  Pain related to T3 fracture managed with hydrocodone-acetaminophen. Advised to adjust medication based on pain levels.  - Monitor pain medication usage and report if additional medication is needed.  - Advise to use pain medication as needed and not to under-treat pain.    Fuchs dystrophy  Preoperative appointment scheduled for corneal transplant. Feasibility depends on  neurosurgical assessment of T3 fracture.  - Keep preoperative appointment for corneal transplant.  - Coordinate with neurosurgery to assess feasibility of proceeding with corneal transplant.    Oral ulcers  Significant improvement with Otezla. Rheumatologist uncertain if condition is Bechet's.  - Continue Otezla at current dose of one pill a day.    General Health Maintenance  Requires handicap permit for mobility assistance.  - Complete and submit handicap permit application.     Return if symptoms worsen or fail to improve.    Cuba Haley MD

## 2025-04-15 ENCOUNTER — LAB (OUTPATIENT)
Dept: LAB | Facility: HOSPITAL | Age: 70
End: 2025-04-15
Payer: MEDICARE

## 2025-04-15 DIAGNOSIS — C83.00 LYMPHOPLASMACYTIC LYMPHOMA (CMS/HCC): ICD-10-CM

## 2025-04-15 LAB
ALBUMIN SERPL-MCNC: 3.7 G/DL (ref 3.5–5.3)
ALP SERPL-CCNC: 81 U/L (ref 45–115)
ALT SERPL-CCNC: 12 U/L (ref 7–52)
ANION GAP SERPL CALC-SCNC: 13 MMOL/L (ref 3–11)
AST SERPL-CCNC: 12 U/L
BASOPHILS # BLD AUTO: 0.05 10*3/UL
BASOPHILS NFR BLD AUTO: 0.3 % (ref 0–2)
BILIRUB SERPL-MCNC: 0.9 MG/DL (ref 0.2–1.4)
BUN SERPL-MCNC: 11 MG/DL (ref 7–25)
CALCIUM ALBUM COR SERPL-MCNC: 9.2 MG/DL (ref 8.6–10.3)
CALCIUM SERPL-MCNC: 9 MG/DL (ref 8.6–10.3)
CHLORIDE SERPL-SCNC: 100 MMOL/L (ref 98–107)
CO2 SERPL-SCNC: 23 MMOL/L (ref 21–32)
CREAT SERPL-MCNC: 0.59 MG/DL (ref 0.7–1.3)
EGFRCR SERPLBLD CKD-EPI 2021: 104 ML/MIN/1.73M*2
EOSINOPHIL # BLD AUTO: 0.07 10*3/UL
EOSINOPHIL NFR BLD AUTO: 0.4 % (ref 0–3)
ERYTHROCYTE DISTRIBUTION WIDTH STANDARD DEVIATION: 45.4 FL (ref 35.1–43.9)
ERYTHROCYTE [DISTWIDTH] IN BLOOD BY AUTOMATED COUNT: 12 % (ref 11.5–15)
GLUCOSE SERPL-MCNC: 95 MG/DL (ref 70–105)
HCT VFR BLD AUTO: 43.8 % (ref 38–50)
HGB BLD-MCNC: 15.1 G/DL (ref 13.2–17.2)
IGM SERPL-MCNC: 8310 MG/DL (ref 40–230)
IMMATURE GRANULOCYTES (10*3/UL) LEUKOCYTES IN BLOOD BY AUTOMATED COUNT: 0.33 10*3/UL
IMMATURE GRANULOCYTES/100 LEUKOCYTES IN BLOOD BY AUTOMATED COUNT: 2 %
LYMPHOCYTES # BLD AUTO: 1.41 10*3/UL
LYMPHOCYTES NFR BLD AUTO: 8.4 % (ref 15–47)
MCH RBC QN AUTO: 35 PG (ref 29–34)
MCHC RBC AUTO-ENTMCNC: 34.5 G/DL (ref 32–36)
MCV RBC AUTO: 101.6 FL (ref 82–97)
MONOCYTES # BLD AUTO: 1.09 10*3/UL
MONOCYTES NFR BLD AUTO: 6.5 % (ref 5–13)
NEUTROPHILS # BLD AUTO: 13.77 10*3/UL
NEUTROPHILS NFR BLD AUTO: 82.4 % (ref 46–70)
NRBC (10*3/UL) BY AUTOMATED COUNT: 0 10*3/UL (ref 0–0.1)
NRBC BLD-RTO: 0 /100 WBCS (ref 0–2)
PLATELET # BLD AUTO: 288 10*3/UL (ref 130–350)
PMV BLD AUTO: 9.7 FL (ref 6.9–10.8)
POTASSIUM SERPL-SCNC: 3.8 MMOL/L (ref 3.5–5.1)
PROT SERPL-MCNC: 6.7 G/DL (ref 6–8.3)
RBC # BLD AUTO: 4.31 10*6/UL (ref 4.1–5.8)
SODIUM SERPL-SCNC: 136 MMOL/L (ref 135–145)
WBC # BLD AUTO: 16.7 10*3/UL (ref 3.7–9.6)

## 2025-04-15 PROCEDURE — 83521 IG LIGHT CHAINS FREE EACH: CPT

## 2025-04-15 PROCEDURE — 80053 COMPREHEN METABOLIC PANEL: CPT

## 2025-04-15 PROCEDURE — 85025 COMPLETE CBC W/AUTO DIFF WBC: CPT

## 2025-04-15 PROCEDURE — 82784 ASSAY IGA/IGD/IGG/IGM EACH: CPT

## 2025-04-15 PROCEDURE — 36415 COLL VENOUS BLD VENIPUNCTURE: CPT

## 2025-04-15 RX ORDER — HYDROCODONE BITARTRATE AND ACETAMINOPHEN 7.5; 325 MG/1; MG/1
1 TABLET ORAL EVERY 4 HOURS PRN
Qty: 60 TABLET | Refills: 0 | Status: SHIPPED | OUTPATIENT
Start: 2025-04-15

## 2025-04-15 NOTE — TELEPHONE ENCOUNTER
Per our discussion yesterday we were going to wait until the end of the week to see how much she was actually requiring due to his compression fracture but given his phone call I have went ahead and sent a new prescription.  Thanks

## 2025-04-15 NOTE — TELEPHONE ENCOUNTER
Notified Emmett of Dr Haley's note.  He did not understand he was to report how much medication he needed.  He will take the medication sparingly and report how much he uses.

## 2025-04-16 ENCOUNTER — RESULTS FOLLOW-UP (OUTPATIENT)
Dept: ONCOLOGY | Facility: CLINIC | Age: 70
End: 2025-04-16

## 2025-04-16 DIAGNOSIS — C83.00 LYMPHOPLASMACYTIC LYMPHOMA (CMS/HCC): Primary | ICD-10-CM

## 2025-04-16 DIAGNOSIS — I10 HYPERTENSION, UNSPECIFIED TYPE: Primary | ICD-10-CM

## 2025-04-16 DIAGNOSIS — Z01.818 PREOP EXAMINATION: ICD-10-CM

## 2025-04-16 LAB
KAPPA LC FREE SERPL-MCNC: 2.05 MG/DL (ref 0.33–1.94)
KAPPA LC FREE/LAMBDA FREE SERPL: 2.2 {RATIO} (ref 0.26–1.65)
LAMBDA LC FREE SERPL-MCNC: 0.93 MG/DL (ref 0.57–2.63)

## 2025-04-18 LAB
IGA SERPL-MCNC: 78 MG/DL (ref 61–356)
IGG SERPL-MCNC: 464 MG/DL (ref 767–1590)
IGM SERPL-MCNC: 863 MG/DL (ref 37–286)
IMMUNOLOGIST REVIEW: ABNORMAL
M PROTEIN SERPL QL: POSITIVE
M-PROTEIN MK(REF): 0.72 G/DL
PROTEIN GLYCOSYLATED SERPL QL: YES
TYPE OF THERAP AB ADMINISTERED: ABNORMAL

## 2025-04-22 ENCOUNTER — LAB (OUTPATIENT)
Dept: LAB | Facility: HOSPITAL | Age: 70
End: 2025-04-22
Payer: MEDICARE

## 2025-04-22 ENCOUNTER — OFFICE VISIT (OUTPATIENT)
Dept: ONCOLOGY | Facility: CLINIC | Age: 70
End: 2025-04-22
Payer: MEDICARE

## 2025-04-22 VITALS
HEIGHT: 74 IN | SYSTOLIC BLOOD PRESSURE: 115 MMHG | TEMPERATURE: 97.6 F | BODY MASS INDEX: 22.06 KG/M2 | WEIGHT: 171.9 LBS | DIASTOLIC BLOOD PRESSURE: 77 MMHG | HEART RATE: 85 BPM | OXYGEN SATURATION: 95 %

## 2025-04-22 DIAGNOSIS — C83.00 LYMPHOPLASMACYTIC LYMPHOMA (CMS/HCC): ICD-10-CM

## 2025-04-22 DIAGNOSIS — K75.4 AUTOIMMUNE HEPATITIS (CMS/HCC): ICD-10-CM

## 2025-04-22 LAB
ALBUMIN SERPL-MCNC: 3.9 G/DL (ref 3.5–5.3)
ALP SERPL-CCNC: 135 U/L (ref 45–115)
ALT SERPL-CCNC: 12 U/L (ref 7–52)
AST SERPL-CCNC: 13 U/L
BILIRUB DIRECT SERPL-MCNC: 0.19 MG/DL
BILIRUB SERPL-MCNC: 0.91 MG/DL (ref 0.2–1.4)
IGM SERPL-MCNC: 1080 MG/DL (ref 40–230)
INR BLD: 1
PROT SERPL-MCNC: 7.4 G/DL (ref 6–8.3)
PROTHROMBIN TIME: 10.4 SECONDS (ref 9.4–12.5)

## 2025-04-22 PROCEDURE — 85610 PROTHROMBIN TIME: CPT

## 2025-04-22 PROCEDURE — 80076 HEPATIC FUNCTION PANEL: CPT

## 2025-04-22 PROCEDURE — 36415 COLL VENOUS BLD VENIPUNCTURE: CPT

## 2025-04-22 PROCEDURE — G2211 COMPLEX E/M VISIT ADD ON: HCPCS | Performed by: INTERNAL MEDICINE

## 2025-04-22 PROCEDURE — 82784 ASSAY IGA/IGD/IGG/IGM EACH: CPT

## 2025-04-22 PROCEDURE — G0463 HOSPITAL OUTPT CLINIC VISIT: HCPCS

## 2025-04-22 PROCEDURE — 82105 ALPHA-FETOPROTEIN SERUM: CPT

## 2025-04-22 PROCEDURE — 99214 OFFICE O/P EST MOD 30 MIN: CPT | Performed by: INTERNAL MEDICINE

## 2025-04-22 ASSESSMENT — PAIN SCALES - GENERAL: PAINLEVEL_OUTOF10: 8

## 2025-04-22 NOTE — PROGRESS NOTES
Hematology/Oncology Clinic Note    REASON FOR REFERRAL: Monoclonal gammopathy    HISTORY OF PRESENT ILLNESS:  Juan Diego Rondon is a 69 y.o.-old gentleman referred for further evaluation and treatment of monoclonal gammopathy.  Patient recently underwent laboratory testing in January 2024 including an SPEP which showed a monoclonal protein measuring 1.4 g/dL.  Further workup showed kappa free light chains elevated at 6.41 with a kappa/lambda ratio of 3.98.  Quantitative immunoglobulins showed elevated IgM of 2256, IgG 698, IgA 114.  LDH was normal at 2 109 and beta-2 microglobulin 2.65.  Gastric panel showed creatinine of 0.79 calcium 9.9, corrected calcium 10.0.  PET scan was done which showed no specific uptake with no osseous lesions noted.    He continues to have occasional night sweats.  He has not noted any new lymphadenopathy.  He was recently started on Otezla and his mucositis has improved significantly.  Unfortunately recently had a fall and has a new compression fracture at L3.    Wt Readings from Last 3 Encounters:   04/14/25 79.7 kg (175 lb 12.8 oz)   04/13/25 79.4 kg (175 lb)   04/08/25 77.1 kg (169 lb 14.4 oz)      REVIEW OF SYSTEMS:   A 12 point review of systems was completed.  Pertinents noted in HPI; otherwise negative.    Past Medical History  Past Medical History:   Diagnosis Date    Allergic eosinophilic esophagitis     Allergic rhinitis 1990    Anemia 2019    Anxiety     Arthritis     Autoimmune hepatitis (CMS/HCC)     Back pain 8/3/24    Blood disorder     pernicious anemia    Cataract 2001    Depression 08/08/2022    Gastrointestinal disease     IBS    Gout     Hyperlipidemia     Hypertension     Lymphoplasmacytic leukemia (CMS/HCC)     Melanoma (CMS/HCC)     Neurologic disorder     Pneumothorax 08/07/2022    Primary biliary cholangitis (CMS/HCC)     autoimmune hepatitis    Psychiatric illness     anxiety    Skin abnormalities 2021       Past Surgical History  Past Surgical History:    Procedure Laterality Date    APPENDECTOMY      COLONOSCOPY W/ BIOPSIES AND POLYPECTOMY  02/09/2009    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    CT BIOPSY LIVER FUNCTION  12/02/2021    CT BIOPSY LIVER FUNCTION 12/2/2021 [facility] MIS CT SCAN    ESOPHAGOGASTRODUODENOSCOPY N/A 05/16/2022    Procedure: ESOPHAGOGASTRODUODENOSCOPY with Biopsies;  Surgeon: Bradley Coko MD;  Location: [facility] Endoscopy;  Service: Endoscopy;  Laterality: N/A;    EYE SURGERY Bilateral     cataract    KNEE SURGERY Bilateral     3 surgeries on left and 2 on right knees - prior open medial meniscectomies bilateral knees in the 1970s    KYPHOSIS SURGERY N/A 11/08/2024    ORTHOPEDIC SURGERY  1972    SKIN BIOPSY      SKIN CANCER EXCISION Left 10/15/2020    Melanoma removed from left forearm    TONSILLECTOMY      TOTAL HIP ARTHROPLASTY Left 04/21/2021    Procedure: LEFT TOTAL HIP ARTHROPLASTY;  Surgeon: Sj Reyes MD;  Location: [facility] OR;  Service: Orthopedics;  Laterality: Left;    TOTAL HIP ARTHROPLASTY Right 03/22/2022    Procedure: RIGHT TOTAL HIP ARTHROPLASTY POSTERIOR;  Surgeon: Sj Reyes MD;  Location: [facility] OR;  Service: Orthopedics;  Laterality: Right;    VASECTOMY  1986       MEDICATIONS:    Current Outpatient Medications   Medication Sig Dispense Refill    HYDROcodone-acetaminophen (NORCO) 7.5-325 mg per tablet Take 1 tablet by mouth every 4 (four) hours as needed for pain scale 8-10/10 Max Daily Amount: 6 tablets 60 tablet 0    hydrOXYzine (ATARAX) 25 mg tablet Take 1 tablet (25 mg total) by mouth nightly (Patient taking differently: Take 1 tablet (25 mg total) by mouth nightly Take only when needed)      apremilast (Otezla) 30 mg tablet Take 30 mg by mouth 2 (two) times a day      rosuvastatin (CRESTOR) 10 mg tablet Take 1 tablet by mouth daily 90 tablet 3    esomeprazole (NexIUM) 40 mg capsule Take 1 capsule (40 mg total) by mouth 2 (two) times a day      sodium chloride (Orly 128) 5 % ophthalmic ointment Apply 2 drops to right  eye as needed      ondansetron ODT (ZOFRAN-ODT) 4 mg disintegrating tablet Take 1 tablet (4 mg total) by mouth every 8 (eight) hours as needed for nausea or vomiting 30 tablet 1    dexAMETHasone 0.5 mg/5 mL solution SMARTSIG:15 Milliliter(s) By Mouth 4-5 Times Daily PRN      omeprazole (PriLOSEC) 40 mg capsule Take 1 capsule (40 mg total) by mouth daily      valsartan (DIOVAN) 80 mg tablet Take 1 tablet (80 mg total) by mouth daily 90 tablet 4    FLUoxetine (PROzac) 40 mg capsule Take 1 capsule (40 mg total) by mouth daily 90 capsule 3    folic acid 1 mg tablet Take 1 tablet (1 mg total) by mouth daily 90 tablet 3    cyanocobalamin 1,000 mcg/mL injection Inject 1 mL (1,000 mcg total) into the shoulder, thigh, or buttocks every 30 (thirty) days 1 mL 11    hydrocortisone 2.5 % cream Apply topically 2 (two) times a day as needed for rash 30 g 0    fenofibrate (TRICOR) 48 mg tablet Take 1 tablet (48 mg total) by mouth daily 90 tablet 4    famotidine (PEPCID) 20 mg tablet Take 1 tablet (20 mg total) by mouth 2 (two) times a day      acetaminophen (TYLENOL) 500 mg tablet Take 1 tablet (500 mg total) by mouth every 6 (six) hours as needed for pain scale 1-3/10      ursodioL (ACTIGALL) 500 mg tablet Take 1 tablet (500 mg total) by mouth 2 times daily Every other day      ursodioL (AMANDEEP) 250 mg tablet Take 3 tablets (750 mg total) by mouth In am and 500 mg in pm every other day-alternate with the 500 mg bid       No current facility-administered medications for this visit.     Facility-Administered Medications Ordered in Other Visits   Medication Dose Route Frequency Provider Last Rate Last Admin    DOBUTamine 500 mg in D5W 250 mL infusion (premix)  2.5-40 mcg/kg/min intravenous Titrated Trevor Ghotra MD 95.3 mL/hr at 10/31/24 0900 40 mcg/kg/min at 10/31/24 0900       Allergies  Allergies have been reviewed.  Juan Diego has no known allergies.    Social History  Social History     Socioeconomic History    Marital status:       Spouse name: Not on file    Number of children: Not on file    Years of education: Not on file    Highest education level: Not on file   Occupational History    Not on file   Tobacco Use    Smoking status: Never    Smokeless tobacco: Never   Vaping Use    Vaping status: Never Used   Substance and Sexual Activity    Alcohol use: Yes     Alcohol/week: 3.0 standard drinks of alcohol     Types: 3 Glasses of wine per week     Comment: occ    Drug use: Never    Sexual activity: Defer   Other Topics Concern    Not on file   Social History Narrative    Not on file     Social Drivers of Health     Financial Resource Strain: Not on file   Food Insecurity: Not on file   Transportation Needs: Not on file   Physical Activity: Not on file   Stress: Not on file   Social Connections: Not on file   Intimate Partner Violence: Not on file   Housing Stability: Not on file       Family History  Family History   Problem Relation Age of Onset    Diabetes Mother         Type 2    Hypertension Mother     Alzheimer's disease Mother     Lung cancer Father     Hypertension Father     Cancer Father     Prostate cancer Brother     Heart failure Brother     Heart attack Maternal Grandmother     Hypertension Maternal Grandfather     Prostate cancer Maternal Grandfather        PHYSICAL EXAMINATION:  There were no vitals filed for this visit.    General: Pleasant,in no acute distress  Head: Normocephalic, atraumatic  Eyes: No scleral icterus  Heart: Regular rate and rhythm without murmur  Extremities: No cyanosis or edema noted  Skin: No obvious rashes or lesion  Psych: Normal affect, no obvious signs of anxiety/depression    ECOG PS:1    RESULTS REVIEWED:   Results for orders placed or performed in visit on 04/22/25   Hepatic function panel    Collection Time: 04/22/25  9:53 AM   Result Value Ref Range    Albumin 3.9 3.5 - 5.3 g/dL    Total Bilirubin 0.91 0.20 - 1.40 mg/dL    Bilirubin, Direct 0.19 <0.20 mg/dL    Alkaline Phosphatase 135 (H) 45  - 115 U/L    AST 13 <40 U/L    ALT (SGPT) 12 7 - 52 U/L    Total Protein 7.4 6.0 - 8.3 g/dL   Protime-INR    Collection Time: 04/22/25  9:53 AM   Result Value Ref Range    Protime 10.4 9.4 - 12.5 SECONDS    INR 1.0 <=1.1        ASSESSMENT/PLAN    # Lymphoplasmacytic lymphoma.  He underwent extensive workup for arthralgias.  Included in most pain was SPEP which showed a monoclonal protein measuring 1.4 g/dL.  Kappa free light chain was elevated at 6.41 with a kappa/lambda ratio of 3.98.  IgM level was elevated at 2256.  PET scan showed no metabolic uptake or osseous lesions.  Bone marrow biopsy was consistent withlymphoplasmacytic lymphoma involving approximately 10% of bone marrow cellularity.  MYD88 L265P was positive.  We started him on weekly rituximab due to fatigue, night sweats, arthralgias.  He completed 4 treatments with slight improvement of his IgM.  His symptoms have remained relatively stable with mild improvement.  His IgM continues to decline suggestive of continued response.    -Follow-up 6 months with repeat labs    #Mucositis.  This has been present since completing Rituxan.  Etiology is unclear.  [facility] was concerned about possible Behcet's disease.  He is currently following with rheumatology.  He is interested in possibly being seen at [facility] and will discuss this at his upcoming appointment.  I told him that if he would like a referral there for further evaluation to call and we will place an order.    NCCN guidelines were consulted in order to help in the management of the patient     Luis Ruffin MD, PhD  Hematology and Oncology

## 2025-04-23 ENCOUNTER — TELEPHONE (OUTPATIENT)
Dept: FAMILY MEDICINE | Facility: CLINIC | Age: 70
End: 2025-04-23

## 2025-04-23 LAB — AFP SERPL-MCNC: 2 NG/ML

## 2025-04-23 NOTE — TELEPHONE ENCOUNTER
Pt cancelled his pre op today because he states he was told to hold of on surgery at the moment. He is wondering if Dr. Haley needs to see him for any reason. Please call and advise.

## 2025-04-23 NOTE — TELEPHONE ENCOUNTER
Juan Diego stated that he canceled his pre op appointment with Dr. Haley today because his oncologist wants to hold off on surgery. States that since they are unsure of his diagnosis at this time, the surgery would not be worthwhile.     Juan Diego would like to know if Dr. Haley would like to see him to follow up?

## 2025-04-27 DIAGNOSIS — S32.030D COMPRESSION FRACTURE OF L3 VERTEBRA WITH ROUTINE HEALING, SUBSEQUENT ENCOUNTER: ICD-10-CM

## 2025-04-27 NOTE — TELEPHONE ENCOUNTER
No care due was identified.  [facility] Embedded Care Due Messages. Reference number: [id number]. 4/27/2025 3:14:02 PM SARAH

## 2025-04-28 DIAGNOSIS — K75.4 AUTOIMMUNE HEPATITIS (CMS/HCC): ICD-10-CM

## 2025-04-28 RX ORDER — HYDROCODONE BITARTRATE AND ACETAMINOPHEN 7.5; 325 MG/1; MG/1
1 TABLET ORAL EVERY 4 HOURS PRN
Qty: 60 TABLET | Refills: 0 | Status: SHIPPED | OUTPATIENT
Start: 2025-04-28

## 2025-05-07 ENCOUNTER — OFFICE VISIT (OUTPATIENT)
Dept: FAMILY MEDICINE | Facility: CLINIC | Age: 70
End: 2025-05-07
Payer: MEDICARE

## 2025-05-07 VITALS
DIASTOLIC BLOOD PRESSURE: 70 MMHG | OXYGEN SATURATION: 97 % | HEART RATE: 95 BPM | SYSTOLIC BLOOD PRESSURE: 118 MMHG | BODY MASS INDEX: 21.73 KG/M2 | TEMPERATURE: 97.1 F | WEIGHT: 167 LBS

## 2025-05-07 DIAGNOSIS — S32.030D COMPRESSION FRACTURE OF L3 VERTEBRA WITH ROUTINE HEALING, SUBSEQUENT ENCOUNTER: Primary | ICD-10-CM

## 2025-05-07 DIAGNOSIS — H18.519 FUCHS' CORNEAL DYSTROPHY, UNSPECIFIED LATERALITY: ICD-10-CM

## 2025-05-07 DIAGNOSIS — C83.00 LYMPHOPLASMACYTIC LYMPHOMA (CMS/HCC): ICD-10-CM

## 2025-05-07 PROCEDURE — 99214 OFFICE O/P EST MOD 30 MIN: CPT | Performed by: FAMILY MEDICINE

## 2025-05-07 PROCEDURE — G0463 HOSPITAL OUTPT CLINIC VISIT: HCPCS | Performed by: FAMILY MEDICINE

## 2025-05-07 PROCEDURE — G2211 COMPLEX E/M VISIT ADD ON: HCPCS | Performed by: FAMILY MEDICINE

## 2025-05-07 NOTE — PROGRESS NOTES
Subjective      Juan Diego Rondon is a 69 y.o. male who presents for follow up  .    HPI  History of Present Illness  Emmett Rondon is a 69 year old male who presents for follow-up on multiple medical issues.    Since his last visit, he has consulted with an oncologist and another physician. The oncologist has decided to delay a cornea transplant. His oral symptoms have shown improvement but are not fully resolved.    He experiences generalized achiness and back pain, which radiates to his groin and testicles. He uses pain medication sparingly, taking one to three doses per day, and maintains a log of his usage.    Regarding his Fuchs dystrophy, he is currently holding off on treatment to observe how his blood counts respond after discontinuing some rheumatologic medications. Recent lab results showed an IgM level of one thousand, which was initially thought to be eight thousand due to an error.    He has noticed urinary symptoms, including dribbling, decreased stream, and increased frequency, waking up once a night to urinate. He is unsure if these symptoms are problematic but is monitoring them.       The following have been reviewed and updated as appropriate in this visit:          No Known Allergies  Current Outpatient Medications   Medication Sig Dispense Refill    HYDROcodone-acetaminophen (NORCO) 7.5-325 mg per tablet Take 1 tablet by mouth every 4 (four) hours as needed for pain scale 8-10/10 Max Daily Amount: 6 tablets 60 tablet 0    hydrOXYzine (ATARAX) 25 mg tablet Take 1 tablet (25 mg total) by mouth nightly      apremilast (Otezla) 30 mg tablet Take 30 mg by mouth 2 (two) times a day      rosuvastatin (CRESTOR) 10 mg tablet Take 1 tablet by mouth daily 90 tablet 3    esomeprazole (NexIUM) 40 mg capsule Take 1 capsule (40 mg total) by mouth 2 (two) times a day      sodium chloride (Orly 128) 5 % ophthalmic ointment Apply 2 drops to right eye as needed      ondansetron ODT (ZOFRAN-ODT) 4 mg  disintegrating tablet Take 1 tablet (4 mg total) by mouth every 8 (eight) hours as needed for nausea or vomiting 30 tablet 1    dexAMETHasone 0.5 mg/5 mL solution SMARTSIG:15 Milliliter(s) By Mouth 4-5 Times Daily PRN      omeprazole (PriLOSEC) 40 mg capsule Take 1 capsule (40 mg total) by mouth daily      valsartan (DIOVAN) 80 mg tablet Take 1 tablet (80 mg total) by mouth daily 90 tablet 4    FLUoxetine (PROzac) 40 mg capsule Take 1 capsule (40 mg total) by mouth daily 90 capsule 3    folic acid 1 mg tablet Take 1 tablet (1 mg total) by mouth daily 90 tablet 3    cyanocobalamin 1,000 mcg/mL injection Inject 1 mL (1,000 mcg total) into the shoulder, thigh, or buttocks every 30 (thirty) days 1 mL 11    fenofibrate (TRICOR) 48 mg tablet Take 1 tablet (48 mg total) by mouth daily 90 tablet 4    famotidine (PEPCID) 20 mg tablet Take 1 tablet (20 mg total) by mouth 2 (two) times a day      acetaminophen (TYLENOL) 500 mg tablet Take 1 tablet (500 mg total) by mouth every 6 (six) hours as needed for pain scale 1-3/10      ursodioL (ACTIGALL) 500 mg tablet Take 1 tablet (500 mg total) by mouth 2 times daily Every other day      ursodioL (AMANDEEP) 250 mg tablet Take 3 tablets (750 mg total) by mouth In am and 500 mg in pm every other day-alternate with the 500 mg bid      hydrocortisone 2.5 % cream Apply topically 2 (two) times a day as needed for rash 30 g 0     No current facility-administered medications for this visit.     Facility-Administered Medications Ordered in Other Visits   Medication Dose Route Frequency Provider Last Rate Last Admin    DOBUTamine 500 mg in D5W 250 mL infusion (premix)  2.5-40 mcg/kg/min intravenous Titrated Trevor Ghotra MD 95.3 mL/hr at 10/31/24 0900 40 mcg/kg/min at 10/31/24 0900     Past Medical History:   Diagnosis Date    Allergic eosinophilic esophagitis     Allergic rhinitis 1990    Anemia 2019    Anxiety     Arthritis     Autoimmune hepatitis (CMS/HCC)     Back pain 8/3/24    Blood  disorder     pernicious anemia    Cataract 2001    Depression 08/08/2022    Gastrointestinal disease     IBS    Gout     Hyperlipidemia     Hypertension     Lymphoplasmacytic leukemia (CMS/HCC)     Melanoma (CMS/HCC)     Neurologic disorder     Pneumothorax 08/07/2022    Primary biliary cholangitis (CMS/HCC)     autoimmune hepatitis    Psychiatric illness     anxiety    Skin abnormalities 2021     Past Surgical History:   Procedure Laterality Date    APPENDECTOMY      COLONOSCOPY W/ BIOPSIES AND POLYPECTOMY  02/09/2009    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    CT BIOPSY LIVER FUNCTION  12/02/2021    CT BIOPSY LIVER FUNCTION 12/2/2021 [facility] MIS CT SCAN    ESOPHAGOGASTRODUODENOSCOPY N/A 05/16/2022    Procedure: ESOPHAGOGASTRODUODENOSCOPY with Biopsies;  Surgeon: Bradley Cook MD;  Location: [facility] Endoscopy;  Service: Endoscopy;  Laterality: N/A;    EYE SURGERY Bilateral     cataract    KNEE SURGERY Bilateral     3 surgeries on left and 2 on right knees - prior open medial meniscectomies bilateral knees in the 1970s    KYPHOSIS SURGERY N/A 11/08/2024    ORTHOPEDIC SURGERY  1972    SKIN BIOPSY      SKIN CANCER EXCISION Left 10/15/2020    Melanoma removed from left forearm    TONSILLECTOMY      TOTAL HIP ARTHROPLASTY Left 04/21/2021    Procedure: LEFT TOTAL HIP ARTHROPLASTY;  Surgeon: Sj Reyes MD;  Location: [facility] OR;  Service: Orthopedics;  Laterality: Left;    TOTAL HIP ARTHROPLASTY Right 03/22/2022    Procedure: RIGHT TOTAL HIP ARTHROPLASTY POSTERIOR;  Surgeon: Sj Reyes MD;  Location: [facility] OR;  Service: Orthopedics;  Laterality: Right;    VASECTOMY  1986     Family History   Problem Relation Age of Onset    Diabetes Mother         Type 2    Hypertension Mother     Alzheimer's disease Mother     Lung cancer Father     Hypertension Father     Cancer Father     Prostate cancer Brother     Heart failure Brother     Heart attack Maternal Grandmother     Hypertension Maternal Grandfather     Prostate cancer  Maternal Grandfather      Social History     Socioeconomic History    Marital status:    Tobacco Use    Smoking status: Never    Smokeless tobacco: Never   Vaping Use    Vaping status: Never Used   Substance and Sexual Activity    Alcohol use: Yes     Alcohol/week: 3.0 standard drinks of alcohol     Types: 3 Glasses of wine per week     Comment: occ    Drug use: Never    Sexual activity: Defer     Social Drivers of Health     Tobacco Use: Low Risk  (4/22/2025)    Patient History     Smoking Tobacco Use: Never     Smokeless Tobacco Use: Never   Alcohol Use: Unknown (9/22/2020)    Received from [facility]    AUDIT-C     Frequency of Alcohol Consumption: 4 or more times a week     Average Number of Drinks: 1 or 2   Depression: Not at risk (9/11/2024)    Received from [facility]    PHQ-2     PHQ-2 SCORE: 2       Review of Systems    Objective     Vitals  /70   Pulse 95   Temp 36.2 °C (97.1 °F)   Wt 75.8 kg (167 lb)   SpO2 97%   BMI 21.73 kg/m²     Physical Exam  Vitals and nursing note reviewed.   Constitutional:       General: He is not in acute distress.     Appearance: Normal appearance. He is not ill-appearing, toxic-appearing or diaphoretic.   Neurological:      Mental Status: He is alert.   Psychiatric:         Mood and Affect: Mood normal.         Behavior: Behavior normal.         Thought Content: Thought content normal.         Judgment: Judgment normal.       Physical Exam        Procedures    Assessment/Plan   Diagnoses and all orders for this visit:    Compression fracture of L3 vertebra with routine healing, subsequent encounter    Lymphoplasmacytic lymphoma (CMS/HCC)    Fuchs' corneal dystrophy, unspecified laterality      Assessment & Plan  Generalized pain and back pain  Pain managed with hydrocodone-acetaminophen. Physical therapy initiated.  - Continue hydrocodone-acetaminophen as needed for severe pain.  - Initiate physical therapy.  - Schedule follow-up every  three months for pain management.    Compression fracture of L3 vertebra  Recent L3 compression fracture. Kyphoplasty not advised. Reclast infusion considered beneficial for bone density.  - Proceed with Reclast infusion.    Urinary frequency and dribbling  Mild symptoms with nocturia once per night. Monitoring for progression.    Fuchs' dystrophy  Holding treatment to assess impact of discontinuing rheumatologic medications and monitor blood counts.     Return in about 3 months (around 8/7/2025) for Follow Up.    Cuba Haley MD

## 2025-05-12 DIAGNOSIS — S32.030D COMPRESSION FRACTURE OF L3 VERTEBRA WITH ROUTINE HEALING, SUBSEQUENT ENCOUNTER: ICD-10-CM

## 2025-05-12 RX ORDER — HYDROCODONE BITARTRATE AND ACETAMINOPHEN 7.5; 325 MG/1; MG/1
1 TABLET ORAL EVERY 4 HOURS PRN
Qty: 60 TABLET | Refills: 0 | Status: SHIPPED | OUTPATIENT
Start: 2025-05-12

## 2025-05-12 NOTE — TELEPHONE ENCOUNTER
No care due was identified.  [facility] Embedded Care Due Messages. Reference number: [id number]. 5/12/2025 10:51:18 AM SARAH

## 2025-05-14 DIAGNOSIS — R61 GENERALIZED HYPERHIDROSIS: ICD-10-CM

## 2025-05-14 DIAGNOSIS — M81.0 AGE-RELATED OSTEOPOROSIS WITHOUT CURRENT PATHOLOGICAL FRACTURE: ICD-10-CM

## 2025-05-14 DIAGNOSIS — T14.8XXA OTHER INJURY OF UNSPECIFIED BODY REGION, INITIAL ENCOUNTER: ICD-10-CM

## 2025-05-14 DIAGNOSIS — R53.83 OTHER FATIGUE: ICD-10-CM

## 2025-05-16 ENCOUNTER — TELEPHONE (OUTPATIENT)
Dept: FAMILY MEDICINE | Facility: CLINIC | Age: 70
End: 2025-05-16

## 2025-05-16 NOTE — TELEPHONE ENCOUNTER
Has received a letter for jury duty.  Pt is requesting a letter from Dr Justin saying he is incapable of being on the jury.  Please fax to  [facility] Courts [phone]

## 2025-05-19 ENCOUNTER — LAB (OUTPATIENT)
Dept: LAB | Facility: HOSPITAL | Age: 70
End: 2025-05-19
Payer: MEDICARE

## 2025-05-19 DIAGNOSIS — R53.83 OTHER FATIGUE: ICD-10-CM

## 2025-05-19 DIAGNOSIS — T14.8XXA OTHER INJURY OF UNSPECIFIED BODY REGION, INITIAL ENCOUNTER: ICD-10-CM

## 2025-05-19 DIAGNOSIS — M81.0 AGE-RELATED OSTEOPOROSIS WITHOUT CURRENT PATHOLOGICAL FRACTURE: ICD-10-CM

## 2025-05-19 DIAGNOSIS — R79.89 LOW SERUM CORTISOL LEVEL: Primary | ICD-10-CM

## 2025-05-19 DIAGNOSIS — R61 GENERALIZED HYPERHIDROSIS: ICD-10-CM

## 2025-05-19 LAB
CK SERPL-CCNC: 24 U/L (ref 39–308)
CORTIS AM PEAK SERPL-MCNC: 0.67 UG/DL (ref 6.7–22.6)

## 2025-05-19 PROCEDURE — 82550 ASSAY OF CK (CPK): CPT

## 2025-05-19 PROCEDURE — 36415 COLL VENOUS BLD VENIPUNCTURE: CPT

## 2025-05-19 PROCEDURE — 82533 TOTAL CORTISOL: CPT

## 2025-05-19 NOTE — TELEPHONE ENCOUNTER
Patient notified, he is wondering if he should get his reclast infusion next week? He is okay with referral to Endocrinology.

## 2025-05-19 NOTE — TELEPHONE ENCOUNTER
Patient notified. Patient did have have some labs drawn with Dr. Javier he would like you to look at.

## 2025-05-19 NOTE — TELEPHONE ENCOUNTER
I have looked at him.  His cortisol is a little bit low.  We need may need to make further adjustments but may be the cause of a lot of his fatigue symptoms.  If you would like we can get an opinion from endocrinologist given this finding.  Thanks

## 2025-05-26 ENCOUNTER — TELEPHONE (OUTPATIENT)
Dept: FAMILY MEDICINE | Facility: CLINIC | Age: 70
End: 2025-05-26
Payer: MEDICARE

## 2025-05-26 DIAGNOSIS — S32.030D COMPRESSION FRACTURE OF L3 VERTEBRA WITH ROUTINE HEALING, SUBSEQUENT ENCOUNTER: ICD-10-CM

## 2025-05-26 NOTE — TELEPHONE ENCOUNTER
Emmett Serrano!    Our records indicate that you'll be due for a visit with Dr. Haley around August 7th to follow up on a visit you'd had with him in May. If you're interested in scheduling that with us, that can be scheduled either by [facility] or by calling our office at [phone].    Thank you!  Ayaka  Patient Access Specialist

## 2025-05-26 NOTE — TELEPHONE ENCOUNTER
No care due was identified.  [facility] Embedded Care Due Messages. Reference number: [id number]. 5/26/2025 5:38:30 PM MDT

## 2025-05-27 RX ORDER — HYDROCODONE BITARTRATE AND ACETAMINOPHEN 7.5; 325 MG/1; MG/1
1 TABLET ORAL EVERY 4 HOURS PRN
Qty: 120 TABLET | Refills: 0 | Status: SHIPPED | OUTPATIENT
Start: 2025-05-27

## 2025-06-15 ENCOUNTER — APPOINTMENT (OUTPATIENT)
Dept: ULTRASOUND IMAGING | Facility: HOSPITAL | Age: 70
End: 2025-06-15
Payer: MEDICARE

## 2025-06-15 ENCOUNTER — HOSPITAL ENCOUNTER (EMERGENCY)
Facility: HOSPITAL | Age: 70
Discharge: HOME | End: 2025-06-15
Attending: FAMILY MEDICINE
Payer: MEDICARE

## 2025-06-15 VITALS
OXYGEN SATURATION: 90 % | BODY MASS INDEX: 21.43 KG/M2 | RESPIRATION RATE: 16 BRPM | HEART RATE: 66 BPM | DIASTOLIC BLOOD PRESSURE: 73 MMHG | SYSTOLIC BLOOD PRESSURE: 113 MMHG | HEIGHT: 74 IN | TEMPERATURE: 98.8 F | WEIGHT: 167 LBS

## 2025-06-15 DIAGNOSIS — M79.661 RIGHT CALF PAIN: Primary | ICD-10-CM

## 2025-06-15 LAB — BSA FOR ECHO PROCEDURE: 1.98 M2

## 2025-06-15 PROCEDURE — 99283 EMERGENCY DEPT VISIT LOW MDM: CPT | Performed by: FAMILY MEDICINE

## 2025-06-15 PROCEDURE — 93971 EXTREMITY STUDY: CPT | Mod: RT

## 2025-06-15 ASSESSMENT — ENCOUNTER SYMPTOMS
SHORTNESS OF BREATH: 0
ABDOMINAL PAIN: 0

## 2025-06-15 NOTE — ED PROVIDER NOTES
HPI:  Chief Complaint   Patient presents with    Leg Pain     C/O right calf pain that initiated on Friday and has had increased pain today       This is a very pleasant 70-year-old male present emergency room with complaints of right calf pain.  The patient reports has been down in [place] and had a massage 3 days prior and now some calf pain in that area during massage itself.  Patient did travel back from [place] and reports the right calf pain is getting worse.  No prior history of DVT.  Patient denies any chest pain or shortness of breath.  No lower extremity trauma        HISTORY:  Past Medical History:   Diagnosis Date    Allergic eosinophilic esophagitis     Allergic rhinitis 1990    Anemia 2019    Anxiety     Arthritis     Autoimmune hepatitis (CMS/HCC)     Back pain 8/3/24    Blood disorder     pernicious anemia    Cataract 2001    Depression 08/08/2022    Gastrointestinal disease     IBS    Gout     Hyperlipidemia     Hypertension     Lymphoplasmacytic leukemia (CMS/HCC)     Melanoma (CMS/HCC)     Neurologic disorder     Pneumothorax 08/07/2022    Primary biliary cholangitis (CMS/HCC)     autoimmune hepatitis    Psychiatric illness     anxiety    Skin abnormalities 2021       Past Surgical History:   Procedure Laterality Date    APPENDECTOMY      COLONOSCOPY W/ BIOPSIES AND POLYPECTOMY  02/09/2009    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    CT BIOPSY LIVER FUNCTION  12/02/2021    CT BIOPSY LIVER FUNCTION 12/2/2021 [facility] MIS CT SCAN    ESOPHAGOGASTRODUODENOSCOPY N/A 05/16/2022    Procedure: ESOPHAGOGASTRODUODENOSCOPY with Biopsies;  Surgeon: Bradley Cook MD;  Location: [facility] Endoscopy;  Service: Endoscopy;  Laterality: N/A;    EYE SURGERY Bilateral     cataract    KNEE SURGERY Bilateral     3 surgeries on left and 2 on right knees - prior open medial meniscectomies bilateral knees in the 1970s    KYPHOSIS SURGERY N/A 11/08/2024    ORTHOPEDIC SURGERY  1972    SKIN BIOPSY      SKIN CANCER EXCISION  Left 10/15/2020    Melanoma removed from left forearm    TONSILLECTOMY      TOTAL HIP ARTHROPLASTY Left 04/21/2021    Procedure: LEFT TOTAL HIP ARTHROPLASTY;  Surgeon: Sj Reyes MD;  Location: [facility] OR;  Service: Orthopedics;  Laterality: Left;    TOTAL HIP ARTHROPLASTY Right 03/22/2022    Procedure: RIGHT TOTAL HIP ARTHROPLASTY POSTERIOR;  Surgeon: Sj Reyes MD;  Location: [facility] OR;  Service: Orthopedics;  Laterality: Right;    VASECTOMY  1986       Family History   Problem Relation Age of Onset    Diabetes Mother         Type 2    Hypertension Mother     Alzheimer's disease Mother     Lung cancer Father     Hypertension Father     Cancer Father     Prostate cancer Brother     Heart failure Brother     Heart attack Maternal Grandmother     Hypertension Maternal Grandfather     Prostate cancer Maternal Grandfather        Social History     Tobacco Use    Smoking status: Never    Smokeless tobacco: Never   Vaping Use    Vaping status: Never Used   Substance Use Topics    Alcohol use: Yes     Alcohol/week: 3.0 standard drinks of alcohol     Types: 3 Glasses of wine per week     Comment: occ    Drug use: Never         ROS:  Review of Systems   Respiratory:  Negative for shortness of breath.    Cardiovascular:  Negative for chest pain.   Gastrointestinal:  Negative for abdominal pain.       PE:  ED Triage Vitals [06/15/25 0918]   Temp Heart Rate Resp BP SpO2   37.1 °C (98.8 °F) 67 18 141/81 96 %      Mean BP (mmHg) Temp Source Heart Rate Source Patient Position BP Location   -- Oral -- -- --      FiO2 (%)       --           Physical Exam  Vitals and nursing note reviewed.   Constitutional:       Appearance: Normal appearance.   Cardiovascular:      Rate and Rhythm: Normal rate and regular rhythm.   Pulmonary:      Effort: Pulmonary effort is normal.      Breath sounds: Normal breath sounds.   Musculoskeletal:      Comments: Upon examination of the right lower extremity, patient does have mild pain located in the  top toe region.  Does have moderate pain upon palpation of the right calf however unable to palpate a cord.   Neurological:      Mental Status: He is alert.   Psychiatric:         Mood and Affect: Mood normal.         Behavior: Behavior normal.         ED LABS:  Labs Reviewed - No data to display      ED IMAGES:  US Venous duplex lower extremity right             ED PROCEDURES:  Procedures    ED COURSE:   This is a very pleasant 70-year-old male presenting the emergency room with complaints of right calf pain.  We did proceed with ultrasound imaging and no DVT was noted.  The patient does have Baker's cyst in the popliteal region.  This is a complex right popliteal fossa cyst measuring 5.2 x 2.9 x 5.8 cm this information was discussed with the patient and his wife.  Patient be discharged back to home and encouraged to follow-up with his primary care physician if symptoms persist.       Sepsis Quality Bundle           MDM:  Medical Decision Making      Final diagnoses:   [M79.661] Right calf pain     6/15/2025 10:28 AM                                        Forrest Azar MD  06/15/25 1029       Forrest Azar MD  06/15/25 1045

## 2025-06-16 ENCOUNTER — PATIENT OUTREACH (OUTPATIENT)
Dept: FAMILY MEDICINE | Facility: CLINIC | Age: 70
End: 2025-06-16
Payer: MEDICARE

## 2025-06-16 PROCEDURE — 93971 EXTREMITY STUDY: CPT | Mod: 26,RT | Performed by: RADIOLOGY

## 2025-06-16 NOTE — PROGRESS NOTES
ED follow up call, pt returning Cms call. Juan Diego, was contacted following a recent Emergency Department discharge at [facility].  The patient was discharged on 06/15/2025. The After Visit Summary was reviewed with the patient.  Patient's primary diagnosis for the Emergency Department visit was right calf pain. Pt states understanding to discharge instructions. Pt was last seen by primary care provider on 05/07/2025. Pt encouraged to follow up with primary care provider. Pt referral to clinic  for review.

## 2025-06-17 ENCOUNTER — ANCILLARY PROCEDURE (OUTPATIENT)
Dept: ULTRASOUND IMAGING | Facility: CLINIC | Age: 70
End: 2025-06-17
Payer: MEDICARE

## 2025-06-17 ENCOUNTER — OFFICE VISIT (OUTPATIENT)
Dept: URGENT CARE | Facility: CLINIC | Age: 70
End: 2025-06-17
Payer: MEDICARE

## 2025-06-17 ENCOUNTER — RESULTS FOLLOW-UP (OUTPATIENT)
Dept: FAMILY MEDICINE | Facility: CLINIC | Age: 70
End: 2025-06-17

## 2025-06-17 ENCOUNTER — LAB (OUTPATIENT)
Dept: LAB | Facility: CLINIC | Age: 70
End: 2025-06-17
Payer: MEDICARE

## 2025-06-17 VITALS
TEMPERATURE: 97 F | DIASTOLIC BLOOD PRESSURE: 80 MMHG | HEART RATE: 94 BPM | OXYGEN SATURATION: 96 % | WEIGHT: 166 LBS | SYSTOLIC BLOOD PRESSURE: 132 MMHG | RESPIRATION RATE: 18 BRPM | BODY MASS INDEX: 21.6 KG/M2

## 2025-06-17 DIAGNOSIS — M79.604 RIGHT LEG PAIN: Primary | ICD-10-CM

## 2025-06-17 DIAGNOSIS — M79.89 LEG SWELLING: Primary | ICD-10-CM

## 2025-06-17 LAB
ALBUMIN SERPL-MCNC: 3.9 G/DL (ref 3.5–5.3)
ALP SERPL-CCNC: 114 U/L (ref 45–115)
ALT SERPL-CCNC: 13 U/L (ref 7–52)
ANION GAP SERPL CALC-SCNC: 11 MMOL/L (ref 3–11)
AST SERPL-CCNC: 18 U/L
BASOPHILS # BLD AUTO: 0.06 10*3/UL
BASOPHILS NFR BLD AUTO: 0.5 % (ref 0–2)
BILIRUB SERPL-MCNC: 0.92 MG/DL (ref 0.2–1.4)
BUN SERPL-MCNC: 8 MG/DL (ref 7–25)
CALCIUM ALBUM COR SERPL-MCNC: 9.3 MG/DL (ref 8.6–10.3)
CALCIUM SERPL-MCNC: 9.2 MG/DL (ref 8.6–10.3)
CHLORIDE SERPL-SCNC: 101 MMOL/L (ref 98–107)
CO2 SERPL-SCNC: 26 MMOL/L (ref 21–32)
CREAT SERPL-MCNC: 0.67 MG/DL (ref 0.7–1.3)
EGFRCR SERPLBLD CKD-EPI 2021: 100 ML/MIN/1.73M*2
EOSINOPHIL # BLD AUTO: 0.06 10*3/UL
EOSINOPHIL NFR BLD AUTO: 0.5 % (ref 0–3)
ERYTHROCYTE DISTRIBUTION WIDTH STANDARD DEVIATION: 47.8 FL (ref 35.1–43.9)
ERYTHROCYTE [DISTWIDTH] IN BLOOD BY AUTOMATED COUNT: 12.6 % (ref 11.5–15)
GLUCOSE SERPL-MCNC: 83 MG/DL (ref 70–105)
HCT VFR BLD AUTO: 39.8 % (ref 38–50)
HGB BLD-MCNC: 13.5 G/DL (ref 13.2–17.2)
IMMATURE GRANULOCYTES (10*3/UL) LEUKOCYTES IN BLOOD BY AUTOMATED COUNT: 0.04 10*3/UL
IMMATURE GRANULOCYTES/100 LEUKOCYTES IN BLOOD BY AUTOMATED COUNT: 0.4 %
LYMPHOCYTES # BLD AUTO: 1.08 10*3/UL
LYMPHOCYTES NFR BLD AUTO: 9.5 % (ref 15–47)
MCH RBC QN AUTO: 35 PG (ref 29–34)
MCHC RBC AUTO-ENTMCNC: 33.9 G/DL (ref 32–36)
MCV RBC AUTO: 103.1 FL (ref 82–97)
MONOCYTES # BLD AUTO: 0.79 10*3/UL
MONOCYTES NFR BLD AUTO: 6.9 % (ref 5–13)
NEUTROPHILS # BLD AUTO: 9.35 10*3/UL
NEUTROPHILS NFR BLD AUTO: 82.2 % (ref 46–70)
NRBC (10*3/UL) BY AUTOMATED COUNT: 0 10*3/UL (ref 0–0.1)
NRBC BLD-RTO: 0 /100 WBCS (ref 0–2)
PLATELET # BLD AUTO: 251 10*3/UL (ref 130–350)
PMV BLD AUTO: 8.8 FL (ref 6.9–10.8)
POTASSIUM SERPL-SCNC: 3.7 MMOL/L (ref 3.5–5.1)
PROT SERPL-MCNC: 7 G/DL (ref 6–8.3)
RBC # BLD AUTO: 3.86 10*6/UL (ref 4.1–5.8)
SODIUM SERPL-SCNC: 138 MMOL/L (ref 135–145)
WBC # BLD AUTO: 11.4 10*3/UL (ref 3.7–9.6)

## 2025-06-17 PROCEDURE — 85025 COMPLETE CBC W/AUTO DIFF WBC: CPT | Performed by: PHYSICIAN ASSISTANT

## 2025-06-17 PROCEDURE — 80053 COMPREHEN METABOLIC PANEL: CPT | Performed by: PHYSICIAN ASSISTANT

## 2025-06-17 PROCEDURE — 36415 COLL VENOUS BLD VENIPUNCTURE: CPT | Performed by: PHYSICIAN ASSISTANT

## 2025-06-17 PROCEDURE — 99214 OFFICE O/P EST MOD 30 MIN: CPT | Performed by: PHYSICIAN ASSISTANT

## 2025-06-17 PROCEDURE — 93971 EXTREMITY STUDY: CPT | Mod: RT

## 2025-06-17 PROCEDURE — 93971 EXTREMITY STUDY: CPT | Mod: 26,RT | Performed by: RADIOLOGY

## 2025-06-17 PROCEDURE — G0463 HOSPITAL OUTPT CLINIC VISIT: HCPCS | Performed by: PHYSICIAN ASSISTANT

## 2025-06-17 ASSESSMENT — ENCOUNTER SYMPTOMS
FEVER: 0
EYE PAIN: 0
COUGH: 0
VOMITING: 0
ARTHRALGIAS: 0
PALPITATIONS: 0
CHILLS: 0
SHORTNESS OF BREATH: 0
ABDOMINAL PAIN: 0
COLOR CHANGE: 1
SEIZURES: 0
SORE THROAT: 0
HEMATURIA: 0
BACK PAIN: 0
DYSURIA: 0

## 2025-06-17 NOTE — PROGRESS NOTES
Subjective      Juan Diego Rondon is a 70 y.o. male who presents for right calf pain    History of Present Illness         Patient originally developed calf pain on 613/25.  He had had a massage while in [place] and while traveling back from there he developed right calf pain.  He went to the emergency room on the 15th.  He had an ultrasound.  No DVT noted.  He did have a complex popliteal cyst that was 5.2 x 2.9 x 5.8 cm.  Patient states that since then he has continued to have increasing swelling in the right lower extremity with some purplish discoloration.  The discoloration and edema does improve with elevation.      The following have been reviewed and updated as appropriate in this visit:   Tobacco  Allergies  Meds  Problems  Med Hx  Surg Hx  Fam Hx         No Known Allergies  Current Outpatient Medications   Medication Sig Dispense Refill    HYDROcodone-acetaminophen (NORCO) 7.5-325 mg per tablet Take 1 tablet by mouth every 4 hours as needed for pain scale 8-10/10 Max Daily Amount: 6 tablets 120 tablet 0    hydrOXYzine (ATARAX) 25 mg tablet Take 1 tablet (25 mg total) by mouth nightly      rosuvastatin (CRESTOR) 10 mg tablet Take 1 tablet by mouth daily 90 tablet 3    esomeprazole (NexIUM) 40 mg capsule Take 1 capsule (40 mg total) by mouth 2 times a day      ondansetron ODT (ZOFRAN-ODT) 4 mg disintegrating tablet Take 1 tablet (4 mg total) by mouth every 8 (eight) hours as needed for nausea or vomiting 30 tablet 1    dexAMETHasone 0.5 mg/5 mL solution SMARTSIG:15 Milliliter(s) By Mouth 4-5 Times Daily PRN      omeprazole (PriLOSEC) 40 mg capsule Take 1 capsule (40 mg total) by mouth daily      valsartan (DIOVAN) 80 mg tablet Take 1 tablet (80 mg total) by mouth daily 90 tablet 4    FLUoxetine (PROzac) 40 mg capsule Take 1 capsule (40 mg total) by mouth daily 90 capsule 3    folic acid 1 mg tablet Take 1 tablet (1 mg total) by mouth daily 90 tablet 3    cyanocobalamin 1,000 mcg/mL injection Inject 1  mL (1,000 mcg total) into the shoulder, thigh, or buttocks every 30 (thirty) days 1 mL 11    hydrocortisone 2.5 % cream Apply topically 2 (two) times a day as needed for rash 30 g 0    fenofibrate (TRICOR) 48 mg tablet Take 1 tablet (48 mg total) by mouth daily 90 tablet 4    famotidine (PEPCID) 20 mg tablet Take 1 tablet (20 mg total) by mouth 2 times a day      acetaminophen (TYLENOL) 500 mg tablet Take 1 tablet (500 mg total) by mouth every 6 (six) hours as needed for pain scale 1-3/10      ursodioL (ACTIGALL) 500 mg tablet Take 1 tablet (500 mg total) by mouth 2 times daily Every other day      ursodioL (AMANDEEP) 250 mg tablet Take 3 tablets (750 mg total) by mouth In am and 500 mg in pm every other day-alternate with the 500 mg bid      apremilast (Otezla) 30 mg tablet Take 1 tablet (30 mg total) by mouth 2 times a day (Patient not taking: Reported on 6/17/2025)      sodium chloride (Orly 128) 5 % ophthalmic ointment Apply 2 drops to right eye as needed (Patient not taking: Reported on 6/17/2025)       No current facility-administered medications for this visit.     Facility-Administered Medications Ordered in Other Visits   Medication Dose Route Frequency Provider Last Rate Last Admin    DOBUTamine 500 mg in D5W 250 mL infusion (premix)  2.5-40 mcg/kg/min intravenous Titrated Trevor Ghotra MD 95.3 mL/hr at 10/31/24 0900 40 mcg/kg/min at 10/31/24 0900     Past Medical History:   Diagnosis Date    Allergic eosinophilic esophagitis     Allergic rhinitis 1990    Anemia 2019    Anxiety     Arthritis     Autoimmune hepatitis (CMS/HCC)     Back pain 8/3/24    Blood disorder     pernicious anemia    Cataract 2001    Depression 08/08/2022    Gastrointestinal disease     IBS    Gout     Hyperlipidemia     Hypertension     Lymphoplasmacytic leukemia (CMS/HCC)     Melanoma (CMS/HCC)     Neurologic disorder     Pneumothorax 08/07/2022    Primary biliary cholangitis (CMS/HCC)     autoimmune hepatitis    Psychiatric illness      anxiety    Skin abnormalities 2021     Past Surgical History:   Procedure Laterality Date    APPENDECTOMY      COLONOSCOPY W/ BIOPSIES AND POLYPECTOMY  02/09/2009    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    CT BIOPSY LIVER FUNCTION  12/02/2021    CT BIOPSY LIVER FUNCTION 12/2/2021 [facility] MIS CT SCAN    ESOPHAGOGASTRODUODENOSCOPY N/A 05/16/2022    Procedure: ESOPHAGOGASTRODUODENOSCOPY with Biopsies;  Surgeon: Bradley Cook MD;  Location: [facility] Endoscopy;  Service: Endoscopy;  Laterality: N/A;    EYE SURGERY Bilateral     cataract    KNEE SURGERY Bilateral     3 surgeries on left and 2 on right knees - prior open medial meniscectomies bilateral knees in the 1970s    KYPHOSIS SURGERY N/A 11/08/2024    ORTHOPEDIC SURGERY  1972    SKIN BIOPSY      SKIN CANCER EXCISION Left 10/15/2020    Melanoma removed from left forearm    TONSILLECTOMY      TOTAL HIP ARTHROPLASTY Left 04/21/2021    Procedure: LEFT TOTAL HIP ARTHROPLASTY;  Surgeon: Sj Reyes MD;  Location: [facility] OR;  Service: Orthopedics;  Laterality: Left;    TOTAL HIP ARTHROPLASTY Right 03/22/2022    Procedure: RIGHT TOTAL HIP ARTHROPLASTY POSTERIOR;  Surgeon: Sj Reyes MD;  Location: [facility] OR;  Service: Orthopedics;  Laterality: Right;    VASECTOMY  1986     Family History   Problem Relation Age of Onset    Diabetes Mother         Type 2    Hypertension Mother     Alzheimer's disease Mother     Lung cancer Father     Hypertension Father     Cancer Father     Prostate cancer Brother     Heart failure Brother     Heart attack Maternal Grandmother     Hypertension Maternal Grandfather     Prostate cancer Maternal Grandfather      Social History     Socioeconomic History    Marital status:    Tobacco Use    Smoking status: Never    Smokeless tobacco: Never   Vaping Use    Vaping status: Never Used   Substance and Sexual Activity    Alcohol use: Yes     Alcohol/week: 3.0 standard drinks of alcohol     Types: 3 Glasses of wine per week     Comment: occ     Drug use: Never    Sexual activity: Defer     Social Drivers of Health     Tobacco Use: Low Risk  (6/17/2025)    Patient History     Smoking Tobacco Use: Never     Smokeless Tobacco Use: Never   Alcohol Use: Unknown (9/22/2020)    Received from [place]    AUDIT-C     Frequency of Alcohol Consumption: 4 or more times a week     Average Number of Drinks: 1 or 2   Depression: Not at risk (9/11/2024)    Received from [facility]    PHQ-2     PHQ-2 SCORE: 2       Review of Systems   Constitutional:  Negative for chills and fever.   HENT:  Negative for ear pain and sore throat.    Eyes:  Negative for pain and visual disturbance.   Respiratory:  Negative for cough and shortness of breath.    Cardiovascular:  Positive for leg swelling. Negative for chest pain and palpitations.   Gastrointestinal:  Negative for abdominal pain and vomiting.   Genitourinary:  Negative for dysuria and hematuria.   Musculoskeletal:  Negative for arthralgias and back pain.   Skin:  Positive for color change. Negative for rash.   Neurological:  Negative for seizures and syncope.   All other systems reviewed and are negative.      Objective   /80 (BP Location: Left arm, Patient Position: Sitting)   Pulse 94   Temp 36.1 °C (97 °F) (Temporal)   Resp 18   Wt 75.3 kg (166 lb)   SpO2 96%   BMI 21.60 kg/m²     Physical Exam  Vitals and nursing note reviewed.   Constitutional:       Appearance: Normal appearance.   HENT:      Head: Normocephalic.   Cardiovascular:      Rate and Rhythm: Normal rate and regular rhythm.      Pulses: Normal pulses.      Heart sounds: Normal heart sounds.   Pulmonary:      Effort: Pulmonary effort is normal.      Breath sounds: Normal breath sounds.   Musculoskeletal:      Comments: Good dorsalis pedis and posterior tibialis pulses in the right leg.  He does have a purpleish mottling of the skin from mid calf down.  He does have edema of the ankle and foot on the right side.  Upon elevation the  mottling does improve   Neurological:      Mental Status: He is alert.         Recent Results (from the past 24 hours)   CBC w/auto differential Blood, Venous    Collection Time: 06/17/25 11:30 AM   Result Value Ref Range    WBC 11.4 (H) 3.7 - 9.6 10*3/uL    RBC 3.86 (L) 4.10 - 5.80 10*6/uL    Hemoglobin 13.5 13.2 - 17.2 g/dL    Hematocrit 39.8 38.0 - 50.0 %    .1 (H) 82.0 - 97.0 fL    MCH 35.0 (H) 29.0 - 34.0 pg    MCHC 33.9 32.0 - 36.0 g/dL    RDW-SD 47.8 (H) 35.1 - 43.9 fl    RDW 12.6 11.5 - 15.0 %    Platelets 251 130 - 350 10*3/uL    MPV 8.8 6.9 - 10.8 fL    Neutrophils% 82.2 (H) 46.0 - 70.0 %    Lymphocytes% 9.5 (L) 15.0 - 47.0 %    Monocytes% 6.9 5.0 - 13.0 %    Eosinophils% 0.5 0.0 - 3.0 %    Basophils% 0.5 0.0 - 2.0 %    Immature Granulocyte% 0.4 <1.0 %    ANC (auto diff) 9.35 10*3/uL    Lymphocytes Absolute 1.08 10*3/uL    Monocytes Absolute 0.79 10*3/uL    Eosinophils Absolute 0.06 10*3/uL    Basophils Absolute 0.06 10*3/uL    Immature Granulocytes Absolute 0.04 10*3/uL    nRBC 0 0 - 2 /100 WBCs    nRBC Absolute 0.0 0.0 - 0.1 10*3/uL   Comprehensive metabolic panel Blood, Venous    Collection Time: 06/17/25 11:30 AM   Result Value Ref Range    Sodium 138 135 - 145 MMOL/L    Potassium 3.7 3.5 - 5.1 MMOL/L    Chloride 101 98 - 107 MMOL/L    CO2 26 21 - 32 MMOL/L    Anion Gap 11 3 - 11 mmol/L    BUN 8 7 - 25 mg/dL    Creatinine 0.67 (L) 0.70 - 1.30 mg/dL    Glucose 83 70 - 105 MG/DL    Calcium 9.2 8.6 - 10.3 MG/DL    AST 18 <40 U/L    ALT (SGPT) 13 7 - 52 U/L    Alkaline Phosphatase 114 45 - 115 U/L    Total Protein 7.0 6.0 - 8.3 g/dL    Albumin 3.9 3.5 - 5.3 g/dL    Total Bilirubin 0.92 0.20 - 1.40 mg/dL    Corrected Calcium 9.3 8.6 - 10.3 mg/dL    eGFR 100 >60 mL/min/1.73m*2     nterpretation Summary  Show Result ComparisonEXAM:  US VENOUS DUPLEX LOWER EXTREMITY RIGHT     DATE: 6/17/2025 11:41 AM     INDICATION: Leg swelling; right lower leg swelling     COMPARISON: None available.       Technique:  Grayscale, color, and pulse Doppler ultrasound evaluation of the deep venous system of the right lower extremity was performed.     Findings:  There is normal grayscale and color Doppler appearance of the veins and waveform analysis shows normal respiratory variation and calf augmentation within the right common femoral, femoral and  popliteal veins. There are no occlusive or nonocclusive thrombi. No thrombus is present in the calf veins.     There is a complex popliteal cyst. This measures approximately 6 cm in length.        IMPRESSION:  Negative for right lower extremity DVT.  Assessment/Plan   Assessment/Plan          Will repeat his ultrasound to ensure something new has not happened since the 15th.    Ultrasound was negative.  I did get Dr. Haley to come in to see the patient.  This is his patient.  There is concerned that something else is going on as the ultrasound is negative however he does have edema and increased pain.  Will get some laboratory studies for baseline as he has been trending up with a white blood cell count.  Dr. Haley will follow-up on these studies.  I will also order a vascular ultrasound and AN MRI.  He will follow-up with his PCP about this.  He does have pain medication at home     Diagnosis Plan   1. Leg swelling  US Venous duplex lower extremity right    CBC w/auto differential Blood, Venous    Comprehensive metabolic panel Blood, Venous        Patient Instructions   declined,    STEPHANIE Lewis

## 2025-06-18 ENCOUNTER — HOSPITAL ENCOUNTER (EMERGENCY)
Facility: HOSPITAL | Age: 70
Discharge: HOME | End: 2025-06-18
Attending: EMERGENCY MEDICINE
Payer: MEDICARE

## 2025-06-18 ENCOUNTER — TELEPHONE (OUTPATIENT)
Dept: FAMILY MEDICINE | Facility: CLINIC | Age: 70
End: 2025-06-18
Payer: MEDICARE

## 2025-06-18 ENCOUNTER — APPOINTMENT (OUTPATIENT)
Dept: RADIOLOGY | Facility: HOSPITAL | Age: 70
End: 2025-06-18
Payer: MEDICARE

## 2025-06-18 VITALS
OXYGEN SATURATION: 92 % | RESPIRATION RATE: 16 BRPM | HEART RATE: 83 BPM | TEMPERATURE: 97.7 F | SYSTOLIC BLOOD PRESSURE: 130 MMHG | BODY MASS INDEX: 21.82 KG/M2 | HEIGHT: 74 IN | DIASTOLIC BLOOD PRESSURE: 76 MMHG | WEIGHT: 170 LBS

## 2025-06-18 DIAGNOSIS — M79.606 LEG PAIN: ICD-10-CM

## 2025-06-18 DIAGNOSIS — M79.671 RIGHT FOOT PAIN: Primary | ICD-10-CM

## 2025-06-18 DIAGNOSIS — M79.89 RIGHT LEG SWELLING: Primary | ICD-10-CM

## 2025-06-18 LAB
ALBUMIN SERPL-MCNC: 3.5 G/DL (ref 3.5–5.3)
ALP SERPL-CCNC: 109 U/L (ref 45–115)
ALT SERPL-CCNC: 11 U/L (ref 7–52)
ANION GAP SERPL CALC-SCNC: 10 MMOL/L (ref 3–11)
AST SERPL-CCNC: 17 U/L
BASOPHILS # BLD AUTO: 0.04 10*3/UL
BASOPHILS NFR BLD AUTO: 0.5 % (ref 0–2)
BILIRUB SERPL-MCNC: 0.77 MG/DL (ref 0.2–1.4)
BUN SERPL-MCNC: 6 MG/DL (ref 7–25)
CALCIUM ALBUM COR SERPL-MCNC: 8.8 MG/DL (ref 8.6–10.3)
CALCIUM SERPL-MCNC: 8.4 MG/DL (ref 8.6–10.3)
CHLORIDE SERPL-SCNC: 102 MMOL/L (ref 98–107)
CO2 SERPL-SCNC: 25 MMOL/L (ref 21–32)
CREAT SERPL-MCNC: 0.57 MG/DL (ref 0.7–1.3)
CRP SERPL-MCNC: 11.1 MG/L
EGFRCR SERPLBLD CKD-EPI 2021: 105 ML/MIN/1.73M*2
EOSINOPHIL # BLD AUTO: 0.1 10*3/UL
EOSINOPHIL NFR BLD AUTO: 1.3 % (ref 0–3)
ERYTHROCYTE DISTRIBUTION WIDTH STANDARD DEVIATION: 48.6 FL (ref 35.1–43.9)
ERYTHROCYTE [DISTWIDTH] IN BLOOD BY AUTOMATED COUNT: 13 % (ref 11.5–15)
ERYTHROCYTE [SEDIMENTATION RATE] IN BLOOD: 48 MM/HR
GLUCOSE SERPL-MCNC: 86 MG/DL (ref 70–105)
HCT VFR BLD AUTO: 38.3 % (ref 38–50)
HGB BLD-MCNC: 13 G/DL (ref 13.2–17.2)
IMMATURE GRANULOCYTES (10*3/UL) LEUKOCYTES IN BLOOD BY AUTOMATED COUNT: <0.03 10*3/UL
IMMATURE GRANULOCYTES/100 LEUKOCYTES IN BLOOD BY AUTOMATED COUNT: 0.3 %
INR BLD: 0.9
LYMPHOCYTES # BLD AUTO: 1.05 10*3/UL
LYMPHOCYTES NFR BLD AUTO: 14.1 % (ref 15–47)
MCH RBC QN AUTO: 34.9 PG (ref 29–34)
MCHC RBC AUTO-ENTMCNC: 33.9 G/DL (ref 32–36)
MCV RBC AUTO: 102.7 FL (ref 82–97)
MONOCYTES # BLD AUTO: 0.52 10*3/UL
MONOCYTES NFR BLD AUTO: 7 % (ref 5–13)
NEUTROPHILS # BLD AUTO: 5.74 10*3/UL
NEUTROPHILS NFR BLD AUTO: 76.8 % (ref 46–70)
NRBC (10*3/UL) BY AUTOMATED COUNT: 0 10*3/UL (ref 0–0.1)
NRBC BLD-RTO: 0 /100 WBCS (ref 0–2)
PLATELET # BLD AUTO: 226 10*3/UL (ref 130–350)
PMV BLD AUTO: 9 FL (ref 6.9–10.8)
POTASSIUM SERPL-SCNC: 3.6 MMOL/L (ref 3.5–5.1)
PROT SERPL-MCNC: 6.1 G/DL (ref 6–8.3)
PROTHROMBIN TIME: 10.1 SECONDS (ref 9.4–12.5)
RBC # BLD AUTO: 3.73 10*6/UL (ref 4.1–5.8)
SODIUM SERPL-SCNC: 137 MMOL/L (ref 135–145)
WBC # BLD AUTO: 7.5 10*3/UL (ref 3.7–9.6)

## 2025-06-18 PROCEDURE — 85652 RBC SED RATE AUTOMATED: CPT | Performed by: EMERGENCY MEDICINE

## 2025-06-18 PROCEDURE — 80053 COMPREHEN METABOLIC PANEL: CPT | Performed by: EMERGENCY MEDICINE

## 2025-06-18 PROCEDURE — 86140 C-REACTIVE PROTEIN: CPT | Performed by: EMERGENCY MEDICINE

## 2025-06-18 PROCEDURE — 99283 EMERGENCY DEPT VISIT LOW MDM: CPT | Performed by: EMERGENCY MEDICINE

## 2025-06-18 PROCEDURE — 85025 COMPLETE CBC W/AUTO DIFF WBC: CPT | Performed by: EMERGENCY MEDICINE

## 2025-06-18 PROCEDURE — 99284 EMERGENCY DEPT VISIT MOD MDM: CPT | Performed by: EMERGENCY MEDICINE

## 2025-06-18 PROCEDURE — 73610 X-RAY EXAM OF ANKLE: CPT | Mod: RT

## 2025-06-18 PROCEDURE — 85610 PROTHROMBIN TIME: CPT | Performed by: EMERGENCY MEDICINE

## 2025-06-18 PROCEDURE — 73610 X-RAY EXAM OF ANKLE: CPT | Mod: 26,RT | Performed by: RADIOLOGY

## 2025-06-18 PROCEDURE — 36415 COLL VENOUS BLD VENIPUNCTURE: CPT | Performed by: EMERGENCY MEDICINE

## 2025-06-18 RX ORDER — KETOROLAC TROMETHAMINE 15 MG/ML
15 INJECTION, SOLUTION INTRAMUSCULAR; INTRAVENOUS ONCE
Status: DISCONTINUED | OUTPATIENT
Start: 2025-06-18 | End: 2025-06-18

## 2025-06-18 RX ORDER — MELOXICAM 15 MG/1
15 TABLET ORAL DAILY
Qty: 30 TABLET | Refills: 0 | Status: SHIPPED | OUTPATIENT
Start: 2025-06-18 | End: 2025-06-18 | Stop reason: ALTCHOICE

## 2025-06-18 RX ORDER — KETOROLAC TROMETHAMINE 15 MG/ML
INJECTION, SOLUTION INTRAMUSCULAR; INTRAVENOUS
Status: DISCONTINUED
Start: 2025-06-18 | End: 2025-06-18 | Stop reason: WASHOUT

## 2025-06-18 NOTE — TELEPHONE ENCOUNTER
Spoke with patient he is home from the ER, they did no an xray and put him in a boot. He is okay with referral to ortho.

## 2025-06-18 NOTE — TELEPHONE ENCOUNTER
Pt states that he was seen in  yesterday for his right leg and they recommended an MRI. The hospital just called him this morning and said they could not get him in for an MRI until July 7th. He is wondering if Dr. Haley has any other ideas or suggestions. Please call and advise.

## 2025-06-18 NOTE — TELEPHONE ENCOUNTER
Guess the other option we would have would be to get another opinion from orthopedic surgery. He has got this complex cyst they can give an opinion on and see if they think that is causing his pain. We could see if they can get him in to be seen this week.  Thanks

## 2025-06-18 NOTE — TELEPHONE ENCOUNTER
Patient Education     Clostridium Difficile Infection  Clostridium difficile (C. diff) bacteria can be very harmful. They affect the intestinal tract. They can cause symptoms ranging from mild diarrhea to severe inflammation of the large intestine (colon). C. diff infection is most common during the days and weeks after treatment with antibiotics. Anyone can become infected. But the risk is greatly increased for people in hospitals and for people living in nursing homes or long-term care facilities. This is because antibiotic use is common there. Germs also spread easily in these places.    What causes C. diff infection?  The stomach and intestines have hundreds of kinds of bacteria. Many of these bacteria actually help keep harmful bacteria like C. diff from causing problems. Small amounts of C. diff are normal in the intestine and don t cause problems. When you take an antibiotic, the normal balance of good and bad bacteria may be affected. There may be too few good bacteria and too many harmful bacteria like C diff. In hospitals and nursing homes, C. diff may be spread from an infected person to others. This can happen when staff or visitors touch infected people or objects such as bed rails, stethoscopes, or bedpans and then touch other people or surfaces.  What are the symptoms of C. diff infection?  About half of people with C. diff infection have no symptoms. Yet they can still pass the infection to others. Others do have symptoms. These include:    Watery diarrhea, which may contain mucus    Pain and cramping    Fever  Some who are infected develop serious problems. Symptoms include:    Belly (abdominal) pain    Abdominal swelling    Nausea and vomiting    Little or no diarrhea  How is C. diff infection diagnosed?  To confirm the infection, a sample of stool is tested for the bacteria or the toxins made by the bacteria.  How is C. diff infection treated?  Your healthcare provider will tell you to stop  Patient has presented to the ER.   taking any antibiotics you have been prescribed, based on your healthcare needs. He or she may prescribe different medicines as needed. In certain cases, you may be given an antibiotic directed at the C. diff infection. Talk with your healthcare provider before stopping or starting any medicines.    Fluids are often given by IV (intravenously) through a vein. This helps replace fluids lost through diarrhea.    In rare cases you may need surgery if treatment doesn t cure severe symptoms  To lessen symptoms:    Drink plenty of fluids to replace water lost through diarrhea. Talk with your healthcare provider or nurse about which fluids are best.    Follow your healthcare provider s instructions for when and what to eat.    Unless your healthcare provider tells you to do so, don't take medicines for diarrhea.    Tell your healthcare provider if symptoms return. Even after treatment, C. diff may come back.  Your doctor may give you an additional medicine if your symptoms come back or you are at risk for another C. diff infection. This medicine is called bezlotoxumab. It is not an antibiotic, but it can help keep your C. diff symptoms from returning.  What are the complications of C. diff infection?  Complications include:    Dehydration    Electrolyte imbalances    Low protein in the blood    Severe widening (dilation) of the large intestine    A hole (perforation) in the bowel    Low blood pressure    Kidney failure    Inflammation or infection all over the body    Death  How is C. diff prevented?  Hospitals and nursing homes take these steps to help prevent C. diff infections:    Limiting use of antibiotics. Giving antibiotics only when needed can help reduce C. diff infections.    Handwashing. Hospital staff should wash their hands before and after treating each person. They should also wash their hands after touching any surface in someone's' room. Soap and water work better than alcohol-based hand  .    Protective clothing. Healthcare workers should wear gloves and a gown when entering the room of someone with C. diff infection. They should remove these items before leaving and then wash their hands.    Private rooms. People with C. diff should be in private rooms. Or they may share rooms with others who have the same infection.    Thorough cleaning. Equipment and rooms should be cleaned and disinfected every day.    Education. Everyone should be shown the best ways to avoid infection.  You can do the following to help prevent C. diff:    Take antibiotics only when you really need them. Antibiotics don t help treat illnesses caused by viruses. This includes colds and the flu. Don t ask for antibiotics from your healthcare provider if he or she says they won t work.    When you are given antibiotics, take them as directed. Don t take more or less than the dosage prescribed. Do not take them for shorter or longer than your provider tells you to, even if you feel better.    Wash your hands carefully. Do this after using the bathroom and before eating. Use plenty of soap and warm water. Alcohol-based hand  may not work against C. diff germs.  Everyone can help prevent C. diff:  In a hospital or care facility:    Wash your hands well before and after visiting someone who has C. diff infection. Use soap and water. Alcohol-based hand  may not work against C. diff.    If the staff asks you to, wear gloves. Take any other steps you are asked to follow to help prevent infection.  At home:    If instructed, wear gloves when caring for a family member with C. diff infection. Throw the gloves away after each use. Then wash your hands well.    Wash the person s clothes, bed linen, and towels separately. Use hot water. Use both detergent and liquid bleach.    Disinfect surfaces in the person s room. This includes the phone, light switches, and remote controls.  Practice good handwashing:    Use warm  water and plenty of soap. Rub your hands together well.    Clean your whole hand. Wash under nails, between fingers, and up your wrists.    Wash for at least 15 seconds to 20 seconds.     Rinse. Let the water run down your fingers, not up your wrists.    Dry your hands well. Then use a paper towel to turn off the faucet and open the door.  Date Last Reviewed: 1/1/2017 2000-2018 [employer]. [address] [place], [place] [id number]. All rights reserved. This information is not intended as a substitute for professional medical care. Always follow your healthcare professional's instructions.

## 2025-06-18 NOTE — TELEPHONE ENCOUNTER
I did speak with [facility] in [place]. The soonest MRI opening they have is 07/15

## 2025-06-18 NOTE — DISCHARGE INSTRUCTIONS
I recommend that you follow-up with Dr. Haley for further evaluation and management.    You should proceed with the MRI and it may benefit you to contact radiology and advised that if there is any cancellation, could be placed on the contact list.

## 2025-06-18 NOTE — TELEPHONE ENCOUNTER
Wonder if there is any way we can get him in in [place] sooner.  Could you please check with radiology down there.  Thanks

## 2025-06-18 NOTE — ED PROVIDER NOTES
HPI:  Chief Complaint   Patient presents with    Leg Swelling     Right leg calf pain and swelling.       70-year-old male patient returns to the emergency department for reevaluation of right ankle and foot pain.    The patient initially had onset of pain on Friday the 13th while traveling back from [place].  He denies any injury and states he had spontaneous onset of pain to the right ankle and foot.  He reports that he subsequent developed swelling as well as additional ecchymotic changes.  He was seen in the ER on Sunday where he had an ultrasound evaluation.  He reports that he followed up with Dr. Haley yesterday as he continued to have worsening ecchymotic changes and had an additional ultrasound showing no significant findings.  The patient reports that he received a call from Dr. Haley this morning stating that the MRI that they were attempting to schedule provider performed until next month and so the patient decided to be evaluated in the ER as he has worsening pain.    He denies any fevers but does complain of chills.    He denies any trauma also denies any additional symptoms.    The patient denies use of any blood thinners.  He also denies previous injury or surgery in the affected extremity joint.          History provided by:  Patient      HISTORY:  Past Medical History:   Diagnosis Date    Allergic eosinophilic esophagitis     Allergic rhinitis 1990    Anemia 2019    Anxiety     Arthritis     Autoimmune hepatitis (CMS/HCC)     Back pain 8/3/24    Blood disorder     pernicious anemia    Cataract 2001    Depression 08/08/2022    Gastrointestinal disease     IBS    Gout     Hyperlipidemia     Hypertension     Lymphoplasmacytic leukemia (CMS/HCC)     Melanoma (CMS/HCC)     Neurologic disorder     Pneumothorax 08/07/2022    Primary biliary cholangitis (CMS/HCC)     autoimmune hepatitis    Psychiatric illness     anxiety    Skin abnormalities 2021       Past Surgical History:   Procedure  Laterality Date    APPENDECTOMY      COLONOSCOPY W/ BIOPSIES AND POLYPECTOMY  02/09/2009    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    CT BIOPSY LIVER FUNCTION  12/02/2021    CT BIOPSY LIVER FUNCTION 12/2/2021 [facility] MIS CT SCAN    ESOPHAGOGASTRODUODENOSCOPY N/A 05/16/2022    Procedure: ESOPHAGOGASTRODUODENOSCOPY with Biopsies;  Surgeon: Bradley Cook MD;  Location: [facility];  Service: Endoscopy;  Laterality: N/A;    EYE SURGERY Bilateral     cataract    KNEE SURGERY Bilateral     3 surgeries on left and 2 on right knees - prior open medial meniscectomies bilateral knees in the 1970s    KYPHOSIS SURGERY N/A 11/08/2024    ORTHOPEDIC SURGERY  1972    SKIN BIOPSY      SKIN CANCER EXCISION Left 10/15/2020    Melanoma removed from left forearm    TONSILLECTOMY      TOTAL HIP ARTHROPLASTY Left 04/21/2021    Procedure: LEFT TOTAL HIP ARTHROPLASTY;  Surgeon: Sj Reyes MD;  Location: [facility] OR;  Service: Orthopedics;  Laterality: Left;    TOTAL HIP ARTHROPLASTY Right 03/22/2022    Procedure: RIGHT TOTAL HIP ARTHROPLASTY POSTERIOR;  Surgeon: Sj Reyes MD;  Location: [facility] OR;  Service: Orthopedics;  Laterality: Right;    VASECTOMY  1986       Family History   Problem Relation Age of Onset    Diabetes Mother         Type 2    Hypertension Mother     Alzheimer's disease Mother     Lung cancer Father     Hypertension Father     Cancer Father     Prostate cancer Brother     Heart failure Brother     Heart attack Maternal Grandmother     Hypertension Maternal Grandfather     Prostate cancer Maternal Grandfather        Social History     Tobacco Use    Smoking status: Never    Smokeless tobacco: Never   Vaping Use    Vaping status: Never Used   Substance Use Topics    Alcohol use: Yes     Alcohol/week: 3.0 standard drinks of alcohol     Types: 3 Glasses of wine per week     Comment: occ    Drug use: Never         ROS:  Review of Systems   All other systems reviewed and are negative.      PE:  ED Triage Vitals  [06/18/25 1107]   Temp Heart Rate Resp BP SpO2   36.5 °C (97.7 °F) 76 16 126/82 92 %      Mean BP (mmHg) Temp Source Heart Rate Source Patient Position BP Location   -- Temporal -- -- --      FiO2 (%)       --           Physical Exam  HENT:      Head: Normocephalic.      Mouth/Throat:      Mouth: Mucous membranes are moist.   Eyes:      Conjunctiva/sclera: Conjunctivae normal.   Cardiovascular:      Rate and Rhythm: Normal rate.      Pulses: Normal pulses.   Pulmonary:      Effort: Pulmonary effort is normal.   Musculoskeletal:      Cervical back: Normal range of motion.      Comments: Examination of the patient's right ankle as well as right foot demonstrate somewhat diffuse circumferential swelling slightly worse anteriorly and dorsally.  There are erythematous and ecchymotic changes noted to the hindfoot particular involving the lateral aspects.  Distal neurovascular function was noted to be intact there is no obvious bony deformities, step-offs or crepitus.    The patient does have a positive Homans' sign.       Skin:     General: Skin is warm.   Neurological:      General: No focal deficit present.      Mental Status: He is alert.   Psychiatric:         Mood and Affect: Mood normal.         ED LABS:  Labs Reviewed   CBC WITH AUTO DIFFERENTIAL - Abnormal       Result Value    WBC 7.5      RBC 3.73 (*)     Hemoglobin 13.0 (*)     Hematocrit 38.3      .7 (*)     MCH 34.9 (*)     MCHC 33.9      RDW-SD 48.6 (*)     RDW 13.0      Platelets 226      MPV 9.0      Neutrophils% 76.8 (*)     Lymphocytes% 14.1 (*)     Monocytes% 7.0      Eosinophils% 1.3      Basophils% 0.5      Immature Granulocyte% 0.3      ANC (auto diff) 5.74      Lymphocytes Absolute 1.05      Monocytes Absolute 0.52      Eosinophils Absolute 0.10      Basophils Absolute 0.04      Immature Granulocytes Absolute <0.03      nRBC 0      nRBC Absolute 0.0     COMPREHENSIVE METABOLIC PANEL - Abnormal    Sodium 137      Potassium 3.6      Chloride  102      CO2 25      Anion Gap 10      BUN 6 (*)     Creatinine 0.57 (*)     Glucose 86      Calcium 8.4 (*)     AST 17      ALT (SGPT) 11      Alkaline Phosphatase 109      Total Protein 6.1      Albumin 3.5      Total Bilirubin 0.77      Corrected Calcium 8.8      eGFR 105      Narrative:     eGFR calculation based on the 2021 Chronic Kidney Disease Epidemiology Collaboration (CKD-EPI) equation refit without adjustment for race.   C-REACTIVE PROTEIN - Abnormal    CRP 11.1 (*)    SEDIMENTATION RATE, AUTOMATED - Abnormal    Sed Rate 48 (*)    PROTIME-INR - Normal    Protime 10.1      INR 0.9     GOLD TOP (ROVER)         ED IMAGES:  X-ray ankle 3 or more views right   Final Result   IMPRESSION:   1. No acute fracture.   2. Diffuse ankle soft tissue swelling.          ED PROCEDURES:  Procedures    ED COURSE:  ED Course as of 06/18/25 1332   Wed Jun 18, 2025   1256 Reviewed the patient's diagnostic laboratory results shows a mildly elevated CRP of 11.1.  The patient's CMP demonstrates no acute clinically significant abnormalities.  The patient's white blood cell count was normal with a mildly low hemoglobin of 13 but a normal hematocrit and normal platelet count.  PT/INR were also normal.     [NR]   1306 The patient will be x-rays also show no acute findings with exception of soft tissue swelling.    The patient will be discharged on a short course of meloxicam for pain and inflammation as well as a 3D walking boot.    PDMP was reviewed showing consistent hydrocodone filled several times each month extensively with the most recent prescription filled on May 27 for 120 tablets. [NR]   1318 Additionally, the sed rate was 48. [NR]      ED Course User Index  [NR] Timothy Gregory,           Sepsis Quality Bundle           MDM:  Medical Decision Making      Final diagnoses:   [M79.89] Right leg swelling   [M79.606] Leg pain     6/18/2025  1:10 PM                                        Timothy Gregory,  DO  06/18/25 1319       Timothy Gregory, DO  06/18/25 1333

## 2025-06-20 ENCOUNTER — TELEPHONE (OUTPATIENT)
Dept: FAMILY MEDICINE | Facility: CLINIC | Age: 70
End: 2025-06-20
Payer: MEDICARE

## 2025-06-20 DIAGNOSIS — S32.030D COMPRESSION FRACTURE OF L3 VERTEBRA WITH ROUTINE HEALING, SUBSEQUENT ENCOUNTER: ICD-10-CM

## 2025-06-20 RX ORDER — HYDROCODONE BITARTRATE AND ACETAMINOPHEN 7.5; 325 MG/1; MG/1
1 TABLET ORAL EVERY 4 HOURS PRN
Qty: 30 TABLET | Refills: 0 | Status: SHIPPED | OUTPATIENT
Start: 2025-06-20

## 2025-06-20 NOTE — TELEPHONE ENCOUNTER
I need more information. Why is he taking hydrocodone and which doctor usually prescribes. How many does he take in a day?

## 2025-06-20 NOTE — TELEPHONE ENCOUNTER
Pt states he needs a refill of his Hydrocodone sent to [address]. Please send and call with any questions.   [phone]

## 2025-06-20 NOTE — TELEPHONE ENCOUNTER
I will fill it but Emmett should have had complete healing of that compression fracture at 6 weeks which would have been June 1 or thereabouts.  If he still having pain something else is going on and he needs to talk to Dr. Haley about this.  Most my patients have great success with Miacalcin nasal spray and usually within 3 to 4 weeks can discontinue it as there fracture pain is resolved.  Will send the narcotic but please let Emmett know he should be following closely with Dr. Haley regarding this.    Rx sent to [id number] 30 tablets.

## 2025-06-25 ENCOUNTER — ANCILLARY PROCEDURE (OUTPATIENT)
Dept: RADIOLOGY | Facility: CLINIC | Age: 70
End: 2025-06-25
Payer: MEDICARE

## 2025-06-25 ENCOUNTER — OFFICE VISIT (OUTPATIENT)
Dept: PODIATRY | Facility: CLINIC | Age: 70
End: 2025-06-25
Payer: MEDICARE

## 2025-06-25 DIAGNOSIS — M79.671 RIGHT FOOT PAIN: ICD-10-CM

## 2025-06-25 DIAGNOSIS — M25.471 EDEMA OF RIGHT ANKLE: ICD-10-CM

## 2025-06-25 DIAGNOSIS — M25.571 ACUTE RIGHT ANKLE PAIN: Primary | ICD-10-CM

## 2025-06-25 PROCEDURE — 73630 X-RAY EXAM OF FOOT: CPT | Mod: 26,RT | Performed by: RADIOLOGY

## 2025-06-25 PROCEDURE — 73600 X-RAY EXAM OF ANKLE: CPT | Mod: 26,RT | Performed by: RADIOLOGY

## 2025-06-25 PROCEDURE — 73630 X-RAY EXAM OF FOOT: CPT | Mod: RT

## 2025-06-25 PROCEDURE — 99203 OFFICE O/P NEW LOW 30 MIN: CPT | Performed by: PODIATRIST

## 2025-06-25 PROCEDURE — 73600 X-RAY EXAM OF ANKLE: CPT | Mod: RT

## 2025-06-25 PROCEDURE — G0463 HOSPITAL OUTPT CLINIC VISIT: HCPCS | Performed by: PODIATRIST

## 2025-06-25 NOTE — PROGRESS NOTES
Davis Bernard DPM, FACFAS  Fellowship Trained Foot and Ankle Surgeon   Department of [facility]  [address] [place] [address]    Cuba Haley MD  Subjective   Chief Complaint   Patient presents with    Foot Pain     R foot/ankle pain and swelling. Patient states that the pain and swelling started on June 13th when he was traveling back from [place]. Patient states that he also noticed some bruising on the medial and lateral sides of his foot.       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 He was referred by Dr. Haley for further evaluation of his ankle and calf pain and swelling.    He has been experiencing pain and swelling in his ankle and calf, which began after receiving a massage while in [place]. The symptoms started with pain and progressively worsened over two days, leading to bruising and swelling. He initially suspected a deep vein thrombosis (DVT) due to pain with dorsiflexion of the calf. Two ultrasounds were performed, both negative for DVT, but symptoms worsened, prompting the referral.    He has a history of macrogabalinemia, diagnosed two years ago, contributing to a decline in his overall health. He has experienced falls resulting in two compression fractures at L2 and L3 and has osteoporosis. He uses a cane for mobility and a walker at home, which he has started using recently due to his current condition.    He has a history of gout, previously affecting his knee, and suspects a similar issue with his ankle. The ankle is swollen and bruised, but not hot. He has not been on any medications for gout recently and has not received steroids for this episode.    He takes two hydrocodone tablets daily, primarily in the morning, to manage his pain. No history of smoking or use of blood thinners. The swelling and pain have been persistent, with no significant relief from current  treatments.          History:  Past Medical History:   Diagnosis Date    Allergic eosinophilic esophagitis     Allergic rhinitis 1990    Anemia 2019    Anxiety     Arthritis 1990    Autoimmune hepatitis (CMS/HCC)     Back pain 8/3/24    Blood disorder     pernicious anemia    Cataract 2001    Depression 08/08/2022    Gastrointestinal disease     IBS    Gout     Hyperlipidemia     Hypertension 1984    Lymphoplasmacytic leukemia (CMS/HCC)     Melanoma (CMS/HCC)     Neurologic disorder     Pneumothorax 08/07/2022    Primary biliary cholangitis (CMS/HCC)     autoimmune hepatitis    Psychiatric illness     anxiety    Skin abnormalities 2021     Past Surgical History:   Procedure Laterality Date    APPENDECTOMY  1968    COLONOSCOPY W/ BIOPSIES AND POLYPECTOMY  02/09/2009    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    CT BIOPSY LIVER FUNCTION  12/02/2021    CT BIOPSY LIVER FUNCTION 12/2/2021 [facility] MIS CT SCAN    ESOPHAGOGASTRODUODENOSCOPY N/A 05/16/2022    Procedure: ESOPHAGOGASTRODUODENOSCOPY with Biopsies;  Surgeon: Bradley Cook MD;  Location: [facility] Endoscopy;  Service: Endoscopy;  Laterality: N/A;    EYE SURGERY Bilateral 2015    cataract    KNEE SURGERY Bilateral     3 surgeries on left and 2 on right knees - prior open medial meniscectomies bilateral knees in the 1970s    KYPHOSIS SURGERY N/A 11/08/2024    ORTHOPEDIC SURGERY  1972    SKIN BIOPSY      SKIN CANCER EXCISION Left 10/15/2020    Melanoma removed from left forearm    TONSILLECTOMY  1959    TOTAL HIP ARTHROPLASTY Left 04/21/2021    Procedure: LEFT TOTAL HIP ARTHROPLASTY;  Surgeon: Sj Reyes MD;  Location: [facility] OR;  Service: Orthopedics;  Laterality: Left;    TOTAL HIP ARTHROPLASTY Right 03/22/2022    Procedure: RIGHT TOTAL HIP ARTHROPLASTY POSTERIOR;  Surgeon: Sj Reyes MD;  Location: [facility] OR;  Service: Orthopedics;  Laterality: Right;    VASECTOMY  1986     Family History   Problem Relation Age of Onset    Diabetes Mother         Type 2     Hypertension Mother     Alzheimer's disease Mother     Lung cancer Father     Hypertension Father     Cancer Father     Prostate cancer Brother     Heart failure Brother     Heart attack Maternal Grandmother     Hypertension Maternal Grandfather     Prostate cancer Maternal Grandfather      Social History     Socioeconomic History    Marital status:    Tobacco Use    Smoking status: Never    Smokeless tobacco: Never   Vaping Use    Vaping status: Never Used   Substance and Sexual Activity    Alcohol use: Yes     Alcohol/week: 3.0 standard drinks of alcohol     Types: 3 Glasses of wine per week     Comment: occ    Drug use: Never    Sexual activity: Defer        No Known Allergies  Current Outpatient Medications   Medication Instructions    acetaminophen (TYLENOL) 500 mg, Every 6 hours PRN    cyanocobalamin 1,000 mcg, intramuscular, Every 30 days    dexAMETHasone 0.5 mg/5 mL solution SMARTSIG:15 Milliliter(s) By Mouth 4-5 Times Daily PRN    esomeprazole (NEXIUM) 40 mg, 2 times daily    famotidine (PEPCID) 20 mg, 2 times daily    fenofibrate (TRICOR) 48 mg, oral, Daily    FLUoxetine (PROZAC) 40 mg, oral, Daily    folic acid 1 mg, oral, Daily    HYDROcodone-acetaminophen (NORCO) 7.5-325 mg per tablet 1 tablet, oral, Every 4 hours PRN    hydrocortisone 2.5 % cream Topical, 2 times daily PRN    hydrOXYzine (ATARAX) 25 mg, Nightly    omeprazole (PriLOSEC) 40 mg capsule 1 capsule, Daily    ondansetron ODT (ZOFRAN-ODT) 4 mg, oral, Every 8 hours PRN    Otezla 30 mg, 2 times daily    rosuvastatin (CRESTOR) 10 mg, oral, Daily    sodium chloride (Orly 128) 5 % ophthalmic ointment 2 drops, As needed    ursodioL 500 mg, 2 times daily    ursodioL 750 mg    valsartan (DIOVAN) 80 mg, oral, Daily          Objective    Wt Readings from Last 3 Encounters:   06/18/25 77.1 kg (170 lb)   06/17/25 75.3 kg (166 lb)   06/15/25 75.8 kg (167 lb)       Physical Exam   VASCULAR:  +2/4 pitting edema to the global ankle joint to the mid  calf region.  There is rubor with dependency and pallor with elevation concerning for PAD    DERMATOLOGICAL  Skin is thin and atrophic.  No erythema no ecchymosis skin envelope intact    NEUROLOGICAL:  Epicritic and protective sensations are intact.  Negative for paresthesias.  Light touch within normal limits.     MUSKULOSKELETAL:  There is tenderness with palpation along the entire course of the Achilles tendon boggy edema is noted to this region as well.  Negative for calcaneal squeeze test.  Ankle joint range of motion demonstrates mild discomfort to the talocrural joint anteriorly.  Muscle groups are 5+5 with all tendons crossing the ankle joint.    X-ray imaging personally reviewed and interpreted below  3 views of the foot ankle weightbearing nature demonstrates no signs of stress fractures or amanda fractures.  Achilles tendon appears to be intact on the lateral view       Assessment   1. Acute right ankle pain    2. Right foot pain    3. Edema of right ankle        Assessment & Plan     Acute swelling and pain in the ankle and calf, with bruising and discoloration. Differential diagnosis includes pseudogout, gout, and PVD . Symptoms are improving without medication. Pseudogout is suspected due to the presentation and improvement without treatment. Risks of steroid treatment discussed, but deferred until further diagnostic results are available.  -  MRI pending on July 7, 2025  - pending  arterial ultrasound for July 7, 2025  - Consider referral to vascular surgeon if blood flow issues are confirmed  - Avoid steroid treatment until diagnostic results are available    Will continue to follow patient's advanced imaging level studies and will notify patient of results          All questions were answered to patient's satisfaction. Patient advised to contact the clinic with any further questions or concerns.    Tabatha Prakash DNP was present and scrubbed throughout the entire procedure and was needed for  preparation of the surgical site, retraction of vital structures, closure and postoperative dressing application.      Davis Bernard DPM, FACFAS  Fellowship Trained Foot and Ankle Surgeon   Department of [facility]  [address] [place] [address], [place] [address]

## 2025-07-07 ENCOUNTER — ANCILLARY PROCEDURE (OUTPATIENT)
Dept: ULTRASOUND IMAGING | Facility: CLINIC | Age: 70
End: 2025-07-07
Payer: MEDICARE

## 2025-07-07 ENCOUNTER — HOSPITAL ENCOUNTER (OUTPATIENT)
Dept: MRI IMAGING | Facility: HOSPITAL | Age: 70
Discharge: HOME | End: 2025-07-07
Payer: MEDICARE

## 2025-07-07 ENCOUNTER — RESULTS FOLLOW-UP (OUTPATIENT)
Dept: FAMILY MEDICINE | Facility: CLINIC | Age: 70
End: 2025-07-07

## 2025-07-07 DIAGNOSIS — S32.030D COMPRESSION FRACTURE OF L3 VERTEBRA WITH ROUTINE HEALING, SUBSEQUENT ENCOUNTER: ICD-10-CM

## 2025-07-07 DIAGNOSIS — M79.604 RIGHT LEG PAIN: ICD-10-CM

## 2025-07-07 DIAGNOSIS — S86.019A RUPTURE OF ACHILLES TENDON, UNSPECIFIED LATERALITY, INITIAL ENCOUNTER: ICD-10-CM

## 2025-07-07 DIAGNOSIS — S82.143A CLOSED FRACTURE OF TIBIAL PLATEAU, UNSPECIFIED LATERALITY, INITIAL ENCOUNTER: Primary | ICD-10-CM

## 2025-07-07 PROCEDURE — A9579 GAD-BASE MR CONTRAST NOS,1ML: HCPCS | Performed by: FAMILY MEDICINE

## 2025-07-07 PROCEDURE — 2550000100 HC RX 255: Performed by: FAMILY MEDICINE

## 2025-07-07 PROCEDURE — 73720 MRI LWR EXTREMITY W/O&W/DYE: CPT | Mod: RT

## 2025-07-07 PROCEDURE — 93926 LOWER EXTREMITY STUDY: CPT | Mod: RT

## 2025-07-07 PROCEDURE — 73720 MRI LWR EXTREMITY W/O&W/DYE: CPT | Mod: 26,RT | Performed by: RADIOLOGY

## 2025-07-07 RX ORDER — HYDROCODONE BITARTRATE AND ACETAMINOPHEN 7.5; 325 MG/1; MG/1
1 TABLET ORAL EVERY 4 HOURS PRN
Qty: 120 TABLET | Refills: 0 | Status: SHIPPED | OUTPATIENT
Start: 2025-07-07

## 2025-07-07 RX ORDER — GADOTERIDOL 279.3 MG/ML
15 INJECTION INTRAVENOUS
Status: COMPLETED | OUTPATIENT
Start: 2025-07-07 | End: 2025-07-07

## 2025-07-07 RX ADMIN — GADOTERIDOL 15 ML: 279.3 INJECTION, SOLUTION INTRAVENOUS at 08:24

## 2025-07-07 NOTE — TELEPHONE ENCOUNTER
No care due was identified.  [facility] Embedded Care Due Messages. Reference number: [id number]. 7/07/2025 10:05:56 AM SARAH

## 2025-07-07 NOTE — TELEPHONE ENCOUNTER
Juan Diego Rondon is calling for medication refill.     Patient has contacted the pharmacy? No    Patient Advised: they will receive a call back.    Name of Medications (Have patient read from the bottle or snip from medication list):    What is the dose of the medication: 7.5-325 mg    How often do you take it:    Are you having any trouble with the medication:    How many doses do you have left: 0    Additional Information: Patient is completely out of medication.    Patient's preferred pharmacy has been noted and populated. [facility]    Is it okay that the nurse communicates your response through [employer]? No      Caller has been advised that their call does not guarantee an immediate refill. This refill will be reviewed and if you do not hear from your provider it will be ready in 72 hours

## 2025-07-07 NOTE — TELEPHONE ENCOUNTER
For Clinic Staff: please review this note,  Centralized RN/Nurse Triage: medication(s) hydrocodone not filled due to -Non-delegated medication- provider fill only.

## 2025-07-08 PROCEDURE — 93926 LOWER EXTREMITY STUDY: CPT | Mod: 26,RT | Performed by: RADIOLOGY

## 2025-07-09 ENCOUNTER — OFFICE VISIT (OUTPATIENT)
Dept: PODIATRY | Facility: CLINIC | Age: 70
End: 2025-07-09
Payer: MEDICARE

## 2025-07-09 ENCOUNTER — TELEPHONE (OUTPATIENT)
Dept: ORTHOPEDIC SURGERY | Facility: CLINIC | Age: 70
End: 2025-07-09
Payer: MEDICARE

## 2025-07-09 ENCOUNTER — TELEPHONE (OUTPATIENT)
Dept: PODIATRY | Facility: CLINIC | Age: 70
End: 2025-07-09

## 2025-07-09 DIAGNOSIS — S86.011A RUPTURE OF RIGHT ACHILLES TENDON, INITIAL ENCOUNTER: Primary | ICD-10-CM

## 2025-07-09 PROCEDURE — 99213 OFFICE O/P EST LOW 20 MIN: CPT | Performed by: NURSE PRACTITIONER

## 2025-07-09 PROCEDURE — G0463 HOSPITAL OUTPT CLINIC VISIT: HCPCS | Performed by: NURSE PRACTITIONER

## 2025-07-09 NOTE — TELEPHONE ENCOUNTER
Patient has seen Podiatry and they were wondering if someone here can look at the imaging and see what they think. Please call patient and advise

## 2025-07-09 NOTE — PROGRESS NOTES
Tabatha Prakash DNP  Department of [facility]  [address] [place] [address]    Cuba Haley MD  Subjective   Chief Complaint   Patient presents with    Foot Pain     R achilles tendon rupture. Patient has been weight bearing in a camboot. He states that he is having a great amount of pain, which he manages with hydrocodone.            HPI:  Emmett Rondon is a 70 year old male who presents with persistent ankle pain and swelling.    He awoke on June 13th with severe ankle pain, described as 'on fire,' followed by significant bruising and swelling on June 15th.  He had no fall or injury to initiate his symptoms.  The day before he had a massage but nothing of remembrance.  An ultrasound ruled out deep vein thrombosis. An MRI revealed an almost complete tear of the Achilles tendon on 7/3/25 and a fracture at the tibial plateau.     He uses a boot intermittently for relief and using a walker to minimize walking.  He had been instructed to be nonweightbearing following the MRI due to the tibial plateau fracture.  However, he is awaiting a wheelchair for mobility assistance. He is not scheduled to see orthopedics until July 30.  He has a history of falls with compression fractures at L2 and L3 but denies recent falls causing the current injury. He has Waldenström's macroglobulinemia, which contributes to bruising and tenderness.          History:  Past Medical History:   Diagnosis Date    Allergic eosinophilic esophagitis     Allergic rhinitis 1990    Anemia 2019    Anxiety     Arthritis 1990    Autoimmune hepatitis (CMS/HCC)     Back pain 8/3/24    Blood disorder     pernicious anemia    Cataract 2001    Depression 08/08/2022    Gastrointestinal disease     IBS    Gout     Hyperlipidemia     Hypertension 1984    Lymphoplasmacytic leukemia (CMS/HCC)     Melanoma (CMS/HCC)     Neurologic disorder     Pneumothorax 08/07/2022    Primary biliary cholangitis (CMS/HCC)     autoimmune hepatitis     Psychiatric illness     anxiety    Skin abnormalities 2021     Past Surgical History:   Procedure Laterality Date    APPENDECTOMY  1968    COLONOSCOPY W/ BIOPSIES AND POLYPECTOMY  02/09/2009    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    CT BIOPSY LIVER FUNCTION  12/02/2021    CT BIOPSY LIVER FUNCTION 12/2/2021 [facility] MIS CT SCAN    ESOPHAGOGASTRODUODENOSCOPY N/A 05/16/2022    Procedure: ESOPHAGOGASTRODUODENOSCOPY with Biopsies;  Surgeon: Bradley Cook MD;  Location: [facility] Endoscopy;  Service: Endoscopy;  Laterality: N/A;    EYE SURGERY Bilateral 2015    cataract    KNEE SURGERY Bilateral     3 surgeries on left and 2 on right knees - prior open medial meniscectomies bilateral knees in the 1970s    KYPHOSIS SURGERY N/A 11/08/2024    ORTHOPEDIC SURGERY  1972    SKIN BIOPSY      SKIN CANCER EXCISION Left 10/15/2020    Melanoma removed from left forearm    TONSILLECTOMY  1959    TOTAL HIP ARTHROPLASTY Left 04/21/2021    Procedure: LEFT TOTAL HIP ARTHROPLASTY;  Surgeon: Sj Reyes MD;  Location: [facility] OR;  Service: Orthopedics;  Laterality: Left;    TOTAL HIP ARTHROPLASTY Right 03/22/2022    Procedure: RIGHT TOTAL HIP ARTHROPLASTY POSTERIOR;  Surgeon: Sj Reyes MD;  Location: [facility] OR;  Service: Orthopedics;  Laterality: Right;    VASECTOMY  1986     Family History   Problem Relation Age of Onset    Diabetes Mother         Type 2    Hypertension Mother     Alzheimer's disease Mother     Lung cancer Father     Hypertension Father     Cancer Father     Prostate cancer Brother     Heart failure Brother     Heart attack Maternal Grandmother     Hypertension Maternal Grandfather     Prostate cancer Maternal Grandfather      Social History     Socioeconomic History    Marital status:    Tobacco Use    Smoking status: Never    Smokeless tobacco: Never   Vaping Use    Vaping status: Never Used   Substance and Sexual Activity    Alcohol use: Yes     Alcohol/week: 3.0 standard drinks of alcohol     Types: 3 Glasses  of wine per week     Comment: occ    Drug use: Never    Sexual activity: Defer        No Known Allergies  Current Outpatient Medications   Medication Instructions    acetaminophen (TYLENOL) 500 mg, Every 6 hours PRN    cyanocobalamin 1,000 mcg, intramuscular, Every 30 days    dexAMETHasone 0.5 mg/5 mL solution SMARTSIG:15 Milliliter(s) By Mouth 4-5 Times Daily PRN    esomeprazole (NEXIUM) 40 mg, 2 times daily    famotidine (PEPCID) 20 mg, 2 times daily    fenofibrate (TRICOR) 48 mg, oral, Daily    FLUoxetine (PROZAC) 40 mg, oral, Daily    folic acid 1 mg, oral, Daily    HYDROcodone-acetaminophen (NORCO) 7.5-325 mg per tablet 1 tablet, oral, Every 4 hours PRN    hydrocortisone 2.5 % cream Topical, 2 times daily PRN    hydrOXYzine (ATARAX) 25 mg, Nightly    omeprazole (PriLOSEC) 40 mg capsule 1 capsule, Daily    ondansetron ODT (ZOFRAN-ODT) 4 mg, oral, Every 8 hours PRN    Otezla 30 mg, 2 times daily    rosuvastatin (CRESTOR) 10 mg, oral, Daily    sodium chloride (Orly 128) 5 % ophthalmic ointment 2 drops, As needed    ursodioL 500 mg, 2 times daily    ursodioL 750 mg    valsartan (DIOVAN) 80 mg, oral, Daily          Objective    Wt Readings from Last 3 Encounters:   06/18/25 77.1 kg (170 lb)   06/17/25 75.3 kg (166 lb)   06/15/25 75.8 kg (167 lb)       Physical Exam   VASCULAR:  Dorsalis pedis and posterior tibial pulses +2/4, normal capillary refill time.   Mild swelling to the lower extremity    DERMATOLOGICAL  Normal skin, texture and turgor.  Skin integrity intact.  No open wounds or lesions appreciated.    NEUROLOGICAL:  Epicritic and protective sensations are intact.  Negative for paresthesias.  Light touch within normal limits.     MUSKULOSKELETAL:  Achilles tendon is palpable although thinned at the mid substance and tender.  Tenderness also at the muscle tendinous juncture. Active plantarflexion 4/5 strength.       ASSESSMENT  1. Rupture of right Achilles tendon, initial encounter        Assessment/Plan      Achilles tendon tear  Near complete tear, approximately three weeks old. Conservative treatment preferred due to age of injury and medical conditions. Surgery not recommended.  - Provide heel lifts for boot to shorten Achilles and promote scar tissue formation.  - Advise wearing boot with wedges, weight bearing ok in regards to the Achilles injury, but recommend non-weight bearing until orthopedic evaluation due to tibial plateau fracture.  - Order custom AFO brace for future use if needed.    Waldenström's macroglobulinemia  Condition monitored without active treatment.    Follow-up  Follow-up to assess healing progress.  - Schedule follow-up with Doctor Bernard in three weeks to evaluate progress and potentially start transitioning out of the boot and/or removing wedges.       All questions were answered to patient's satisfaction. Patient advised to contact the clinic with any further questions or concerns.    Return in about 3 weeks (around 7/30/2025) for FU achilles rupture , with Dr. Bernard.     Tabatha Prakash, ENDER  Department of [facility]  [address] [place] [address], [place] [address]

## 2025-07-09 NOTE — TELEPHONE ENCOUNTER
Consulted with provider in clinic on message rec'd from patient & he advises Emmett would not necessarily need to be seen by Orthopedics but if he wishes to have an appt one can be set. Patient notified of this information & was fine with not scheduling, he said he just wanted to be sure.

## 2025-07-09 NOTE — PATIENT INSTRUCTIONS
Your order has been sent to [facility].  They will call you within 1-2 weeks to establish an appointment to have your feet molded for custom orthotics.  If you have not heard from [facility], please call them at the number listed below.   Orthotics typically takes 3 weeks to make them.    [facility] is in [place] which can be set up Monday through Friday or here in [place] on Wednesdays located on [facility] by [facility] physical therapy  Once you obtain your orthotics, please break them in slowly wearing them 1 hour/day incrementally over the next 7 days.    After 7 days you been wearing the inserts for 7 hours please wear them full-time thereafter.  If at anytime you need to modify please return back to [facility] to have them adjust your inserts.    As a reminder, typically custom orthotics are not covered by insurance and can range from $300-$400.      [facility]: Prosthetics & Orthotics  [facility]  [address], [place] [address]     [phone]

## 2025-07-11 ENCOUNTER — TELEPHONE (OUTPATIENT)
Dept: FAMILY MEDICINE | Facility: CLINIC | Age: 70
End: 2025-07-11
Payer: MEDICARE

## 2025-07-11 NOTE — TELEPHONE ENCOUNTER
Patient called and stated that for his insurance to pay for the wheelchair they need a necessity of Durable equipment form filled out and on file. Please call and advise.

## 2025-07-14 NOTE — TELEPHONE ENCOUNTER
Please see if we amended my last note on 5/7 if that is current enough to be able to use as a face-to-face for this.  Thanks

## 2025-07-14 NOTE — TELEPHONE ENCOUNTER
Spoke with [facility] DME. They stated that due to no face to face visit they need documentation on why patient needs the wheelchiar and if he has space inside his home for wheelchair. I did discuss the wheelchair with patient when giving his MRI results. Is home is wheelchair accessible.

## 2025-07-14 NOTE — TELEPHONE ENCOUNTER
I spoke with [facility] dme they stated as long as it has what medicare requires and is within the last 6 months, this will be fine.

## 2025-07-21 ENCOUNTER — TELEPHONE (OUTPATIENT)
Dept: FAMILY MEDICINE | Facility: CLINIC | Age: 70
End: 2025-07-21
Payer: MEDICARE

## 2025-07-21 NOTE — TELEPHONE ENCOUNTER
[facility] ([facility]) called and would like to get an addendum for the patient.    Phone number [phone]

## 2025-07-21 NOTE — TELEPHONE ENCOUNTER
Returned call to Isreal at [facility].     He is requesting Tabatha addend her note to get orthotics approved.     He is going to send us a fax.

## 2025-07-28 ENCOUNTER — OFFICE VISIT (OUTPATIENT)
Dept: PODIATRY | Facility: CLINIC | Age: 70
End: 2025-07-28
Payer: MEDICARE

## 2025-07-28 ENCOUNTER — TELEPHONE (OUTPATIENT)
Dept: PODIATRY | Facility: CLINIC | Age: 70
End: 2025-07-28

## 2025-07-28 DIAGNOSIS — S86.011A RUPTURE OF RIGHT ACHILLES TENDON, INITIAL ENCOUNTER: Primary | ICD-10-CM

## 2025-07-28 PROCEDURE — G0463 HOSPITAL OUTPT CLINIC VISIT: HCPCS | Performed by: PODIATRIST

## 2025-07-28 PROCEDURE — 99213 OFFICE O/P EST LOW 20 MIN: CPT | Performed by: PODIATRIST

## 2025-07-28 NOTE — PROGRESS NOTES
[facility]  Department of [facility]  [address] [place] [address]    No ref. provider found  Subjective   Chief Complaint   Patient presents with    Follow-up     R achilles tendon rupture           History of Present Illness  Emmett Rondon is a 70 year old male with a history of lymphoma who presents with foot pain and weakness following an achilles rupture.    He has been experiencing ongoing foot pain and weakness since an injury, which led to the use of a boot for support. The pain is intermittent, and he has been wearing the boot since before July 4th after visiting urgent care and the emergency room. He is awaiting an AFO brace, expected in two weeks, and is uncertain about the duration of boot use.    He has not yet started physical therapy and is awaiting guidance on exercises. He experiences difficulty walking without the boot and reports needing to lift his leg higher to walk. No recent injuries or antibiotic use have contributed to his condition. He mentions having had a deep tissue massage prior to the onset of symptoms.    He has a history of lymphoma and is currently taking Aygestin. The pain is not limited to his foot, as he also experiences pain in other areas, including a tibial plateau fracture that he believes has been present for some time. He has been using a cane for balance issues prior to the current foot problem.           History:  Past Medical History:   Diagnosis Date    Allergic eosinophilic esophagitis     Allergic rhinitis 1990    Anemia 2019    Anxiety     Arthritis 1990    Autoimmune hepatitis (CMS/HCC)     Back pain 8/3/24    Blood disorder     pernicious anemia    Cataract 2001    Depression 08/08/2022    Gastrointestinal disease     IBS    Gout     Hyperlipidemia     Hypertension 1984    Lymphoplasmacytic leukemia (CMS/HCC)     Melanoma (CMS/HCC)     Neurologic disorder     Pneumothorax 08/07/2022    Primary biliary cholangitis (CMS/HCC)     autoimmune  hepatitis    Psychiatric illness     anxiety    Skin abnormalities 2021     Past Surgical History:   Procedure Laterality Date    APPENDECTOMY  1968    COLONOSCOPY W/ BIOPSIES AND POLYPECTOMY  02/09/2009    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    CT BIOPSY LIVER FUNCTION  12/02/2021    CT BIOPSY LIVER FUNCTION 12/2/2021 [facility] MIS CT SCAN    ESOPHAGOGASTRODUODENOSCOPY N/A 05/16/2022    Procedure: ESOPHAGOGASTRODUODENOSCOPY with Biopsies;  Surgeon: Bradley Cook MD;  Location: [facility] Endoscopy;  Service: Endoscopy;  Laterality: N/A;    EYE SURGERY Bilateral 2015    cataract    KNEE SURGERY Bilateral     3 surgeries on left and 2 on right knees - prior open medial meniscectomies bilateral knees in the 1970s    KYPHOSIS SURGERY N/A 11/08/2024    ORTHOPEDIC SURGERY  1972    SKIN BIOPSY      SKIN CANCER EXCISION Left 10/15/2020    Melanoma removed from left forearm    TONSILLECTOMY  1959    TOTAL HIP ARTHROPLASTY Left 04/21/2021    Procedure: LEFT TOTAL HIP ARTHROPLASTY;  Surgeon: Sj Reyes MD;  Location: [facility] OR;  Service: Orthopedics;  Laterality: Left;    TOTAL HIP ARTHROPLASTY Right 03/22/2022    Procedure: RIGHT TOTAL HIP ARTHROPLASTY POSTERIOR;  Surgeon: Sj Reyes MD;  Location: [facility] OR;  Service: Orthopedics;  Laterality: Right;    VASECTOMY  1986     Family History   Problem Relation Age of Onset    Diabetes Mother         Type 2    Hypertension Mother     Alzheimer's disease Mother     Lung cancer Father     Hypertension Father     Cancer Father     Prostate cancer Brother     Heart failure Brother     Heart attack Maternal Grandmother     Hypertension Maternal Grandfather     Prostate cancer Maternal Grandfather      Social History     Socioeconomic History    Marital status:    Tobacco Use    Smoking status: Never    Smokeless tobacco: Never   Vaping Use    Vaping status: Never Used   Substance and Sexual Activity    Alcohol use: Yes     Alcohol/week: 3.0 standard drinks of alcohol      Types: 3 Glasses of wine per week     Comment: occ    Drug use: Never    Sexual activity: Defer        No Known Allergies  Current Outpatient Medications   Medication Instructions    acetaminophen (TYLENOL) 500 mg, Every 6 hours PRN    cyanocobalamin 1,000 mcg, intramuscular, Every 30 days    dexAMETHasone 0.5 mg/5 mL solution SMARTSIG:15 Milliliter(s) By Mouth 4-5 Times Daily PRN    esomeprazole (NEXIUM) 40 mg, 2 times daily    famotidine (PEPCID) 20 mg, 2 times daily    fenofibrate (TRICOR) 48 mg, oral, Daily    FLUoxetine (PROZAC) 40 mg, oral, Daily    folic acid 1 mg, oral, Daily    HYDROcodone-acetaminophen (NORCO) 7.5-325 mg per tablet 1 tablet, oral, Every 4 hours PRN    hydrocortisone 2.5 % cream Topical, 2 times daily PRN    hydrOXYzine (ATARAX) 25 mg, Nightly    omeprazole (PriLOSEC) 40 mg capsule 1 capsule, Daily    ondansetron ODT (ZOFRAN-ODT) 4 mg, oral, Every 8 hours PRN    Otezla 30 mg, 2 times daily    rosuvastatin (CRESTOR) 10 mg, oral, Daily    sodium chloride (Orly 128) 5 % ophthalmic ointment 2 drops, As needed    ursodioL 500 mg, 2 times daily    ursodioL 750 mg    valsartan (DIOVAN) 80 mg, oral, Daily          Objective    Wt Readings from Last 3 Encounters:   06/18/25 77.1 kg (170 lb)   06/17/25 75.3 kg (166 lb)   06/15/25 75.8 kg (167 lb)       Physical Exam   VASCULAR:  No significant edema to the posterior aspect of the Achilles tendon.  Pedal pulses +2/4.    DERMATOLOGICAL  Integument is intact with no open wounds or lesions appreciated.  Negative for erythema or ecchymosis.  Normal skin texture and turgor    NEUROLOGICAL:  Light touch is intact no hypersensitivity    MUSKULOSKELETAL:  Once again Achilles tendon is palpable no complete separation is appreciated as scarring has improved connection.  He does have decent plantarflexion strength but weakened compared to the contralateral unaffected limb.  Some discomfort palpation of the Achilles tendon injury site.    ASSESSMENT  1. Rupture  of right Achilles tendon, initial encounter           Assessment & Plan  Right ankle pain    Chronic right ankle pain with associated drop foot. Weakness and lack of strength due to prolonged boot use. Surgery not recommended due to late presentation and lymphoma.  - Continue wearing the boot until the AFO brace is received.  - Initiate physical therapy to improve plantar flexion strength.  - Use an elastic band for home exercises, avoiding Achilles tendon stretching.  - Apply ice as needed.  - Transition from the boot to the AFO brace once received.  - Continue physical therapy and assess strength improvement.  - Consider discontinuing the AFO brace if strength improves significantly.        Order has been placed for physical therapy.  Recommend following with physical therapy 2-3 times a week for 4 to 6 weeks for range of motion exercises, ultrasound therapy, potential iontophoresis, muscle group strengthening, etc.  Patient can follow-up 6 weeks if pain continues       Davis Bernard DPM  Department of [facility]  [address] [place] [address], [place] [address]

## 2025-07-29 ENCOUNTER — TELEPHONE (OUTPATIENT)
Dept: FAMILY MEDICINE | Facility: CLINIC | Age: 70
End: 2025-07-29

## 2025-07-29 ENCOUNTER — TELEPHONE (OUTPATIENT)
Dept: PODIATRY | Facility: CLINIC | Age: 70
End: 2025-07-29
Payer: MEDICARE

## 2025-07-29 DIAGNOSIS — S86.011S RUPTURE OF RIGHT ACHILLES TENDON, SEQUELA: Primary | ICD-10-CM

## 2025-07-30 ENCOUNTER — ANCILLARY PROCEDURE (OUTPATIENT)
Dept: RADIOLOGY | Facility: CLINIC | Age: 70
End: 2025-07-30
Payer: MEDICARE

## 2025-07-30 ENCOUNTER — TELEPHONE (OUTPATIENT)
Dept: PODIATRY | Facility: CLINIC | Age: 70
End: 2025-07-30
Payer: MEDICARE

## 2025-07-30 ENCOUNTER — OFFICE VISIT (OUTPATIENT)
Dept: ORTHOPEDIC SURGERY | Facility: CLINIC | Age: 70
End: 2025-07-30
Payer: MEDICARE

## 2025-07-30 VITALS — SYSTOLIC BLOOD PRESSURE: 112 MMHG | DIASTOLIC BLOOD PRESSURE: 64 MMHG

## 2025-07-30 DIAGNOSIS — M79.604 RIGHT LEG PAIN: Primary | ICD-10-CM

## 2025-07-30 PROCEDURE — 99213 OFFICE O/P EST LOW 20 MIN: CPT | Performed by: NURSE PRACTITIONER

## 2025-07-30 PROCEDURE — 73564 X-RAY EXAM KNEE 4 OR MORE: CPT | Mod: RT,PO

## 2025-07-30 PROCEDURE — G0463 HOSPITAL OUTPT CLINIC VISIT: HCPCS | Mod: PO | Performed by: NURSE PRACTITIONER

## 2025-07-30 PROCEDURE — 73564 X-RAY EXAM KNEE 4 OR MORE: CPT | Mod: 26,RT | Performed by: INTERNAL MEDICINE

## 2025-07-30 RX ORDER — ERGOCALCIFEROL (VITAMIN D2) 50 MCG
1 CAPSULE ORAL EVERY 24 HOURS
COMMUNITY

## 2025-07-30 RX ORDER — ZOLEDRONIC ACID 5 MG/100ML
INJECTION, SOLUTION INTRAVENOUS
COMMUNITY
Start: 2025-04-28

## 2025-07-30 NOTE — PROGRESS NOTES
Fracture of the Right Knee (Pt presents with closed fracture of tibia plateau. No recent falls, injuries. Rates pain 6/10, taking hydrocodone PRN for pain. Using cane to ambulate today. )      HPI      History of Present Illness    Emmett Rondon is a 70 year old male who presents with a ruptured Achilles tendon and tibial plateau fracture.    His initial injury was the beginning in June 2025.  He initially experienced leg pain upon waking up on a Friday while in [place]. By Sunday, his leg was swollen and bruised, prompting concerns about a possible deep vein thrombosis (DVT), which was subsequently ruled out. An MRI conducted three weeks later revealed a ruptured Achilles tendon and a subchondral fracture of the tibial plateau.    He recalls a history of falling on the same knee a few years ago, which caused significant pain at the time, but denies any recent falls. The MRI indicated bone marrow edema. He does not recall any specific incident that could have caused the fracture, other than possibly twisting or stepping down wrong.    He experiences pain primarily in his ankle and hip. He has previously received steroid injections for hip pain, which he associates with greater trochanteric bursitis.    He has been fitted for an ankle-foot orthosis (AFO) due to limited control, as his foot drops when he picks it up.    He was diagnosed with lymphoma in April of the previous year. He also has a history of a compression fracture in his back, which he states is improving.           Past Medical History:   Diagnosis Date    Allergic eosinophilic esophagitis     Allergic rhinitis 1990    Anemia 2019    Anxiety     Arthritis 1990    Autoimmune hepatitis (CMS/HCC)     Back pain 8/3/24    Blood disorder     pernicious anemia    Cataract 2001    Depression 08/08/2022    Gastrointestinal disease     IBS    Gout     Hyperlipidemia     Hypertension 1984    Lymphoplasmacytic leukemia (CMS/HCC)     Melanoma (CMS/HCC)      Neurologic disorder     Pneumothorax 08/07/2022    Primary biliary cholangitis (CMS/HCC)     autoimmune hepatitis    Psychiatric illness     anxiety    Skin abnormalities 2021     Past Surgical History:   Procedure Laterality Date    APPENDECTOMY  1968    COLONOSCOPY W/ BIOPSIES AND POLYPECTOMY  02/09/2009    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    CT BIOPSY LIVER FUNCTION  12/02/2021    CT BIOPSY LIVER FUNCTION 12/2/2021 [facility] MIS CT SCAN    ESOPHAGOGASTRODUODENOSCOPY N/A 05/16/2022    Procedure: ESOPHAGOGASTRODUODENOSCOPY with Biopsies;  Surgeon: Bradley Cook MD;  Location: [facility] Endoscopy;  Service: Endoscopy;  Laterality: N/A;    EYE SURGERY Bilateral 2015    cataract    KNEE SURGERY Bilateral     3 surgeries on left and 2 on right knees - prior open medial meniscectomies bilateral knees in the 1970s    KYPHOSIS SURGERY N/A 11/08/2024    ORTHOPEDIC SURGERY  1972    SKIN BIOPSY      SKIN CANCER EXCISION Left 10/15/2020    Melanoma removed from left forearm    TONSILLECTOMY  1959    TOTAL HIP ARTHROPLASTY Left 04/21/2021    Procedure: LEFT TOTAL HIP ARTHROPLASTY;  Surgeon: Sj Reyes MD;  Location: [facility] OR;  Service: Orthopedics;  Laterality: Left;    TOTAL HIP ARTHROPLASTY Right 03/22/2022    Procedure: RIGHT TOTAL HIP ARTHROPLASTY POSTERIOR;  Surgeon: Sj Reyes MD;  Location: [facility] OR;  Service: Orthopedics;  Laterality: Right;    VASECTOMY  1986     Prior to Admission medications   Medication Sig Start Date End Date Taking? Authorizing Provider   ergocalciferol, vitamin D2, 50 mcg (2,000 unit) capsule 1 capsule daily   Yes ProviderHoda MD   zoledronic acid-mannitoL-water (RECLAST) 5 mg/100 mL piggyback infusion (premix) as directed Intravenous 4/28/25  Yes Hoda Blount MD   HYDROcodone-acetaminophen (NORCO) 7.5-325 mg per tablet Take 1 tablet by mouth every 4 hours as needed for pain scale 8-10/10 Max Daily Amount: 6 tablets 7/7/25  Yes Cuba Haley MD   hydrOXYzine (ATARAX)  25 mg tablet Take 1 tablet (25 mg total) by mouth nightly 4/4/25  Yes Hoda Blount MD   rosuvastatin (CRESTOR) 10 mg tablet Take 1 tablet by mouth daily 2/25/25  Yes Cuba Haley MD   esomeprazole (NexIUM) 40 mg capsule Take 1 capsule (40 mg total) by mouth 2 times a day 1/9/25  Yes Hoda Blount MD   ondansetron ODT (ZOFRAN-ODT) 4 mg disintegrating tablet Take 1 tablet (4 mg total) by mouth every 8 (eight) hours as needed for nausea or vomiting 12/2/24  Yes Cuba Haley MD   dexAMETHasone 0.5 mg/5 mL solution SMARTSIG:15 Milliliter(s) By Mouth 4-5 Times Daily PRN 11/14/24  Yes Hoda Blount MD   omeprazole (PriLOSEC) 40 mg capsule Take 1 capsule (40 mg total) by mouth daily 6/18/24  Yes Hoda Blount MD   valsartan (DIOVAN) 80 mg tablet Take 1 tablet (80 mg total) by mouth daily 9/30/24 9/30/25 Yes Cuba Haley MD   FLUoxetine (PROzac) 40 mg capsule Take 1 capsule (40 mg total) by mouth daily 9/23/24 9/23/25 Yes Cuba Haley MD   folic acid 1 mg tablet Take 1 tablet (1 mg total) by mouth daily 9/11/24 9/6/25 Yes Kinjal Sylvester CNP   cyanocobalamin 1,000 mcg/mL injection Inject 1 mL (1,000 mcg total) into the shoulder, thigh, or buttocks every 30 (thirty) days 6/17/24  Yes Cuba Haley MD   hydrocortisone 2.5 % cream Apply topically 2 (two) times a day as needed for rash 5/1/24 7/30/25 Yes Cuba Haley MD   fenofibrate (TRICOR) 48 mg tablet Take 1 tablet (48 mg total) by mouth daily 1/6/23 7/30/25 Yes Cuba Haley MD   famotidine (PEPCID) 20 mg tablet Take 1 tablet (20 mg total) by mouth 2 times a day 12/22/22  Yes Hoda Blount MD   acetaminophen (TYLENOL) 500 mg tablet Take 1 tablet (500 mg total) by mouth every 6 hours as needed for pain scale 1-3/10   Yes Provider, Historical, MD   ursodioL (ACTIGALL) 500 mg tablet Take 1 tablet (500 mg total) by mouth 2 times daily Every other day   Yes Provider, Historical, MD   ursodioL (AMANDEEP) 250  mg tablet Take 3 tablets (750 mg total) by mouth In am and 500 mg in pm every other day-alternate with the 500 mg bid   Yes Hoda Blount MD   apremilast (Otezla) 30 mg tablet Take 1 tablet (30 mg total) by mouth 2 times a day  Patient not taking: Reported on 6/17/2025    Hoda Blount MD   sodium chloride (Orly 128) 5 % ophthalmic ointment Apply 2 drops to right eye as needed  Patient not taking: Reported on 6/17/2025 1/14/25   Hoda Blount MD     No Known Allergies  Social History     Socioeconomic History    Marital status:      Spouse name: Not on file    Number of children: Not on file    Years of education: Not on file    Highest education level: Not on file   Occupational History    Not on file   Tobacco Use    Smoking status: Never    Smokeless tobacco: Never   Vaping Use    Vaping status: Never Used   Substance and Sexual Activity    Alcohol use: Yes     Alcohol/week: 3.0 standard drinks of alcohol     Types: 3 Glasses of wine per week     Comment: occ    Drug use: Never    Sexual activity: Defer   Other Topics Concern    Not on file   Social History Narrative    Not on file     Social Drivers of Health     Financial Resource Strain: Not on file   Food Insecurity: Not on file   Transportation Needs: Not on file   Physical Activity: Not on file   Stress: Not on file   Social Connections: Not on file   Intimate Partner Violence: Not on file   Housing Stability: Not on file     Family History   Problem Relation Age of Onset    Diabetes Mother         Type 2    Hypertension Mother     Alzheimer's disease Mother     Lung cancer Father     Hypertension Father     Cancer Father     Prostate cancer Brother     Heart failure Brother     Heart attack Maternal Grandmother     Hypertension Maternal Grandfather     Prostate cancer Maternal Grandfather        /64 (BP Location: Right arm, Patient Position: Sitting, Cuff Size: Regular Adult)     Right Knee Exam     Tenderness   The  patient is experiencing tenderness in the lateral joint line and medial joint line (Patellofemoral joint).    Range of Motion   Extension:  normal   Flexion:  normal     Tests   Rhonda:  Medial - positive Lateral - negative  Varus: negative Valgus: negative  Lachman:  Anterior - negative    Posterior - negative    Other   Erythema: absent  Scars: absent  Sensation: normal  Pulse: present  Swelling: none  Effusion: no effusion present            Physical Exam  Musculoskeletal:      Right knee: No effusion.      Instability Tests: Medial Rhonda test positive. Lateral Rhonda test negative.          Assessment/Plan   MRI revealed a subchondral fracture of the posterior aspect of the medial tibial plateau with bone marrow edema, suggesting a recent injury. Fracture is not depressed greater than two millimeters, so no surgical intervention is required. Medial meniscus tear is not causing locking or significant mechanical symptoms, so conservative management is appropriate.  Assessment/Plan     Left Achilles tendon rupture with foot drop  Confirmed by MRI. Experiencing foot drop, contributing to altered gait mechanics and subsequent hip pain.  - Continue wearing the AFO to assist with ambulation and transition out of the boot.  He will continue with follow-up with podiatrist.    Right greater trochanteric bursitis  Persistent hip pain likely exacerbated by altered gait mechanics due to the Achilles tendon rupture and foot drop. History of receiving steroid injections for hip pain, possibly related to greater trochanteric bursitis.  - Schedule a corticosteroid injection into the left hip for September 13th or later if hip pain persists.    Right knee subchondral fracture and medial meniscus tear  Plan:  - Diagnostics-reviewed x-rays and MRI results with Emmett showing the above diagnosis.  - Treatment-today we will treat him with continuation conservative measures.  He continue with his home exercise and formal  exercise regiment.  We did discussion regarding treatment options including not limited to continuation conservative measures, over-the-counter analgesics, steroid injections into the right knee intra-articular space and possibly providing perineural injection therapy  - Follow-up-  - Schedule a corticosteroid injection into the left knee for September 13th or later.

## 2025-07-30 NOTE — TELEPHONE ENCOUNTER
The sheet that Star from [facility] sent, has been signed by Tabatha and faxed back to [facility].

## 2025-08-01 ENCOUNTER — TELEPHONE (OUTPATIENT)
Dept: FAMILY MEDICINE | Facility: CLINIC | Age: 70
End: 2025-08-01
Payer: MEDICARE

## 2025-08-01 DIAGNOSIS — S32.030D COMPRESSION FRACTURE OF L3 VERTEBRA WITH ROUTINE HEALING, SUBSEQUENT ENCOUNTER: ICD-10-CM

## 2025-08-01 RX ORDER — HYDROCODONE BITARTRATE AND ACETAMINOPHEN 7.5; 325 MG/1; MG/1
1 TABLET ORAL EVERY 4 HOURS PRN
Qty: 120 TABLET | Refills: 0 | Status: SHIPPED | OUTPATIENT
Start: 2025-08-01

## 2025-08-01 NOTE — TELEPHONE ENCOUNTER
Juan Diego Rondon is calling for medication refill.     Patient has contacted the pharmacy? Yes    Patient Advised: they will receive a call back.    Name of Medications (Have patient read from the bottle or snip from medication list): Hydrocodone    What is the dose of the medication: 7.5mg-325mg    How often do you take it: 3 daily    Are you having any trouble with the medication:no     How many doses do you have left: 16    Additional Information:     Patient's preferred pharmacy has been noted and populated. [facility]    Is it okay that the nurse communicates your response through [employer]? No      Caller has been advised that their call does not guarantee an immediate refill. This refill will be reviewed and if you do not hear from your provider it will be ready in 72 hours

## 2025-08-01 NOTE — TELEPHONE ENCOUNTER
Care Due:                  Date            Visit Type   Department     Provider  --------------------------------------------------------------------------------                              ESTABLISHED   [facility]  Last Visit: 05-      PATIENT      MEDICINE       [id number]  Next Visit: 12-      VISIT        MEDICINE       [facility]                                                            Last  Test          Frequency    Reason                     Performed    Due Date  --------------------------------------------------------------------------------  Office Visit  3 months...  HYDROcodone-acetaminophen  05- 08-    Lipid Panel.  12 months..  rosuvastatin.............  Not Found    Overdue    [employer] Embedded Care Due Messages. Reference number: [id number]. 8/01/2025 3:33:29 PM SARAH

## 2025-08-03 DIAGNOSIS — R11.0 NAUSEA: ICD-10-CM

## 2025-08-05 RX ORDER — ONDANSETRON 4 MG/1
4 TABLET, ORALLY DISINTEGRATING ORAL EVERY 8 HOURS PRN
Qty: 30 TABLET | Refills: 1 | Status: SHIPPED | OUTPATIENT
Start: 2025-08-05

## 2025-08-19 PROBLEM — M35.2 BEHCET'S DISEASE (CMS/HCC): Status: ACTIVE | Noted: 2025-08-19

## 2025-08-25 DIAGNOSIS — S32.030D COMPRESSION FRACTURE OF L3 VERTEBRA WITH ROUTINE HEALING, SUBSEQUENT ENCOUNTER: ICD-10-CM

## 2025-08-25 RX ORDER — HYDROCODONE BITARTRATE AND ACETAMINOPHEN 7.5; 325 MG/1; MG/1
1 TABLET ORAL EVERY 4 HOURS PRN
Qty: 120 TABLET | Refills: 0 | Status: SHIPPED | OUTPATIENT
Start: 2025-08-25

## (undated) DEVICE — SYRINGE 20CC LUER LOCK TIP STL MONOJECT

## (undated) DEVICE — DRAPE STERI SM

## (undated) DEVICE — BLADE SAGITTAL 18.0X1.27X90MM

## (undated) DEVICE — SUTURE VICRYL 2 TP-1 MS4 27IN VIOLET

## (undated) DEVICE — TRAY TOTAL JOINT CUSTOM SPRH CUSTOM PACK

## (undated) DEVICE — GARMENT CALF 18" MED GREEN VASOPRESS

## (undated) DEVICE — GLOVE SENSICARE SLT 7.0 SURG

## (undated) DEVICE — IRRIGATION WOUND IRRISEPT WOUND DEBRIDEMENT SYSTEM

## (undated) DEVICE — SOL LAC RING INJ 1000ML BAG USP VIAFLEX PLASTIC CONTAINER

## (undated) DEVICE — DRAPE C-ARM 3M 60X42

## (undated) DEVICE — SUTURE VICRYL 2-0 CT2 27" UNDYED

## (undated) DEVICE — SUTURE VICRYL 1 CT-2 27" VIOLET

## (undated) DEVICE — GLOVE SENSICARE SLT 8.0 SURG

## (undated) DEVICE — BLANKET WARMING UPPER BODY @ HYPOTHERMIA FORCED AIR

## (undated) DEVICE — GLOVE BIOGEL PI IND 6.5 SURGICAL

## (undated) DEVICE — SLEEVE SUCTION 165MM

## (undated) DEVICE — PAD BOVIE ADULT 9' CORD REM ELECTRODE PATIENT RETURN

## (undated) DEVICE — BAG DECANTER II

## (undated) DEVICE — BLADE CAUTERY

## (undated) DEVICE — CATH IV 18G 1 1/4" SAFETY

## (undated) DEVICE — Device

## (undated) DEVICE — GOWN SURG IMPERVIOUS XXL ASTOUND SMARTSLEEVE

## (undated) DEVICE — FORCEP BIOPSY RADIAL JAW 4

## (undated) DEVICE — GOWN SURG IMPERVIOUS XLONG XLG ASTOUND RAGLAN SLEEVE

## (undated) DEVICE — SUTURE VICRYL 1 CT

## (undated) DEVICE — STAPLER SKIN WIDE 35

## (undated) DEVICE — DRAPE IOBAN 2 60CM X 45CM @

## (undated) DEVICE — MANIFOLD 4 PORT NEPTUNE

## (undated) DEVICE — GLOVE SENSICARE SLT 7.5 SURG STL POWDER FREE

## (undated) DEVICE — SYSTEM POLAR CARE WAVE COLD THERAPY

## (undated) DEVICE — GLOVE SURG SZ 8 GREEN W/ ALOE VERA

## (undated) DEVICE — PAD HIP COLD COMPRESSION

## (undated) DEVICE — GLOVE SURG SENSICARE PI ORTHO 7.0 LF PF

## (undated) DEVICE — KIT ENDO PROCEDURE CARRY ON

## (undated) DEVICE — DRAPE HIP W POUCH STL TIBURON

## (undated) DEVICE — PREP SKIN CHLORAPREP ORNG 26ML STL

## (undated) DEVICE — DRESSING MEPILEX BORDER 4X10 FOAM POST OP

## (undated) DEVICE — CLOSURE SKIN DERMABOND 22CM PRINEO

## (undated) DEVICE — TUBING SUCTION 3/16"X10'

## (undated) DEVICE — SUTURE VICRYL 0 CT-2 J270H

## (undated) DEVICE — GLOVE BIOGEL PI INDICATOR SIZE 8.0 UNDERGLOVE 0

## (undated) DEVICE — GLOVE SENSICARE SLT 6.0 SURG

## (undated) DEVICE — BLOCK BITE 60F OMNIBLOC YLW YELLOW

## (undated) DEVICE — MASK CURAPLEX POM OXYGEN PAMORAMIC ELITE

## (undated) DEVICE — SUCTION YANKAUER BULB TIP W 1 PIECE HANDLE

## (undated) DEVICE — GLOVE BIOGEL PI IND SURGICAL SZ 7.5

## (undated) DEVICE — GLOVE SENSICARE ORTHO 8.0 SURG @

## (undated) DEVICE — DRAPE STERI U-DRAPE

## (undated) DEVICE — GLOVE BIOGEL PI IND 7.0 SURGICAL

## (undated) DEVICE — GOWN SURG IMPERVOUS ASTOUND LG SMARTSLEEVE

## (undated) DEVICE — GLOVE SENSICARE SLT 8.5 SURG @